# Patient Record
Sex: MALE | Race: WHITE | NOT HISPANIC OR LATINO | Employment: FULL TIME | URBAN - NONMETROPOLITAN AREA
[De-identification: names, ages, dates, MRNs, and addresses within clinical notes are randomized per-mention and may not be internally consistent; named-entity substitution may affect disease eponyms.]

---

## 2018-02-12 ENCOUNTER — OFFICE VISIT (OUTPATIENT)
Dept: FAMILY MEDICINE | Facility: OTHER | Age: 53
End: 2018-02-12
Attending: FAMILY MEDICINE
Payer: COMMERCIAL

## 2018-02-12 VITALS
OXYGEN SATURATION: 97 % | HEART RATE: 76 BPM | SYSTOLIC BLOOD PRESSURE: 116 MMHG | RESPIRATION RATE: 16 BRPM | TEMPERATURE: 98 F | DIASTOLIC BLOOD PRESSURE: 70 MMHG | HEIGHT: 74 IN | BODY MASS INDEX: 32.08 KG/M2 | WEIGHT: 250 LBS

## 2018-02-12 DIAGNOSIS — Z12.5 SCREENING FOR PROSTATE CANCER: ICD-10-CM

## 2018-02-12 DIAGNOSIS — E78.5 HYPERLIPIDEMIA, UNSPECIFIED HYPERLIPIDEMIA TYPE: ICD-10-CM

## 2018-02-12 DIAGNOSIS — K04.7 DENTAL INFECTION: ICD-10-CM

## 2018-02-12 DIAGNOSIS — K08.9 POOR DENTITION: ICD-10-CM

## 2018-02-12 DIAGNOSIS — Z13.220 LIPID SCREENING: ICD-10-CM

## 2018-02-12 DIAGNOSIS — F17.200 TOBACCO USE DISORDER: ICD-10-CM

## 2018-02-12 DIAGNOSIS — L72.9 SCALP CYST: ICD-10-CM

## 2018-02-12 DIAGNOSIS — Z76.89 ENCOUNTER TO ESTABLISH CARE WITH NEW DOCTOR: Primary | ICD-10-CM

## 2018-02-12 DIAGNOSIS — R22.1 MASS OF RIGHT SIDE OF NECK: ICD-10-CM

## 2018-02-12 DIAGNOSIS — E11.65 POORLY CONTROLLED TYPE 2 DIABETES MELLITUS (H): ICD-10-CM

## 2018-02-12 LAB
ALBUMIN SERPL-MCNC: 3.9 G/DL (ref 3.4–5)
ALP SERPL-CCNC: 65 U/L (ref 40–150)
ALT SERPL W P-5'-P-CCNC: 47 U/L (ref 0–70)
ANION GAP SERPL CALCULATED.3IONS-SCNC: 5 MMOL/L (ref 3–14)
AST SERPL W P-5'-P-CCNC: 27 U/L (ref 0–45)
BASOPHILS # BLD AUTO: 0.1 10E9/L (ref 0–0.2)
BASOPHILS NFR BLD AUTO: 1.2 %
BILIRUB SERPL-MCNC: 0.4 MG/DL (ref 0.2–1.3)
BUN SERPL-MCNC: 16 MG/DL (ref 7–30)
CALCIUM SERPL-MCNC: 8.3 MG/DL (ref 8.5–10.1)
CHLORIDE SERPL-SCNC: 106 MMOL/L (ref 94–109)
CHOLEST SERPL-MCNC: 208 MG/DL
CO2 SERPL-SCNC: 28 MMOL/L (ref 20–32)
CREAT SERPL-MCNC: 1.01 MG/DL (ref 0.66–1.25)
CREAT UR-MCNC: 305 MG/DL
DIFFERENTIAL METHOD BLD: NORMAL
EOSINOPHIL # BLD AUTO: 0.3 10E9/L (ref 0–0.7)
EOSINOPHIL NFR BLD AUTO: 4.2 %
ERYTHROCYTE [DISTWIDTH] IN BLOOD BY AUTOMATED COUNT: 12.8 % (ref 10–15)
EST. AVERAGE GLUCOSE BLD GHB EST-MCNC: 151 MG/DL
GFR SERPL CREATININE-BSD FRML MDRD: 78 ML/MIN/1.7M2
GLUCOSE SERPL-MCNC: 116 MG/DL (ref 70–99)
HBA1C MFR BLD: 6.9 % (ref 4.3–6)
HCT VFR BLD AUTO: 42 % (ref 40–53)
HDLC SERPL-MCNC: 35 MG/DL
HGB BLD-MCNC: 14.4 G/DL (ref 13.3–17.7)
IMM GRANULOCYTES # BLD: 0 10E9/L (ref 0–0.4)
IMM GRANULOCYTES NFR BLD: 0.4 %
LDLC SERPL CALC-MCNC: 110 MG/DL
LYMPHOCYTES # BLD AUTO: 2.1 10E9/L (ref 0.8–5.3)
LYMPHOCYTES NFR BLD AUTO: 30.6 %
MCH RBC QN AUTO: 29 PG (ref 26.5–33)
MCHC RBC AUTO-ENTMCNC: 34.3 G/DL (ref 31.5–36.5)
MCV RBC AUTO: 85 FL (ref 78–100)
MICROALBUMIN UR-MCNC: 48 MG/L
MICROALBUMIN/CREAT UR: 15.74 MG/G CR (ref 0–17)
MONOCYTES # BLD AUTO: 0.7 10E9/L (ref 0–1.3)
MONOCYTES NFR BLD AUTO: 9.8 %
NEUTROPHILS # BLD AUTO: 3.7 10E9/L (ref 1.6–8.3)
NEUTROPHILS NFR BLD AUTO: 53.8 %
NONHDLC SERPL-MCNC: 173 MG/DL
NRBC # BLD AUTO: 0 10*3/UL
NRBC BLD AUTO-RTO: 0 /100
PLATELET # BLD AUTO: 244 10E9/L (ref 150–450)
POTASSIUM SERPL-SCNC: 4 MMOL/L (ref 3.4–5.3)
PROT SERPL-MCNC: 8.2 G/DL (ref 6.8–8.8)
PSA SERPL-ACNC: 0.55 UG/L (ref 0–4)
RBC # BLD AUTO: 4.96 10E12/L (ref 4.4–5.9)
SODIUM SERPL-SCNC: 139 MMOL/L (ref 133–144)
TRIGL SERPL-MCNC: 313 MG/DL
TSH SERPL DL<=0.005 MIU/L-ACNC: 2.7 MU/L (ref 0.4–4)
WBC # BLD AUTO: 6.9 10E9/L (ref 4–11)

## 2018-02-12 PROCEDURE — 80061 LIPID PANEL: CPT | Performed by: FAMILY MEDICINE

## 2018-02-12 PROCEDURE — 82043 UR ALBUMIN QUANTITATIVE: CPT | Performed by: FAMILY MEDICINE

## 2018-02-12 PROCEDURE — 99204 OFFICE O/P NEW MOD 45 MIN: CPT | Performed by: FAMILY MEDICINE

## 2018-02-12 PROCEDURE — 36415 COLL VENOUS BLD VENIPUNCTURE: CPT | Performed by: FAMILY MEDICINE

## 2018-02-12 PROCEDURE — 83036 HEMOGLOBIN GLYCOSYLATED A1C: CPT | Performed by: FAMILY MEDICINE

## 2018-02-12 PROCEDURE — G0103 PSA SCREENING: HCPCS | Performed by: FAMILY MEDICINE

## 2018-02-12 PROCEDURE — 80050 GENERAL HEALTH PANEL: CPT | Performed by: FAMILY MEDICINE

## 2018-02-12 RX ORDER — CLINDAMYCIN HCL 300 MG
300 CAPSULE ORAL 3 TIMES DAILY
Qty: 21 CAPSULE | Refills: 0 | Status: SHIPPED | OUTPATIENT
Start: 2018-02-12 | End: 2018-02-19

## 2018-02-12 RX ORDER — ATORVASTATIN CALCIUM 20 MG/1
20 TABLET, FILM COATED ORAL DAILY
Qty: 90 TABLET | Refills: 3 | Status: SHIPPED | OUTPATIENT
Start: 2018-02-12 | End: 2018-12-07

## 2018-02-12 ASSESSMENT — PAIN SCALES - GENERAL: PAINLEVEL: NO PAIN (0)

## 2018-02-12 NOTE — PROGRESS NOTES
SUBJECTIVE:  Atul is a 52 year old male who comes in today for establish care visit.  He has not had medical care for over 10 years.      Diabetes - history of diabetes, diagnosed many years ago, around age 42.  Dad has diabetes.  Patient was having dry mouth, other symptoms, and went to be screened.  Glucose was 600.  Was treated with Metformin.  Made lifestyle changes, lost weight, and was able to stop the Metformin.  Now, quit smoking in September and has gained 30 pounds since.  Blood sugars are coming up again, 220 in the morning, 300 at times.  Has noted some tingling in the toes.      Scalp cyst - present for years, enlarging, more noticeable with hair loss.  Would like it excised.    Dental infection - has appointment with dentist next month.  Notes lack of care over years, dental pain, and now swelling in neck/jaw region.  No fevers.      Current Outpatient Prescriptions   Medication     clindamycin (CLEOCIN) 300 MG capsule     No current facility-administered medications for this visit.         Allergies   Allergen Reactions     Animal Dander      Nasal congestion     Dust Mites      Nasal congestion       Past Medical History:   Diagnosis Date     Congenital hiatus hernia      Diabetes (H)      History reviewed. No pertinent surgical history.  Social History     Social History     Marital status:      Spouse name: N/A     Number of children: N/A     Years of education: N/A     Occupational History     Not on file.     Social History Main Topics     Smoking status: Former Smoker     Quit date: 9/12/2017     Smokeless tobacco: Never Used     Alcohol use Not on file     Drug use: Not on file     Sexual activity: Not on file     Other Topics Concern     Not on file     Social History Narrative     Family History   Problem Relation Age of Onset     DIABETES Father      CEREBROVASCULAR DISEASE Father        ROS:  General: positive for weight gain, negative for, fever, chills  Skin: negative for,  "rash  Eyes: negative for, visual blurring, double vision  ENT: positive for as above, swollen glands, dental pain, infection, negative for, sore throat  Resp: No shortness of breath and No cough  CV: negative for, palpitations, chest pain, lower extremity edema and syncope or near-syncope  GI: poor appetite, nausea, vomiting, abdominal pain, melena, hematochezia, constipation and diarrhea  : negative for, dysuria and hematuria  Neurologic: positive for numbness or tingling of feet, negative for, local weakness and numbness or tingling of hands  Psychiatric: negative for, anxiety and depression  Endocrine: positive for as above and diabetes  Patient declined JOSELINE/PHQ.    OBJECTIVE:  Vitals:    02/12/18 0936   BP: 116/70   BP Location: Left arm   Patient Position: Sitting   Cuff Size: Adult Large   Pulse: 76   Resp: 16   Temp: 98  F (36.7  C)   TempSrc: Tympanic   SpO2: 97%   Weight: 250 lb (113.4 kg)   Height: 6' 2\" (1.88 m)     GENERAL APPEARANCE: alert, no distress, cooperative and over weight  NECK: right anterior cervical vs submandibular mass, soft, fairly well circumscribed; non-tender  RESP: lungs clear to auscultation - no rales, rhonchi or wheezes  CV: regular rates and rhythm, normal S1 S2, no S3 or S4 and no murmur, click or rub -  ABDOMEN:  soft, nontender, no HSM or masses and bowel sounds normal  MS: extremities normal- no gross deformities noted and peripheral pulses diminished; normal monofilament exam  SKIN: no suspicious lesions or rashes  NEURO: Normal strength and tone, sensory exam grossly normal, mentation intact and speech normal  PSYCH: mentation appears normal. and affect normal/bright    ASSESSMENT/ORDERS:    ICD-10-CM    1. Encounter to establish care with new doctor Z76.89    2. Tobacco use disorder F17.200    3. Poorly controlled type 2 diabetes mellitus (H) E11.65 DIABETES EDUCATION REFERRAL ([place])     OPHTHALMOLOGY ADULT REFERRAL     Lipid Profile (Chol, Trig, HDL, LDL calc)     " Hemoglobin A1c     CBC with platelets and differential     Comprehensive metabolic panel     TSH with free T4 reflex     Albumin Random Urine Quantitative with Creat Ratio   4. Lipid screening Z13.220    5. Screening for prostate cancer Z12.5 PSA, screen   6. Poor dentition K08.9    7. Dental infection K04.7 clindamycin (CLEOCIN) 300 MG capsule     CBC with platelets and differential   8. Scalp cyst L72.9 GENERAL SURG ADULT REFERRAL   9. Mass of right side of neck R22.1 CBC with platelets and differential     PLAN:  New patient today.  Lots of things to get updated on regarding health care maintenance.  Patient prefers to address diabetes today and follow up.  See below.    Patient Instructions   Will call with lab results.    Can then restart diabetic medications.    Referral to DR[facility] and for annual eye exam.  Foot exam done today.  Declined vaccines - pneumonia and flu.  Tetanus up to date.  Complete antibiotic course for dental infection.  Keep follow up dental exam next month.  If right neck mass does not resolve with treatment, needs follow up - possible CT of neck and/or ENT consultation.    Colon screen declined - stool guaiac or colonoscopy.  Referral to surgeon for scalp cyst excision.  Close follow up.          Zoraida Bahena

## 2018-02-12 NOTE — NURSING NOTE
"Chief Complaint   Patient presents with     Diabetes     Took metformin in the past and ended up not needing it but thinks he may need it now.  Patient reports blood sugars are a littler higher these days.     Dental Problem     States he doesn't really have any pain but a great deal of swelling in his mouth-R side.  Requesting an antibiotic.       Initial /70 (BP Location: Left arm, Patient Position: Sitting, Cuff Size: Adult Large)  Pulse 76  Temp 98  F (36.7  C) (Tympanic)  Resp 16  Ht 6' 2\" (1.88 m)  Wt 250 lb (113.4 kg)  SpO2 97%  BMI 32.1 kg/m2 Estimated body mass index is 32.1 kg/(m^2) as calculated from the following:    Height as of this encounter: 6' 2\" (1.88 m).    Weight as of this encounter: 250 lb (113.4 kg).  Medication Reconciliation: complete     Cynthia Maxwell      "

## 2018-02-12 NOTE — MR AVS SNAPSHOT
After Visit Summary   2/12/2018    Atul Lam    MRN: [id number]           Patient Information     Date Of Birth          1965        Visit Information        Provider Department      2/12/2018 9:30 AM Zoraida Pham MD [facility]        Today's Diagnoses     Encounter to establish care with new doctor    -  1    Tobacco use disorder        Poorly controlled type 2 diabetes mellitus (H)        Lipid screening        Screening for prostate cancer        Poor dentition        Dental infection        Scalp cyst        Mass of right side of neck          Care Instructions    Will call with lab results.    Can then restart diabetic medications.    Referral to DRC and for annual eye exam.  Foot exam done today.  Declined vaccines - pneumonia and flu.  Tetanus up to date.  Complete antibiotic course for dental infection.  Keep follow up dental exam next month.  If right neck mass does not resolve with treatment, needs follow up - possible CT of neck and/or ENT consultation.    Colon screen declined - stool guaiac or colonoscopy.  Referral to surgeon for scalp cyst excision.  Close follow up.            Follow-ups after your visit        Additional Services     DIABETES EDUCATION REFERRAL ([place])       DIABETES SELF-MANAGEMENT TRAINING (DSMT)  Type of training and number of hours requested:  Initial Group DMST; 10    (Medicare will cover:10 hours initial DSMT in 12-month period, plus 2 hours follow-up DSMT annually)    Please add if the patient has special educational need: None  (Patients with special needs requiring individual DSMT)    Please include the following DMST content: All ten content areas, as appropriate    Patient has the following:none    Please begin Medical Nutritional Therapy.  Initial Medical Nutritional Therapy (MNT)  (Medcare allows 3 hours initial MNT in the first calendar year, plus two hours follow up MNT annually.  Additional MNT hours are available for  change in medical condition treatment and/or diagnosis)    Additional Services Provided:  >>A1c will be completed upon referral and completion of program unless completed in clinic.  >>Influenza vaccination assessment (form #N228) as applicable.  >>Order for diabetes supplies will be faxed to patient's pharmacy.  >>If on insulin: Insulin dose adjustment per staged Diabetes Mgmt. Protocols    DIABETES RESOURCE CENTER  [facility]  Telephone:  [phone]   Fax:  [phone]            GENERAL SURG ADULT REFERRAL       Your provider has referred you to: N: [facility] [phone]   [email or web address]    Please be aware that coverage of these services is subject to the terms and limitations of your health insurance plan.  Call member services at your health plan with any benefit or coverage questions.      Please bring the following with you to your appointment:    (1) Any X-Rays, CTs or MRIs which have been performed.  Contact the facility where they were done to arrange for  prior to your scheduled appointment.   (2) List of current medications   (3) This referral request   (4) Any documents/labs given to you for this referral            OPHTHALMOLOGY ADULT REFERRAL       Your provider has referred you to: [facility]    Please be aware that coverage of these services is subject to the terms and limitations of your health insurance plan.  Call member services at your health plan with any benefit or coverage questions.      Please bring the following with you to your appointment:    (1) Any X-Rays, CTs or MRIs which have been performed.  Contact the facility where they were done to arrange for  prior to your scheduled appointment.    (2) List of current medications  (3) This referral request   (4) Any documents/labs given to you for this referral                  Who to contact     If you have questions  "or need follow up information about today's clinic visit or your schedule please contact [facility] directly at [phone].  Normal or non-critical lab and imaging results will be communicated to you by Ingeniatricshart, letter or phone within 4 business days after the clinic has received the results. If you do not hear from us within 7 days, please contact the clinic through Ingeniatricshart or phone. If you have a critical or abnormal lab result, we will notify you by phone as soon as possible.  Submit refill requests through Onepager or call your pharmacy and they will forward the refill request to us. Please allow 3 business days for your refill to be completed.          Additional Information About Your Visit        IngeniatricsharPortal Profes Information     Onepager lets you send messages to your doctor, view your test results, renew your prescriptions, schedule appointments and more. To sign up, go to [email or web address] . Click on \"Log in\" on the left side of the screen, which will take you to the Welcome page. Then click on \"Sign up Now\" on the right side of the page.     You will be asked to enter the access code listed below, as well as some personal information. Please follow the directions to create your username and password.     Your access code is: [email or web address]  Expires: 2018 10:24 AM     Your access code will  in 90 days. If you need help or a new code, please call your [facility] clinic or [phone].        Care EveryWhere ID     This is your Care EveryWhere ID. This could be used by other organizations to access your [facility] medical records  [id number]        Your Vitals Were     Pulse Temperature Respirations Height Pulse Oximetry BMI (Body Mass Index)    76 98  F (36.7  C) (Tympanic) 16 6' 2\" (1.88 m) 97% 32.1 kg/m2       Blood Pressure from Last 3 Encounters:   18 116/70    Weight from Last 3 Encounters:   18 250 lb (113.4 kg)              We Performed the Following     Albumin Random " Urine Quantitative with Creat Ratio     CBC with platelets and differential     Comprehensive metabolic panel     DIABETES EDUCATION REFERRAL ([place])     GENERAL SURG ADULT REFERRAL     Hemoglobin A1c     Lipid Profile (Chol, Trig, HDL, LDL calc)     OPHTHALMOLOGY ADULT REFERRAL     PSA, screen     TSH with free T4 reflex          Today's Medication Changes          These changes are accurate as of 2/12/18 10:24 AM.  If you have any questions, ask your nurse or doctor.               Start taking these medicines.        Dose/Directions    clindamycin 300 MG capsule   Commonly known as:  CLEOCIN   Used for:  Dental infection   Started by:  Zoraida Pham MD        Dose:  300 mg   Take 1 capsule (300 mg) by mouth 3 times daily for 7 days   Quantity:  21 capsule   Refills:  0            Where to get your medicines      These medications were sent to [id number] - [place], [place] - [address]   [id number] , [place] [address]     Phone:  [phone]     clindamycin 300 MG capsule                Primary Care Provider Office Phone # Fax #    [facility] [phone] [address] [facility] [address]        Equal Access to Services     [facility] AH: Hadii aad ku hadasho Soomaali, waaxda luqadaha, qaybta kaalmada adeegyada, waxay idiin hayaan karely richardson . So [facility] [phone].    ATENCIÓN: Si habla español, tiene a saunders disposición servicios gratuitos de asistencia lingüística. Llame al [phone].    We comply with applicable federal civil rights laws and [place] laws. We do not discriminate on the basis of race, color, national origin, age, disability, sex, sexual orientation, or gender identity.            Thank you!     Thank you for choosing [facility]  for your care. Our goal is always to provide you with excellent care. Hearing back from our patients is one way we can continue to improve our services. Please take a few minutes to complete the  written survey that you may receive in the mail after your visit with us. Thank you!             Your Updated Medication List - Protect others around you: Learn how to safely use, store and throw away your medicines at www.disposemymeds.org.          This list is accurate as of 2/12/18 10:24 AM.  Always use your most recent med list.                   Brand Name Dispense Instructions for use Diagnosis    clindamycin 300 MG capsule    CLEOCIN    21 capsule    Take 1 capsule (300 mg) by mouth 3 times daily for 7 days    Dental infection

## 2018-02-12 NOTE — PATIENT INSTRUCTIONS
Will call with lab results.    Can then restart diabetic medications.    Referral to DRC and for annual eye exam.  Foot exam done today.  Declined vaccines - pneumonia and flu.  Tetanus up to date.  Complete antibiotic course for dental infection.  Keep follow up dental exam next month.  If right neck mass does not resolve with treatment, needs follow up - possible CT of neck and/or ENT consultation.    Colon screen declined - stool guaiac or colonoscopy.  Referral to surgeon for scalp cyst excision.  Close follow up.

## 2018-02-13 ENCOUNTER — HOSPITAL ENCOUNTER (OUTPATIENT)
Dept: EDUCATION SERVICES | Facility: HOSPITAL | Age: 53
Discharge: HOME | End: 2018-02-13
Attending: FAMILY MEDICINE | Admitting: FAMILY MEDICINE
Payer: COMMERCIAL

## 2018-02-13 ENCOUNTER — TELEPHONE (OUTPATIENT)
Dept: EDUCATION SERVICES | Facility: HOSPITAL | Age: 53
End: 2018-02-13

## 2018-02-13 VITALS
WEIGHT: 250.9 LBS | DIASTOLIC BLOOD PRESSURE: 82 MMHG | OXYGEN SATURATION: 98 % | TEMPERATURE: 96.3 F | HEART RATE: 83 BPM | BODY MASS INDEX: 32.21 KG/M2 | SYSTOLIC BLOOD PRESSURE: 129 MMHG

## 2018-02-13 DIAGNOSIS — E11.9 TYPE 2 DIABETES MELLITUS WITHOUT COMPLICATION, WITHOUT LONG-TERM CURRENT USE OF INSULIN (H): Primary | ICD-10-CM

## 2018-02-13 PROCEDURE — 97802 MEDICAL NUTRITION INDIV IN: CPT | Performed by: DIETITIAN, REGISTERED

## 2018-02-13 RX ORDER — METFORMIN HCL 500 MG
500 TABLET, EXTENDED RELEASE 24 HR ORAL 2 TIMES DAILY WITH MEALS
Qty: 60 TABLET | Refills: 3 | Status: SHIPPED | OUTPATIENT
Start: 2018-02-13 | End: 2018-04-04

## 2018-02-13 ASSESSMENT — ANXIETY QUESTIONNAIRES
2. NOT BEING ABLE TO STOP OR CONTROL WORRYING: NOT AT ALL
3. WORRYING TOO MUCH ABOUT DIFFERENT THINGS: NOT AT ALL
1. FEELING NERVOUS, ANXIOUS, OR ON EDGE: NOT AT ALL
IF YOU CHECKED OFF ANY PROBLEMS ON THIS QUESTIONNAIRE, HOW DIFFICULT HAVE THESE PROBLEMS MADE IT FOR YOU TO DO YOUR WORK, TAKE CARE OF THINGS AT HOME, OR GET ALONG WITH OTHER PEOPLE: NOT DIFFICULT AT ALL
7. FEELING AFRAID AS IF SOMETHING AWFUL MIGHT HAPPEN: NOT AT ALL
5. BEING SO RESTLESS THAT IT IS HARD TO SIT STILL: NOT AT ALL
6. BECOMING EASILY ANNOYED OR IRRITABLE: NOT AT ALL
GAD7 TOTAL SCORE: 0

## 2018-02-13 ASSESSMENT — PAIN SCALES - GENERAL: PAINLEVEL: NO PAIN (0)

## 2018-02-13 ASSESSMENT — PATIENT HEALTH QUESTIONNAIRE - PHQ9: 5. POOR APPETITE OR OVEREATING: NOT AT ALL

## 2018-02-13 NOTE — PROGRESS NOTES
Patient here for annual review.  Pt was dx in approximately .  He has had no insurance recently so has been using a meter with  test strips and has been taking some old Metformin he had left over randomly.  His wife is here with him today and she appears supportive.      BG readings as follows: Pt has not been testing consistently and strips are .      Lab Results   Component Value Date    A1C 6.9 2018     Blood pressure 129/82, pulse 83, temperature 96.3  F (35.7  C), temperature source Tympanic, weight 113.8 kg (250 lb 14.4 oz), SpO2 98 %.  Pt states weight is up 30# since he quit smoking in 2017.      Current diabetes medications: none - taking some old Metformin he had inconsistently.      Readiness to learn: willing.     Barriers to learning: none.      Pt works 12-14 hours/day 5 days per week as a .  No routine exercise.  Verbal diet recall obtained.    Breakfast-3 peanut butter toast with coffee/vanilla creamer or 3 waffles with sugar free syrup  Pt packs 2 sandwiches and 2 pieces of fruit along with flavored water which he eats throughout the day.    Pt gets home at midnight and this is when he eats many snacks - ice cream, chocolate.     Provided pt with written educational materials regarding carbohydrate counting.  Reviewed carbohydrate limits, portion control, label reading.  Encouraged pt to try to consume foods with minimal carbohydrates when he gets home late at night and goes to bed shortly after eating.      Patient actively participated and demonstrated adequate understanding of topics discussed.     Topics covered: reviewed blood glucose/A1c targets, testing times, problem solving techniques, discussed risks and reduction of complications related to diabetes and co-morbidities, reviewed diabetes check-list, foot care, sick day management, stress & depression risks and management, managing a chronic disease, quality check of meter, review medications,  meal planning, benefits of exercise and importance of diabetes self-management.     Eye exam is due.  Pt encouraged to schedule.  Foot exam 2/12/2018.  Pt stopped smoking in September 2017.      Supplies for pt's current meter are not covered.  Provided pt with a new [employer] Contour Next EZ meter and instructed him on use.  Pt was able to perform test with minimal cueing.  Glucose level was 155 - 1.25 hours post prandial.      Plan: Follow carbohydrate counting meal plan provided.  Try to get some routine exercise.  Test glucose 2x/day - fasting and 2 hours after a meal.  Request sent to provider to begin Metformin  mg and titrate up as needed.      Follow up: 1 month.      Time spent with patient: 45 minutes.      HUMA Marino, CDE

## 2018-02-13 NOTE — DISCHARGE INSTRUCTIONS
-Follow carbohydrate counting meal plan.  Try to limit carbohydrates before bed.    -Be as active as you are able.  Exercise goal is 30 minutes most days of the week.   -Test your glucose 2x/day - fasting and 2 hours after a meal.   -Target levels are fasting , 2 hours after a meal less than 180.   -I will call you regarding Metformin.   -Weight today was 250.4#.  Optimal weight loss is 1# per week.   -Follow up in 1 month.  Bring your meter.   -Call with any questions.  HUMA Marino, [phone].

## 2018-02-13 NOTE — TELEPHONE ENCOUNTER
Pt was here today for diabetes visit.  He has been taking some old Metformin he had randomly.  Okay to begin Metformin  mg and titrate dose as needed?  Thanks!

## 2018-02-13 NOTE — TELEPHONE ENCOUNTER
Placed order for Metformin ER to patient's pharmacy.  Pt will begin 500 mg with dinner x 1 week and then increase to 500 mg bid on week 2.   I will call him after week two and we will titrate dose further if needed.

## 2018-02-13 NOTE — NURSING NOTE
"Chief Complaint   Patient presents with     Diabetes     annual review        Initial /82 (BP Location: Left arm, Patient Position: Chair, Cuff Size: Adult Large)  Pulse 83  Temp 96.3  F (35.7  C) (Tympanic)  Wt 113.8 kg (250 lb 14.4 oz)  SpO2 98%  BMI 32.21 kg/m2 Estimated body mass index is 32.21 kg/(m^2) as calculated from the following:    Height as of 2/12/18: 1.88 m (6' 2\").    Weight as of this encounter: 113.8 kg (250 lb 14.4 oz).  Medication Reconciliation: complete   Irasema Moody CMA(AAMA)     "

## 2018-02-13 NOTE — IP AVS SNAPSHOT
MRN:[id number]                      After Visit Summary   2/13/2018    Atul Lam    MRN: [id number]           Thank you!     Thank you for choosing [facility] for your care. Our goal is always to provide you with excellent care. Hearing back from our patients is one way we can continue to improve our services. Please take a few minutes to complete the written survey that you may receive in the mail after you visit with us. Thank you!        Patient Information     Date Of Birth          1965        About your hospital stay     You were admitted on:  February 13, 2018 You last received care in the:  [facility]    You were discharged on:  February 13, 2018       Who to Call     For medical emergencies, please call 911.  For non-urgent questions about your medical care, please call your primary care provider or clinic, [phone]          Attending Provider     Provider Specialty    Zoraida Pham MD Family Practice       Primary Care Provider Office Phone # Fax #    Sam Michaels [phone]      Further instructions from your care team       -Follow carbohydrate counting meal plan.  Try to limit carbohydrates before bed.    -Be as active as you are able.  Exercise goal is 30 minutes most days of the week.   -Test your glucose 2x/day - fasting and 2 hours after a meal.   -Target levels are fasting , 2 hours after a meal less than 180.   -I will call you regarding Metformin.   -Weight today was 250.4#.  Optimal weight loss is 1# per week.   -Follow up in 1 month.  Bring your meter.   -Call with any questions.  HUMA Marino, E [phone].     Pending Results     No orders found from 2/11/2018 to 2/14/2018.            Admission Information     Date & Time Provider Department Dept. Phone    2/13/2018 Zoraida Pham MD [facility] [phone]      Your Vitals Were     Blood Pressure Pulse Temperature    129/82 (BP Location:  "Left arm, Patient Position: Chair, Cuff Size: Adult Large) 83 96.3  F (35.7  C) (Tympanic)    Weight Pulse Oximetry BMI (Body Mass Index)    113.8 kg (250 lb 14.4 oz) 98% 32.21 kg/m2      [employer] Information     [employer] lets you send messages to your doctor, view your test results, renew your prescriptions, schedule appointments and more. To sign up, go to [email or web address] . Click on \"Log in\" on the left side of the screen, which will take you to the Welcome page. Then click on \"Sign up Now\" on the right side of the page.     You will be asked to enter the access code listed below, as well as some personal information. Please follow the directions to create your username and password.     Your access code is: [email or web address]  Expires: 2018 10:24 AM     Your access code will  in 90 days. If you need help or a new code, please call your [facility] clinic or [phone].        Care EveryWhere ID     This is your Care EveryWhere ID. This could be used by other organizations to access your [facility] medical records  [id number]        Equal Access to Services     JOSE EVANS AH: Vi Olivares, drew mas, qajosafat kaalmada ira, jennifer st. So [facility] [phone].    ATENCIÓN: Si habla español, tiene a saunders disposición servicios gratuitos de asistencia lingüística. Llame al [phone].    We comply with applicable federal civil rights laws and [place] laws. We do not discriminate on the basis of race, color, national origin, age, disability, sex, sexual orientation, or gender identity.               Review of your medicines      UNREVIEWED medicines. Ask your doctor about these medicines        Dose / Directions    atorvastatin 20 MG tablet   Commonly known as:  LIPITOR   Used for:  Hyperlipidemia, unspecified hyperlipidemia type, Poorly controlled type 2 diabetes mellitus (H)        Dose:  20 mg   Take 1 tablet (20 mg) by mouth daily   Quantity:  90 " tablet   Refills:  3       clindamycin 300 MG capsule   Commonly known as:  CLEOCIN   Used for:  Dental infection        Dose:  300 mg   Take 1 capsule (300 mg) by mouth 3 times daily for 7 days   Quantity:  21 capsule   Refills:  0                Protect others around you: Learn how to safely use, store and throw away your medicines at www.disposemymeds.org.             Medication List: This is a list of all your medications and when to take them. Check marks below indicate your daily home schedule. Keep this list as a reference.      Medications           Morning Afternoon Evening Bedtime As Needed    atorvastatin 20 MG tablet   Commonly known as:  LIPITOR   Take 1 tablet (20 mg) by mouth daily                                clindamycin 300 MG capsule   Commonly known as:  CLEOCIN   Take 1 capsule (300 mg) by mouth 3 times daily for 7 days

## 2018-02-13 NOTE — IP AVS SNAPSHOT
[facility]    [address] [place] [address]    Phone:  [phone]    Fax:  [phone]                                       After Visit Summary   2/13/2018    Atul Lam    MRN: [id number]           After Visit Summary Signature Page     I have received my discharge instructions, and my questions have been answered. I have discussed any challenges I see with this plan with the nurse or doctor.    ..........................................................................................................................................  Patient/Patient Representative Signature      ..........................................................................................................................................  Patient Representative Print Name and Relationship to Patient    ..................................................               ................................................  Date                                            Time    ..........................................................................................................................................  Reviewed by Signature/Title    ...................................................              ..............................................  Date                                                            Time

## 2018-02-14 ASSESSMENT — ANXIETY QUESTIONNAIRES: GAD7 TOTAL SCORE: 0

## 2018-02-14 ASSESSMENT — PATIENT HEALTH QUESTIONNAIRE - PHQ9: SUM OF ALL RESPONSES TO PHQ QUESTIONS 1-9: 0

## 2018-03-07 ENCOUNTER — OFFICE VISIT (OUTPATIENT)
Dept: SURGERY | Facility: OTHER | Age: 53
End: 2018-03-07
Attending: FAMILY MEDICINE
Payer: COMMERCIAL

## 2018-03-07 VITALS
TEMPERATURE: 97 F | WEIGHT: 250 LBS | RESPIRATION RATE: 18 BRPM | HEIGHT: 74 IN | HEART RATE: 72 BPM | BODY MASS INDEX: 32.08 KG/M2 | DIASTOLIC BLOOD PRESSURE: 64 MMHG | SYSTOLIC BLOOD PRESSURE: 118 MMHG

## 2018-03-07 DIAGNOSIS — R22.1 LOCALIZED SWELLING, MASS AND LUMP, NECK: Primary | ICD-10-CM

## 2018-03-07 DIAGNOSIS — L72.9 SCALP CYST: ICD-10-CM

## 2018-03-07 PROCEDURE — 88305 TISSUE EXAM BY PATHOLOGIST: CPT | Mod: TC | Performed by: SURGERY

## 2018-03-07 PROCEDURE — 11422 EXC H-F-NK-SP B9+MARG 1.1-2: CPT | Performed by: SURGERY

## 2018-03-07 ASSESSMENT — PAIN SCALES - GENERAL: PAINLEVEL: NO PAIN (0)

## 2018-03-07 NOTE — NURSING NOTE
"Chief Complaint   Patient presents with     Consult     Cyst removal from top of scalp.       Initial /64 (BP Location: Right arm, Patient Position: Sitting, Cuff Size: Adult Large)  Pulse 72  Temp 97  F (36.1  C) (Tympanic)  Resp 18  Ht 6' 2\" (1.88 m)  Wt 250 lb (113.4 kg)  BMI 32.1 kg/m2 Estimated body mass index is 32.1 kg/(m^2) as calculated from the following:    Height as of this encounter: 6' 2\" (1.88 m).    Weight as of this encounter: 250 lb (113.4 kg).  Medication Reconciliation: complete   Jessenia Landaverde LPN      "

## 2018-03-07 NOTE — PROGRESS NOTES
"[facility] Surgery Consultation    CC:  Scalp lesion, and right neck swelling     HPI:  This 52 year old year old male is seen at the request of Dr. Pham for evaluation of scalp lesion, he comes in with years of a scalp lesion that will swell occasionally become inflamed and drain. It has become more pronounced since he has lost his hair. He would like it removed.     As a side he has noted right submandibular swelling for the last year. He recently completed a course of antibiotics that he believes helped with the swelling. He denies that it is painful and will get bigger and smaller at times but has never gone away. He used to smoke but quit 6 months ago. Still chews tobacco, has poor dentition planning on having a few teeth removed next month.     Past Medical History:   Diagnosis Date     Congenital hiatus hernia      Diabetes (H)        No past surgical history on file.    Allergies   Allergen Reactions     Animal Dander      Nasal congestion     Dust Mites      Nasal congestion       Current Outpatient Prescriptions   Medication     blood glucose monitoring (BELL CONTOUR NEXT) test strip     blood glucose monitoring (BELL MICROLET) lancets     metFORMIN (GLUCOPHAGE-XR) 500 MG 24 hr tablet     atorvastatin (LIPITOR) 20 MG tablet     No current facility-administered medications for this visit.        HABITS:    Social History   Substance Use Topics     Smoking status: Former Smoker     Quit date: 9/12/2017     Smokeless tobacco: Never Used     Alcohol use Yes       Family History   Problem Relation Age of Onset     DIABETES Father      CEREBROVASCULAR DISEASE Father        REVIEW OF SYSTEMS:  Ten point review of systems negative except those mentioned in the HPI.     OBJECTIVE:    /64 (BP Location: Right arm, Patient Position: Sitting, Cuff Size: Adult Large)  Pulse 72  Temp 97  F (36.1  C) (Tympanic)  Resp 18  Ht 6' 2\" (1.88 m)  Wt 250 lb (113.4 kg)  BMI 32.1 kg/m2    GENERAL: Generally " appears well, in no distress with appropriate affect.  HEENT:   Sclerae anicteric - no visible lesions on tongue base or buccal mucosa, no mass of parotid, right submandibular swelling with a non-tender mass felt, tonsils absent no cervical anterior or posterior adenopathy.   There is a 2 cm scalp lesion mobile non ulcerated.      Respiratory:  No acute distress, no splinting   Cardiovascular:  Regular Rate and Rhythm  :  deferred  Extremities:  Extremities normal. No deformities, edema, or skin discoloration.  Skin:  no suspicious lesions or rashes  Neurological: grossly intact  Psych:  Alert, oriented, affect appropriate with normal decision making ability.    IMPRESSION:    Scalp cyst history and exam consistent with sebaceous cyst.     Right submandibular swelling- somewhat concerning time course for an infectious process, Non tender- will get US of neck and likely biopsy of lesion, differential included chronic inflammation from tooth infection, submandibular tumor, or clogged salivary duct. No masses identified as possible primary if malignancy on oral exam.     Offered to schedule colonoscopy he declined.     PLAN:    Excise head lesion   US with likely biopsy of right submandibular mass.     Jordan Page MD,     3/7/2018  9:47 AM    [facility] Surgery  Minor Procedure Note    Preoperative diagnosis:  Scalp cyst    Postoperative diagnosis:  Same     Procedure:  Excision of scalp cyst    Anesthesia:  Local    History: See above     Findings:  1.5 cm scalp cyst       Tissue to pathology:    See above     Details:  Pre procedure samina was placed and the requisite time out pause observed during which the patient confirmed their identity, date of birth, side and site of the requested excision.  The region was prepped and draped sterilely; local anesthesia was obtained with infiltration of 5 cc of 2% lidocaine.  A small elliptical  incision was made over the presenting portion of the mass and  carried through full thickness skin.  Using combination of blunt and sharp dissection the mass was delivered through the incision.  The wound was closed with interrupted 3-0 nylon sutures. The patient was observed in the treatment room for 15 minutes following the procedure without sequelae.  The procedure was well tolerated and the patient was discharged with wound care instructions and followup appointment.       Jordan Page

## 2018-03-07 NOTE — PATIENT INSTRUCTIONS
Thank you for allowing Dr. Page and our surgical team to participate in your care.  If you have a scheduling or an appointment question please contact Li our Health Unit Coordinator at her direct line [phone].   ALL nursing questions or concerns can be directed to your surgical nurse at: [phone] for Ying.      POST PROCEDURE INSTRUCTIONS    Leave your dressing on for 48 hours, remove dressing    Do not soak and scrub the area. Let the soapy water run over the area.     Keep incision clean and dry   Do NOT soak in water such as a tub bath or swimming   Do NOT put make-up, deodorant, powders, hairspray, lotions, etc on the incision    Cover with a clean dressing daily or when wet/soiled    If you have steri-strips, these will fall off on their own in 7 days. If they are still adhered after 7 days, you may remove them by pulling gently.     Do NOT use aspirin/NSAIDS (Motrin, Ibuprofen, Aleve, etc..) for 7 days    You can use acetaminophen(Tylenol) or the prescription you received for pain.       If you have any bleeding, cover the wound with clean gauze and hold pressure for 10 Minutes. If the bleeding does not stop or is heavy and profuse, call the clinic or go to the Urgent Care/Emergency Department.      SIGNS OF INFECTION ARE:    Redness, swelling, red streaks, pus, drainage, warmth, fever, increased pain, foul smell.     Contact your health care provider if you notice any of the warning signs.     FOLLOW - UP    Follow-up in clinic with Dr. Page on 03/16/2018 for suture removal.     Pathology results will also be discussed at that time.

## 2018-03-07 NOTE — MR AVS SNAPSHOT
After Visit Summary   3/7/2018    Atul Lam    MRN: [id number]           Patient Information     Date Of Birth          1965        Visit Information        Provider Department      3/7/2018 9:00 AM Jordan Page MD [facility]        Today's Diagnoses     Scalp cyst          Care Instructions    Thank you for allowing Dr. Page and our surgical team to participate in your care.  If you have a scheduling or an appointment question please contact Li [facility] Health Unit Coordinator at her direct line [phone].   ALL nursing questions or concerns can be directed to your surgical nurse at: [phone] for Ying.      POST PROCEDURE INSTRUCTIONS    Leave your dressing on for 48 hours, remove dressing    Do not soak and scrub the area. Let the soapy water run over the area.     Keep incision clean and dry   Do NOT soak in water such as a tub bath or swimming   Do NOT put make-up, deodorant, powders, hairspray, lotions, etc on the incision    Cover with a clean dressing daily or when wet/soiled    If you have steri-strips, these will fall off on their own in 7 days. If they are still adhered after 7 days, you may remove them by pulling gently.     Do NOT use aspirin/NSAIDS (Motrin, Ibuprofen, Aleve, etc..) for 7 days    You can use acetaminophen(Tylenol) or the prescription you received for pain.       If you have any bleeding, cover the wound with clean gauze and hold pressure for 10 Minutes. If the bleeding does not stop or is heavy and profuse, call the clinic or go to the Urgent Care/Emergency Department.      SIGNS OF INFECTION ARE:    Redness, swelling, red streaks, pus, drainage, warmth, fever, increased pain, foul smell.     Contact your health care provider if you notice any of the warning signs.     FOLLOW - UP    Follow-up in clinic with Dr. Page on 03/16/2018 for suture removal.     Pathology results will also be discussed at that time.             Follow-ups after  "your visit        Your next 10 appointments already scheduled     Mar 16, 2018  2:00 PM CDT   (Arrive by 1:45 PM)   Nurse Only with HC SURG NURSE   [facility] ([facility] )    [address] [address] [phone]            Mar 16, 2018  2:30 PM CDT   (Arrive by 2:15 PM)   Return Visit with Chica Guzman RD   [facility] ([facility] )    [address] [place] [id number] [phone]              Who to contact     If you have questions or need follow up information about today's clinic visit or your schedule please contact [facility] directly at [phone].  Normal or non-critical lab and imaging results will be communicated to you by [employer], letter or phone within 4 business days after the clinic has received the results. If you do not hear from us within 7 days, please contact the clinic through MyChart or phone. If you have a critical or abnormal lab result, we will notify you by phone as soon as possible.  Submit refill requests through [facility] or call your pharmacy and they will forward the refill request to us. Please allow 3 business days for your refill to be completed.          Additional Information About Your Visit        [employer] Information     [facility] lets you send messages to your doctor, view your test results, renew your prescriptions, schedule appointments and more. To sign up, go to [email or web address] . Click on \"Log in\" on the left side of the screen, which will take you to the Welcome page. Then click on \"Sign up Now\" on the right side of the page.     You will be asked to enter the access code listed below, as well as some personal information. Please follow the directions to create your username and password.     Your access code is: [email or web address]  Expires: 2018 10:24 AM     Your access code will  in 90 days. If you need help or a new code, please call your [facility] or " "[phone].        Care EveryWhere ID     This is your Care EveryWhere ID. This could be used by other organizations to access your [facility] medical records  [id number]        Your Vitals Were     Pulse Temperature Respirations Height BMI (Body Mass Index)       72 97  F (36.1  C) (Tympanic) 18 6' 2\" (1.88 m) 32.1 kg/m2        Blood Pressure from Last 3 Encounters:   03/07/18 118/64   02/13/18 129/82   02/12/18 116/70    Weight from Last 3 Encounters:   03/07/18 250 lb (113.4 kg)   02/13/18 250 lb 14.4 oz (113.8 kg)   02/12/18 250 lb (113.4 kg)              We Performed the Following     Surgical pathology exam        Primary Care Provider Office Phone # Fax #    Zoraida Pham [phone] [address]  [facility] [address]        Equal Access to Services     [facility]: Hadii aad ku hadasho Soomaali, waaxda luqadaha, qaybta kaalmada adeegyada, waxay idiin hayambikan karely richardson . So [facility] [phone].    ATENCIÓN: Si habla español, tiene a saunders disposición servicios gratuitos de asistencia lingüística. Llame al [phone].    We comply with applicable federal civil rights laws and [place] laws. We do not discriminate on the basis of race, color, national origin, age, disability, sex, sexual orientation, or gender identity.            Thank you!     Thank you for choosing [facility]  for your care. Our goal is always to provide you with excellent care. Hearing back from our patients is one way we can continue to improve our services. Please take a few minutes to complete the written survey that you may receive in the mail after your visit with us. Thank you!             Your Updated Medication List - Protect others around you: Learn how to safely use, store and throw away your medicines at www.disposemymeds.org.          This list is accurate as of 3/7/18  9:26 AM.  Always use your most recent med list.                   Brand Name Dispense Instructions for " use Diagnosis    atorvastatin 20 MG tablet    LIPITOR    90 tablet    Take 1 tablet (20 mg) by mouth daily    Hyperlipidemia, unspecified hyperlipidemia type, Poorly controlled type 2 diabetes mellitus (H)       blood glucose monitoring lancets     100 each    Use to test blood sugar 2 times daily.    Type 2 diabetes mellitus without complication, without long-term current use of insulin (H)       blood glucose monitoring test strip    BELL CONTOUR NEXT    100 each    Use to test blood sugar 2 times daily.    Type 2 diabetes mellitus without complication, without long-term current use of insulin (H)       metFORMIN 500 MG 24 hr tablet    GLUCOPHAGE-XR    60 tablet    Take 1 tablet (500 mg) by mouth 2 times daily (with meals)    Type 2 diabetes mellitus without complication, without long-term current use of insulin (H)

## 2018-03-08 ENCOUNTER — HOSPITAL ENCOUNTER (OUTPATIENT)
Dept: ULTRASOUND IMAGING | Facility: HOSPITAL | Age: 53
Discharge: HOME | End: 2018-03-08
Attending: SURGERY | Admitting: SURGERY
Payer: COMMERCIAL

## 2018-03-08 DIAGNOSIS — R59.0 CERVICAL LYMPHADENOPATHY: Primary | ICD-10-CM

## 2018-03-08 DIAGNOSIS — R22.1 LOCALIZED SWELLING, MASS AND LUMP, NECK: ICD-10-CM

## 2018-03-08 LAB — COPATH REPORT: NORMAL

## 2018-03-08 PROCEDURE — 76536 US EXAM OF HEAD AND NECK: CPT | Mod: TC

## 2018-03-09 ENCOUNTER — TELEPHONE (OUTPATIENT)
Dept: SURGERY | Facility: OTHER | Age: 53
End: 2018-03-09

## 2018-03-09 ENCOUNTER — TELEPHONE (OUTPATIENT)
Dept: EDUCATION SERVICES | Facility: HOSPITAL | Age: 53
End: 2018-03-09

## 2018-03-09 RX ORDER — LIDOCAINE HYDROCHLORIDE 10 MG/ML
10 INJECTION, SOLUTION EPIDURAL; INFILTRATION; INTRACAUDAL; PERINEURAL ONCE
Status: CANCELLED | OUTPATIENT
Start: 2018-03-15

## 2018-03-09 RX ORDER — DIAZEPAM 5 MG
10 TABLET ORAL ONCE
Status: CANCELLED | OUTPATIENT
Start: 2018-03-15

## 2018-03-09 RX ORDER — LIDOCAINE 40 MG/G
CREAM TOPICAL
Status: CANCELLED | OUTPATIENT
Start: 2018-03-15

## 2018-03-09 NOTE — TELEPHONE ENCOUNTER
Called pt and he reports he is tolerating Metformin  mg bid without any side effects.  Pt instructed to increase dose to 1000 mg bid.  Follow up appointment is scheduled.

## 2018-03-14 ENCOUNTER — TELEPHONE (OUTPATIENT)
Dept: INTERVENTIONAL RADIOLOGY/VASCULAR | Facility: HOSPITAL | Age: 53
End: 2018-03-14

## 2018-03-14 NOTE — TELEPHONE ENCOUNTER
Pre procedure call placed to patient regarding hector's procedure.  Questions answered.  Arrival time told to patient.

## 2018-03-15 ENCOUNTER — ALLIED HEALTH/NURSE VISIT (OUTPATIENT)
Dept: SURGERY | Facility: OTHER | Age: 53
End: 2018-03-15
Attending: SURGERY
Payer: COMMERCIAL

## 2018-03-15 ENCOUNTER — HOSPITAL ENCOUNTER (OUTPATIENT)
Dept: ULTRASOUND IMAGING | Facility: HOSPITAL | Age: 53
Discharge: HOME | End: 2018-03-15
Attending: SURGERY | Admitting: SURGERY
Payer: COMMERCIAL

## 2018-03-15 VITALS
OXYGEN SATURATION: 94 % | TEMPERATURE: 98 F | RESPIRATION RATE: 18 BRPM | DIASTOLIC BLOOD PRESSURE: 104 MMHG | SYSTOLIC BLOOD PRESSURE: 144 MMHG

## 2018-03-15 DIAGNOSIS — R59.0 CERVICAL LYMPHADENOPATHY: ICD-10-CM

## 2018-03-15 DIAGNOSIS — Z48.02 VISIT FOR SUTURE REMOVAL: Primary | ICD-10-CM

## 2018-03-15 PROCEDURE — 88173 CYTOPATH EVAL FNA REPORT: CPT | Mod: TC | Performed by: SURGERY

## 2018-03-15 PROCEDURE — 88305 TISSUE EXAM BY PATHOLOGIST: CPT | Mod: TC | Performed by: SURGERY

## 2018-03-15 PROCEDURE — 25000125 ZZHC RX 250: Performed by: RADIOLOGY

## 2018-03-15 PROCEDURE — 88172 CYTP DX EVAL FNA 1ST EA SITE: CPT | Mod: TC | Performed by: SURGERY

## 2018-03-15 PROCEDURE — 00000155 ZZHCL STATISTIC H-CELL BLOCK W/STAIN: Performed by: SURGERY

## 2018-03-15 PROCEDURE — 25000132 ZZH RX MED GY IP 250 OP 250 PS 637: Performed by: RADIOLOGY

## 2018-03-15 PROCEDURE — 21550 BIOPSY OF NECK/CHEST: CPT | Mod: TC

## 2018-03-15 RX ORDER — DIAZEPAM 5 MG
TABLET ORAL
Status: DISCONTINUED
Start: 2018-03-15 | End: 2018-03-16 | Stop reason: HOSPADM

## 2018-03-15 RX ORDER — DIAZEPAM 10 MG
10 TABLET ORAL ONCE
Status: COMPLETED | OUTPATIENT
Start: 2018-03-15 | End: 2018-03-15

## 2018-03-15 RX ORDER — LIDOCAINE HYDROCHLORIDE 10 MG/ML
10 INJECTION, SOLUTION EPIDURAL; INFILTRATION; INTRACAUDAL; PERINEURAL ONCE
Status: COMPLETED | OUTPATIENT
Start: 2018-03-15 | End: 2018-03-15

## 2018-03-15 RX ORDER — LIDOCAINE 40 MG/G
CREAM TOPICAL
Status: DISCONTINUED | OUTPATIENT
Start: 2018-03-15 | End: 2018-03-16 | Stop reason: HOSPADM

## 2018-03-15 RX ADMIN — LIDOCAINE HYDROCHLORIDE 10 ML: 10 INJECTION, SOLUTION EPIDURAL; INFILTRATION; INTRACAUDAL; PERINEURAL at 14:15

## 2018-03-15 RX ADMIN — DIAZEPAM 10 MG: 5 TABLET ORAL at 13:30

## 2018-03-15 NOTE — IP AVS SNAPSHOT
[facility]    [address] [place] [address]    Phone:  [phone]                                       After Visit Summary   3/15/2018    Atul Lam    MRN: [id number]           After Visit Summary Signature Page     I have received my discharge instructions, and my questions have been answered. I have discussed any challenges I see with this plan with the nurse or doctor.    ..........................................................................................................................................  Patient/Patient Representative Signature      ..........................................................................................................................................  Patient Representative Print Name and Relationship to Patient    ..................................................               ................................................  Date                                            Time    ..........................................................................................................................................  Reviewed by Signature/Title    ...................................................              ..............................................  Date                                                            Time

## 2018-03-15 NOTE — MR AVS SNAPSHOT
"              After Visit Summary   3/15/2018    Atul Lam    MRN: [id number]           Patient Information     Date Of Birth          1965        Visit Information        Provider Department      3/15/2018 2:00 PM HC SURG NURSE [facility]        Today's Diagnoses     Visit for suture removal    -  1       Follow-ups after your visit        Your next 10 appointments already scheduled     Mar 23, 2018 11:30 AM CDT   (Arrive by 11:15 AM)   Return Visit with Chica Guzman RD   [facility] ([facility] )    [address] [place] [id number] [phone]              Future tests that were ordered for you today     Open Standing Orders        Priority Remaining Interval Expires Ordered    Notify Provider Routine [id number] PRN  3/15/2018    Notify Radiologists  Routine [id number] PRN  3/15/2018    Wound care: Dressing change Routine 1/1 DAILY  3/15/2018            Who to contact     If you have questions or need follow up information about today's clinic visit or your schedule please contact [facility] directly at [phone].  Normal or non-critical lab and imaging results will be communicated to you by [employer], letter or phone within 4 business days after the clinic has received the results. If you do not hear from us within 7 days, please contact the clinic through [employer] or phone. If you have a critical or abnormal lab result, we will notify you by phone as soon as possible.  Submit refill requests through [employer] or call your pharmacy and they will forward the refill request to us. Please allow 3 business days for your refill to be completed.          Additional Information About Your Visit        MyChart Information     [employer] lets you send messages to your doctor, view your test results, renew your prescriptions, schedule appointments and more. To sign up, go to [email or web address] . Click on \"Log in\" on the left side of the screen, " "which will take you to the Welcome page. Then click on \"Sign up Now\" on the right side of the page.     You will be asked to enter the access code listed below, as well as some personal information. Please follow the directions to create your username and password.     Your access code is: [email or web address]  Expires: 2018 11:24 AM     Your access code will  in 90 days. If you need help or a new code, please call your [facility] clinic or [phone].        Care EveryWhere ID     This is your Care EveryWhere ID. This could be used by other organizations to access your [facility] medical records  [id number]         Blood Pressure from Last 3 Encounters:   03/15/18 (!) 144/104   18 118/64   18 129/82    Weight from Last 3 Encounters:   18 250 lb (113.4 kg)   18 250 lb 14.4 oz (113.8 kg)   18 250 lb (113.4 kg)              Today, you had the following     No orders found for display       Primary Care Provider Office Phone # Fax #    Zoraida Pham [phone]       [address] [facility] [address]  [facility] [address]        Equal Access to Services     MEKA EVANS AH: Hadii helen ku hadasho Soomaali, waaxda luqadaha, qaybta kaalmada adeegyada, waxay gopi hayelliott richardson . So [facility] [phone].    ATENCIÓN: Si habla español, tiene a saunders disposición servicios gratuitos de asistencia lingüística. Llame al [phone].    We comply with applicable federal civil rights laws and [place] laws. We do not discriminate on the basis of race, color, national origin, age, disability, sex, sexual orientation, or gender identity.            Thank you!     Thank you for choosing [facility]  for your care. Our goal is always to provide you with excellent care. Hearing back from our patients is one way we can continue to improve our services. Please take a few minutes to complete the written survey that you may receive in the mail after your visit with us. Thank you!   "           Your Updated Medication List - Protect others around you: Learn how to safely use, store and throw away your medicines at www.disposemymeds.org.          This list is accurate as of 3/15/18  3:09 PM.  Always use your most recent med list.                   Brand Name Dispense Instructions for use Diagnosis    atorvastatin 20 MG tablet    LIPITOR    90 tablet    Take 1 tablet (20 mg) by mouth daily    Hyperlipidemia, unspecified hyperlipidemia type, Poorly controlled type 2 diabetes mellitus (H)       blood glucose monitoring lancets     100 each    Use to test blood sugar 2 times daily.    Type 2 diabetes mellitus without complication, without long-term current use of insulin (H)       blood glucose monitoring test strip    BELL CONTOUR NEXT    100 each    Use to test blood sugar 2 times daily.    Type 2 diabetes mellitus without complication, without long-term current use of insulin (H)       metFORMIN 500 MG 24 hr tablet    GLUCOPHAGE-XR    60 tablet    Take 1 tablet (500 mg) by mouth 2 times daily (with meals)    Type 2 diabetes mellitus without complication, without long-term current use of insulin (H)

## 2018-03-15 NOTE — PROGRESS NOTES
Patient to ultrasound room with significant other.  Denies pain, denies any recent falls or fear of falling.  Changed into gown.  Signed consent after having procedure explained to him.  VSS as charted.  Medicated with 10mg of valium as ordered.

## 2018-03-15 NOTE — DISCHARGE INSTRUCTIONS
Discharge Instructions for Fine-Needle  Biopsy  You had a procedure called fine-needle biopsy. This biopsy was done to assess a nodule or cyst.  During the biopsy, a very thin needle is inserted through the skin into the gland. The needle is used to remove a small amount of tissue from the gland. (This may be done more than one time to be sure to get cells from all parts of the nodule.) Or the needle is used to drain fluid from a cyst. The tissue or fluid is then studied in a lab.   Home care  Tips to take care of yourself at home:     You will have a small adhesive bandage on your biopsy site. Leave the bandage in place for 4 to 6 hours. After this time, you don't need to keep it covered.     If you feel discomfort after the biopsy, take over-the-counter pain medicine. Do not take aspirin.    Ask your healthcare provider when you can return to work and normal activities. This will likely be the same day as your procedure.  Getting your results  Your biopsy results may take a few days. When the results are ready, your healthcare provider will discuss them with you and tell you what, if anything, needs to be done next.   Follow-up  Make a follow-up appointment as directed by your healthcare provider.  When to call your healthcare provider  Call your healthcare provider right away if you have any of the following:    Bleeding that won t stop    Shortness of breath or trouble breathing    Fever of 100.4 F (38 C) or higher    Increasing pain, redness, tenderness, or drainage at the biopsy site    Swelling of the biopsy site  Be sure you understand what problems you should watch for and know how to reach the healthcare provider after hours and on weekends.    Date Last Reviewed: 2/9/2016 2000-2017 [employer]. [address] [place] [address]. All rights reserved. This information is not intended as a substitute for professional medical care. Always follow your healthcare professional's  instructions.

## 2018-03-15 NOTE — NURSING NOTE
Nurse only for suture removal. 3 black sutures removed, patient tolerated well. No S/S of infection. Tamar Valdivia LPN

## 2018-03-15 NOTE — IP AVS SNAPSHOT
MRN:[id number]                      After Visit Summary   3/15/2018    Atul Lam    MRN: [id number]           Visit Information        Provider Department      3/15/2018  2:00 PM HIIRRAD; HIXRRN; HIUS1 HI ULTRASOUND           Review of your medicines      UNREVIEWED medicines. Ask your doctor about these medicines        Dose / Directions    atorvastatin 20 MG tablet   Commonly known as:  LIPITOR   Used for:  Hyperlipidemia, unspecified hyperlipidemia type, Poorly controlled type 2 diabetes mellitus (H)        Dose:  20 mg   Take 1 tablet (20 mg) by mouth daily   Quantity:  90 tablet   Refills:  3       metFORMIN 500 MG 24 hr tablet   Commonly known as:  GLUCOPHAGE-XR   Used for:  Type 2 diabetes mellitus without complication, without long-term current use of insulin (H)        Dose:  500 mg   Take 1 tablet (500 mg) by mouth 2 times daily (with meals)   Quantity:  60 tablet   Refills:  3         CONTINUE these medicines which have NOT CHANGED        Dose / Directions    blood glucose monitoring lancets   Used for:  Type 2 diabetes mellitus without complication, without long-term current use of insulin (H)        Use to test blood sugar 2 times daily.   Quantity:  100 each   Refills:  11       blood glucose monitoring test strip   Commonly known as:  BELL CONTOUR NEXT   Used for:  Type 2 diabetes mellitus without complication, without long-term current use of insulin (H)        Use to test blood sugar 2 times daily.   Quantity:  100 each   Refills:  11                Protect others around you: Learn how to safely use, store and throw away your medicines at www.disposemymeds.org.         Follow-ups after your visit        Your next 10 appointments already scheduled     Mar 15, 2018  2:00 PM CDT   (Arrive by 1:45 PM)   US BIOPSY HEAD NECK THORAX SOFT TISSUE with HIUS1, HIXRRN, HIIRRAD   [facility] ULTRASOUND ([facility] )    [address] [place] [address]   [phone]            Bring a list of your medicines to the exam. Include vitamins, minerals and over-the-counter drugs.  Tell your doctor in advance:   If you are or may be pregnant.   If you are taking Coumadin (or any other blood thinners) 5 days prior to the exam for any special instructions.  IF YOUR DOCTOR HAS TOLD YOU THAT YOU WILL BE RECEIVING SEDATION (medicine to help you relax): (Typically sedation is only for liver exams at [facility] and Renal Biopsy exams in Pediatrics)   See your family doctor for an exam within 30 days of treatment.   Plan for an adult to drive you home and stay with you for at least 24 hours.   No eating or drinking for 4 hours before your test. You may take medicine with small sips of water.   If you have diabetes:If you take insulin, call your diabetes care team for any special instructions for this exam.  Please call the Imaging Department at your exam site with any questions.             Mar 16, 2018  2:00 PM CDT   (Arrive by 1:45 PM)   Nurse Only with HC SURG NURSE   [facility] ([facility] )    [address] [place] [address]   [phone]            Mar 23, 2018 11:30 AM CDT   (Arrive by 11:15 AM)   Return Visit with Chica Guzman RD   [facility] ([facility] )    [address] [place] [id number] [phone]               Care Instructions        Further instructions from your care team         Discharge Instructions for Fine-Needle  Biopsy  You had a procedure called fine-needle biopsy. This biopsy was done to assess a nodule or cyst.  During the biopsy, a very thin needle is inserted through the skin into the gland. The needle is used to remove a small amount of tissue from the gland. (This may be done more than one time to be sure to get cells from all parts of the nodule.) Or the needle is used to drain fluid from a cyst. The tissue or fluid is then studied in a lab.   Home care  Tips to take care of  "yourself at home:     You will have a small adhesive bandage on your biopsy site. Leave the bandage in place for 4 to 6 hours. After this time, you don't need to keep it covered.     If you feel discomfort after the biopsy, take over-the-counter pain medicine. Do not take aspirin.    Ask your healthcare provider when you can return to work and normal activities. This will likely be the same day as your procedure.  Getting your results  Your biopsy results may take a few days. When the results are ready, your healthcare provider will discuss them with you and tell you what, if anything, needs to be done next.   Follow-up  Make a follow-up appointment as directed by your healthcare provider.  When to call your healthcare provider  Call your healthcare provider right away if you have any of the following:    Bleeding that won t stop    Shortness of breath or trouble breathing    Fever of 100.4 F (38 C) or higher    Increasing pain, redness, tenderness, or drainage at the biopsy site    Swelling of the biopsy site  Be sure you understand what problems you should watch for and know how to reach the healthcare provider after hours and on weekends.    Date Last Reviewed: 2/9/2016 2000-2017 [employer]. [address] [place] [address]. All rights reserved. This information is not intended as a substitute for professional medical care. Always follow your healthcare professional's instructions.               Additional Information About Your Visit        MyChart Information     [employer] lets you send messages to your doctor, view your test results, renew your prescriptions, schedule appointments and more. To sign up, go to [email or web address] . Click on \"Log in\" on the left side of the screen, which will take you to the Welcome page. Then click on \"Sign up Now\" on the right side of the page.     You will be asked to enter the access code listed below, as well as some personal information. Please " follow the directions to create your username and password.     Your access code is: [email or web address]  Expires: 2018 11:24 AM     Your access code will  in 90 days. If you need help or a new code, please call your [facility] clinic or [phone].        Care EveryWhere ID     This is your Care EveryWhere ID. This could be used by other organizations to access your [facility] medical records  [id number]        Your Vitals Were     Blood Pressure Temperature Respirations Pulse Oximetry          134/93 (BP Location: Left arm, Cuff Size: Adult Regular) 98  F (36.7  C) (Tympanic) 18 94%         Primary Care Provider Office Phone # Fax #    Zoraida Pham [phone]      Equal Access to Services     [facility] : Hadii helen marquez hadasho Soomaali, waaxda luqadaha, qaybta kaalmada adeegyada, jennifer richardson . So [facility] [phone].    ATENCIÓN: Si habla español, tiene a saunders disposición servicios gratuitos de asistencia lingüística. Llame al [phone].    We comply with applicable federal civil rights laws and [place] laws. We do not discriminate on the basis of race, color, national origin, age, disability, sex, sexual orientation, or gender identity.            Thank you!     Thank you for choosing [facility] for your care. Our goal is always to provide you with excellent care. Hearing back from our patients is one way we can continue to improve our services. Please take a few minutes to complete the written survey that you may receive in the mail after you visit with us. Thank you!             Medication List: This is a list of all your medications and when to take them. Check marks below indicate your daily home schedule. Keep this list as a reference.      Medications           Morning Afternoon Evening Bedtime As Needed    atorvastatin 20 MG tablet   Commonly known as:  LIPITOR   Take 1 tablet (20 mg) by mouth daily                                blood glucose  monitoring lancets   Use to test blood sugar 2 times daily.                                blood glucose monitoring test strip   Commonly known as:  [employer] CONTOUR NEXT   Use to test blood sugar 2 times daily.                                metFORMIN 500 MG 24 hr tablet   Commonly known as:  GLUCOPHAGE-XR   Take 1 tablet (500 mg) by mouth 2 times daily (with meals)

## 2018-03-15 NOTE — PROGRESS NOTES
There were  no complications and patient has no symptoms..      Tolerated procedure well.    Patientt to US.  Consent complete.  Supine on Ultrasound table.      Procedure: Fine Needle Biopsy, soft tissue    Radiologist:Dr Thompson    Time Out: Prior to the start of the procedure and with procedural staff participation, I verbally confirmed the patient s identity using two indicators, relevant allergies, that the procedure was appropriate and matched the consent or emergent situation, and that the correct equipment/implants were available. Immediately prior to starting the procedure I conducted the Time Out with the procedural staff and re-confirmed the patient s name, procedure, and site/side. (The Joint Commission universal protocol was followed.)  Yes    Position:  supine    Pain:  0    Sedation:None. Local Anesthestic used  No sedation    Estimated Blood Loss: Minimal     Condition: Stable    Comments: See dictated procedure note for full details     Park Dumont RN

## 2018-03-15 NOTE — PROGRESS NOTES
Soft tissue Biopsy Discharge Instructions      Diet and medicines:       You may go back to your regular diet and medicines.     You may take pain relievers such as Advil (ibuprofen) or Tylenol                          (acetaminophen).                           Activity       You may go back to your normal routine.     No heavy exercise for 24 hours.    Site care     The needle site may have mild bruising, soreness and swelling.                This will go away in a few days.     For swelling and bruising, place an ice pack on the site. Never use ice directly                on your skin. Use the pack for 20 minutes. Remove it for at least 30 minutes                before re-using.    Call your doctor if you have:       Severe pain at the needle site.     A fever over 101  F (38.3  C), taken under the tongue.     Increased redness or swelling.     Fluid oozing or draining from the site.    Go to the emergency room or call 911 if:       You have bleeding that cannot be stopped with direct pressure.     You have trouble breathing.     Your neck swells.    If you have questions, call your hospital:   [facility]  [phone]

## 2018-03-16 ENCOUNTER — TELEPHONE (OUTPATIENT)
Dept: SURGERY | Facility: OTHER | Age: 53
End: 2018-03-16

## 2018-03-16 ENCOUNTER — TELEPHONE (OUTPATIENT)
Dept: INTERVENTIONAL RADIOLOGY/VASCULAR | Facility: HOSPITAL | Age: 53
End: 2018-03-16

## 2018-03-16 DIAGNOSIS — D11.9 WARTHIN'S TUMOR: Primary | ICD-10-CM

## 2018-03-16 LAB — COPATH REPORT: NORMAL

## 2018-03-16 NOTE — TELEPHONE ENCOUNTER
Post procedural phone call placed to patient.  No s/s of infection (unusual drainage, fever, excessive swelling).  Denies that he has needed anything for pain.  Encouraged to call us if any questions or concerns arise.

## 2018-03-16 NOTE — TELEPHONE ENCOUNTER
Called to discuss results of his pathology, I discussed that this is typically benign in the sense it does not spread to other areas but can be locally destructive and should be removed. I discussed that this is a more specialized procedure. Discussed with ENT here ho recommended the [facility], referral made. If patient has not heard back form them by early next week he is to call out clinic back.     Jordan Page

## 2018-03-21 DIAGNOSIS — R22.1 MASS OF NECK: Primary | ICD-10-CM

## 2018-03-22 LAB — COPATH REPORT: NORMAL

## 2018-03-30 ENCOUNTER — RADIANT APPOINTMENT (OUTPATIENT)
Dept: CT IMAGING | Facility: CLINIC | Age: 53
End: 2018-03-30
Attending: OTOLARYNGOLOGY
Payer: COMMERCIAL

## 2018-03-30 ENCOUNTER — OFFICE VISIT (OUTPATIENT)
Dept: OTOLARYNGOLOGY | Facility: CLINIC | Age: 53
End: 2018-03-30
Payer: COMMERCIAL

## 2018-03-30 VITALS — BODY MASS INDEX: 32.56 KG/M2 | HEIGHT: 74 IN | WEIGHT: 253.7 LBS

## 2018-03-30 DIAGNOSIS — D11.9 WARTHIN TUMOR: ICD-10-CM

## 2018-03-30 DIAGNOSIS — R22.1 MASS OF NECK: Primary | ICD-10-CM

## 2018-03-30 DIAGNOSIS — R22.1 MASS OF NECK: ICD-10-CM

## 2018-03-30 PROBLEM — E88.810 DYSMETABOLIC SYNDROME X: Status: ACTIVE | Noted: 2018-03-30

## 2018-03-30 PROBLEM — E78.00 HYPERCHOLESTEREMIA: Status: ACTIVE | Noted: 2018-03-30

## 2018-03-30 RX ORDER — IOPAMIDOL 755 MG/ML
100 INJECTION, SOLUTION INTRAVASCULAR ONCE
Status: COMPLETED | OUTPATIENT
Start: 2018-03-30 | End: 2018-03-30

## 2018-03-30 RX ORDER — IOPAMIDOL 755 MG/ML
500 INJECTION, SOLUTION INTRAVASCULAR ONCE
Status: DISCONTINUED | OUTPATIENT
Start: 2018-03-30 | End: 2018-03-30 | Stop reason: CLARIF

## 2018-03-30 RX ADMIN — IOPAMIDOL 100 ML: 755 INJECTION, SOLUTION INTRAVASCULAR at 11:00

## 2018-03-30 ASSESSMENT — PAIN SCALES - GENERAL: PAINLEVEL: NO PAIN (0)

## 2018-03-30 NOTE — DISCHARGE INSTRUCTIONS

## 2018-03-30 NOTE — LETTER
3/30/2018       RE: Atul Lam  [address]  [place] [address]     Dear Colleague,    Thank you for referring your patient, Atul Lam, to the [facility] EAR NOSE AND THROAT at [facility]. Please see a copy of my visit note below.    Dear Dr. Page:    I had the pleasure of meeting Atul Lam in consultation today at the [facility] at your request.     History of Present Illness:   Atul Lam is a 52 year old man who was referred for evaluation of a right parotid mass. He says that he noticed a mass about a year ago. It has slowly increased in size. 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 He denies any pain prior to the biopsy but since then has intermittent aching in the area. He denies any ear pain. He has no facial weakness or numbness. He has no dysphagia or odynophagia and no weight changes.     PMH: diabetes - says sugars run around 120  PSH: tonsillectomy as adult - patient had postop bleed that he says was from carotid artery but on discussion with him on the story likely was just a postop tonsil bleed not a true carotid injury since it was managed with cautery  FH: negative  Social history: ; quit smoking 1 year ago previously 1.5 ppd x 30+ years, chews tobacco 1 tin every 3 days; occasional alcohol (few times per month); no drug use; works as a     MEDICATIONS:     Current Outpatient Prescriptions   Medication Sig Dispense Refill     blood glucose monitoring (BELL CONTOUR NEXT) test strip Use to test blood sugar 2 times daily. 100 each 11     blood glucose monitoring (BELL MICROLET) lancets Use to test blood sugar 2 times daily. 100 each 11     metFORMIN (GLUCOPHAGE-XR) 500 MG 24 hr tablet Take 1 tablet (500 mg) by mouth 2 times daily (with meals) 60 tablet 3     atorvastatin (LIPITOR) 20 MG tablet Take 1 tablet (20 mg) by mouth daily 90 tablet 3       ALLERGIES:   "  Allergies   Allergen Reactions     Animal Dander      Nasal congestion     Aspirin Nausea and Vomiting     Dust Mites      Nasal congestion     Morphine Nausea and Vomiting       HABITS/SOCIAL HISTORY:     Quit smoking 1 year ago previously 1.5 ppd x 30+ years, chews tobacco 1 tin every 3 days; occasional alcohol (few times per month); no drug use  Works as a       Social History     Social History     Marital status:      Spouse name: N/A     Number of children: N/A     Years of education: N/A     Occupational History     Not on file.     Social History Main Topics     Smoking status: Former Smoker     Years: 30.00     Types: Dip, chew, snus or snuff     Quit date: 9/12/2017     Smokeless tobacco: Current User     Alcohol use Yes     Drug use: No     Sexual activity: Yes     Partners: Female     Birth control/ protection: Male Surgical     Other Topics Concern     Not on file     Social History Narrative       PAST MEDICAL HISTORY:   Past Medical History:   Diagnosis Date     Congenital hiatus hernia      Diabetes (H)         PAST SURGICAL HISTORY: History reviewed. No pertinent surgical history.    FAMILY HISTORY:    Family History   Problem Relation Age of Onset     DIABETES Father      CEREBROVASCULAR DISEASE Father        REVIEW OF SYSTEMS:  12 point ROS was negative other than the symptoms noted above in the HPI.  Patient Supplied Answers to Review of Systems  No flowsheet data found.      PHYSICAL EXAMINATION:   Ht 1.88 m (6' 2\")  Wt 115.1 kg (253 lb 11.2 oz)  BMI 32.57 kg/m2   Appearance:   normal; NAD, age-appropriate appearance, well-developed, normal habitus   Communication:   normal; communicates verbally, normal voice quality   Head/Face:   inspection -  Visible right tail of parotid fullness   Salivary glands -  Approximately 3 cm mass of right tail of parotid, skin overlying mobile   Facial strength -  Normal and symmetric bilateral; H/B I/VI   Skin:  normal, no rash "   Ocular Motility:  normal occular movements   Ears:  auricle (AD) -  normal  EAC (AD) -  normal  TM (AD) -  Normal, no effusion  auricle (AS) -  normal  EAC (AS) -  normal  TM (AS) -  Normal, no effusion  Normal clinical speech reception   Nose:  Ext. inspection -  Normal   Oral Cavity:  lips -  Normal mucosa, oral competence, and stoma size   Poor dentition, healthy gingival mucosa   Hard palate, buccal, floor of mouth mucosa normal   Tongue - normal movement, no lesions   Oropharynx:  mucosa -  Normal, no lesions  soft palate -  Normal, no lesions, no asymmetry, normal elevation  tonsils -  absent   Neck: No visible mass or asymmetry   Normal palpation, no tenderness, no tracheal deviation  Normal range of motion   Lymphatic:  no abnormal nodes   Cardiovascular: warm, pink, well-perfused extremities without swelling, tenderness, or edema   Respiratory: Normal respiratory effort, no stridor   Neuro/Psych.:  mood/affect -  normal  mental status -  normal  cranial nerves -  normal          RESULTS REVIEWED:   I independently reviewed the CT scan images (formal read pending) - there is an approximately 3 x 2.5 cm mass of the right tail of parotid that abuts the retromandibular vein and appears to partially extend into the deep lobe; there is a questionable mass vs lymph node more superiorly in the right parotid as well as one in the left parotid      IMPRESSION AND PLAN:   Atul Lam is a 52 year old man with a right sided parotid mass. 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 We have requested the slides for review here. I explained to the patient that a Warthin's tumor is benign. Options for management include observation vs surgery. One of the risks of observation would be interval growth which can result in making dissection off the facial nerve more challenging. The alterative would be surgery which we discussed in detail. The tumor is within the parotid gland and we discussed the relevant anatomy of  the facial nerve. The facial nerve is at risk with surgical resection. Based on my review of the imaging the tumor may be sitting underneath the facial nerve, specifically the marginal mandibular branch. We discussed that even if the nerve is preserved it can have weakness associated with it which can last for a year even if its temporary. This can result in difficulty with movements in the face temporarily or permanently. We discussed with lower division weakness this can result in oral incompetence. We discussed other risks including bleeding, infection, scarring. 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 The patient would like to proceed with surgery. We will find him a surgical date. He will undergo preoperative clearance by his local PCP.        CC:  Jordan Page MD  [address] [address]      Again, thank you for allowing me to participate in the care of your patient.      Sincerely,    Alexandra Holbrook MD

## 2018-03-30 NOTE — MR AVS SNAPSHOT
After Visit Summary   3/30/2018    Atul Lam    MRN: [id number]           Patient Information     Date Of Birth          1965        Visit Information        Provider Department      3/30/2018 1:20 PM Alexandra Holbrook MD [facility]        Today's Diagnoses     Mass of neck    -  1    Warthin tumor          Care Instructions    1. Patient is scheduled for surgery on 4/10/18  2. Patient to schedule a pre-op physical with their primary MD within 30 days of the procedure.   3. Patient to avoid blood thinning medications 1 week prior to surgery (Ibuprofen, Aleve, Aspirin, etc.)   4. Patient to review contents within the surgical packet & use the antiseptic scrub as directed.   5. Patient to call the ENT clinic with further questions or concerns: [phone]                Follow-ups after your visit        Your next 10 appointments already scheduled     Apr 06, 2018  1:30 PM CDT   (Arrive by 1:15 PM)   Return Visit with Chica Guzman RD   [facility] ([facility] )    [address] [place] [id number] [phone]            Apr 06, 2018  4:00 PM CDT   (Arrive by 3:45 PM)   PAC PHONE RN ASSESSMENT with  Pac Rn   [facility] ([facility])    [address] [place] [address] [phone]           Note: this is not an onsite visit; there is no need to come to the facility.            Apr 10, 2018   Procedure with Alexandra Holbrook MD   [facility] [place], Same Day Surgery (--)    [address] [facility] [id number] [phone]            Apr 20, 2018  1:00 PM CDT   (Arrive by 12:45 PM)   Return Visit with Alexandra Holbrook MD   [facility] Ear Nose [facility] Throat ([facility])    [address] [address] [phone]              Who to contact     Please call your clinic at [phone] to:    Ask questions  "about your health    Make or cancel appointments    Discuss your medicines    Learn about your test results    Speak to your doctor            Additional Information About Your Visit        MyChart Information     SeeClickFix is an electronic gateway that provides easy, online access to your medical records. With SeeClickFix, you can request a clinic appointment, read your test results, renew a prescription or communicate with your care team.     To sign up for SeeClickFix visit the website at [email or web address]   You will be asked to enter the access code listed below, as well as some personal information. Please follow the directions to create your username and password.     Your access code is: [email or web address]  Expires: 2018 11:24 AM     Your access code will  in 90 days. If you need help or a new code, please contact your [facility] or call [phone] for assistance.        Care EveryWhere ID     This is your Care EveryWhere ID. This could be used by other organizations to access your [facility] medical records  [id number]        Your Vitals Were     Height BMI (Body Mass Index)                1.88 m (6' 2\") 32.57 kg/m2           Blood Pressure from Last 3 Encounters:   03/15/18 (!) 144/104   18 118/64   18 129/82    Weight from Last 3 Encounters:   18 115.1 kg (253 lb 11.2 oz)   18 113.4 kg (250 lb)   18 113.8 kg (250 lb 14.4 oz)              We Performed the Following     IMAGESTREAM RECORDING ORDER     [facility]ka-Operative Worksheet (Head & Neck)        Primary Care Provider Office Phone # Fax #    Zoraida Pham [phone]       [address] [place] [address]        Equal Access to Services     JOSE EVANS : Vi Olivares, wabelindada tg, qaybta kaalmada ira, jennifer st. So [facility] [phone].    ATENCIÓN: Si habla español, tiene a saunders disposición servicios gratuitos " de asistencia lingüística. Alex ambrose [phone].    We comply with applicable federal civil rights laws and [place] laws. We do not discriminate on the basis of race, color, national origin, age, disability, sex, sexual orientation, or gender identity.            Thank you!     Thank you for choosing [facility] EAR NOSE AND THROAT  for your care. Our goal is always to provide you with excellent care. Hearing back from our patients is one way we can continue to improve our services. Please take a few minutes to complete the written survey that you may receive in the mail after your visit with us. Thank you!             Your Updated Medication List - Protect others around you: Learn how to safely use, store and throw away your medicines at www.disposemymeds.org.          This list is accurate as of 3/30/18  3:07 PM.  Always use your most recent med list.                   Brand Name Dispense Instructions for use Diagnosis    atorvastatin 20 MG tablet    LIPITOR    90 tablet    Take 1 tablet (20 mg) by mouth daily    Hyperlipidemia, unspecified hyperlipidemia type, Poorly controlled type 2 diabetes mellitus (H)       blood glucose monitoring lancets     100 each    Use to test blood sugar 2 times daily.    Type 2 diabetes mellitus without complication, without long-term current use of insulin (H)       blood glucose monitoring test strip    BELL CONTOUR NEXT    100 each    Use to test blood sugar 2 times daily.    Type 2 diabetes mellitus without complication, without long-term current use of insulin (H)       metFORMIN 500 MG 24 hr tablet    GLUCOPHAGE-XR    60 tablet    Take 1 tablet (500 mg) by mouth 2 times daily (with meals)    Type 2 diabetes mellitus without complication, without long-term current use of insulin (H)

## 2018-03-30 NOTE — PROGRESS NOTES
Dear Dr. Page:    I had the pleasure of meeting Atul Lam in consultation today at the [facility] at your request.     History of Present Illness:   Atul Lam is a 52 year old man who was referred for evaluation of a right parotid mass. He says that he noticed a mass about a year ago. It has slowly increased in size. 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 He denies any pain prior to the biopsy but since then has intermittent aching in the area. He denies any ear pain. He has no facial weakness or numbness. He has no dysphagia or odynophagia and no weight changes.     PMH: diabetes - says sugars run around 120  PSH: tonsillectomy as adult - patient had postop bleed that he says was from carotid artery but on discussion with him on the story likely was just a postop tonsil bleed not a true carotid injury since it was managed with cautery  FH: negative  Social history: ; quit smoking 1 year ago previously 1.5 ppd x 30+ years, chews tobacco 1 tin every 3 days; occasional alcohol (few times per month); no drug use; works as a     MEDICATIONS:     Current Outpatient Prescriptions   Medication Sig Dispense Refill     blood glucose monitoring (BELL CONTOUR NEXT) test strip Use to test blood sugar 2 times daily. 100 each 11     blood glucose monitoring (BELL MICROLET) lancets Use to test blood sugar 2 times daily. 100 each 11     metFORMIN (GLUCOPHAGE-XR) 500 MG 24 hr tablet Take 1 tablet (500 mg) by mouth 2 times daily (with meals) 60 tablet 3     atorvastatin (LIPITOR) 20 MG tablet Take 1 tablet (20 mg) by mouth daily 90 tablet 3       ALLERGIES:    Allergies   Allergen Reactions     Animal Dander      Nasal congestion     Aspirin Nausea and Vomiting     Dust Mites      Nasal congestion     Morphine Nausea and Vomiting       HABITS/SOCIAL HISTORY:     Quit smoking 1 year ago previously 1.5 ppd x 30+ years, chews  "tobacco 1 tin every 3 days; occasional alcohol (few times per month); no drug use  Works as a       Social History     Social History     Marital status:      Spouse name: N/A     Number of children: N/A     Years of education: N/A     Occupational History     Not on file.     Social History Main Topics     Smoking status: Former Smoker     Years: 30.00     Types: Dip, chew, snus or snuff     Quit date: 9/12/2017     Smokeless tobacco: Current User     Alcohol use Yes     Drug use: No     Sexual activity: Yes     Partners: Female     Birth control/ protection: Male Surgical     Other Topics Concern     Not on file     Social History Narrative       PAST MEDICAL HISTORY:   Past Medical History:   Diagnosis Date     Congenital hiatus hernia      Diabetes (H)         PAST SURGICAL HISTORY: History reviewed. No pertinent surgical history.    FAMILY HISTORY:    Family History   Problem Relation Age of Onset     DIABETES Father      CEREBROVASCULAR DISEASE Father        REVIEW OF SYSTEMS:  12 point ROS was negative other than the symptoms noted above in the HPI.  Patient Supplied Answers to Review of Systems  No flowsheet data found.      PHYSICAL EXAMINATION:   Ht 1.88 m (6' 2\")  Wt 115.1 kg (253 lb 11.2 oz)  BMI 32.57 kg/m2   Appearance:   normal; NAD, age-appropriate appearance, well-developed, normal habitus   Communication:   normal; communicates verbally, normal voice quality   Head/Face:   inspection -  Visible right tail of parotid fullness   Salivary glands -  Approximately 3 cm mass of right tail of parotid, skin overlying mobile   Facial strength -  Normal and symmetric bilateral; H/B I/VI   Skin:  normal, no rash   Ocular Motility:  normal occular movements   Ears:  auricle (AD) -  normal  EAC (AD) -  normal  TM (AD) -  Normal, no effusion  auricle (AS) -  normal  EAC (AS) -  normal  TM (AS) -  Normal, no effusion  Normal clinical speech reception   Nose:  Ext. inspection -  Normal   Oral " Cavity:  lips -  Normal mucosa, oral competence, and stoma size   Poor dentition, healthy gingival mucosa   Hard palate, buccal, floor of mouth mucosa normal   Tongue - normal movement, no lesions   Oropharynx:  mucosa -  Normal, no lesions  soft palate -  Normal, no lesions, no asymmetry, normal elevation  tonsils -  absent   Neck: No visible mass or asymmetry   Normal palpation, no tenderness, no tracheal deviation  Normal range of motion   Lymphatic:  no abnormal nodes   Cardiovascular: warm, pink, well-perfused extremities without swelling, tenderness, or edema   Respiratory: Normal respiratory effort, no stridor   Neuro/Psych.:  mood/affect -  normal  mental status -  normal  cranial nerves -  normal          RESULTS REVIEWED:   I independently reviewed the CT scan images (formal read pending) - there is an approximately 3 x 2.5 cm mass of the right tail of parotid that abuts the retromandibular vein and appears to partially extend into the deep lobe; there is a questionable mass vs lymph node more superiorly in the right parotid as well as one in the left parotid      IMPRESSION AND PLAN:   Atul Lam is a 52 year old man with a right sided parotid mass. 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 We have requested the slides for review here. I explained to the patient that a Warthin's tumor is benign. Options for management include observation vs surgery. One of the risks of observation would be interval growth which can result in making dissection off the facial nerve more challenging. The alterative would be surgery which we discussed in detail. The tumor is within the parotid gland and we discussed the relevant anatomy of the facial nerve. The facial nerve is at risk with surgical resection. Based on my review of the imaging the tumor may be sitting underneath the facial nerve, specifically the marginal mandibular branch. We discussed that even if the nerve is preserved it can have weakness  associated with it which can last for a year even if its temporary. This can result in difficulty with movements in the face temporarily or permanently. We discussed with lower division weakness this can result in oral incompetence. We discussed other risks including bleeding, infection, scarring. 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 The patient would like to proceed with surgery. We will find him a surgical date. He will undergo preoperative clearance by his local PCP.    Thank you very much for the opportunity to participate in the care of your patient.      Alexandra Holbrook M.D.  Otolaryngology- Head & Neck Surgery        CC:  Jordan Page MD  [address] [place] [address]

## 2018-03-30 NOTE — NURSING NOTE
"Chief Complaint   Patient presents with     Consult     Consultation for Wathins tumor     Height 1.88 m (6' 2\"), weight 115.1 kg (253 lb 11.2 oz).    Momo Hopkins    "

## 2018-04-02 ENCOUNTER — CARE COORDINATION (OUTPATIENT)
Dept: OTOLARYNGOLOGY | Facility: CLINIC | Age: 53
End: 2018-04-02

## 2018-04-02 LAB
CREAT BLD-MCNC: 1 MG/DL (ref 0.66–1.25)
GFR SERPL CREATININE-BSD FRML MDRD: 79 ML/MIN/1.7M2

## 2018-04-02 NOTE — PROGRESS NOTES
Patient was called with CT scan results:    Impression:  1. Biopsy-proven right parotid tail Warthin's tumor.  2. Smaller solid mass in the superior aspect of the superficial right  parotid gland, most likely representing a synchronous Warthin's tumor,  though technically indeterminate.  3. Presumed reactive relatively symmetric borderline enlarged cervical  lymph nodes and hypertrophy of the Waldeyer's ring.  4. Pansinusitis.    Patient will proceed with surgery scheduled for 4/10 with Dr. Holbrook. Patient was encouraged to call with further questions or concerns.     Nuzhat Jorgensen, RN, BSN

## 2018-04-04 ENCOUNTER — OFFICE VISIT (OUTPATIENT)
Dept: FAMILY MEDICINE | Facility: OTHER | Age: 53
End: 2018-04-04
Attending: FAMILY MEDICINE
Payer: COMMERCIAL

## 2018-04-04 VITALS
DIASTOLIC BLOOD PRESSURE: 72 MMHG | RESPIRATION RATE: 16 BRPM | BODY MASS INDEX: 33.75 KG/M2 | SYSTOLIC BLOOD PRESSURE: 122 MMHG | HEART RATE: 75 BPM | OXYGEN SATURATION: 98 % | TEMPERATURE: 97.6 F | WEIGHT: 263 LBS | HEIGHT: 74 IN

## 2018-04-04 DIAGNOSIS — D11.9 WARTHIN TUMOR: ICD-10-CM

## 2018-04-04 DIAGNOSIS — E11.9 TYPE 2 DIABETES MELLITUS WITHOUT COMPLICATION, WITHOUT LONG-TERM CURRENT USE OF INSULIN (H): ICD-10-CM

## 2018-04-04 DIAGNOSIS — Z01.818 PREOP GENERAL PHYSICAL EXAM: Primary | ICD-10-CM

## 2018-04-04 LAB
ANION GAP SERPL CALCULATED.3IONS-SCNC: 7 MMOL/L (ref 3–14)
BASOPHILS # BLD AUTO: 0.1 10E9/L (ref 0–0.2)
BASOPHILS NFR BLD AUTO: 1.1 %
BUN SERPL-MCNC: 22 MG/DL (ref 7–30)
CALCIUM SERPL-MCNC: 9.1 MG/DL (ref 8.5–10.1)
CHLORIDE SERPL-SCNC: 106 MMOL/L (ref 94–109)
CO2 SERPL-SCNC: 26 MMOL/L (ref 20–32)
COPATH REPORT: NORMAL
CREAT SERPL-MCNC: 0.91 MG/DL (ref 0.66–1.25)
DIFFERENTIAL METHOD BLD: NORMAL
EOSINOPHIL # BLD AUTO: 0.3 10E9/L (ref 0–0.7)
EOSINOPHIL NFR BLD AUTO: 4.5 %
ERYTHROCYTE [DISTWIDTH] IN BLOOD BY AUTOMATED COUNT: 13 % (ref 10–15)
GFR SERPL CREATININE-BSD FRML MDRD: 88 ML/MIN/1.7M2
GLUCOSE SERPL-MCNC: 283 MG/DL (ref 70–99)
HCT VFR BLD AUTO: 42.6 % (ref 40–53)
HGB BLD-MCNC: 14.9 G/DL (ref 13.3–17.7)
IMM GRANULOCYTES # BLD: 0 10E9/L (ref 0–0.4)
IMM GRANULOCYTES NFR BLD: 0.6 %
LYMPHOCYTES # BLD AUTO: 1.8 10E9/L (ref 0.8–5.3)
LYMPHOCYTES NFR BLD AUTO: 27.8 %
MCH RBC QN AUTO: 29.5 PG (ref 26.5–33)
MCHC RBC AUTO-ENTMCNC: 35 G/DL (ref 31.5–36.5)
MCV RBC AUTO: 84 FL (ref 78–100)
MONOCYTES # BLD AUTO: 0.5 10E9/L (ref 0–1.3)
MONOCYTES NFR BLD AUTO: 7.2 %
NEUTROPHILS # BLD AUTO: 3.8 10E9/L (ref 1.6–8.3)
NEUTROPHILS NFR BLD AUTO: 58.8 %
NRBC # BLD AUTO: 0 10*3/UL
NRBC BLD AUTO-RTO: 0 /100
PLATELET # BLD AUTO: 222 10E9/L (ref 150–450)
POTASSIUM SERPL-SCNC: 4.3 MMOL/L (ref 3.4–5.3)
RBC # BLD AUTO: 5.05 10E12/L (ref 4.4–5.9)
SODIUM SERPL-SCNC: 139 MMOL/L (ref 133–144)
WBC # BLD AUTO: 6.4 10E9/L (ref 4–11)

## 2018-04-04 PROCEDURE — 36415 COLL VENOUS BLD VENIPUNCTURE: CPT | Performed by: FAMILY MEDICINE

## 2018-04-04 PROCEDURE — 85025 COMPLETE CBC W/AUTO DIFF WBC: CPT | Performed by: FAMILY MEDICINE

## 2018-04-04 PROCEDURE — 00000346 ZZHCL STATISTIC REVIEW OUTSIDE SLIDES TC 88321: Performed by: OTOLARYNGOLOGY

## 2018-04-04 PROCEDURE — 80048 BASIC METABOLIC PNL TOTAL CA: CPT | Performed by: FAMILY MEDICINE

## 2018-04-04 PROCEDURE — 93000 ELECTROCARDIOGRAM COMPLETE: CPT | Performed by: INTERNAL MEDICINE

## 2018-04-04 PROCEDURE — 99214 OFFICE O/P EST MOD 30 MIN: CPT | Performed by: FAMILY MEDICINE

## 2018-04-04 RX ORDER — METFORMIN HCL 500 MG
1000 TABLET, EXTENDED RELEASE 24 HR ORAL 2 TIMES DAILY WITH MEALS
Qty: 360 TABLET | Refills: 1 | Status: SHIPPED | OUTPATIENT
Start: 2018-04-04 | End: 2018-04-06 | Stop reason: SINTOL

## 2018-04-04 ASSESSMENT — PAIN SCALES - GENERAL: PAINLEVEL: NO PAIN (0)

## 2018-04-04 NOTE — PROGRESS NOTES
[facility]  [address]  [place] [address]  [phone]  Dept: [phone]    PRE-OP EVALUATION:  Today's date: 2018    Atul Lam (: 1965) presents for pre-operative evaluation assessment as requested by Dr. Escobar.  He requires evaluation and anesthesia risk assessment prior to undergoing surgery/procedure for excision of parotid Warthin tumor, right side.    Proposed Surgery/ Procedure: parotid Warthin tumor excision  Date of Surgery/ Procedure: 4/10/18  Time of Surgery/ Procedure: D  Hospital/Surgical Facility: The main  in [place]    Primary Physician: Zoraida Pham  Type of Anesthesia Anticipated: to be determined    Patient has a Health Care Directive or Living Will:  NO    1. NO - Do you have a history of heart attack, stroke, stent, bypass or surgery on an artery in the head, neck, heart or legs?  2. NO - Do you ever have any pain or discomfort in your chest?  3. NO - Do you have a history of  Heart Failure?  4. NO - Are you troubled by shortness of breath when: walking on the level, up a slight hill or at night?  5. NO - Do you currently have a cold, bronchitis or other respiratory infection?  6. NO - Do you have a cough, shortness of breath or wheezing?  7. NO - Do you sometimes get pains in the calves of your legs when you walk?  8. NO - Do you or anyone in your family have previous history of blood clots?  9. NO - Do you or does anyone in your family have a serious bleeding problem such as prolonged bleeding following surgeries or cuts?  10. NO - Have you ever had problems with anemia or been told to take iron pills?  11. NO - Have you had any abnormal blood loss such as black, tarry or bloody stools, or abnormal vaginal bleeding?  12. NO - Have you ever had a blood transfusion?  13. NO - Have you or any of your relatives ever had problems with anesthesia?  14. YES - DO YOU HAVE SLEEP APNEA, EXCESSIVE SNORING OR DAYTIME DROWSINESS? Snores; prior negative  sleep study  14. NO - Do you have sleep apnea, excessive snoring or daytime drowsiness?  15. NO - Do you have any prosthetic heart valves?  16. NO - Do you have prosthetic joints?  17. NO - Is there any chance that you may be pregnant?      HPI:     HPI related to upcoming procedure: Patient with right sided parotid tumor, Warthin's tumor.      DIABETES - Patient has a longstanding history of DiabetesType Type II . Patient is being treated with diet, oral agents and exercise and denies significant side effects. Control has been fair.  Follows at [facility].    Lab Results   Component Value Date    A1C 6.9 02/12/2018                                                                                                           MEDICAL HISTORY:     Patient Active Problem List    Diagnosis Date Noted     Hypercholesteremia 03/30/2018     Priority: Medium     Dysmetabolic syndrome X 03/30/2018     Priority: Medium     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     Priority: Medium     Tobacco use disorder 02/12/2018     Priority: Medium      Past Medical History:   Diagnosis Date     Congenital hiatus hernia      Diabetes (H)      History reviewed. No pertinent surgical history.  Current Outpatient Prescriptions   Medication Sig Dispense Refill     metFORMIN (GLUCOPHAGE-XR) 500 MG 24 hr tablet Take 2 tablets (1,000 mg) by mouth 2 times daily (with meals) 360 tablet 1     blood glucose monitoring (BELL CONTOUR NEXT) test strip Use to test blood sugar 2 times daily. 100 each 11     blood glucose monitoring (BELL MICROLET) lancets Use to test blood sugar 2 times daily. 100 each 11     atorvastatin (LIPITOR) 20 MG tablet Take 1 tablet (20 mg) by mouth daily 90 tablet 3     [DISCONTINUED] metFORMIN (GLUCOPHAGE-XR) 500 MG 24 hr tablet Take 1 tablet (500 mg) by mouth 2 times daily (with meals) 60 tablet 3     OTC products: None, except as noted above    Allergies   Allergen Reactions     Animal Dander      Nasal congestion     Aspirin Nausea and Vomiting  "    Dust Mites      Nasal congestion     Morphine Nausea and Vomiting      Latex Allergy: NO    Social History   Substance Use Topics     Smoking status: Former Smoker     Years: 30.00     Types: Dip, chew, snus or snuff     Quit date: 9/12/2017     Smokeless tobacco: Current User     Alcohol use Yes     History   Drug Use No       REVIEW OF SYSTEMS:   CONSTITUTIONAL: NEGATIVE for fever, chills, change in weight  INTEGUMENTARY/SKIN: NEGATIVE for worrisome rashes, moles or lesions  EYES: NEGATIVE for vision changes or irritation  ENT/MOUTH: as above - neck/parotid mass  RESP: NEGATIVE for significant cough or SOB  BREAST: NEGATIVE for masses, tenderness or discharge  CV: NEGATIVE for chest pain, palpitations or peripheral edema  GI: NEGATIVE for nausea, abdominal pain, heartburn, or change in bowel habits  : NEGATIVE for frequency, dysuria, or hematuria  MUSCULOSKELETAL: NEGATIVE for significant arthralgias or myalgia  NEURO: NEGATIVE for weakness, dizziness or paresthesias  ENDOCRINE: NEGATIVE for temperature intolerance, skin/hair changes  HEME: NEGATIVE for bleeding problems  PSYCHIATRIC: NEGATIVE for changes in mood or affect    EXAM:   /72 (BP Location: Right arm, Patient Position: Sitting, Cuff Size: Adult Large)  Pulse 75  Temp 97.6  F (36.4  C) (Tympanic)  Resp 16  Ht 6' 2\" (1.88 m)  Wt 263 lb (119.3 kg)  SpO2 98%  BMI 33.77 kg/m2    GENERAL APPEARANCE: healthy, alert and no distress     EYES: EOMI,  PERRL     HENT: ear canals and TM's normal and nose and mouth without ulcers or lesions     HENT: right sided parotid mass     NECK: no adenopathy, no asymmetry, masses, or scars and thyroid normal to palpation     RESP: lungs clear to auscultation - no rales, rhonchi or wheezes     CV: regular rates and rhythm, normal S1 S2, no S3 or S4 and no murmur, click or rub     ABDOMEN:  soft, nontender, no HSM or masses and bowel sounds normal     MS: extremities normal- no gross deformities noted, no " evidence of inflammation in joints, FROM in all extremities.     SKIN: no suspicious lesions or rashes     NEURO: Normal strength and tone, sensory exam grossly normal, mentation intact and speech normal     PSYCH: mentation appears normal. and affect normal/bright     LYMPHATICS: No cervical adenopathy    DIAGNOSTICS:   EKG: appears normal, NSR, normal axis, normal intervals, no acute ST/T changes c/w ischemia, no LVH by voltage criteria, unchanged from previous tracings  Results for orders placed or performed in visit on 04/04/18 (from the past 24 hour(s))   Basic metabolic panel   Result Value Ref Range    Sodium 139 133 - 144 mmol/L    Potassium 4.3 3.4 - 5.3 mmol/L    Chloride 106 94 - 109 mmol/L    Carbon Dioxide 26 20 - 32 mmol/L    Anion Gap 7 3 - 14 mmol/L    Glucose 283 (H) 70 - 99 mg/dL    Urea Nitrogen 22 7 - 30 mg/dL    Creatinine 0.91 0.66 - 1.25 mg/dL    GFR Estimate 88 >60 mL/min/1.7m2    GFR Estimate If Black >90 >60 mL/min/1.7m2    Calcium 9.1 8.5 - 10.1 mg/dL   CBC with platelets and differential   Result Value Ref Range    WBC 6.4 4.0 - 11.0 10e9/L    RBC Count 5.05 4.4 - 5.9 10e12/L    Hemoglobin 14.9 13.3 - 17.7 g/dL    Hematocrit 42.6 40.0 - 53.0 %    MCV 84 78 - 100 fl    MCH 29.5 26.5 - 33.0 pg    MCHC 35.0 31.5 - 36.5 g/dL    RDW 13.0 10.0 - 15.0 %    Platelet Count 222 150 - 450 10e9/L    Diff Method Automated Method     % Neutrophils 58.8 %    % Lymphocytes 27.8 %    % Monocytes 7.2 %    % Eosinophils 4.5 %    % Basophils 1.1 %    % Immature Granulocytes 0.6 %    Nucleated RBCs 0 0 /100    Absolute Neutrophil 3.8 1.6 - 8.3 10e9/L    Absolute Lymphocytes 1.8 0.8 - 5.3 10e9/L    Absolute Monocytes 0.5 0.0 - 1.3 10e9/L    Absolute Eosinophils 0.3 0.0 - 0.7 10e9/L    Absolute Basophils 0.1 0.0 - 0.2 10e9/L    Abs Immature Granulocytes 0.0 0 - 0.4 10e9/L    Absolute Nucleated RBC 0.0          Recent Labs   Lab Test  02/12/18   1030   HGB  14.4   PLT  244   NA  139   POTASSIUM  4.0   CR  1.01    A1C  6.9*        IMPRESSION:   Reason for surgery/procedure: right parotid Warthin's tumor excision  Diagnosis/reason for consult: cardiopulmonary clearance    The proposed surgical procedure is considered INTERMEDIATE risk.    REVISED CARDIAC RISK INDEX  The patient has the following serious cardiovascular risks for perioperative complications such as (MI, PE, VFib and 3  AV Block):  No serious cardiac risks  INTERPRETATION: 0 risks: Class I (very low risk - 0.4% complication rate)    The patient has the following additional risks for perioperative complications:  No identified additional risks      ICD-10-CM    1. Preop general physical exam Z01.818 Basic metabolic panel     CBC with platelets and differential     EKG 12-lead complete w/read - (Clinic Performed)   2. Type 2 diabetes mellitus without complication, without long-term current use of insulin (H) E11.9 metFORMIN (GLUCOPHAGE-XR) 500 MG 24 hr tablet   3. Warthin tumor D11.9 GENERAL SURG ADULT REFERRAL       RECOMMENDATIONS:       --Patient is to take all scheduled medications on the day of surgery EXCEPT for modifications listed below.    Diabetes Medication Use  Metformin - hold day of surgery  -----Hold usual oral and non-insulin diabetic meds (e.g. Metformin, Actos, Glipizide) while NPO.       Anticoagulant or Antiplatelet Medication Use  ASPIRIN: Does not take - allergic        APPROVAL GIVEN to proceed with proposed procedure, without further diagnostic evaluation       Signed Electronically by: Zoraida Bahena MD    Copy of this evaluation report is provided to requesting physician.    [facility] Preop Guidelines

## 2018-04-04 NOTE — PATIENT INSTRUCTIONS
EKG today.  Will call with notable lab results.  Hold Metformin day of surgery.  No Aspirin or Ibuprofen products week prior to surgery.    Before Your Surgery      Call your surgeon if there is any change in your health. This includes signs of a cold or flu (such as a sore throat, runny nose, cough, rash or fever).    Do not smoke, drink alcohol or take over the counter medicine (unless your surgeon or primary care doctor tells you to) for the 24 hours before and after surgery.    If you take prescribed drugs: Follow your doctor s orders about which medicines to take and which to stop until after surgery.    Eating and drinking prior to surgery: follow the instructions from your surgeon    Take a shower or bath the night before surgery. Use the soap your surgeon gave you to gently clean your skin. If you do not have soap from your surgeon, use your regular soap. Do not shave or scrub the surgery site.  Wear clean pajamas and have clean sheets on your bed.

## 2018-04-04 NOTE — MR AVS SNAPSHOT
After Visit Summary   4/4/2018    Atul Lam    MRN: [id number]           Patient Information     Date Of Birth          1965        Visit Information        Provider Department      4/4/2018 9:45 AM Zoraida Pham MD [facility]        Today's Diagnoses     Preop general physical exam    -  1    Type 2 diabetes mellitus without complication, without long-term current use of insulin (H)          Care Instructions    EKG today.  Will call with notable lab results.  Hold Metformin day of surgery.  No Aspirin or Ibuprofen products week prior to surgery.    Before Your Surgery      Call your surgeon if there is any change in your health. This includes signs of a cold or flu (such as a sore throat, runny nose, cough, rash or fever).    Do not smoke, drink alcohol or take over the counter medicine (unless your surgeon or primary care doctor tells you to) for the 24 hours before and after surgery.    If you take prescribed drugs: Follow your doctor s orders about which medicines to take and which to stop until after surgery.    Eating and drinking prior to surgery: follow the instructions from your surgeon    Take a shower or bath the night before surgery. Use the soap your surgeon gave you to gently clean your skin. If you do not have soap from your surgeon, use your regular soap. Do not shave or scrub the surgery site.  Wear clean pajamas and have clean sheets on your bed.           Follow-ups after your visit        Your next 10 appointments already scheduled     Apr 06, 2018  1:30 PM CDT   (Arrive by 1:15 PM)   Return Visit with Chica Guzman RD   [facility] ([facility] )    [address] [place] [id number] [phone]            Apr 06, 2018  4:00 PM CDT   (Arrive by 3:45 PM)   PAC PHONE RN ASSESSMENT with Christiano Pac Rn   [facility] ([facility])    [address] [place] [address]  "Floor  [facility] [id number] [phone]           Note: this is not an onsite visit; there is no need to come to the facility.            Apr 10, 2018   Procedure with Alexandra Holbrook MD   [facility] [place] Same Day [facility] (--)    [address]  [place] [address]   [phone]            2018  1:00 PM CDT   (Arrive by 12:45 PM)   Return Visit with Alexandra Holbrook MD   [facility] ([facility])    [address]  4th Floor  [facility] [id number] [phone]              Who to contact     If you have questions or need follow up information about today's clinic visit or your schedule please [facility] at [phone].  Normal or non-critical lab and imaging results will be communicated to you by MyChart, letter or phone within 4 business days after the clinic has received the results. If you do not hear from us within 7 days, please contact the clinic through MyChart or phone. If you have a critical or abnormal lab result, we will notify you by phone as soon as possible.  Submit refill requests through [employer] or call your pharmacy and they will forward the refill request to us. Please allow 3 business days for your refill to be completed.          Additional Information About Your Visit        MyChart Information     [employer] lets you send messages to your doctor, view your test results, renew your prescriptions, schedule appointments and more. To sign up, go to [email or web address] . Click on \"Log in\" on the left side of the screen, which will take you to the Welcome page. Then click on \"Sign up Now\" on the right side of the page.     You will be asked to enter the access code listed below, as well as some personal information. Please follow the directions to create your username and password.     Your access code is: [email or web address]  Expires: 2018 11:24 AM     Your access code will  in 90 days. If " "you need help or a new code, please call your [place] clinic or [phone].        Care EveryWhere ID     This is your Care EveryWhere ID. This could be used by other organizations to access your [place] medical records  [id number]        Your Vitals Were     Pulse Temperature Respirations Height Pulse Oximetry BMI (Body Mass Index)    75 97.6  F (36.4  C) (Tympanic) 16 6' 2\" (1.88 m) 98% 33.77 kg/m2       Blood Pressure from Last 3 Encounters:   04/04/18 122/72   03/15/18 (!) 144/104   03/07/18 118/64    Weight from Last 3 Encounters:   04/04/18 263 lb (119.3 kg)   03/30/18 253 lb 11.2 oz (115.1 kg)   03/07/18 250 lb (113.4 kg)              We Performed the Following     Basic metabolic panel     CBC with platelets and differential     EKG 12-lead complete w/read - (Clinic Performed)          Today's Medication Changes          These changes are accurate as of 4/4/18 11:04 AM.  If you have any questions, ask your nurse or doctor.               These medicines have changed or have updated prescriptions.        Dose/Directions    metFORMIN 500 MG 24 hr tablet   Commonly known as:  GLUCOPHAGE-XR   This may have changed:  how much to take   Used for:  Type 2 diabetes mellitus without complication, without long-term current use of insulin (H)   Changed by:  Zoraida Pham MD        Dose:  1000 mg   Take 2 tablets (1,000 mg) by mouth 2 times daily (with meals)   Quantity:  360 tablet   Refills:  1            Where to get your medicines      These medications were sent to [id number] - [address] - [address]  [address], [place] [address]     Phone:  [phone]     metFORMIN 500 MG 24 hr tablet                Primary Care Provider Office Phone # Fax #    Zoraida Pham [phone] [address] [facility] [address]        Equal Access to Services     [facility] NATHAN AH: Hadii helen marquez hadasho Soomaali, waaxda luqadaha, qaybta kaalmada adeegyada, jennifer cali " blu richardson ah. So [facility] [phone].    ATENCIÓN: Si fitzla jennifer, tiene a saunders disposición servicios gratuitos de asistencia lingüística. Alex al [phone].    We comply with applicable federal civil rights laws and [place] laws. We do not discriminate on the basis of race, color, national origin, age, disability, sex, sexual orientation, or gender identity.            Thank you!     Thank you for choosing [facility]  for your care. Our goal is always to provide you with excellent care. Hearing back from our patients is one way we can continue to improve our services. Please take a few minutes to complete the written survey that you may receive in the mail after your visit with us. Thank you!             Your Updated Medication List - Protect others around you: Learn how to safely use, store and throw away your medicines at www.disposemymeds.org.          This list is accurate as of 4/4/18 11:04 AM.  Always use your most recent med list.                   Brand Name Dispense Instructions for use Diagnosis    atorvastatin 20 MG tablet    LIPITOR    90 tablet    Take 1 tablet (20 mg) by mouth daily    Hyperlipidemia, unspecified hyperlipidemia type, Poorly controlled type 2 diabetes mellitus (H)       blood glucose monitoring lancets     100 each    Use to test blood sugar 2 times daily.    Type 2 diabetes mellitus without complication, without long-term current use of insulin (H)       blood glucose monitoring test strip    BELL CONTOUR NEXT    100 each    Use to test blood sugar 2 times daily.    Type 2 diabetes mellitus without complication, without long-term current use of insulin (H)       metFORMIN 500 MG 24 hr tablet    GLUCOPHAGE-XR    360 tablet    Take 2 tablets (1,000 mg) by mouth 2 times daily (with meals)    Type 2 diabetes mellitus without complication, without long-term current use of insulin (H)

## 2018-04-05 ENCOUNTER — TELEPHONE (OUTPATIENT)
Dept: FAMILY MEDICINE | Facility: OTHER | Age: 53
End: 2018-04-05

## 2018-04-06 ENCOUNTER — TELEPHONE (OUTPATIENT)
Dept: EDUCATION SERVICES | Facility: HOSPITAL | Age: 53
End: 2018-04-06

## 2018-04-06 ENCOUNTER — HOSPITAL ENCOUNTER (OUTPATIENT)
Dept: EDUCATION SERVICES | Facility: HOSPITAL | Age: 53
Discharge: HOME | End: 2018-04-06
Attending: NURSE PRACTITIONER | Admitting: FAMILY MEDICINE
Payer: COMMERCIAL

## 2018-04-06 VITALS
HEART RATE: 81 BPM | HEIGHT: 74 IN | WEIGHT: 257.5 LBS | DIASTOLIC BLOOD PRESSURE: 79 MMHG | SYSTOLIC BLOOD PRESSURE: 121 MMHG | BODY MASS INDEX: 33.05 KG/M2 | OXYGEN SATURATION: 97 %

## 2018-04-06 DIAGNOSIS — E11.9 TYPE 2 DIABETES MELLITUS WITHOUT COMPLICATION, WITHOUT LONG-TERM CURRENT USE OF INSULIN (H): Primary | ICD-10-CM

## 2018-04-06 PROCEDURE — 97803 MED NUTRITION INDIV SUBSEQ: CPT | Performed by: DIETITIAN, REGISTERED

## 2018-04-06 RX ORDER — GLIMEPIRIDE 1 MG/1
1 TABLET ORAL
Qty: 30 TABLET | Refills: 3 | Status: SHIPPED | OUTPATIENT
Start: 2018-04-06 | End: 2018-05-01

## 2018-04-06 ASSESSMENT — PAIN SCALES - GENERAL: PAINLEVEL: NO PAIN (0)

## 2018-04-06 NOTE — PROGRESS NOTES
"Pt is here today for diabetes follow up - glucose review.      /79  Pulse 81  Ht 1.88 m (6' 2\")  Wt 116.8 kg (257 lb 8 oz)  SpO2 97%  BMI 33.06 kg/m2     Current diabetes medications:  Metformin ER 1000 mg bid.  Pt has c/o gi symptoms.      Current glucose levels:  [id number]  Later in the day - 148, 133    Pt's wife is here with him today.  She seems supportive.  Pt's wife packs his lunch for work.  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  Packs two sandwiches, small bag of chips, bag of fruit, bag of vegetables.  He does drink large amounts of coffee with flavored creamer.  No exercise.  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      PLAN:  Continue current glucose testing times.  Try to find alternative to flavored creamer - discussed options.  Provider okayed d/c of Metformin ER and begin 1 mg Glimepiride daily.  Dose will be titrated as needed.      Follow up:  Via phone on April 16th to review glucose numbers and titrate Glimepiride if needed.      Total time spent with pt was 45 minutes.    "

## 2018-04-06 NOTE — TELEPHONE ENCOUNTER
Pt was here today for diabetes follow up.  He states Metformin ER is causing him gi issues.  He is a .  Okay to discontinue Metformin ER and being 1 mg Glimepiride daily and titrate dose as needed.  Thanks!

## 2018-04-06 NOTE — NURSING NOTE
"Chief Complaint   Patient presents with     Diabetes     BG review       Initial /79  Pulse 81  Ht 1.88 m (6' 2\")  Wt 116.8 kg (257 lb 8 oz)  SpO2 97%  BMI 33.06 kg/m2 Estimated body mass index is 33.06 kg/(m^2) as calculated from the following:    Height as of this encounter: 1.88 m (6' 2\").    Weight as of this encounter: 116.8 kg (257 lb 8 oz).  Medication Reconciliation: complete   Stephanie Velazco      "

## 2018-04-06 NOTE — IP AVS SNAPSHOT
MRN:[id number]                      After Visit Summary   4/6/2018    Atul Lam    MRN: [id number]           Thank you!     Thank you for choosing [facility] for your care. Our goal is always to provide you with excellent care. Hearing back from our patients is one way we can continue to improve our services. Please take a few minutes to complete the written survey that you may receive in the mail after you visit with us. Thank you!        Patient Information     Date Of Birth          1965        About your hospital stay     You were admitted on:  April 6, 2018 You last received care in the:  [facility]    You were discharged on:  April 6, 2018       Who to Call     For medical emergencies, please call 911.  For non-urgent questions about your medical care, please call your primary care provider or clinic, [phone]          Attending Provider     Provider Specialty    Carolyn De Leon NP [facility]       Primary Care Provider Office Phone # Fax #    Zoraida Pham [phone]      Your next 10 appointments already scheduled     Apr 06, 2018  4:00 PM CDT   (Arrive by 3:45 PM)   PAC PHONE RN ASSESSMENT with  Pac Rn   [facility] ([facility])    [address]  4th Floor  [place] [address] [phone]           Note: this is not an onsite visit; there is no need to come to the facility.            Apr 10, 2018   Procedure with Alexandra Holbrook MD   [facility], Same Day Surgery (--)    [address] [address] [phone]            Apr 13, 2018 11:30 AM CDT   (Arrive by 11:15 AM)   Return Visit with Jordan Page MD   [facility] [place] ([facility] [place] )    [address] [place] [address]   [phone]            Apr 20, 2018  1:00 PM CDT   (Arrive by 12:45 PM)   Return Visit with Alexandra Holbrook MD   [facility]ose [facility]  "Throat ([facility] [place])    [address]  4th Floor  [facility] [address] [phone]              Further instructions from your care team       -Try to find substitution for creamer.   -Test your glucose 2x/day - fasting when you wake up and when you get home.   -I will call you regarding beginning Glimepiride 1 mg daily -vs Metformin.    -You will stop the Metformin once you begin the Glimepiride.   -I will call for your glucose numbers on .    -Call with any questions - HUMA Marino, [phone].    Pending Results     No orders found from 2018 to 2018.            Admission Information     Date & Time Provider Department Dept. Phone    2018 Carolyn De Leon, [facility] [phone]      Your Vitals Were     Blood Pressure Pulse Height Weight Pulse Oximetry BMI (Body Mass Index)    121/79 81 1.88 m (6' 2\") 116.8 kg (257 lb 8 oz) 97% 33.06 kg/m2      Circular Information     Circular lets you send messages to your doctor, view your test results, renew your prescriptions, schedule appointments and more. To sign up, go to [email or web address] . Click on \"Log in\" on the left side of the screen, which will take you to the Welcome page. Then click on \"Sign up Now\" on the right side of the page.     You will be asked to enter the access code listed below, as well as some personal information. Please follow the directions to create your username and password.     Your access code is: [email or web address]  Expires: 2018 11:24 AM     Your access code will  in 90 days. If you need help or a new code, please call your [facility] clinic or [phone].        Care EveryWhere ID     This is your Care EveryWhere ID. This could be used by other organizations to access your [facility] medical records  [id number]        Equal Access to Services     JOSE EVANS AH: drew Rangel, belle roth, " jennifer balbuenaeugenio cali khjumanash la'aan ah. So [facility] [phone].    ATENCIÓN: Si fitzla jennifer, tiene a saunders disposición servicios gratuitos de asistencia lingüística. Alex al [phone].    We comply with applicable federal civil rights laws and [place] laws. We do not discriminate on the basis of race, color, national origin, age, disability, sex, sexual orientation, or gender identity.               Review of your medicines      UNREVIEWED medicines. Ask your doctor about these medicines        Dose / Directions    atorvastatin 20 MG tablet   Commonly known as:  LIPITOR   Used for:  Hyperlipidemia, unspecified hyperlipidemia type, Poorly controlled type 2 diabetes mellitus (H)        Dose:  20 mg   Take 1 tablet (20 mg) by mouth daily   Quantity:  90 tablet   Refills:  3       metFORMIN 500 MG 24 hr tablet   Commonly known as:  GLUCOPHAGE-XR   Used for:  Type 2 diabetes mellitus without complication, without long-term current use of insulin (H)        Dose:  1000 mg   Take 2 tablets (1,000 mg) by mouth 2 times daily (with meals)   Quantity:  360 tablet   Refills:  1         CONTINUE these medicines which have NOT CHANGED        Dose / Directions    blood glucose monitoring lancets   Used for:  Type 2 diabetes mellitus without complication, without long-term current use of insulin (H)        Use to test blood sugar 2 times daily.   Quantity:  100 each   Refills:  11       blood glucose monitoring test strip   Commonly known as:  BELL CONTOUR NEXT   Used for:  Type 2 diabetes mellitus without complication, without long-term current use of insulin (H)        Use to test blood sugar 2 times daily.   Quantity:  100 each   Refills:  11                Protect others around you: Learn how to safely use, store and throw away your medicines at www.disposemymeds.org.             Medication List: This is a list of all your medications and when to take them. Check marks below indicate your daily home schedule. Keep this  list as a reference.      Medications           Morning Afternoon Evening Bedtime As Needed    atorvastatin 20 MG tablet   Commonly known as:  LIPITOR   Take 1 tablet (20 mg) by mouth daily                                blood glucose monitoring lancets   Use to test blood sugar 2 times daily.                                blood glucose monitoring test strip   Commonly known as:  SWIIM System CONTOUR NEXT   Use to test blood sugar 2 times daily.                                metFORMIN 500 MG 24 hr tablet   Commonly known as:  GLUCOPHAGE-XR   Take 2 tablets (1,000 mg) by mouth 2 times daily (with meals)

## 2018-04-06 NOTE — IP AVS SNAPSHOT
[facility]    [address] [place] [address]    Phone:  [phone]    Fax:  [phone]                                       After Visit Summary   4/6/2018    Atul Lam    MRN: [id number]           After Visit Summary Signature Page     I have received my discharge instructions, and my questions have been answered. I have discussed any challenges I see with this plan with the nurse or doctor.    ..........................................................................................................................................  Patient/Patient Representative Signature      ..........................................................................................................................................  Patient Representative Print Name and Relationship to Patient    ..................................................               ................................................  Date                                            Time    ..........................................................................................................................................  Reviewed by Signature/Title    ...................................................              ..............................................  Date                                                            Time

## 2018-04-06 NOTE — DISCHARGE INSTRUCTIONS
-Try to find substitution for creamer.   -Test your glucose 2x/day - fasting when you wake up and when you get home.   -I will call you regarding beginning Glimepiride 1 mg daily -vs Metformin.    -You will stop the Metformin once you begin the Glimepiride.   -I will call for your glucose numbers on Monday April 16th.    -Call with any questions - HUMA Marino, -[phone].

## 2018-04-09 ENCOUNTER — ANESTHESIA EVENT (OUTPATIENT)
Dept: SURGERY | Facility: CLINIC | Age: 53
End: 2018-04-09
Payer: COMMERCIAL

## 2018-04-10 ENCOUNTER — SURGERY (OUTPATIENT)
Age: 53
End: 2018-04-10

## 2018-04-10 ENCOUNTER — HOSPITAL ENCOUNTER (OUTPATIENT)
Facility: CLINIC | Age: 53
Discharge: HOME | End: 2018-04-10
Attending: OTOLARYNGOLOGY | Admitting: OTOLARYNGOLOGY
Payer: COMMERCIAL

## 2018-04-10 ENCOUNTER — ANESTHESIA (OUTPATIENT)
Dept: SURGERY | Facility: CLINIC | Age: 53
End: 2018-04-10
Payer: COMMERCIAL

## 2018-04-10 VITALS
OXYGEN SATURATION: 94 % | RESPIRATION RATE: 16 BRPM | BODY MASS INDEX: 32.62 KG/M2 | DIASTOLIC BLOOD PRESSURE: 86 MMHG | WEIGHT: 254.19 LBS | HEART RATE: 78 BPM | TEMPERATURE: 97.2 F | SYSTOLIC BLOOD PRESSURE: 139 MMHG | HEIGHT: 74 IN

## 2018-04-10 DIAGNOSIS — D11.9 WARTHIN TUMOR: Primary | ICD-10-CM

## 2018-04-10 LAB
GLUCOSE BLDC GLUCOMTR-MCNC: 180 MG/DL (ref 70–99)
GLUCOSE BLDC GLUCOMTR-MCNC: 217 MG/DL (ref 70–99)
GLUCOSE BLDC GLUCOMTR-MCNC: 245 MG/DL (ref 70–99)

## 2018-04-10 PROCEDURE — 25000128 H RX IP 250 OP 636: Performed by: OTOLARYNGOLOGY

## 2018-04-10 PROCEDURE — 71000013 ZZH RECOVERY PHASE 1 LEVEL 1 EA ADDTL HR: Performed by: OTOLARYNGOLOGY

## 2018-04-10 PROCEDURE — 25000128 H RX IP 250 OP 636: Performed by: ANESTHESIOLOGY

## 2018-04-10 PROCEDURE — 36000062 ZZH SURGERY LEVEL 4 1ST 30 MIN - UMMC: Performed by: OTOLARYNGOLOGY

## 2018-04-10 PROCEDURE — 25000125 ZZHC RX 250: Performed by: OTOLARYNGOLOGY

## 2018-04-10 PROCEDURE — 37000009 ZZH ANESTHESIA TECHNICAL FEE, EACH ADDTL 15 MIN: Performed by: OTOLARYNGOLOGY

## 2018-04-10 PROCEDURE — 27210995 ZZH RX 272: Performed by: OTOLARYNGOLOGY

## 2018-04-10 PROCEDURE — 25000125 ZZHC RX 250: Performed by: NURSE ANESTHETIST, CERTIFIED REGISTERED

## 2018-04-10 PROCEDURE — 27210794 ZZH OR GENERAL SUPPLY STERILE: Performed by: OTOLARYNGOLOGY

## 2018-04-10 PROCEDURE — 25000566 ZZH SEVOFLURANE, EA 15 MIN: Performed by: OTOLARYNGOLOGY

## 2018-04-10 PROCEDURE — 36000064 ZZH SURGERY LEVEL 4 EA 15 ADDTL MIN - UMMC: Performed by: OTOLARYNGOLOGY

## 2018-04-10 PROCEDURE — 82962 GLUCOSE BLOOD TEST: CPT

## 2018-04-10 PROCEDURE — 25000128 H RX IP 250 OP 636: Performed by: NURSE ANESTHETIST, CERTIFIED REGISTERED

## 2018-04-10 PROCEDURE — 88307 TISSUE EXAM BY PATHOLOGIST: CPT | Performed by: OTOLARYNGOLOGY

## 2018-04-10 PROCEDURE — 37000008 ZZH ANESTHESIA TECHNICAL FEE, 1ST 30 MIN: Performed by: OTOLARYNGOLOGY

## 2018-04-10 PROCEDURE — 40000170 ZZH STATISTIC PRE-PROCEDURE ASSESSMENT II: Performed by: OTOLARYNGOLOGY

## 2018-04-10 PROCEDURE — 71000012 ZZH RECOVERY PHASE 1 LEVEL 1 FIRST HR: Performed by: OTOLARYNGOLOGY

## 2018-04-10 PROCEDURE — 25000132 ZZH RX MED GY IP 250 OP 250 PS 637: Performed by: ANESTHESIOLOGY

## 2018-04-10 PROCEDURE — 71000027 ZZH RECOVERY PHASE 2 EACH 15 MINS: Performed by: OTOLARYNGOLOGY

## 2018-04-10 RX ORDER — OXYCODONE HYDROCHLORIDE 5 MG/1
5-10 TABLET ORAL EVERY 6 HOURS PRN
Qty: 56 TABLET | Refills: 0 | Status: SHIPPED | OUTPATIENT
Start: 2018-04-10 | End: 2018-04-20

## 2018-04-10 RX ORDER — LIDOCAINE HYDROCHLORIDE 20 MG/ML
INJECTION, SOLUTION INFILTRATION; PERINEURAL PRN
Status: DISCONTINUED | OUTPATIENT
Start: 2018-04-10 | End: 2018-04-10

## 2018-04-10 RX ORDER — ONDANSETRON 2 MG/ML
INJECTION INTRAMUSCULAR; INTRAVENOUS PRN
Status: DISCONTINUED | OUTPATIENT
Start: 2018-04-10 | End: 2018-04-10

## 2018-04-10 RX ORDER — FENTANYL CITRATE 50 UG/ML
25-50 INJECTION, SOLUTION INTRAMUSCULAR; INTRAVENOUS EVERY 5 MIN PRN
Status: DISCONTINUED | OUTPATIENT
Start: 2018-04-10 | End: 2018-04-10 | Stop reason: HOSPADM

## 2018-04-10 RX ORDER — NALOXONE HYDROCHLORIDE 0.4 MG/ML
.1-.4 INJECTION, SOLUTION INTRAMUSCULAR; INTRAVENOUS; SUBCUTANEOUS
Status: DISCONTINUED | OUTPATIENT
Start: 2018-04-10 | End: 2018-04-10 | Stop reason: HOSPADM

## 2018-04-10 RX ORDER — PROPOFOL 10 MG/ML
INJECTION, EMULSION INTRAVENOUS PRN
Status: DISCONTINUED | OUTPATIENT
Start: 2018-04-10 | End: 2018-04-10

## 2018-04-10 RX ORDER — SODIUM CHLORIDE, SODIUM LACTATE, POTASSIUM CHLORIDE, CALCIUM CHLORIDE 600; 310; 30; 20 MG/100ML; MG/100ML; MG/100ML; MG/100ML
INJECTION, SOLUTION INTRAVENOUS CONTINUOUS
Status: DISCONTINUED | OUTPATIENT
Start: 2018-04-10 | End: 2018-04-10 | Stop reason: HOSPADM

## 2018-04-10 RX ORDER — ONDANSETRON 4 MG/1
4 TABLET, ORALLY DISINTEGRATING ORAL EVERY 30 MIN PRN
Status: DISCONTINUED | OUTPATIENT
Start: 2018-04-10 | End: 2018-04-10 | Stop reason: HOSPADM

## 2018-04-10 RX ORDER — DEXAMETHASONE SODIUM PHOSPHATE 10 MG/ML
10 INJECTION, SOLUTION INTRAMUSCULAR; INTRAVENOUS ONCE
Status: COMPLETED | OUTPATIENT
Start: 2018-04-10 | End: 2018-04-10

## 2018-04-10 RX ORDER — ONDANSETRON 2 MG/ML
4 INJECTION INTRAMUSCULAR; INTRAVENOUS EVERY 30 MIN PRN
Status: DISCONTINUED | OUTPATIENT
Start: 2018-04-10 | End: 2018-04-10 | Stop reason: HOSPADM

## 2018-04-10 RX ORDER — ACETAMINOPHEN 325 MG/1
975 TABLET ORAL ONCE
Status: COMPLETED | OUTPATIENT
Start: 2018-04-10 | End: 2018-04-10

## 2018-04-10 RX ORDER — SODIUM CHLORIDE, SODIUM LACTATE, POTASSIUM CHLORIDE, CALCIUM CHLORIDE 600; 310; 30; 20 MG/100ML; MG/100ML; MG/100ML; MG/100ML
INJECTION, SOLUTION INTRAVENOUS CONTINUOUS PRN
Status: DISCONTINUED | OUTPATIENT
Start: 2018-04-10 | End: 2018-04-10

## 2018-04-10 RX ORDER — AMOXICILLIN 250 MG
1-2 CAPSULE ORAL 2 TIMES DAILY
Qty: 30 TABLET | Refills: 0 | Status: SHIPPED | OUTPATIENT
Start: 2018-04-10 | End: 2018-04-20

## 2018-04-10 RX ORDER — GABAPENTIN 300 MG/1
300 CAPSULE ORAL ONCE
Status: COMPLETED | OUTPATIENT
Start: 2018-04-10 | End: 2018-04-10

## 2018-04-10 RX ORDER — GLIMEPIRIDE 1 MG/1
1 TABLET ORAL ONCE
Status: COMPLETED | OUTPATIENT
Start: 2018-04-10 | End: 2018-04-10

## 2018-04-10 RX ORDER — AMPICILLIN AND SULBACTAM 2; 1 G/1; G/1
3 INJECTION, POWDER, FOR SOLUTION INTRAMUSCULAR; INTRAVENOUS
Status: COMPLETED | OUTPATIENT
Start: 2018-04-10 | End: 2018-04-10

## 2018-04-10 RX ORDER — FENTANYL CITRATE 50 UG/ML
INJECTION, SOLUTION INTRAMUSCULAR; INTRAVENOUS PRN
Status: DISCONTINUED | OUTPATIENT
Start: 2018-04-10 | End: 2018-04-10

## 2018-04-10 RX ADMIN — REMIFENTANIL HYDROCHLORIDE: 1 INJECTION, POWDER, LYOPHILIZED, FOR SOLUTION INTRAVENOUS at 12:22

## 2018-04-10 RX ADMIN — AMPICILLIN SODIUM AND SULBACTAM SODIUM 1.5 G: 2; 1 INJECTION, POWDER, FOR SOLUTION INTRAMUSCULAR; INTRAVENOUS at 10:35

## 2018-04-10 RX ADMIN — FENTANYL CITRATE 50 MCG: 50 INJECTION, SOLUTION INTRAMUSCULAR; INTRAVENOUS at 13:28

## 2018-04-10 RX ADMIN — ACETAMINOPHEN 975 MG: 325 TABLET, FILM COATED ORAL at 06:15

## 2018-04-10 RX ADMIN — HYDROMORPHONE HYDROCHLORIDE 0.5 MG: 1 INJECTION, SOLUTION INTRAMUSCULAR; INTRAVENOUS; SUBCUTANEOUS at 13:44

## 2018-04-10 RX ADMIN — LIDOCAINE HYDROCHLORIDE 100 MG: 20 INJECTION, SOLUTION INFILTRATION; PERINEURAL at 08:07

## 2018-04-10 RX ADMIN — FENTANYL CITRATE 100 MCG: 50 INJECTION, SOLUTION INTRAMUSCULAR; INTRAVENOUS at 07:57

## 2018-04-10 RX ADMIN — GLIMEPIRIDE 1 MG: 1 TABLET ORAL at 16:18

## 2018-04-10 RX ADMIN — PROPOFOL 100 MG: 10 INJECTION, EMULSION INTRAVENOUS at 08:38

## 2018-04-10 RX ADMIN — REMIFENTANIL HYDROCHLORIDE: 1 INJECTION, POWDER, LYOPHILIZED, FOR SOLUTION INTRAVENOUS at 10:32

## 2018-04-10 RX ADMIN — HUMAN INSULIN 3 UNITS: 100 INJECTION, SOLUTION SUBCUTANEOUS at 14:28

## 2018-04-10 RX ADMIN — PROPOFOL 200 MG: 10 INJECTION, EMULSION INTRAVENOUS at 08:07

## 2018-04-10 RX ADMIN — EPINEPHRINE: 1 INJECTION PARENTERAL at 08:43

## 2018-04-10 RX ADMIN — SODIUM CHLORIDE, POTASSIUM CHLORIDE, SODIUM LACTATE AND CALCIUM CHLORIDE: 600; 310; 30; 20 INJECTION, SOLUTION INTRAVENOUS at 07:30

## 2018-04-10 RX ADMIN — AMPICILLIN SODIUM AND SULBACTAM SODIUM 3 G: 2; 1 INJECTION, POWDER, FOR SOLUTION INTRAMUSCULAR; INTRAVENOUS at 08:35

## 2018-04-10 RX ADMIN — FENTANYL CITRATE 50 MCG: 50 INJECTION, SOLUTION INTRAMUSCULAR; INTRAVENOUS at 13:37

## 2018-04-10 RX ADMIN — DEXAMETHASONE SODIUM PHOSPHATE 10 MG: 10 INJECTION INTRAMUSCULAR; INTRAVENOUS at 08:15

## 2018-04-10 RX ADMIN — Medication 140 MG: at 08:07

## 2018-04-10 RX ADMIN — REMIFENTANIL HYDROCHLORIDE 0.07 MCG/KG/MIN: 1 INJECTION, POWDER, LYOPHILIZED, FOR SOLUTION INTRAVENOUS at 08:11

## 2018-04-10 RX ADMIN — AMPICILLIN SODIUM AND SULBACTAM SODIUM 1.5 G: 2; 1 INJECTION, POWDER, FOR SOLUTION INTRAMUSCULAR; INTRAVENOUS at 12:31

## 2018-04-10 RX ADMIN — Medication 1 PAD.: at 12:19

## 2018-04-10 RX ADMIN — FENTANYL CITRATE 100 MCG: 50 INJECTION, SOLUTION INTRAMUSCULAR; INTRAVENOUS at 08:38

## 2018-04-10 RX ADMIN — GABAPENTIN 300 MG: 300 CAPSULE ORAL at 06:16

## 2018-04-10 RX ADMIN — ONDANSETRON 4 MG: 2 INJECTION INTRAMUSCULAR; INTRAVENOUS at 12:46

## 2018-04-10 RX ADMIN — MIDAZOLAM 2 MG: 1 INJECTION INTRAMUSCULAR; INTRAVENOUS at 07:54

## 2018-04-10 ASSESSMENT — COPD QUESTIONNAIRES: COPD: 0

## 2018-04-10 ASSESSMENT — LIFESTYLE VARIABLES: TOBACCO_USE: 1

## 2018-04-10 NOTE — IP AVS SNAPSHOT
MRN:[id number]                      After Visit Summary   4/10/2018    Atul Lam    MRN: [id number]           Thank you!     Thank you for choosing [facility] for your care. Our goal is always to provide you with excellent care. Hearing back from our patients is one way we can continue to improve our services. Please take a few minutes to complete the written survey that you may receive in the mail after you visit with us. Thank you!        Patient Information     Date Of Birth          1965        About your hospital stay     You were admitted on:  April 10, 2018 You last received care in the:  Post Anesthesia Care Unit [facility]    You were discharged on:  April 10, 2018       Who to Call     For medical emergencies, please call 911.  For non-urgent questions about your medical care, please call your primary care provider or clinic, [phone]  For questions related to your surgery, please call your surgery clinic        Attending Provider     Provider Specialty    Alexandra Holbrook MD Otolaryngology       Primary Care Provider Office Phone # Fax #    Zoraida Pham [phone]      After Care Instructions     Diet Instructions       Resume pre procedure diet            Discharge Instructions - Lifting restrictions       Lifting Restrictions 10 pounds until seen at Post-op follow up appointment            No driving or operating machinery       While taking narcotic pain medications            Wound care       - Leave jaw bra in place for 48 hours.  - Empty and record drain output 2 times/day for your neck drain.  Once output is less than 30cc in each drain, call the ENT clinic to schedule an appointment to have it removed.                  Your next 10 appointments already scheduled     Apr 13, 2018 11:30 AM CDT   (Arrive by 11:15 AM)   Return Visit with Jordan Page MD   [facility] ([facility] )    [address]  [place] [address]   [phone]            2018  1:00 PM CDT   (Arrive by 12:45 PM)   Return Visit with Alexandra Holbrook MD   [facility] ([facility])    [address] [id number] [phone]              Further instructions from your care team       [facility] [place]  Same-Day Surgery   Adult Discharge Orders & Instructions     For 24 hours after surgery    1. Get plenty of rest.  A responsible adult must stay with you for at least 24 hours after you leave the hospital.   2. Do not drive or use heavy equipment.  If you have weakness or tingling, don't drive or use heavy equipment until this feeling goes away.  3. Do not drink alcohol.  4. Avoid strenuous or risky activities.  Ask for help when climbing stairs.   5. You may feel lightheaded.  IF so, sit for a few minutes before standing.  Have someone help you get up.   6. If you have nausea (feel sick to your stomach): Drink only clear liquids such as apple juice, ginger ale, broth or 7-Up.  Rest may also help.  Be sure to drink enough fluids.  Move to a regular diet as you feel able.  7. You may have a slight fever. Call the doctor if your fever is over 100 F (37.7 C) (taken under the tongue) or lasts longer than 24 hours.  8. You may have a dry mouth, a sore throat, muscle aches or trouble sleeping.  These should go away after 24 hours.  9. Do not make important or legal decisions.   Call your doctor for any of the followin.  Signs of infection (fever, growing tenderness at the surgery site, a large amount of drainage or bleeding, severe pain, foul-smelling drainage, redness, swelling).    2. It has been over 8 to 10 hours since surgery and you are still not able to urinate (pass water).    3.  Headache for over 24 hours.      To contact a doctor, call Dr. Alexandra Holbrook at [phone] (Otolarygology clinic) or:    x   [phone] and  "ask for the resident on call for   Otolaryngology (answered 24 hours a day)  x   Emergency Department:    [facility]: [phone]       (TTY for hearing impaired: [phone])           Tips for taking pain medications  To get the best pain relief possible , remember these points:      Take pain medications as directed, before pain becomes severe      Pain medication can upset your stomach: taking it with food may help      Constipation is a common side effect of pain medication. Drink plenty of  Fluids      Eat foods high in fiber. Take a stool softener  if recommended by your doctor or  Pharmacist.        Do not drink alcohol, drive or operate machinery while taking pain medications.      Ask about other ways to control pain, such as with heat, ice or relaxation.          Pending Results     Date and Time Order Name Status Description    4/10/2018 1153 Surgical pathology exam In process             Admission Information     Date & Time Provider Department Dept. Phone    4/10/2018 Alexandra Holbrook MD Post Anesthesia Care Unit [facility] [phone]      Your Vitals Were     Blood Pressure Pulse Temperature Respirations Height Weight    134/85 78 98.1  F (36.7  C) (Oral) 14 1.88 m (6' 2\") 115.3 kg (254 lb 3.1 oz)    Pulse Oximetry BMI (Body Mass Index)                96% 32.64 kg/m2          [employer] Information     [employer] lets you send messages to your doctor, view your test results, renew your prescriptions, schedule appointments and more. To sign up, go to [email or web address] . Click on \"Log in\" on the left side of the screen, which will take you to the Welcome page. Then click on \"Sign up Now\" on the right side of the page.     You will be asked to enter the access code listed below, as well as some personal information. Please follow the directions to create your username and password.     Your access code is: [email or web address]  Expires: 2018 11:24 AM     Your access code will  in 90 " days. If you need help or a new code, please call your [facility] clinic or [phone].        Care EveryWhere ID     This is your Care EveryWhere ID. This could be used by other organizations to access your [facility] medical records  [id number]        Equal Access to Services     KULWANTMEKA NATHAN : Hadii aad ku hadkrystalsimeon Somarline, waaxda luqadaha, qaybta kaalmada anaterijamal, jennifer christopherjumanashaista st. [facility] [phone].    ATENCIÓN: Si habla español, tiene a saunders disposición servicios gratuitos de asistencia lingüística. Llame al [phone].    We comply with applicable federal civil rights laws and [place] laws. We do not discriminate on the basis of race, color, national origin, age, disability, sex, sexual orientation, or gender identity.               Review of your medicines      START taking        Dose / Directions    oxyCODONE IR 5 MG tablet   Commonly known as:  ROXICODONE   Used for:  Warthin tumor        Dose:  5-10 mg   Take 1-2 tablets (5-10 mg) by mouth every 6 hours as needed for pain or other (Moderate to Severe)   Quantity:  56 tablet   Refills:  0       senna-docusate 8.6-50 MG per tablet   Commonly known as:  SENOKOT-S;PERICOLACE   Used for:  Warthin tumor        Dose:  1-2 tablet   Take 1-2 tablets by mouth 2 times daily Take while on oral narcotics to prevent or treat constipation.   Quantity:  30 tablet   Refills:  0         CONTINUE these medicines which have NOT CHANGED        Dose / Directions    atorvastatin 20 MG tablet   Commonly known as:  LIPITOR   Used for:  Hyperlipidemia, unspecified hyperlipidemia type, Poorly controlled type 2 diabetes mellitus (H)        Dose:  20 mg   Take 1 tablet (20 mg) by mouth daily   Quantity:  90 tablet   Refills:  3       blood glucose monitoring lancets   Used for:  Type 2 diabetes mellitus without complication, without long-term current use of insulin (H)        Use to test blood sugar 2 times daily.   Quantity:  100 each   Refills:  11        blood glucose monitoring test strip   Commonly known as:  BELL CONTOUR NEXT   Used for:  Type 2 diabetes mellitus without complication, without long-term current use of insulin (H)        Use to test blood sugar 2 times daily.   Quantity:  100 each   Refills:  11       glimepiride 1 MG tablet   Commonly known as:  AMARYL   Used for:  Type 2 diabetes mellitus without complication, without long-term current use of insulin (H)        Dose:  1 mg   Take 1 tablet (1 mg) by mouth every morning (before breakfast)   Quantity:  30 tablet   Refills:  3            Where to get your medicines      These medications were sent to [facility] [place], [place] - [address]  [address] [address]     Phone:  [phone]     senna-docusate 8.6-50 MG per tablet         Some of these will need a paper prescription and others can be bought over the counter. Ask your nurse if you have questions.     Bring a paper prescription for each of these medications     oxyCODONE IR 5 MG tablet                Protect others around you: Learn how to safely use, store and throw away your medicines at www.disposemymeds.org.        Information about OPIOIDS     PRESCRIPTION OPIOIDS: WHAT YOU NEED TO KNOW    Prescription opioids can be used to help relieve moderate to severe pain and are often prescribed following a surgery or injury, or for certain health conditions. These medications can be an important part of treatment but also come with serious risks. It is important to work with your health care provider to make sure you are getting the safest, most effective care.    WHAT ARE THE RISKS AND SIDE EFFECTS OF OPIOID USE?  Prescription opioids carry serious risks of addiction and overdose, especially with prolonged use. An opioid overdose, often marked by slowed breathing can cause sudden death. The use of prescription opioids can have a number of side effects as well, even when taken as  directed:      Tolerance - meaning you might need to take more of a medication for the same pain relief    Physical dependence - meaning you have symptoms of withdrawal when a medication is stopped    Increased sensitivity to pain    Constipation    Nausea, vomiting, and dry mouth    Sleepiness and dizziness    Confusion    Depression    Low levels of testosterone that can result in lower sex drive, energy, and strength    Itching and sweating    RISKS ARE GREATER WITH:    History of drug misuse, substance use disorder, or overdose    Mental health conditions (such as depression or anxiety)    Sleep apnea    Older age (65 years or older)    Pregnancy    Avoid alcohol while taking prescription opioids.   Also, unless specifically advised by your health care provider, medications to avoid include:    Benzodiazepines (such as Xanax or Valium)    Muscle relaxants (such as Soma or Flexeril)    Hypnotics (such as Ambien or Lunesta)    Other prescription opioids    KNOW YOUR OPTIONS:  Talk to your health care provider about ways to manage your pain that do not involve prescription opioids. Some of these options may actually work better and have fewer risks and side effects:    Pain relievers such as acetaminophen, ibuprofen, and naproxen    Some medications that are also used for depression or seizures    Physical therapy and exercise    Cognitive behavioral therapy, a psychological, goal-directed approach, in which patients learn how to modify physical, behavioral, and emotional triggers of pain and stress    IF YOU ARE PRESCRIBED OPIOIDS FOR PAIN:    Never take opioids in greater amounts or more often than prescribed    Follow up with your primary health care provider and work together to create a plan on how to manage your pain.    Talk about ways to help manage your pain that do not involve prescription opioids    Talk about all concerns and side effects    Help prevent misuse and abuse    Never sell or share  prescription opioids    Never use another person's prescription opioids    Store prescription opioids in a secure place and out of reach of others (this may include visitors, children, friends, and family)    Visit www.cdc.gov/drugoverdose to learn about risks of opioid abuse and overdose    If you believe you may be struggling with addiction, tell your health care provider and ask for guidance or call [facility]'s National Helpline at 5-012-826-HELP    LEARN MORE / www.cdc.gov/drugoverdose/prescribing/guideline.html    Safely dispose of unused prescription opioids: Find your local drug take-back programs and more information about the importance of safe disposal at www.doseofreality.[place].gov             Medication List: This is a list of all your medications and when to take them. Check marks below indicate your daily home schedule. Keep this list as a reference.      Medications           Morning Afternoon Evening Bedtime As Needed    atorvastatin 20 MG tablet   Commonly known as:  LIPITOR   Take 1 tablet (20 mg) by mouth daily                                blood glucose monitoring lancets   Use to test blood sugar 2 times daily.                                blood glucose monitoring test strip   Commonly known as:  [employer] CONTOUR NEXT   Use to test blood sugar 2 times daily.                                glimepiride 1 MG tablet   Commonly known as:  AMARYL   Take 1 tablet (1 mg) by mouth every morning (before breakfast)                                oxyCODONE IR 5 MG tablet   Commonly known as:  ROXICODONE   Take 1-2 tablets (5-10 mg) by mouth every 6 hours as needed for pain or other (Moderate to Severe)                                senna-docusate 8.6-50 MG per tablet   Commonly known as:  SENOKOT-S;PERICOLACE   Take 1-2 tablets by mouth 2 times daily Take while on oral narcotics to prevent or treat constipation.                                          More Information        Caring for a Closed Suction Drainage  "Tube  A drainage tube removes fluid from around an incision. This helps prevent infection and promotes healing. The collection bulb at the end of the tube is squeezed and plugged to create suction. The bulb should be emptied and reset when half full to maintain adequate suction. You need to empty the bulb and clean the skin around the drain as often as your healthcare provider tells you to. Follow the steps below.     What you ll need  Have the following items ready:    Disposable gloves    Measuring cup    Record sheet    Gauze or paper towel    Sterile cotton swabs or 4\" x 4\" gauze pads    Sterile saline or soap and water       Step 1. Empty the bulb    Wash your hands and put on a new pair of disposable gloves.    Point the top of the bulb away from you and remove the stopper.    Turn the bulb upside down over a measuring cup. Squeeze the fluid into the cup. Make sure the bulb is totally empty.    Put the cup to one side. You can record the volume of liquid in the cup after you clean and reconnect the bulb in step 2.    Step 2. Clean and reconnect the bulb    Clean the top of the bulb with clean gauze or a paper towel, if needed.    Squeeze the bulb tight, and put the stopper back on the top.    Record the amount of fluid in the cup. Then, empty the cup as directed.    Step 3. Clean the site    Remove your disposable gloves and wash your hands before cleaning the site.    Put on a new pair of disposable gloves.    Wet a sterile cotton swab or 4\" x 4\" gauze pad with sterile saline or soap and water.    Gently clean the skin around the drain. Always wipe away from the incision.    Apply an antibacterial ointment if directed.   When to call your healthcare provider  Call your healthcare provider if you notice any of these changes:    The amount of fluid increases or decreases suddenly    Large amount of blood or a clot in drainage    Color, odor, or thickness of the fluid changes    Tube falls out or the incision " "opens    Skin around the drain is red, swollen, painful, or seeping pus    You have a fever of 100.4 F (38 C) or higher, or as directed by your healthcare provider     If the tube isn't draining  Here are tips to drain the tube:    Uncurl any kinks in the tube.    With one hand, firmly hold the base of the tube between your thumb and index finger. Do not touch the incision.    Put the thumb and index finger of your other hand on the tube, next to the first hand. Pinch your fingers together. Then pull them along the tube toward the bag. This will help push any clogged fluid through the tube. This is called \"stripping the tube.\" You may find it helpful to hold an alcohol swab between your fingers and the tube to lubricate the tubing.    If the tube still does not drain, call your healthcare provider.   Date Last Reviewed: 12/1/2016 2000-2017 [employer]. [address] [place] [address]. All rights reserved. This information is not intended as a substitute for professional medical care. Always follow your healthcare professional's instructions.             "

## 2018-04-10 NOTE — OP NOTE
Date of Procedure: 4/10/2018    Attending Physician: Alexandra Holbrook MD    Resident Physicians: Cherelle Lala MD    Procedure Performed:  Right superficial parotidectomy with facial nerve dissection  Continuous facial nerve monitoring    Preoperative Diagnosis: Warthin tumor    Postoperative Diagnosis: same    Anesthesia: General    Blood loss: 25 cc    Specimens:   ID Type Source Tests Collected by Time Destination   A : RIGHT SUPERFICIAL PAROTIDECTOMY  -  STITCH SUPERIOR Tissue Parotid Gland SURGICAL PATHOLOGY EXAM Alexandra Holbrook MD 4/10/2018 11:52 AM        Implants:  GREG drain x 1    Complications: None    Findings:  Mass of right tail of parotid measuring about 3 cm  Smaller mass of superior parotid  Facial nerve intact and stimulated at 0.8 mA at end of case with movement in all branches    Indications:  Atul Lam is a 52 year old man with a history 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 Preoperative imaging also showed a possible smaller second Warthin tumor in the superior parotid. He is indicated for a superficial parotidectomy with facial nerve monitoring.     Description of Procedure:  After informed consent was obtained, the patient was brought back to the main operating room and placed in a supine position. General anesthesia was induced. The patient was orotracheally intubated. A dowell was placed. The bed was turned 180 degrees from anesthesia. The [facility] facial nerve monitors were placed and tested. The planned incision was marked in modified Romulo fashion in a preauricular crease and extending down into the neck. This was injected with 10 cc of 1:100,000 epinephrine. The patient was prepped and draped in sterile fashion. A time out was performed. A 15 blade was used to make an incision in the skin. The incision was extended inferiorly down through the platysma. The external jugular vein and greater auricular nerve were identified and preserved.  Laterally the incision was brought down to the SCM and superiorly to the parotid fascia. A skin flap was raised superiorly in a subplatysmal plane and extended superiorly along the parotid fascia out toward the masseter. The greater auricular nerve was traced out and the anterior branch was divided as it passed over the tumor while the posterior branch was preserved. The anterior border of the SCM was defined and the posterior belly of the digastric was identified. The soft tissue lateral to the digastric was released. A pre-tragal plane of dissection was defined. The Wesley dissector was used to identify the main trunk of the facial nerve and verified with the Prass probe. The main trunk of the facial nerve was dissected anteriorly toward the pes, dividing the overlying parotid tissue with the bipolar cautery and a 12 blade. The larger mass was in the tail of the parotid, overlying the lower division of the facial nerve. We started with dissecting out the superior division of the facial nerve. The superior division was traced out to the frontal and zygomatic branches, dividing the overlying parotid gland. The nerve was thin and small in caliber. Once we reached the anterior border of the parotid on these two branches the parotid fascia was divided along its anterior border. The inferior division and it branches were then traced. Of note, the inferior division branches were even smaller than the superior division branches and were not all at the same level as one another. Great care was taken to use the Prass probe to stimulate any tissue before dividing it with the bipolar and 12 blade while tracing out the facial nerve given its atypical branching pattern. The buccal and marginal mandibular nerve branches were dissected out toward the masseter and the anterior border of the parotid was released. The parotid and the tail of parotid mass were then released off the cervical branch and the external jugular vein. Once  the parotid and the mass was completely released it was handed off to nursing for permanent pathology. Of note, the patient had an enlarged external jugular vein node vs tumor lobule that was taken in continuity with the specimen. Given the smaller second tumor that had been identified on preoperative imaging, the remainder of the superior aspect of the parotid overlying the frontal branch was removed. The frontal branch was traced out and the parotid overlying it was reflected laterally back toward the tragus. The bipolar cautery and 12 blade were used to release this approximately 2.5 cm area of parotid tissue. On manual palpation of this parotid specimen, a small mass could be palpated within the specimen. This was also handed off to nursing for permanent pathology. The wound was irrigated with a liter of saline. Hemostasis was achieved with the bipolar cautery. Multiple sustained valsalva maneuvers were performed to ensure hemostasis. The Prass probe was used to test the main trunk of the facial nerve at 0.8 mA with movement in all branches. There was a midface branch that appeared to be praxed when stimulating it distally but was intact and had undergone more extensive dissection to remove the parotid. A piece of surgicel was placed in the defect. A 10 mm flat GREG drain was placed. The incision was closed with a buried interrupted 3-0 vicryl. The skin was closed with a running 5-0 prolene. Bacitracin was applied to the incision. A pressure dressing was placed. The patient was turned back to anesthesia for extubation and transported to the recovery room in stable condition with no immediate complications.    I was present for and participated in the entire procedure.       Alexandra Holbrook MD    Department of Otolaryngology

## 2018-04-10 NOTE — ANESTHESIA PREPROCEDURE EVALUATION
Atul Lam is a 52 year old male with a PMH of  Neck Mass  who is scheduled for Procedure(s):  Right Superficial Parotidectomy - Wound Class: I-Clean    NPO Status: Adequate.  > 6 hours solids, > 2 hours clear liquids.       Past Surgical History:   Procedure Laterality Date     ENT SURGERY      T and A       Anesthesia Evaluation     . Pt has had prior anesthetic. Type: General    No history of anesthetic complications          ROS/MED HX    ENT/Pulmonary:     (+)tobacco use (currently chewing, past smoker), , . .   (-) asthma and COPD   Neurologic:      (-) CVA, TIA and Neuropathy   Cardiovascular:        (-) hypertension, CAD, irregular heartbeat/palpitations and stent   METS/Exercise Tolerance:     Hematologic:        (-) anemia   Musculoskeletal:         GI/Hepatic:        (-) GERD and liver disease   Renal/Genitourinary:      (-) renal disease   Endo:     (+) type II DM Not using insulin .   (-) Type I DM and thyroid disease   Psychiatric:         Infectious Disease:  - neg infectious disease ROS       Malignancy:         Other:                     Physical Exam  Normal systems: cardiovascular, pulmonary and dental    Airway   Mallampati: III  TM distance: >3 FB  Neck ROM: full    Dental     Cardiovascular   Rhythm and rate: regular and normal      Pulmonary    breath sounds clear to auscultation                    Anesthesia Plan      History & Physical Review  History and physical reviewed and following examination; no interval change.    ASA Status:  2 .        Plan for General and ETT with Intravenous induction. Maintenance will be Balanced.    PONV prophylaxis:  Ondansetron (or other 5HT-3) and Dexamethasone or Solumedrol  Additional equipment: Videolaryngoscope and 2nd IV      Postoperative Care  Postoperative pain management:  Oral pain medications and Multi-modal analgesia.      Consents  Anesthetic plan, risks, benefits and alternatives discussed with:  Patient..          Michael Lai,  MD  8:37 AM April 10, 2018                     .

## 2018-04-10 NOTE — OR NURSING
Pt's wife was instructed in GREG drain care, then she stripped the GREG drain line gently, and emptied the bulb, and measured it with verbal cueing.She performed an efficient return demonstration, and verbalized understanding.

## 2018-04-10 NOTE — OR NURSING
Dr landrum came to see the pt and ordered pt to take his Amaryl. He did not take it yesterday or today, and his blood sugars are high at present. Plus he just drank apple juice. Amaryl 1 mg given before discharge. Pt is to check his blood sugar tonight and call his MD if his blood sugar is 400 or more. He will check his blood sugar in the morning, and resume his amaryl tomorrow.

## 2018-04-10 NOTE — DISCHARGE INSTRUCTIONS
General acute hospital  Same-Day Surgery   Adult Discharge Orders & Instructions     For 24 hours after surgery    1. Get plenty of rest.  A responsible adult must stay with you for at least 24 hours after you leave the hospital.   2. Do not drive or use heavy equipment.  If you have weakness or tingling, don't drive or use heavy equipment until this feeling goes away.  3. Do not drink alcohol.  4. Avoid strenuous or risky activities.  Ask for help when climbing stairs.   5. You may feel lightheaded.  IF so, sit for a few minutes before standing.  Have someone help you get up.   6. If you have nausea (feel sick to your stomach): Drink only clear liquids such as apple juice, ginger ale, broth or 7-Up.  Rest may also help.  Be sure to drink enough fluids.  Move to a regular diet as you feel able.  7. You may have a slight fever. Call the doctor if your fever is over 100 F (37.7 C) (taken under the tongue) or lasts longer than 24 hours.  8. You may have a dry mouth, a sore throat, muscle aches or trouble sleeping.  These should go away after 24 hours.  9. Do not make important or legal decisions.   Call your doctor for any of the followin.  Signs of infection (fever, growing tenderness at the surgery site, a large amount of drainage or bleeding, severe pain, foul-smelling drainage, redness, swelling).    2. It has been over 8 to 10 hours since surgery and you are still not able to urinate (pass water).    3.  Headache for over 24 hours.      To contact a doctor, call Dr. Alexandra Holbrook at [phone] (Otolarygology clinic) or:    x   [phone] and ask for the resident on call for   Otolaryngology (answered 24 hours a day)  x   Emergency Department:    [facility]: [phone]       (TTY for hearing impaired: [phone])           Tips for taking pain medications  To get the best pain relief possible , remember these points:      Take pain medications as directed, before pain  becomes severe      Pain medication can upset your stomach: taking it with food may help      Constipation is a common side effect of pain medication. Drink plenty of  Fluids      Eat foods high in fiber. Take a stool softener  if recommended by your doctor or  Pharmacist.        Do not drink alcohol, drive or operate machinery while taking pain medications.      Ask about other ways to control pain, such as with heat, ice or relaxation.

## 2018-04-10 NOTE — ANESTHESIA CARE TRANSFER NOTE
Patient: Atul Lam    Procedure(s):  Right Superficial Parotidectomy - Wound Class: I-Clean    Diagnosis: Neck Mass   Diagnosis Additional Information: No value filed.    Anesthesia Type:   No value filed.     Note:  Airway :Face Mask  Patient transferred to:PACU  Handoff Report: Identifed the Patient, Identified the Reponsible Provider, Reviewed the pertinent medical history, Discussed the surgical course, Reviewed Intra-OP anesthesia mangement and issues during anesthesia, Set expectations for post-procedure period and Allowed opportunity for questions and acknowledgement of understanding      Vitals: (Last set prior to Anesthesia Care Transfer)    CRNA VITALS  4/10/2018 1238 - 4/10/2018 1315      4/10/2018             Resp Rate (set): 10                Electronically Signed By: MARYLIN Magallanes CRNA  April 10, 2018  1:15 PM

## 2018-04-10 NOTE — BRIEF OP NOTE
[facility] [place]    Brief Operative Note    Pre-operative diagnosis: Neck Mass   Post-operative diagnosis * No post-op diagnosis entered *  Procedure: Procedure(s):  Right Superficial Parotidectomy - Wound Class: I-Clean  Surgeon: Surgeon(s) and Role:     * Alexandra Holbrook MD - Primary     * Cherelle Lala MD - Resident - Assisting  Anesthesia: General   Estimated blood loss: Less than 10 ml  Drains: 10 fully perforated Shakeel-Mcnamara in the right neck  Specimens:   ID Type Source Tests Collected by Time Destination   A : RIGHT SUPERFICIAL PAROTIDECTOMY  -  STITCH SUPERIOR Tissue Parotid Gland SURGICAL PATHOLOGY EXAM Alexandra Holbrook MD 4/10/2018 11:52 AM      Findings:   See op note  Complications: None.  Implants: None.    Cherelle Lala MD PGY-3  Otolaryngology-Head & Neck Surgery

## 2018-04-10 NOTE — IP AVS SNAPSHOT
Post Anesthesia Care Unit [address] [address]    Phone:  [phone]                                       After Visit Summary   4/10/2018    Atul Lam    MRN: [id number]           After Visit Summary Signature Page     I have received my discharge instructions, and my questions have been answered. I have discussed any challenges I see with this plan with the nurse or doctor.    ..........................................................................................................................................  Patient/Patient Representative Signature      ..........................................................................................................................................  Patient Representative Print Name and Relationship to Patient    ..................................................               ................................................  Date                                            Time    ..........................................................................................................................................  Reviewed by Signature/Title    ...................................................              ..............................................  Date                                                            Time

## 2018-04-10 NOTE — ANESTHESIA POSTPROCEDURE EVALUATION
Patient: Atul Lam    Procedure(s):  Right Superficial Parotidectomy - Wound Class: I-Clean    Diagnosis:Neck Mass   Diagnosis Additional Information: No value filed.    Anesthesia Type:  No value filed.    Note:  Anesthesia Post Evaluation    Patient location during evaluation: PACU and Phase 2  Patient participation: Able to fully participate in evaluation  Level of consciousness: awake and alert  Pain management: adequate  Airway patency: patent  Cardiovascular status: acceptable  Respiratory status: acceptable  Hydration status: acceptable  PONV: none     Anesthetic complications: None    Comments: Dressed and ready to go, feeling well. BS moderately elevated, has not taken glimepride in past few days, patient will take home dose this afternoon and then late morning tomorrow. Has glucometer at home.        Last vitals:  Vitals:    04/10/18 1430 04/10/18 1445 04/10/18 1504   BP: 130/89 134/85 (!) 142/91   Pulse:      Resp: 11 14 14   Temp:  36.7  C (98.1  F) 36.9  C (98.5  F)   SpO2: 97% 96% 98%         Electronically Signed By: Michael Lai MD  April 10, 2018  3:56 PM

## 2018-04-11 ENCOUNTER — CARE COORDINATION (OUTPATIENT)
Dept: OTOLARYNGOLOGY | Facility: CLINIC | Age: 53
End: 2018-04-11

## 2018-04-11 NOTE — PROGRESS NOTES
ENT Discharge Follow-Up      Responsible Attending Physician:   Date of Discharge:    Discharge to:  Home    Current Status:  Pt is a 53 y/o male s/p Right superficial parotidectomy with facial nerve dissection on 4/11/18.  Patient reports operative  pain is decreasing.  Ambulating without assistance.  Pain well controlled with current meds, ample supply.  Reports incision CDI without signs of infection.  Denies redness, swelling, increased tenderness, or elevated temp.  Denies current bowel or bladder issues.  GREG drain output has been 40ml in the last 24 hours. Patient is scheduled with local ENT doctor on Friday for removal, he will rearrange this if output is still greater than 30ml in 24 hours.     Discharge instructions and medication use were reviewed.  RN triage/on call number given:  [phone] or after hours and w/e - [phone].  Patient verbalized understanding and agreement with current plan.    Follow up appointments/imaging/tests needed: 4/20 at 1:00pm.     Nuzhat Jorgensen, RN, BSN

## 2018-04-13 ENCOUNTER — OFFICE VISIT (OUTPATIENT)
Dept: SURGERY | Facility: OTHER | Age: 53
End: 2018-04-13
Attending: FAMILY MEDICINE
Payer: COMMERCIAL

## 2018-04-13 VITALS
BODY MASS INDEX: 32.6 KG/M2 | SYSTOLIC BLOOD PRESSURE: 132 MMHG | TEMPERATURE: 97 F | OXYGEN SATURATION: 98 % | DIASTOLIC BLOOD PRESSURE: 68 MMHG | HEIGHT: 74 IN | HEART RATE: 75 BPM | WEIGHT: 254 LBS

## 2018-04-13 DIAGNOSIS — D11.9 WARTHIN TUMOR: ICD-10-CM

## 2018-04-13 PROCEDURE — 99213 OFFICE O/P EST LOW 20 MIN: CPT | Performed by: SURGERY

## 2018-04-13 ASSESSMENT — PAIN SCALES - GENERAL: PAINLEVEL: MILD PAIN (2)

## 2018-04-13 NOTE — PROGRESS NOTES
"[facility] Progress Note    HPI: 52 y.o. Male referred to my office for a sebaceous cyst of the scalp found to have a right sided neck mass. 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 Referred to the N for resection. Returns to clinic per ENT order to have drain removed.        S: Doing well no issues     O:   Vitals:  /68 (BP Location: Left arm, Patient Position: Sitting, Cuff Size: Adult Regular)  Pulse 75  Temp 97  F (36.1  C) (Tympanic)  Ht 6' 2\" (1.88 m)  Wt 254 lb (115.2 kg)  SpO2 98%  BMI 32.61 kg/m2      Physical Exam:  G: alert oriented, no acute distress   ENT: sclera non-icteric, right sided suture line without erythema, drain serous.   Pulm: no respiratory distress   CVS: RRR  ABD: soft non-tender non-distended   Ext: WWP     Assessment/Plan:  52 y.o. M comes to clinic post op from superficial parotidectomy, here for drain removal. Reviewed outputs at they are 30cc or less. Drain output is serous. Removed in clinic without issue.     Jordan Page     "

## 2018-04-13 NOTE — MR AVS SNAPSHOT
"              After Visit Summary   4/13/2018    Atul Lam    MRN: [id number]           Patient Information     Date Of Birth          1965        Visit Information        Provider Department      4/13/2018 11:30 AM Jordan Page MD [facility]        Today's Diagnoses     Warthin tumor          Care Instructions    Thank you for allowing Dr. Page and our surgical team to participate in your care. Please call with any scheduling questions or concerns to Li at [phone]   Ying [phone]            Follow-ups after your visit        Your next 10 appointments already scheduled     Apr 20, 2018  1:00 PM CDT   (Arrive by 12:45 PM)   Return Visit with Alexandra Holbrook MD   [facility] ([facility])    [address] [place] [address] [phone]              Who to contact     If you have questions or need follow up information about today's clinic visit or your schedule please contact [facility] directly at [phone].  Normal or non-critical lab and imaging results will be communicated to you by MyChart, letter or phone within 4 business days after the clinic has received the results. If you do not hear from us within 7 days, please contact the clinic through MyChart or phone. If you have a critical or abnormal lab result, we will notify you by phone as soon as possible.  Submit refill requests through [email or web address] or call your pharmacy and they will forward the refill request to us. Please allow 3 business days for your refill to be completed.          Additional Information About Your Visit        MyChart Information     [email or web address] lets you send messages to your doctor, view your test results, renew your prescriptions, schedule appointments and more. To sign up, go to [email or web address] . Click on \"Log in\" on the left side of the screen, which will take you to the Welcome page. Then click on " "\"Sign up Now\" on the right side of the page.     You will be asked to enter the access code listed below, as well as some personal information. Please follow the directions to create your username and password.     Your access code is: [email or web address]  Expires: 2018 11:24 AM     Your access code will  in 90 days. If you need help or a new code, please call your [facility] clinic or [phone].        Care EveryWhere ID     This is your Care EveryWhere ID. This could be used by other organizations to access your [facility] medical records  [id number]        Your Vitals Were     Pulse Temperature Height Pulse Oximetry BMI (Body Mass Index)       75 97  F (36.1  C) (Tympanic) 6' 2\" (1.88 m) 98% 32.61 kg/m2        Blood Pressure from Last 3 Encounters:   18 132/68   04/10/18 139/86   18 121/79    Weight from Last 3 Encounters:   18 254 lb (115.2 kg)   04/10/18 254 lb 3.1 oz (115.3 kg)   18 257 lb 8 oz (116.8 kg)              Today, you had the following     No orders found for display       Primary Care Provider Office Phone # Fax #    Zoraiad Pham [phone] [address]  [facility] [address]        Equal Access to Services     [facility]: Hadii aad ku hadasho Soomaali, waaxda luqadaha, qaybta kaalmada adeegyada, jennifer richardson . So [facility] [phone].    ATENCIÓN: Si habla español, tiene a saunders disposición servicios gratuitos de asistencia lingüística. Llame al [phone].    We comply with applicable federal civil rights laws and [place] laws. We do not discriminate on the basis of race, color, national origin, age, disability, sex, sexual orientation, or gender identity.            Thank you!     Thank you for choosing [facility]  for your care. Our goal is always to provide you with excellent care. Hearing back from our patients is one way we can continue to improve our services. Please take a few minutes to " complete the written survey that you may receive in the mail after your visit with us. Thank you!             Your Updated Medication List - Protect others around you: Learn how to safely use, store and throw away your medicines at www.disposemymeds.org.          This list is accurate as of 4/13/18 11:31 AM.  Always use your most recent med list.                   Brand Name Dispense Instructions for use Diagnosis    atorvastatin 20 MG tablet    LIPITOR    90 tablet    Take 1 tablet (20 mg) by mouth daily    Hyperlipidemia, unspecified hyperlipidemia type, Poorly controlled type 2 diabetes mellitus (H)       blood glucose monitoring lancets     100 each    Use to test blood sugar 2 times daily.    Type 2 diabetes mellitus without complication, without long-term current use of insulin (H)       blood glucose monitoring test strip    BELL CONTOUR NEXT    100 each    Use to test blood sugar 2 times daily.    Type 2 diabetes mellitus without complication, without long-term current use of insulin (H)       glimepiride 1 MG tablet    AMARYL    30 tablet    Take 1 tablet (1 mg) by mouth every morning (before breakfast)    Type 2 diabetes mellitus without complication, without long-term current use of insulin (H)       oxyCODONE IR 5 MG tablet    ROXICODONE    56 tablet    Take 1-2 tablets (5-10 mg) by mouth every 6 hours as needed for pain or other (Moderate to Severe)    Warthin tumor       senna-docusate 8.6-50 MG per tablet    SENOKOT-S;PERICOLACE    30 tablet    Take 1-2 tablets by mouth 2 times daily Take while on oral narcotics to prevent or treat constipation.    Warthin tumor

## 2018-04-13 NOTE — NURSING NOTE
"Chief Complaint   Patient presents with     Surgical Followup     Drain removal.       Initial /68 (BP Location: Left arm, Patient Position: Sitting, Cuff Size: Adult Regular)  Pulse 75  Temp 97  F (36.1  C) (Tympanic)  Ht 6' 2\" (1.88 m)  Wt 254 lb (115.2 kg)  SpO2 98%  BMI 32.61 kg/m2 Estimated body mass index is 32.61 kg/(m^2) as calculated from the following:    Height as of this encounter: 6' 2\" (1.88 m).    Weight as of this encounter: 254 lb (115.2 kg).  Medication Reconciliation: complete   Jessenia Landaverde LPN      "

## 2018-04-13 NOTE — PATIENT INSTRUCTIONS
Thank you for allowing Dr. Page and our surgical team to participate in your care. Please call with any scheduling questions or concerns to Li at [phone]   Ying [phone]

## 2018-04-16 ENCOUNTER — TELEPHONE (OUTPATIENT)
Dept: EDUCATION SERVICES | Facility: HOSPITAL | Age: 53
End: 2018-04-16

## 2018-04-16 NOTE — TELEPHONE ENCOUNTER
Called pt regarding glucose levels.  Current diabetes medications:  Glimepiride 1 mg daily.  Current glucose levels:  Fasting-180, 246 (no meds secondary to surgery), 146, 211, 157  2 hours post supper-245, 169, 162  Pt will increase Glimepiride to 2 mg daily and we will speak again in one week.

## 2018-04-17 LAB — COPATH REPORT: NORMAL

## 2018-04-20 ENCOUNTER — OFFICE VISIT (OUTPATIENT)
Dept: OTOLARYNGOLOGY | Facility: CLINIC | Age: 53
End: 2018-04-20
Payer: COMMERCIAL

## 2018-04-20 VITALS
SYSTOLIC BLOOD PRESSURE: 126 MMHG | OXYGEN SATURATION: 96 % | WEIGHT: 256 LBS | HEIGHT: 74 IN | DIASTOLIC BLOOD PRESSURE: 82 MMHG | BODY MASS INDEX: 32.85 KG/M2 | HEART RATE: 83 BPM

## 2018-04-20 DIAGNOSIS — D11.9 WARTHIN TUMOR: Primary | ICD-10-CM

## 2018-04-20 ASSESSMENT — PAIN SCALES - GENERAL: PAINLEVEL: NO PAIN (0)

## 2018-04-20 NOTE — PROGRESS NOTES
Dear Dr. Page:    I had the pleasure of seeing Atul Lam in follow-up today at the [facility].     History of Present Illness:   Atul Lam is a 52 year old man with a right sided gradually increasing right parotid mass. 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 His preoperative CT scan showed a 4 cm cystic parotid tumor in addition to a possible second parotid tumor superiorly. He was taken to the OR on 4/10/2018 for superficial parotidectomy. The facial nerve was preserved during the case. 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 He comes in today for f/u. His drain was removed locally. He denies any facial weakness. He has numbness of his incision and ear.         MEDICATIONS:     Current Outpatient Prescriptions   Medication Sig Dispense Refill     atorvastatin (LIPITOR) 20 MG tablet Take 1 tablet (20 mg) by mouth daily 90 tablet 3     blood glucose monitoring (BELL CONTOUR NEXT) test strip Use to test blood sugar 2 times daily. 100 each 11     blood glucose monitoring (BELL MICROLET) lancets Use to test blood sugar 2 times daily. 100 each 11     glimepiride (AMARYL) 1 MG tablet Take 1 tablet (1 mg) by mouth every morning (before breakfast) 30 tablet 3       ALLERGIES:    Allergies   Allergen Reactions     Animal Dander      Nasal congestion     Aspirin Nausea and Vomiting     Dust Mites      Nasal congestion     Morphine Nausea and Vomiting       HABITS/SOCIAL HISTORY:     Quit smoking 1 year ago previously 1.5 ppd x 30+ years, chews tobacco 1 tin every 3 days; occasional alcohol (few times per month); no drug use  Works as a       Social History     Social History     Marital status:      Spouse name: N/A     Number of children: N/A     Years of education: N/A     Occupational History     Not on file.     Social History Main Topics     Smoking status: Former Smoker     Years: 30.00     Types: Dip, chew, snus or snuff     Quit  "date: 9/12/2017     Smokeless tobacco: Current User     Types: Chew     Alcohol use Yes      Comment: 1 per mo     Drug use: No     Sexual activity: Yes     Partners: Female     Birth control/ protection: Male Surgical     Other Topics Concern     Not on file     Social History Narrative       PAST MEDICAL HISTORY:   Past Medical History:   Diagnosis Date     Congenital hiatus hernia      Diabetes (H)      Gastroesophageal reflux disease      Hiatal hernia      Neck mass      Warthin tumor         PAST SURGICAL HISTORY:   Past Surgical History:   Procedure Laterality Date     ENT SURGERY      T and A     PAROTIDECTOMY Right 4/10/2018    Procedure: PAROTIDECTOMY;  Right Superficial Parotidectomy;  Surgeon: Alexandra Holbrook MD;  Location:  OR       FAMILY HISTORY:    Family History   Problem Relation Age of Onset     DIABETES Father      CEREBROVASCULAR DISEASE Father        REVIEW OF SYSTEMS:  12 point ROS was negative other than the symptoms noted above in the HPI.  Patient Supplied Answers to Review of Systems  [facility] ROS 4/20/2018   Ears, Nose, Throat Nasal congestion or drainage         PHYSICAL EXAMINATION:   /82  Pulse 83  Ht 1.88 m (6' 2\")  Wt 116.1 kg (256 lb)  SpO2 96%  BMI 32.87 kg/m2   Patient in NAD  Breathing comfortably on room air  Facial nerve moving symmetrically bilaterally, normal function  Right parotidectomy modified Romulo incision healing appropriately, no fluid collection, incision intact without dehiscence    RESULTS REVIEWED:     SPECIMEN(S):   Right superficial parotidectomy     FINAL DIAGNOSIS:   PAROTID GLAND, RIGHT, SUPERFICIAL PAROTIDECTOMY:   - Warthin tumor, multinodular, 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   - Margins are negative for tumor.   - Two benign lymph nodes.   - Uninvolved salivary gland with no histopathologic abnormality.   - Negative for malignancy.       IMPRESSION AND PLAN:   Atul Lam is a 52 year old man with a right sided Warthin tumor s/p " parotidectomy. His pathology was reviewed today along with its benign nature. His sutures were removed today in clinic. I will see him back in 3 months.     Thank you very much for the opportunity to participate in the care of your patient.      Alexandra Holbrook M.D.  Otolaryngology- Head & Neck Surgery        CC:  Jordan Page MD  [address] [place] [address]

## 2018-04-20 NOTE — LETTER
4/20/2018       RE: Atul Lam  [address]  [place] [address]     Dear Colleague,    Thank you for referring your patient, Atul Lam, to the [facility] EAR NOSE AND THROAT at [facility]. Please see a copy of my visit note below.    Dear Dr. Page:    I had the pleasure of seeing Atul Lam in follow-up today at the [facility].     History of Present Illness:   Atul Lam is a 52 year old man with a right sided gradually increasing right parotid mass. 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 His preoperative CT scan showed a 4 cm cystic parotid tumor in addition to a possible second parotid tumor superiorly. He was taken to the OR on 4/10/2018 for superficial parotidectomy. The facial nerve was preserved during the case. 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 He comes in today for f/u. His drain was removed locally. He denies any facial weakness. He has numbness of his incision and ear.         MEDICATIONS:     Current Outpatient Prescriptions   Medication Sig Dispense Refill     atorvastatin (LIPITOR) 20 MG tablet Take 1 tablet (20 mg) by mouth daily 90 tablet 3     blood glucose monitoring (BELL CONTOUR NEXT) test strip Use to test blood sugar 2 times daily. 100 each 11     blood glucose monitoring (BELL MICROLET) lancets Use to test blood sugar 2 times daily. 100 each 11     glimepiride (AMARYL) 1 MG tablet Take 1 tablet (1 mg) by mouth every morning (before breakfast) 30 tablet 3       ALLERGIES:    Allergies   Allergen Reactions     Animal Dander      Nasal congestion     Aspirin Nausea and Vomiting     Dust Mites      Nasal congestion     Morphine Nausea and Vomiting       HABITS/SOCIAL HISTORY:     Quit smoking 1 year ago previously 1.5 ppd x 30+ years, chews tobacco 1 tin every 3 days; occasional alcohol (few times per month); no drug use  Works as a       Social History     Social  "History     Marital status:      Spouse name: N/A     Number of children: N/A     Years of education: N/A     Occupational History     Not on file.     Social History Main Topics     Smoking status: Former Smoker     Years: 30.00     Types: Dip, chew, snus or snuff     Quit date: 9/12/2017     Smokeless tobacco: Current User     Types: Chew     Alcohol use Yes      Comment: 1 per mo     Drug use: No     Sexual activity: Yes     Partners: Female     Birth control/ protection: Male Surgical     Other Topics Concern     Not on file     Social History Narrative       PAST MEDICAL HISTORY:   Past Medical History:   Diagnosis Date     Congenital hiatus hernia      Diabetes (H)      Gastroesophageal reflux disease      Hiatal hernia      Neck mass      Warthin tumor         PAST SURGICAL HISTORY:   Past Surgical History:   Procedure Laterality Date     ENT SURGERY      T and A     PAROTIDECTOMY Right 4/10/2018    Procedure: PAROTIDECTOMY;  Right Superficial Parotidectomy;  Surgeon: Alexandra Holbrook MD;  Location:  OR       FAMILY HISTORY:    Family History   Problem Relation Age of Onset     DIABETES Father      CEREBROVASCULAR DISEASE Father        REVIEW OF SYSTEMS:  12 point ROS was negative other than the symptoms noted above in the HPI.  Patient Supplied Answers to Review of Systems  [facility] ENT ROS 4/20/2018   Ears, Nose, Throat Nasal congestion or drainage         PHYSICAL EXAMINATION:   /82  Pulse 83  Ht 1.88 m (6' 2\")  Wt 116.1 kg (256 lb)  SpO2 96%  BMI 32.87 kg/m2   Patient in NAD  Breathing comfortably on room air  Facial nerve moving symmetrically bilaterally, normal function  Right parotidectomy modified Romulo incision healing appropriately, no fluid collection, incision intact without dehiscence    RESULTS REVIEWED:     SPECIMEN(S):   Right superficial parotidectomy     FINAL DIAGNOSIS:   PAROTID GLAND, RIGHT, SUPERFICIAL PAROTIDECTOMY:   - Warthin tumor, multinodular, 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   - Margins are negative for tumor.   - Two benign lymph nodes.   - Uninvolved salivary gland with no histopathologic abnormality.   - Negative for malignancy.       IMPRESSION AND PLAN:   Atul Lam is a 52 year old man with a right sided Warthin tumor s/p parotidectomy. His pathology was reviewed today along with its benign nature. His sutures were removed today in clinic. I will see him back in 3 months.     CC:  Jordan Page MD  [address] [place] [address]      Again, thank you for allowing me to participate in the care of your patient.      Sincerely,    Alexandra Holbrook MD

## 2018-04-20 NOTE — PATIENT INSTRUCTIONS
1. Please follow-up in clinic in 3 months with Dr. Holbrook  2. Please call the ENT clinic with any questions,concerns, new or worsening symptoms.    -Clinic number is [phone]   - Nuzhat's direct line (Dr. Holbrook's nurse) [phone]

## 2018-04-20 NOTE — MR AVS SNAPSHOT
"              After Visit Summary   2018    Atul Lam    MRN: [id number]           Patient Information     Date Of Birth          1965        Visit Information        Provider Department      2018 1:00 PM Alexandra Holbrook MD [facility] Ear Nose and Throat        Today's Diagnoses     Warthin tumor    -  1      Care Instructions    1. Please follow-up in clinic in 3 months with Dr. Holbrook  2. Please call the ENT clinic with any questions,concerns, new or worsening symptoms.    -Clinic number is [phone]   - Nuzhat's direct line (Dr. Holbrook's nurse) [phone]              Follow-ups after your visit        Who to contact     Please call your clinic at [phone] to:    Ask questions about your health    Make or cancel appointments    Discuss your medicines    Learn about your test results    Speak to your doctor            Additional Information About Your Visit        MyChart Information     "[employer] is an electronic gateway that provides easy, online access to your medical records. With "[employer] you can request a clinic appointment, read your test results, renew a prescription or communicate with your care team.     To sign up for "[employer] visit the website at [email or web address]   You will be asked to enter the access code listed below, as well as some personal information. Please follow the directions to create your username and password.     Your access code is: [email or web address]  Expires: 2018 11:24 AM     Your access code will  in 90 days. If you need help or a new code, please contact your [facility] or call [phone] for assistance.        Care EveryWhere ID     This is your Care EveryWhere ID. This could be used by other organizations to access your [facility] medical records  [id number]        Your Vitals Were     Pulse Height Pulse Oximetry BMI (Body Mass Index)          83 1.88 m (6' 2\") 96% 32.87 kg/m2         Blood Pressure from " Last 3 Encounters:   04/20/18 126/82   04/13/18 132/68   04/10/18 139/86    Weight from Last 3 Encounters:   04/20/18 116.1 kg (256 lb)   04/13/18 115.2 kg (254 lb)   04/10/18 115.3 kg (254 lb 3.1 oz)              Today, you had the following     No orders found for display       Primary Care Provider Office Phone # Fax #    Zoraida Pham [phone]       [address] [address]        Equal Access to Services     [facility]: Hadii aad ku hadasho Soomaali, waaxda luqadaha, qaybta kaalmada adechuckyyajamal, jennifer richardson . So [facility] [phone].    ATENCIÓN: Si habla español, tiene a saunders disposición servicios gratuitos de asistencia lingüística. [facility] [phone].    We comply with applicable federal civil rights laws and [place] laws. We do not discriminate on the basis of race, color, national origin, age, disability, sex, sexual orientation, or gender identity.            Thank you!     Thank you for choosing [facility] EAR NOSE AND THROAT  for your care. Our goal is always to provide you with excellent care. Hearing back from our patients is one way we can continue to improve our services. Please take a few minutes to complete the written survey that you may receive in the mail after your visit with us. Thank you!             Your Updated Medication List - Protect others around you: Learn how to safely use, store and throw away your medicines at www.disposemymeds.org.          This list is accurate as of 4/20/18  7:46 PM.  Always use your most recent med list.                   Brand Name Dispense Instructions for use Diagnosis    atorvastatin 20 MG tablet    LIPITOR    90 tablet    Take 1 tablet (20 mg) by mouth daily    Hyperlipidemia, unspecified hyperlipidemia type, Poorly controlled type 2 diabetes mellitus (H)       blood glucose monitoring lancets     100 each    Use to test blood sugar 2 times daily.    Type 2 diabetes mellitus without  complication, without long-term current use of insulin (H)       blood glucose monitoring test strip    BELL CONTOUR NEXT    100 each    Use to test blood sugar 2 times daily.    Type 2 diabetes mellitus without complication, without long-term current use of insulin (H)       glimepiride 1 MG tablet    AMARYL    30 tablet    Take 1 tablet (1 mg) by mouth every morning (before breakfast)    Type 2 diabetes mellitus without complication, without long-term current use of insulin (H)

## 2018-04-20 NOTE — NURSING NOTE
"Chief Complaint   Patient presents with     Consult     post op parotidectomy      Blood pressure 126/82, pulse 83, height 1.88 m (6' 2\"), weight 116.1 kg (256 lb), SpO2 96 %.    Oni Moore LPN    "

## 2018-04-23 ENCOUNTER — TELEPHONE (OUTPATIENT)
Dept: EDUCATION SERVICES | Facility: HOSPITAL | Age: 53
End: 2018-04-23

## 2018-04-23 NOTE — TELEPHONE ENCOUNTER
Called pt today regarding glucose levels.  Current diabetes medications:  Glimepiride 2 mg daily.  Current glucose levels:  Fasting-210, 269, 168, 143, 187, 219  Post supper-103, 103, 108, 209, 130  Pt forgot pill one day.  His wife states he would like to try taking 1 mg in am and 1 mg pm.  Told her he could try this and we will speak again in one week.

## 2018-04-30 ENCOUNTER — TELEPHONE (OUTPATIENT)
Dept: EDUCATION SERVICES | Facility: HOSPITAL | Age: 53
End: 2018-04-30

## 2018-04-30 NOTE — TELEPHONE ENCOUNTER
Called pt's wife regarding his glucose levels.  He is a  and is working.  Current diabetes medication is Glimepiride 2 mg.  Unsure if he is taking 2 mg in am or 1 mg bid.  Current glucose levels:  Fasting-157, 198, 207  Post supper-110, 101, 170  Pt is missing many tests.  Encouraged more testing.  Instructed wife to have pt increase Glimepiride to 1 mg in am and 2 mg in evening.  Will speak with them again next week.

## 2018-05-01 ENCOUNTER — TELEPHONE (OUTPATIENT)
Dept: EDUCATION SERVICES | Facility: HOSPITAL | Age: 53
End: 2018-05-01

## 2018-05-01 DIAGNOSIS — E11.9 TYPE 2 DIABETES MELLITUS WITHOUT COMPLICATION, WITHOUT LONG-TERM CURRENT USE OF INSULIN (H): ICD-10-CM

## 2018-05-01 RX ORDER — GLIMEPIRIDE 1 MG/1
TABLET ORAL
Qty: 90 TABLET | Refills: 3 | Status: SHIPPED | OUTPATIENT
Start: 2018-05-01 | End: 2018-05-07

## 2018-05-01 NOTE — TELEPHONE ENCOUNTER
Returned pt's wife's call.  Spoke with pt.  Plan is that he will take 2 mg Glimepiride in the am and 1 mg in the evening.  Will speak with them again next week regarding glucose levels.

## 2018-05-01 NOTE — TELEPHONE ENCOUNTER
Patient wife Leah calling and she can't remember how Atul should be taking his medication for morning and evenings. Please advise.

## 2018-05-07 ENCOUNTER — TELEPHONE (OUTPATIENT)
Dept: EDUCATION SERVICES | Facility: HOSPITAL | Age: 53
End: 2018-05-07

## 2018-05-07 DIAGNOSIS — E11.9 TYPE 2 DIABETES MELLITUS WITHOUT COMPLICATION, WITHOUT LONG-TERM CURRENT USE OF INSULIN (H): ICD-10-CM

## 2018-05-07 RX ORDER — GLIMEPIRIDE 1 MG/1
TABLET ORAL
Qty: 120 TABLET | Refills: 3 | Status: SHIPPED | OUTPATIENT
Start: 2018-05-07 | End: 2018-05-22

## 2018-05-07 NOTE — TELEPHONE ENCOUNTER
Called Leah mayer's pt's glucose levels.  Current diabetes medications:  Glimepiride 2 mg bid.  Current glucose levels:   Fasting-150, 158, 157, 168, 161  Evening-134, 155, 131, 181  No change in medication dose today.  Will speak with pt and/or wife in one week.

## 2018-05-15 ENCOUNTER — TELEPHONE (OUTPATIENT)
Dept: EDUCATION SERVICES | Facility: HOSPITAL | Age: 53
End: 2018-05-15

## 2018-05-15 NOTE — TELEPHONE ENCOUNTER
Called pt's wife Leah today regarding his glucose levels.  Current diabetes medications:  Glimepiride 2 mg bid.  Pt has been taking this dose since May 7th.  Current glucose levels:  Fasting-148, 153, 161, 187, 170, 249, 215  Evening-118, 98, 131, 110  Pt forgot pills x1.  No change today.  Will speak with them again in one week.

## 2018-05-22 ENCOUNTER — TELEPHONE (OUTPATIENT)
Dept: EDUCATION SERVICES | Facility: HOSPITAL | Age: 53
End: 2018-05-22

## 2018-05-22 DIAGNOSIS — E11.9 TYPE 2 DIABETES MELLITUS WITHOUT COMPLICATION, WITHOUT LONG-TERM CURRENT USE OF INSULIN (H): ICD-10-CM

## 2018-05-22 RX ORDER — GLIMEPIRIDE 1 MG/1
TABLET ORAL
Qty: 150 TABLET | Refills: 3 | Status: SHIPPED | OUTPATIENT
Start: 2018-05-22 | End: 2018-10-17

## 2018-05-22 NOTE — TELEPHONE ENCOUNTER
Called Leah today regarding Atul's glucose levels.  Current diabetes medications:  Glimepiride 2 mg bid.  Current glucose levels:  Fasting-184, 174, 120, 171, 180, 155, 258  Evening-120, 149, 111, 125, 98, 151  Instructed Leah to have Atul try taking 2 mg am and 3 mg evening to see if we can get fasting levels to trend down.  Will speak with her again in one week.

## 2018-05-29 ENCOUNTER — TELEPHONE (OUTPATIENT)
Dept: EDUCATION SERVICES | Facility: HOSPITAL | Age: 53
End: 2018-05-29

## 2018-05-29 NOTE — TELEPHONE ENCOUNTER
Called Leah today regarding pt's glucose levels.  Current diabetes medications:  Glimepiride 2 mg am/3 mg evening.  Current glucose levels:  Fasting-202, 147, 158, 160, 153, 137  Evening-137, 113, 118, 129, 141, 98  Levels trending down. No changes today.  Will speak with Leah again next week for pt's glucose numbers.

## 2018-06-05 ENCOUNTER — TELEPHONE (OUTPATIENT)
Dept: EDUCATION SERVICES | Facility: HOSPITAL | Age: 53
End: 2018-06-05

## 2018-06-05 NOTE — TELEPHONE ENCOUNTER
Called Leah today regarding pt's glucose levels.  Current diabetes medications:  Glimepiride 2 gm am/3 mg evening.  Current glucose levels:  Fasting-138, 147, 156, 161, 138, 186, 201  Evening-111, 155, 181, 174, 95  Pt's work schedule will change again for a few weeks.  Leah notes this may change the times he takes his Glimepiride.  A1c is due.  She will check to see if pt will be able to come in for an appointment.  May need to consider alternate medication with erratic schedule.  Will speak with her again next week.

## 2018-06-13 ENCOUNTER — TELEPHONE (OUTPATIENT)
Dept: EDUCATION SERVICES | Facility: HOSPITAL | Age: 53
End: 2018-06-13

## 2018-06-13 NOTE — TELEPHONE ENCOUNTER
Called Leah today regarding pt's glucose levels.  Current diabetes medications:  Glimepiride 2 mg am/3 mg evening.  Current glucose levels:  Fasting-201, 176, 130, 134, 138, 154, 143  Evening-148, 105 117, 119, 124, 122, 124  Pt is able to come in for an appointment on June 22nd for A1c check and glucose review.  Will schedule.  No changes in med doses today.

## 2018-06-21 ENCOUNTER — TELEPHONE (OUTPATIENT)
Dept: EDUCATION SERVICES | Facility: HOSPITAL | Age: 53
End: 2018-06-21

## 2018-06-21 NOTE — TELEPHONE ENCOUNTER
Wife calling left a voicemail message stating they are cancelling the appointment with Chica on Friday 6/22/18 and she will talk with Chica on Tuesday when Chica call her.

## 2018-06-26 ENCOUNTER — TELEPHONE (OUTPATIENT)
Dept: EDUCATION SERVICES | Facility: HOSPITAL | Age: 53
End: 2018-06-26

## 2018-07-02 NOTE — TELEPHONE ENCOUNTER
Called Leah today regarding pt's glucose levels.  Pt was also present today.  Current diabetes medications:  Glimepiride 2 gm am/3 mg evening.  Current glucose levels:  Fasting-136, 154, 132, 142, 138, 147, 141  Evening-104, 98, 112, 109, 114, 106, 146  Pt denies any hypoglycemia.  No medication changes indicated.  Appointment scheduled for July 27th for A1c check.

## 2018-08-10 ENCOUNTER — HOSPITAL ENCOUNTER (OUTPATIENT)
Dept: EDUCATION SERVICES | Facility: HOSPITAL | Age: 53
Discharge: HOME | End: 2018-08-10
Attending: FAMILY MEDICINE | Admitting: FAMILY MEDICINE
Payer: COMMERCIAL

## 2018-08-10 VITALS
BODY MASS INDEX: 33.95 KG/M2 | SYSTOLIC BLOOD PRESSURE: 123 MMHG | DIASTOLIC BLOOD PRESSURE: 83 MMHG | OXYGEN SATURATION: 97 % | HEART RATE: 75 BPM | HEIGHT: 74 IN | WEIGHT: 264.5 LBS | RESPIRATION RATE: 16 BRPM

## 2018-08-10 DIAGNOSIS — E11.9 TYPE 2 DIABETES MELLITUS WITHOUT COMPLICATION, WITHOUT LONG-TERM CURRENT USE OF INSULIN (H): Primary | ICD-10-CM

## 2018-08-10 LAB — HBA1C MFR BLD: 6.8 % (ref 0–5.7)

## 2018-08-10 PROCEDURE — G0108 DIAB MANAGE TRN  PER INDIV: HCPCS | Performed by: DIETITIAN, REGISTERED

## 2018-08-10 PROCEDURE — 36416 COLLJ CAPILLARY BLOOD SPEC: CPT | Performed by: DIETITIAN, REGISTERED

## 2018-08-10 PROCEDURE — 83036 HEMOGLOBIN GLYCOSYLATED A1C: CPT | Performed by: DIETITIAN, REGISTERED

## 2018-08-10 ASSESSMENT — PAIN SCALES - GENERAL: PAINLEVEL: NO PAIN (0)

## 2018-08-10 NOTE — IP AVS SNAPSHOT
"                  MRN:[id number]                      After Visit Summary   8/10/2018    Atul Lam    MRN: [id number]           Thank you!     Thank you for choosing [facility] for your care. Our goal is always to provide you with excellent care. Hearing back from our patients is one way we can continue to improve our services. Please take a few minutes to complete the written survey that you may receive in the mail after you visit with us. Thank you!        Patient Information     Date Of Birth          1965        About your hospital stay     You were admitted on:  August 10, 2018 You last received care in the:  [facility]    You were discharged on:  August 10, 2018       Who to Call     For medical emergencies, please call 911.  For non-urgent questions about your medical care, please call your primary care provider or clinic, [phone]          Attending Provider     Provider Specialty    Zoraida Pham MD Family Practice       Primary Care Provider Office Phone # Fax #    Zoraida Pham [phone]      Further instructions from your care team       -Keep limiting foods high in carbohydrates in your diet.   -Be as active as you are able.  Exercise lowers your glucose levels.   -Test 2x/day - fasting and before you eat in the evening.   -Target levels are .   -Weight today was 264.4#.  Try to avoid further gain.   -A1c was 6.8% today - great job!  Goal is to keep A1c below 7.0%.   -Follow up in 6 months.   -Call with any concerns - HUMA Marino, [phone].     Pending Results     No orders found from 8/8/2018 to 8/11/2018.            Admission Information     Date & Time Provider Department Dept. Phone    8/10/2018 Zoraida Pham MD [facility] [phone]      Your Vitals Were     Blood Pressure Pulse Respirations Height Weight Pulse Oximetry    123/83 75 16 1.88 m (6' 2\") 120 kg (264 lb 8 oz) 97%    BMI (Body Mass Index)    " "               33.96 kg/m2           [employer] Information     [employer] lets you send messages to your doctor, view your test results, renew your prescriptions, schedule appointments and more. To sign up, go to [email or web address] . Click on \"Log in\" on the left side of the screen, which will take you to the Welcome page. Then click on \"Sign up Now\" on the right side of the page.     You will be asked to enter the access code listed below, as well as some personal information. Please follow the directions to create your username and password.     Your access code is: [email or web address]  Expires: 2018 12:39 PM     Your access code will  in 90 days. If you need help or a new code, please call your [place] clinic or [phone].        Care EveryWhere ID     This is your Care EveryWhere ID. This could be used by other organizations to access your [place] medical records  [id number]        Equal Access to Services     [facility]: Hadii helen marquez hadasho Sosadafali, waaxda luqadaha, qaybta kaalmada adeegyada, jennifer richardson . So [facility] [phone].    ATENCIÓN: Si habla español, tiene a saunders disposición servicios gratuitos de asistencia lingüística. Llame al [phone].    We comply with applicable federal civil rights laws and [place] laws. We do not discriminate on the basis of race, color, national origin, age, disability, sex, sexual orientation, or gender identity.               Review of your medicines      UNREVIEWED medicines. Ask your doctor about these medicines        Dose / Directions    atorvastatin 20 MG tablet   Commonly known as:  LIPITOR   Used for:  Hyperlipidemia, unspecified hyperlipidemia type, Poorly controlled type 2 diabetes mellitus (H)        Dose:  20 mg   Take 1 tablet (20 mg) by mouth daily   Quantity:  90 tablet   Refills:  3       glimepiride 1 MG tablet   Commonly known as:  AMARYL   Used for:  Type 2 diabetes mellitus without complication, without " long-term current use of insulin (H)        Take 2 mg with breakfast and 3 mg in the evening.   Quantity:  150 tablet   Refills:  3         CONTINUE these medicines which have NOT CHANGED        Dose / Directions    blood glucose monitoring lancets   Used for:  Type 2 diabetes mellitus without complication, without long-term current use of insulin (H)        Use to test blood sugar 2 times daily.   Quantity:  100 each   Refills:  11       blood glucose monitoring test strip   Commonly known as:  BELL CONTOUR NEXT   Used for:  Type 2 diabetes mellitus without complication, without long-term current use of insulin (H)        Use to test blood sugar 2 times daily.   Quantity:  100 each   Refills:  11                Protect others around you: Learn how to safely use, store and throw away your medicines at www.disposemymeds.org.             Medication List: This is a list of all your medications and when to take them. Check marks below indicate your daily home schedule. Keep this list as a reference.      Medications           Morning Afternoon Evening Bedtime As Needed    atorvastatin 20 MG tablet   Commonly known as:  LIPITOR   Take 1 tablet (20 mg) by mouth daily                                blood glucose monitoring lancets   Use to test blood sugar 2 times daily.                                blood glucose monitoring test strip   Commonly known as:  BELL CONTOUR NEXT   Use to test blood sugar 2 times daily.                                glimepiride 1 MG tablet   Commonly known as:  AMARYL   Take 2 mg with breakfast and 3 mg in the evening.

## 2018-08-10 NOTE — DISCHARGE INSTRUCTIONS
-Keep limiting foods high in carbohydrates in your diet.   -Be as active as you are able.  Exercise lowers your glucose levels.   -Test 2x/day - fasting and before you eat in the evening.   -Target levels are .   -Weight today was 264.4#.  Try to avoid further gain.   -A1c was 6.8% today - great job!  Goal is to keep A1c below 7.0%.   -Follow up in 6 months.   -Call with any concerns - HUMA Marino, -[phone].

## 2018-08-10 NOTE — IP AVS SNAPSHOT
[facility]    [address] [place] [address]    Phone:  [phone]    Fax:  [phone]                                       After Visit Summary   8/10/2018    Atul Lam    MRN: [id number]           After Visit Summary Signature Page     I have received my discharge instructions, and my questions have been answered. I have discussed any challenges I see with this plan with the nurse or doctor.    ..........................................................................................................................................  Patient/Patient Representative Signature      ..........................................................................................................................................  Patient Representative Print Name and Relationship to Patient    ..................................................               ................................................  Date                                            Time    ..........................................................................................................................................  Reviewed by Signature/Title    ...................................................              ..............................................  Date                                                            Time

## 2018-08-10 NOTE — PROGRESS NOTES
"Pt is here today for diabetes follow up - glucose review.      /83  Pulse 75  Resp 16  Ht 1.88 m (6' 2\")  Wt 120 kg (264 lb 8 oz)  SpO2 97%  BMI 33.96 kg/m2  Weight is up 7# since last visit.     Current diabetes medications:  Glimepiride 2 mg am/3 mg evening.  Pt takes am dose at 10:30 am and evening dose at midnight-3 am r/t work schedule.  He is a .     Current glucose levels:   [id number]  Evening before eating when he gets home late at night (midnight-3am)-103-136 with one at 170.     Lab Results   Component Value Date    A1C 6.8 08/10/2018    A1C 6.9 02/12/2018     A1c is in target.  Discussed goal of keeping A1c below 7.0%.  Reviewed diet recall and pt eats breakfast before he leaves home and wife packs lunch and snacks that are appropriate in carbohydrates.  He eats late at night when he gets home and does seem to be making efforts to make better choices - vegetable spiral noodles vs pasta, less bread, sugar free cream in coffee.  No routine exercise.      PLAN:  Continue current meal planning efforts.  Be as active as able.  Test glucose 2x/day - fasting and before eating evening meal. Schedule eye exam.      Follow up:  6 months     Total time spent with pt was 30 minutes.      "

## 2018-08-10 NOTE — NURSING NOTE
"Chief Complaint   Patient presents with     Diabetes     glucose review, book       Initial /83  Pulse 75  Resp 16  Ht 1.88 m (6' 2\")  Wt 120 kg (264 lb 8 oz)  SpO2 97%  BMI 33.96 kg/m2 Estimated body mass index is 33.96 kg/(m^2) as calculated from the following:    Height as of this encounter: 1.88 m (6' 2\").    Weight as of this encounter: 120 kg (264 lb 8 oz).  Medication Reconciliation: complete    Serene Garduno LPN    "

## 2018-12-05 PROBLEM — E11.9 TYPE 2 DIABETES MELLITUS WITHOUT COMPLICATION, WITHOUT LONG-TERM CURRENT USE OF INSULIN (H): Status: ACTIVE | Noted: 2018-12-05

## 2018-12-05 NOTE — PROGRESS NOTES
"  SUBJECTIVE:   Atul Lam is a 53 year old male who presents to clinic today for the following health issues:    Diabetes Follow-up    Patient is checking blood sugars: once daily.  Results are as follows:         am - 120         bedtime - 100    Diabetic concerns: None     Symptoms of hypoglycemia (low blood sugar): none     Paresthesias (numbness or burning in feet) or sores: No     Date of last diabetic eye exam: Unsure of last exam.    BP Readings from Last 2 Encounters:   12/07/18 118/82   08/10/18 123/83     Hemoglobin A1C (%)   Date Value   08/10/2018 6.8   02/12/2018 6.9 (H)     LDL Cholesterol Calculated (mg/dL)   Date Value   02/12/2018 110 (H)       Diabetes Management Resources  Hyperlipidemia Follow-Up      Rate your low fat/cholesterol diet?: good    Taking statin?  Yes, no muscle aches from statin    Other lipid medications/supplements?:  none      Amount of exercise or physical activity: None    Problems taking medications regularly: No    Medication side effects: none    Diet: regular (no restrictions), diabetic         Erectile dysfunction.  Requesting viagra trial. No cardiac history.  No chest pain or dyspnea.  Poor quality erection \"half mast\".  Normal urination, ejaculation, and libido.    Problem list and histories reviewed & adjusted, as indicated.  Additional history: as documented    Current Outpatient Prescriptions   Medication     atorvastatin (LIPITOR) 20 MG tablet     blood glucose monitoring (BELL CONTOUR NEXT) test strip     blood glucose monitoring (BELL MICROLET) lancets     glimepiride (AMARYL) 1 MG tablet     sildenafil (REVATIO) 20 MG tablet     [DISCONTINUED] atorvastatin (LIPITOR) 20 MG tablet     [DISCONTINUED] glimepiride (AMARYL) 1 MG tablet     No current facility-administered medications for this visit.        Patient Active Problem List   Diagnosis     Hyperlipidemia, unspecified hyperlipidemia type     Dysmetabolic syndrome X     Warthin tumor     Type 2 " diabetes mellitus without complication, without long-term current use of insulin (H)     Past Surgical History:   Procedure Laterality Date     ENT SURGERY      T and A     PAROTIDECTOMY Right 4/10/2018    Procedure: PAROTIDECTOMY;  Right Superficial Parotidectomy;  Surgeon: Alexandra Holbrook MD;  Location:  OR       Social History   Substance Use Topics     Smoking status: Former Smoker     Years: 30.00     Types: Dip, chew, snus or snuff     Quit date: 9/12/2017     Smokeless tobacco: Current User     Types: Chew     Alcohol use Yes      Comment: 1 per mo     Family History   Problem Relation Age of Onset     Diabetes Father      Cerebrovascular Disease Father            Reviewed and updated as needed this visit by clinical staff  Tobacco  Allergies  Meds  Med Hx  Surg Hx  Fam Hx  Soc Hx      Reviewed and updated as needed this visit by Provider         ROS:  Constitutional, HEENT, cardiovascular, pulmonary, gi and gu systems are negative, except as otherwise noted.    OBJECTIVE:     /82 (BP Location: Right arm, Patient Position: Chair, Cuff Size: Adult Large)  Pulse 78  Temp 96.8  F (36  C) (Tympanic)  Wt 260 lb 12.8 oz (118.3 kg)  SpO2 97%  BMI 33.48 kg/m2  Body mass index is 33.48 kg/(m^2).  GENERAL: alert, no distress and overweight  EYES: Eyes grossly normal to inspection, PERRL and conjunctivae and sclerae normal  NECK: no adenopathy, no asymmetry, masses, or scars and thyroid normal to palpation  RESP: lungs clear to auscultation - no rales, rhonchi or wheezes  CV: regular rate and rhythm, normal S1 S2, no S3 or S4, no murmur, click or rub, no peripheral edema and peripheral pulses strong  ABDOMEN: soft, nontender, no hepatosplenomegaly, no masses and bowel sounds normal  MS: no gross musculoskeletal defects noted, no edema  SKIN: no suspicious lesions or rashes  PSYCH: mentation appears normal, affect normal/bright  Diabetic foot exam: normal DP and PT pulses, no trophic changes or  ulcerative lesions and normal sensory exam    Diagnostic Test Results:  Pending.    ASSESSMENT/PLAN:     (E11.9) Type 2 diabetes mellitus without complication, without long-term current use of insulin (H)  (primary encounter diagnosis)  Comment: fair control; update labs; foot exam done; referral for eye exam; follow up with DRC; declines immunizations  Plan: Hemoglobin A1c, Lipid Profile (Chol, Trig, HDL,        LDL calc), OPHTHALMOLOGY ADULT REFERRAL,         Comprehensive metabolic panel, glimepiride         (AMARYL) 1 MG tablet            (E78.5) Hyperlipidemia, unspecified hyperlipidemia type  Comment: started statin - repeat labs today  Plan: Lipid Profile (Chol, Trig, HDL, LDL calc),         Comprehensive metabolic panel, atorvastatin         (LIPITOR) 20 MG tablet            (E66.9) Obesity (BMI 30.0-34.9)  Comment: diet, exercise    (E11.65) Poorly controlled type 2 diabetes mellitus (H)  Comment: improving  Plan: atorvastatin (LIPITOR) 20 MG tablet            (N52.9) Erectile dysfunction, unspecified erectile dysfunction type  Comment: former smoker; diabetic  Plan: sildenafil (REVATIO) 20 MG tablet          Patient Instructions   Will call with lab results.  Medications refilled.  Immunizations declined.  Referral for eye exam.  Foot exam done today.  Trial of Viagra.            Zoraida Bahena MD  [facility]

## 2018-12-07 ENCOUNTER — OFFICE VISIT (OUTPATIENT)
Dept: FAMILY MEDICINE | Facility: OTHER | Age: 53
End: 2018-12-07
Attending: FAMILY MEDICINE
Payer: COMMERCIAL

## 2018-12-07 VITALS
TEMPERATURE: 96.8 F | HEART RATE: 78 BPM | SYSTOLIC BLOOD PRESSURE: 118 MMHG | OXYGEN SATURATION: 97 % | DIASTOLIC BLOOD PRESSURE: 82 MMHG | BODY MASS INDEX: 33.48 KG/M2 | WEIGHT: 260.8 LBS

## 2018-12-07 DIAGNOSIS — N52.9 ERECTILE DYSFUNCTION, UNSPECIFIED ERECTILE DYSFUNCTION TYPE: ICD-10-CM

## 2018-12-07 DIAGNOSIS — E78.5 HYPERLIPIDEMIA, UNSPECIFIED HYPERLIPIDEMIA TYPE: ICD-10-CM

## 2018-12-07 DIAGNOSIS — E11.9 TYPE 2 DIABETES MELLITUS WITHOUT COMPLICATION, WITHOUT LONG-TERM CURRENT USE OF INSULIN (H): Primary | ICD-10-CM

## 2018-12-07 DIAGNOSIS — E11.65 POORLY CONTROLLED TYPE 2 DIABETES MELLITUS (H): ICD-10-CM

## 2018-12-07 DIAGNOSIS — E66.811 OBESITY (BMI 30.0-34.9): ICD-10-CM

## 2018-12-07 PROBLEM — F17.200 TOBACCO USE DISORDER: Status: RESOLVED | Noted: 2018-02-12 | Resolved: 2018-12-07

## 2018-12-07 LAB
ALBUMIN SERPL-MCNC: 3.9 G/DL (ref 3.4–5)
ALP SERPL-CCNC: 100 U/L (ref 40–150)
ALT SERPL W P-5'-P-CCNC: 58 U/L (ref 0–70)
ANION GAP SERPL CALCULATED.3IONS-SCNC: 6 MMOL/L (ref 3–14)
AST SERPL W P-5'-P-CCNC: 23 U/L (ref 0–45)
BILIRUB SERPL-MCNC: 0.6 MG/DL (ref 0.2–1.3)
BUN SERPL-MCNC: 13 MG/DL (ref 7–30)
CALCIUM SERPL-MCNC: 9.1 MG/DL (ref 8.5–10.1)
CHLORIDE SERPL-SCNC: 104 MMOL/L (ref 94–109)
CHOLEST SERPL-MCNC: 144 MG/DL
CO2 SERPL-SCNC: 28 MMOL/L (ref 20–32)
CREAT SERPL-MCNC: 0.94 MG/DL (ref 0.66–1.25)
EST. AVERAGE GLUCOSE BLD GHB EST-MCNC: 220 MG/DL
GFR SERPL CREATININE-BSD FRML MDRD: 84 ML/MIN/1.7M2
GLUCOSE SERPL-MCNC: 163 MG/DL (ref 70–99)
HBA1C MFR BLD: 9.3 % (ref 0–5.6)
HDLC SERPL-MCNC: 34 MG/DL
LDLC SERPL CALC-MCNC: 67 MG/DL
NONHDLC SERPL-MCNC: 110 MG/DL
POTASSIUM SERPL-SCNC: 4.1 MMOL/L (ref 3.4–5.3)
PROT SERPL-MCNC: 8.6 G/DL (ref 6.8–8.8)
SODIUM SERPL-SCNC: 138 MMOL/L (ref 133–144)
TRIGL SERPL-MCNC: 217 MG/DL

## 2018-12-07 PROCEDURE — 83036 HEMOGLOBIN GLYCOSYLATED A1C: CPT | Performed by: FAMILY MEDICINE

## 2018-12-07 PROCEDURE — 80061 LIPID PANEL: CPT | Performed by: FAMILY MEDICINE

## 2018-12-07 PROCEDURE — 99214 OFFICE O/P EST MOD 30 MIN: CPT | Performed by: FAMILY MEDICINE

## 2018-12-07 PROCEDURE — 36415 COLL VENOUS BLD VENIPUNCTURE: CPT | Performed by: FAMILY MEDICINE

## 2018-12-07 PROCEDURE — 80053 COMPREHEN METABOLIC PANEL: CPT | Performed by: FAMILY MEDICINE

## 2018-12-07 RX ORDER — METFORMIN HCL 500 MG
500 TABLET, EXTENDED RELEASE 24 HR ORAL 2 TIMES DAILY WITH MEALS
Qty: 60 TABLET | Refills: 1 | Status: SHIPPED | OUTPATIENT
Start: 2018-12-07 | End: 2019-02-08

## 2018-12-07 RX ORDER — GLIMEPIRIDE 1 MG/1
TABLET ORAL
Qty: 450 TABLET | Refills: 1 | Status: SHIPPED | OUTPATIENT
Start: 2018-12-07 | End: 2019-03-29

## 2018-12-07 RX ORDER — ATORVASTATIN CALCIUM 20 MG/1
20 TABLET, FILM COATED ORAL DAILY
Qty: 90 TABLET | Refills: 3 | Status: SHIPPED | OUTPATIENT
Start: 2018-12-07 | End: 2019-03-29 | Stop reason: SINTOL

## 2018-12-07 RX ORDER — SILDENAFIL CITRATE 20 MG/1
100 TABLET ORAL DAILY PRN
Qty: 30 TABLET | Refills: 1 | Status: SHIPPED | OUTPATIENT
Start: 2018-12-07 | End: 2019-08-08

## 2018-12-07 ASSESSMENT — PAIN SCALES - GENERAL: PAINLEVEL: NO PAIN (0)

## 2018-12-07 NOTE — MR AVS SNAPSHOT
After Visit Summary   12/7/2018    Atul Lam    MRN: [id number]           Patient Information     Date Of Birth          1965        Visit Information        Provider Department      12/7/2018 10:15 AM Zoraida Pham MD [facility]        Today's Diagnoses     Type 2 diabetes mellitus without complication, without long-term current use of insulin (H)    -  1    Hyperlipidemia, unspecified hyperlipidemia type        Obesity (BMI 30.0-34.9)        Poorly controlled type 2 diabetes mellitus (H)        Erectile dysfunction, unspecified erectile dysfunction type          Care Instructions    Will call with lab results.  Medications refilled.  Immunizations declined.  Referral for eye exam.  Foot exam done today.  Trial of Viagra.          Follow-ups after your visit        Additional Services     OPHTHALMOLOGY ADULT REFERRAL       Your provider has referred you to: [facility]    Please be aware that coverage of these services is subject to the terms and limitations of your health insurance plan.  Call member services at your health plan with any benefit or coverage questions.      Please bring the following with you to your appointment:    (1) Any X-Rays, CTs or MRIs which have been performed.  Contact the facility where they were done to arrange for  prior to your scheduled appointment.    (2) List of current medications  (3) This referral request   (4) Any documents/labs given to you for this referral                  Your next 10 appointments already scheduled     Feb 08, 2019 10:30 AM CST   (Arrive by 10:15 AM)   Diabetes Education with Chica Guzman RD   [facility] ([facility] )    [address] [place] [id number] [phone]              Who to contact     If you have questions or need follow up information about today's clinic visit or your schedule please contact [facility] directly at  [phone].  Normal or non-critical lab and imaging results will be communicated to you by MyChart, letter or phone within 4 business days after the clinic has received the results. If you do not hear from us within 7 days, please contact the clinic through MyChart or phone. If you have a critical or abnormal lab result, we will notify you by phone as soon as possible.  Submit refill requests through [employer] or call your pharmacy and they will forward the refill request to us. Please allow 3 business days for your refill to be completed.          Additional Information About Your Visit        Care EveryWhere ID     This is your Care EveryWhere ID. This could be used by other organizations to access your [facility] medical records  [id number]        Your Vitals Were     Pulse Temperature Pulse Oximetry BMI (Body Mass Index)          78 96.8  F (36  C) (Tympanic) 97% 33.48 kg/m2         Blood Pressure from Last 3 Encounters:   12/07/18 118/82   08/10/18 123/83   04/20/18 126/82    Weight from Last 3 Encounters:   12/07/18 260 lb 12.8 oz (118.3 kg)   08/10/18 264 lb 8 oz (120 kg)   04/20/18 256 lb (116.1 kg)              We Performed the Following     Comprehensive metabolic panel     Hemoglobin A1c     Lipid Profile (Chol, Trig, HDL, LDL calc)     OPHTHALMOLOGY ADULT REFERRAL          Today's Medication Changes          These changes are accurate as of 12/7/18 10:40 AM.  If you have any questions, ask your nurse or doctor.               Start taking these medicines.        Dose/Directions    sildenafil 20 MG tablet   Commonly known as:  REVATIO   Used for:  Erectile dysfunction, unspecified erectile dysfunction type   Started by:  Zoraida Pham MD        Dose:  100 mg   Take 5 tablets (100 mg) by mouth daily as needed (erectile dysfunction)   Quantity:  30 tablet   Refills:  1         These medicines have changed or have updated prescriptions.        Dose/Directions    glimepiride 1 MG tablet   Commonly known  as:  SOLEDAD   This may have changed:  See the new instructions.   Used for:  Type 2 diabetes mellitus without complication, without long-term current use of insulin (H)   Changed by:  Zoraida Pham MD        Take 2 tablets by mouth once daily in the morning with breakfast and 3 tablets in the evening   Quantity:  450 tablet   Refills:  1            Where to get your medicines      These medications were sent to [id number] - [place], [place] - [address]   [id number] [facility] [place] [address]     Phone:  [phone]     atorvastatin 20 MG tablet    glimepiride 1 MG tablet    sildenafil 20 MG tablet                Primary Care Provider Office Phone # Fax #    Zoraida Pham [phone] [address]  [place] [address]        Equal Access to Services     [facility] : Hadii helen marquez hadasho Soomaali, waaxda luqadaha, qaybta kaalmada adeegyada, waxay idiin hayaan karely khgerald richardson . So [facility] [phone].    ATENCIÓN: Si habla español, tiene a saunders disposición servicios gratuitos de asistencia lingüística. Llame al [phone].    We comply with applicable federal civil rights laws and [place] laws. We do not discriminate on the basis of race, color, national origin, age, disability, sex, sexual orientation, or gender identity.            Thank you!     Thank you for choosing [facility]  for your care. Our goal is always to provide you with excellent care. Hearing back from our patients is one way we can continue to improve our services. Please take a few minutes to complete the written survey that you may receive in the mail after your visit with us. Thank you!             Your Updated Medication List - Protect others around you: Learn how to safely use, store and throw away your medicines at www.disposemymeds.org.          This list is accurate as of 12/7/18 10:40 AM.  Always use your most recent med list.                   Brand Name Dispense  Instructions for use Diagnosis    atorvastatin 20 MG tablet    LIPITOR    90 tablet    Take 1 tablet (20 mg) by mouth daily    Hyperlipidemia, unspecified hyperlipidemia type, Poorly controlled type 2 diabetes mellitus (H)       blood glucose monitoring lancets     100 each    Use to test blood sugar 2 times daily.    Type 2 diabetes mellitus without complication, without long-term current use of insulin (H)       blood glucose monitoring test strip    BELL CONTOUR NEXT    100 each    Use to test blood sugar 2 times daily.    Type 2 diabetes mellitus without complication, without long-term current use of insulin (H)       glimepiride 1 MG tablet    AMARYL    450 tablet    Take 2 tablets by mouth once daily in the morning with breakfast and 3 tablets in the evening    Type 2 diabetes mellitus without complication, without long-term current use of insulin (H)       sildenafil 20 MG tablet    REVATIO    30 tablet    Take 5 tablets (100 mg) by mouth daily as needed (erectile dysfunction)    Erectile dysfunction, unspecified erectile dysfunction type

## 2018-12-07 NOTE — NURSING NOTE
"Chief Complaint   Patient presents with     Lipids     Diabetes       Initial /82 (BP Location: Right arm, Patient Position: Chair, Cuff Size: Adult Large)  Pulse 78  Temp 96.8  F (36  C) (Tympanic)  Wt 260 lb 12.8 oz (118.3 kg)  SpO2 97%  BMI 33.48 kg/m2 Estimated body mass index is 33.48 kg/(m^2) as calculated from the following:    Height as of 8/10/18: 6' 2\" (1.88 m).    Weight as of this encounter: 260 lb 12.8 oz (118.3 kg).  Medication Reconciliation: complete    Brenda Hearn LPN    "

## 2018-12-07 NOTE — PATIENT INSTRUCTIONS
Will call with lab results.  Medications refilled.  Immunizations declined.  Referral for eye exam.  Foot exam done today.  Trial of Viagra.

## 2018-12-18 ENCOUNTER — TRANSFERRED RECORDS (OUTPATIENT)
Dept: HEALTH INFORMATION MANAGEMENT | Facility: CLINIC | Age: 53
End: 2018-12-18

## 2018-12-20 ENCOUNTER — TELEPHONE (OUTPATIENT)
Dept: FAMILY MEDICINE | Facility: OTHER | Age: 53
End: 2018-12-20

## 2019-01-25 ENCOUNTER — TELEPHONE (OUTPATIENT)
Dept: EDUCATION SERVICES | Facility: HOSPITAL | Age: 54
End: 2019-01-25

## 2019-01-25 DIAGNOSIS — E11.9 TYPE 2 DIABETES MELLITUS WITHOUT COMPLICATION, WITHOUT LONG-TERM CURRENT USE OF INSULIN (H): Primary | ICD-10-CM

## 2019-02-08 ENCOUNTER — HOSPITAL ENCOUNTER (OUTPATIENT)
Dept: EDUCATION SERVICES | Facility: HOSPITAL | Age: 54
Discharge: HOME | End: 2019-02-08
Attending: FAMILY MEDICINE | Admitting: FAMILY MEDICINE
Payer: COMMERCIAL

## 2019-02-08 VITALS
HEART RATE: 82 BPM | HEIGHT: 74 IN | TEMPERATURE: 97.9 F | SYSTOLIC BLOOD PRESSURE: 126 MMHG | BODY MASS INDEX: 33.11 KG/M2 | OXYGEN SATURATION: 98 % | WEIGHT: 258 LBS | DIASTOLIC BLOOD PRESSURE: 79 MMHG

## 2019-02-08 DIAGNOSIS — E11.9 TYPE 2 DIABETES MELLITUS WITHOUT COMPLICATION, WITHOUT LONG-TERM CURRENT USE OF INSULIN (H): ICD-10-CM

## 2019-02-08 PROCEDURE — G0108 DIAB MANAGE TRN  PER INDIV: HCPCS | Performed by: DIETITIAN, REGISTERED

## 2019-02-08 RX ORDER — METFORMIN HCL 500 MG
500 TABLET, EXTENDED RELEASE 24 HR ORAL 2 TIMES DAILY WITH MEALS
Qty: 60 TABLET | Refills: 1 | Status: SHIPPED | OUTPATIENT
Start: 2019-02-08 | End: 2019-03-29

## 2019-02-08 ASSESSMENT — MIFFLIN-ST. JEOR: SCORE: 2095.03

## 2019-02-08 ASSESSMENT — PAIN SCALES - GENERAL: PAINLEVEL: NO PAIN (0)

## 2019-02-08 NOTE — LETTER
"    2/8/2019        RE: Atul Lam  [address]  [place] [address]        Diabetes Self-Management Education & Support    Diabetes Education Self Management & Training    SUBJECTIVE/OBJECTIVE:  Presents for: Follow-up  Accompanied by: Self  Diabetes education in the past 24mo: Yes  Focus of Visit: Reducing Risks  Diabetes type: Type 2  Date of diagnosis: 2008  Disease course: Worsening  How confident are you filling out medical forms by yourself:: Quite a bit  Diabetes management related comments/concerns: Elevated A1c.   Transportation concerns: No  Other concerns:: None  Cultural Influences/Ethnic Background:  American    Diabetes Symptoms & Complications  Blurred vision: No  Fatigue: Yes(always tired r/t work )  Neuropathy: No  Foot ulcerations: (Recently froze feet.  Healing. )  Visual change: No  Weakness: No  Weight loss: Yes(Down 6.5# since August 2018. )  Slow healing wounds: No  Recent Infection(s): No  Symptom course: Stable  Weight trend: Decreasing steadily  Autonomic neuropathy: No  CVA: No  Heart disease: No  Nephropathy: No  Peripheral neuropathy: No  Peripheral Vascular Disease: No  Retinopathy: No    Patient Problem List and Family Medical History reviewed for relevant medical history, current medical status, and diabetes risk factors.    Vitals:  /79 (BP Location: Right arm, Patient Position: Sitting, Cuff Size: Adult Large)   Pulse 82   Temp 97.9  F (36.6  C) (Tympanic)   Ht 1.88 m (6' 2\")   Wt 117 kg (258 lb)   SpO2 98%   BMI 33.13 kg/m     Estimated body mass index is 33.13 kg/m  as calculated from the following:    Height as of this encounter: 1.88 m (6' 2\").    Weight as of this encounter: 117 kg (258 lb).   Weight is down 6.5# since last visit in August 2018.   Last 3 BP:   BP Readings from Last 3 Encounters:   02/08/19 126/79   12/07/18 118/82   08/10/18 123/83       History   Smoking Status     Former Smoker     Years: 30.00     Types: Dip, chew, snus or snuff     Quit " date: 2017   Smokeless Tobacco     Current User     Types: Chew       Labs:  Lab Results   Component Value Date    A1C 9.3 2018     Lab Results   Component Value Date     2018     Lab Results   Component Value Date    LDL 67 2018     HDL Cholesterol   Date Value Ref Range Status   2018 34 (L) >39 mg/dL Final   ]  GFR Estimate   Date Value Ref Range Status   2018 84 >60 mL/min/1.7m2 Final     Comment:     Non  GFR Calc     GFR Estimate If Black   Date Value Ref Range Status   2018 >90 >60 mL/min/1.7m2 Final     Comment:      GFR Calc     Lab Results   Component Value Date    CR 0.94 2018     No results found for: MICROALBUMIN    Healthy Eating  Healthy Eating Assessed Today: Yes  Cultural/Jew diet restrictions?: No  Patient on a regular basis: (Reduced carbohydrates in diet since appointment with provider in December. )  Meals include: Breakfast  Breakfast: 11-12 pm 2 eggs, fried ham, milk   Lunch: Packs lunch - meat, yogurt, cheese sticks, fruit - eats throughout day at work - drinks coffee with sugar free creamer  Dinner: Eats when he gets home at 12-2 am - steamer bag of vegis with pasta, meat, milk   Other: Rare dining out.   Beverages: Coffee, Milk, Diet soda(flavored water)  Has patient met with a dietitian in the past?: Yes    Being Active  Being Active Assessed Today: Yes  Exercise:: Yes(started riding exercise bike 2 weeks ago)  Days per week of moderate to strenuous exercise (like a brisk walk): 5  On average, minutes per day of exercise at this level: 30  How intense was your typical exercise? : Moderate (like brisk walking)  Exercise Minutes per Week: 150  Barrier to exercise: None    Monitoring  Monitoring Assessed Today: Yes  Did patient bring glucose meter to appointment? : No  Blood Glucose Meter: Accu-check  Home Glucose (Sugar) Monitorin-2 times per day  Blood glucose trend: Decreasing steadily  Pt states  fasting levels around 120 and levels when he gets home from work late in the evening are closer to 100.      Taking Medications  Diabetes Medication(s)     Biguanides Sig    metFORMIN (GLUCOPHAGE-XR) 500 MG 24 hr tablet Take 1 tablet (500 mg) by mouth 2 times daily (with meals)    Sulfonylureas Sig    glimepiride (AMARYL) 1 MG tablet Take 2 tablets by mouth once daily in the morning with breakfast and 3 tablets in the evening        Taking Medication Assessed Today: Yes  Current Treatments: Oral Agent (monotherapy)(Metformin 500 mg bid.  Pt stopped Glimepiride)  Problems taking diabetes medications regularly?: Yes  Diabetes medication side effects?: No  Treatment Compliance: All of the time    Problem Solving  Problem Solving Assessed Today: Yes  Hypoglycemia Frequency: Never    Reducing Risks  Diabetes Risks: Age over 45 years, Family History  CAD Risks: Family history, Diabetes Mellitus, Male sex, Obesity  Has dilated eye exam at least once a year?: Yes  Sees dentist every 6 months?: No  Sees podiatrist (foot doctor)?: No    Healthy Coping  Healthy Coping Assessed Today: Yes  Emotional response to diabetes: Acceptance, Ready to learn  Stage of change: ACTION (Actively working towards change)  Difficulty affording diabetes management supplies?: No  Support resources: None  Patient Activation Measure Survey Score:  GRUPO Score (Last Two) 12/7/2018   GRUPO Raw Score 29   Activation Score 52.9   GRUPO Level 2     ASSESSMENT:  Pt's A1c in December trended way up from previous A1c.  He has made changes to his diet to reduce carbohydrate intake since then and purchased an exercise bike and is riding it for 30 minutes 5x/week.  He does this at 12-2 am when he gets home from work - praise given for his efforts.  He is now tolerating Metformin as he did not previously.  Pt stopped his Glimepiride when he started the Metformin.  Glucose levels per his report are improved but no meter to document.  Will not restart Glimepiride  until he brings in meter.     Goals Addressed as of 2/8/2019        Patient Stated      Reducing Risks (pt-stated)     Added 2/8/19 by Chica Guzman RD     My Goal: I will lower my A1c to target of less than 7.0%.     What I need to meet my goal: limit carbohydrates in diet, continue to use exercise bike, take medications as prescribed.     I plan to meet my goal by this date: next A1c check.           Patient's most recent   Lab Results   Component Value Date    A1C 9.3 12/07/2018    is not meeting goal of <7.0    INTERVENTION:   Diabetes knowledge and skills assessment:     Patient is knowledgeable in diabetes management concepts related to: Healthy Eating, Being Active, Monitoring and Taking Medication    Patient needs further education on the following diabetes management concepts: Problem Solving, Reducing Risks and Healthy Coping    Based on learning assessment above, most appropriate setting for further diabetes education would be: Individual setting.    Education provided today on:  AADE Self-Care Behaviors:  Healthy Eating: consistency in amount, composition, and timing of food intake and portion control  Being Active: relationship to blood glucose  Monitoring: individual blood glucose targets and frequency of monitoring  Taking Medication: Possible need to increase dose of Metformin and/or add back Glimepiride pending A1c and review of glucose levels.   Reducing Risks: major complications of diabetes, A1C - goals, relating to blood glucose levels, how often to check and need to keep an eye on toes r/t recent episode of freezing them.     Opportunities for ongoing education and support in diabetes-self management were discussed.    Pt verbalized understanding of concepts discussed and recommendations provided today.       Education Materials Provided:  None    PLAN:  Continue to limit foods high in carbohydrates in diet.   Exercise goal is 30 minutes most days of the week.   Test 2x/day - fasting and when  you get home from work.  Try to test a two hours after a meal on a day off.   See Patient Instructions for co-developed, patient-stated behavior change goals.  AVS printed and provided to patient today.   Follow up in one month for A1c check and meter download.      Time Spent: 45 minutes  Encounter Type: Individual    Any diabetes medication dose changes were made via the CDE Protocol and Collaborative Practice Agreement with the patient's referring provider. A copy of this encounter was shared with the provider.        Sincerely,        Chica Guzman RD

## 2019-02-08 NOTE — PATIENT INSTRUCTIONS
-Keep trying to limit the foods high in carbohydrates in your diet.    -Exercise goal is 30 minutes most days of the week.   -Test you glucose 2x/day - fasting and when you get home from work.   -Target levels are .  If you ever are able to test 2 hours after eating a meal target level is less than 180.   -Last A1c was 9.3%.  Goal is less than 7.0%.    -Weight today was 258# - down 6.5# since last visit.    -Follow up in one month for A1c check.  You do not have to be fasting.   -Bring your meter.   -Call with questions - HUMA Marino, [phone].

## 2019-02-08 NOTE — PROGRESS NOTES
"Diabetes Self-Management Education & Support    Diabetes Education Self Management & Training    SUBJECTIVE/OBJECTIVE:  Presents for: Follow-up  Accompanied by: Self  Diabetes education in the past 24mo: Yes  Focus of Visit: Reducing Risks  Diabetes type: Type 2  Date of diagnosis: 2008  Disease course: Worsening  How confident are you filling out medical forms by yourself:: Quite a bit  Diabetes management related comments/concerns: Elevated A1c.   Transportation concerns: No  Other concerns:: None  Cultural Influences/Ethnic Background:  American    Diabetes Symptoms & Complications  Blurred vision: No  Fatigue: Yes(always tired r/t work )  Neuropathy: No  Foot ulcerations: (Recently froze feet.  Healing. )  Visual change: No  Weakness: No  Weight loss: Yes(Down 6.5# since August 2018. )  Slow healing wounds: No  Recent Infection(s): No  Symptom course: Stable  Weight trend: Decreasing steadily  Autonomic neuropathy: No  CVA: No  Heart disease: No  Nephropathy: No  Peripheral neuropathy: No  Peripheral Vascular Disease: No  Retinopathy: No    Patient Problem List and Family Medical History reviewed for relevant medical history, current medical status, and diabetes risk factors.    Vitals:  /79 (BP Location: Right arm, Patient Position: Sitting, Cuff Size: Adult Large)   Pulse 82   Temp 97.9  F (36.6  C) (Tympanic)   Ht 1.88 m (6' 2\")   Wt 117 kg (258 lb)   SpO2 98%   BMI 33.13 kg/m    Estimated body mass index is 33.13 kg/m  as calculated from the following:    Height as of this encounter: 1.88 m (6' 2\").    Weight as of this encounter: 117 kg (258 lb).   Weight is down 6.5# since last visit in August 2018.   Last 3 BP:   BP Readings from Last 3 Encounters:   02/08/19 126/79   12/07/18 118/82   08/10/18 123/83       History   Smoking Status     Former Smoker     Years: 30.00     Types: Dip, chew, snus or snuff     Quit date: 9/12/2017   Smokeless Tobacco     Current User     Types: Chew "       Labs:  Lab Results   Component Value Date    A1C 9.3 2018     Lab Results   Component Value Date     2018     Lab Results   Component Value Date    LDL 67 2018     HDL Cholesterol   Date Value Ref Range Status   2018 34 (L) >39 mg/dL Final   ]  GFR Estimate   Date Value Ref Range Status   2018 84 >60 mL/min/1.7m2 Final     Comment:     Non  GFR Calc     GFR Estimate If Black   Date Value Ref Range Status   2018 >90 >60 mL/min/1.7m2 Final     Comment:      GFR Calc     Lab Results   Component Value Date    CR 0.94 2018     No results found for: MICROALBUMIN    Healthy Eating  Healthy Eating Assessed Today: Yes  Cultural/Faith diet restrictions?: No  Patient on a regular basis: (Reduced carbohydrates in diet since appointment with provider in December. )  Meals include: Breakfast  Breakfast: 11-12 pm 2 eggs, fried ham, milk   Lunch: Packs lunch - meat, yogurt, cheese sticks, fruit - eats throughout day at work - drinks coffee with sugar free creamer  Dinner: Eats when he gets home at 12-2 am - steamer bag of vegis with pasta, meat, milk   Other: Rare dining out.   Beverages: Coffee, Milk, Diet soda(flavored water)  Has patient met with a dietitian in the past?: Yes    Being Active  Being Active Assessed Today: Yes  Exercise:: Yes(started riding exercise bike 2 weeks ago)  Days per week of moderate to strenuous exercise (like a brisk walk): 5  On average, minutes per day of exercise at this level: 30  How intense was your typical exercise? : Moderate (like brisk walking)  Exercise Minutes per Week: 150  Barrier to exercise: None    Monitoring  Monitoring Assessed Today: Yes  Did patient bring glucose meter to appointment? : No  Blood Glucose Meter: Accu-check  Home Glucose (Sugar) Monitorin-2 times per day  Blood glucose trend: Decreasing steadily  Pt states fasting levels around 120 and levels when he gets home from work  late in the evening are closer to 100.      Taking Medications  Diabetes Medication(s)     Biguanides Sig    metFORMIN (GLUCOPHAGE-XR) 500 MG 24 hr tablet Take 1 tablet (500 mg) by mouth 2 times daily (with meals)    Sulfonylureas Sig    glimepiride (AMARYL) 1 MG tablet Take 2 tablets by mouth once daily in the morning with breakfast and 3 tablets in the evening        Taking Medication Assessed Today: Yes  Current Treatments: Oral Agent (monotherapy)(Metformin 500 mg bid.  Pt stopped Glimepiride)  Problems taking diabetes medications regularly?: Yes  Diabetes medication side effects?: No  Treatment Compliance: All of the time    Problem Solving  Problem Solving Assessed Today: Yes  Hypoglycemia Frequency: Never    Reducing Risks  Diabetes Risks: Age over 45 years, Family History  CAD Risks: Family history, Diabetes Mellitus, Male sex, Obesity  Has dilated eye exam at least once a year?: Yes  Sees dentist every 6 months?: No  Sees podiatrist (foot doctor)?: No    Healthy Coping  Healthy Coping Assessed Today: Yes  Emotional response to diabetes: Acceptance, Ready to learn  Stage of change: ACTION (Actively working towards change)  Difficulty affording diabetes management supplies?: No  Support resources: None  Patient Activation Measure Survey Score:  GRUPO Score (Last Two) 12/7/2018   GRUPO Raw Score 29   Activation Score 52.9   GRUPO Level 2     ASSESSMENT:  Pt's A1c in December trended way up from previous A1c.  He has made changes to his diet to reduce carbohydrate intake since then and purchased an exercise bike and is riding it for 30 minutes 5x/week.  He does this at 12-2 am when he gets home from work - praise given for his efforts.  He is now tolerating Metformin as he did not previously.  Pt stopped his Glimepiride when he started the Metformin.  Glucose levels per his report are improved but no meter to document.  Will not restart Glimepiride until he brings in meter.     Goals Addressed as of 2/8/2019         Patient Stated      Reducing Risks (pt-stated)     Added 2/8/19 by Chica Guzman RD     My Goal: I will lower my A1c to target of less than 7.0%.     What I need to meet my goal: limit carbohydrates in diet, continue to use exercise bike, take medications as prescribed.     I plan to meet my goal by this date: next A1c check.           Patient's most recent   Lab Results   Component Value Date    A1C 9.3 12/07/2018    is not meeting goal of <7.0    INTERVENTION:   Diabetes knowledge and skills assessment:     Patient is knowledgeable in diabetes management concepts related to: Healthy Eating, Being Active, Monitoring and Taking Medication    Patient needs further education on the following diabetes management concepts: Problem Solving, Reducing Risks and Healthy Coping    Based on learning assessment above, most appropriate setting for further diabetes education would be: Individual setting.    Education provided today on:  AADE Self-Care Behaviors:  Healthy Eating: consistency in amount, composition, and timing of food intake and portion control  Being Active: relationship to blood glucose  Monitoring: individual blood glucose targets and frequency of monitoring  Taking Medication: Possible need to increase dose of Metformin and/or add back Glimepiride pending A1c and review of glucose levels.   Reducing Risks: major complications of diabetes, A1C - goals, relating to blood glucose levels, how often to check and need to keep an eye on toes r/t recent episode of freezing them.     Opportunities for ongoing education and support in diabetes-self management were discussed.    Pt verbalized understanding of concepts discussed and recommendations provided today.       Education Materials Provided:  None    PLAN:  Continue to limit foods high in carbohydrates in diet.   Exercise goal is 30 minutes most days of the week.   Test 2x/day - fasting and when you get home from work.  Try to test a two hours after a meal on a  day off.   See Patient Instructions for co-developed, patient-stated behavior change goals.  AVS printed and provided to patient today.   Follow up in one month for A1c check and meter download.      Time Spent: 45 minutes  Encounter Type: Individual    Any diabetes medication dose changes were made via the CDE Protocol and Collaborative Practice Agreement with the patient's referring provider. A copy of this encounter was shared with the provider.

## 2019-03-28 NOTE — PROGRESS NOTES
SUBJECTIVE:   Atul Lam is a 53 year old male who presents to clinic today for the following health issues:    Diabetes Follow-up      Patient is checking blood sugars: rarely.  Results range from 100 to 120; checked 3 times in past couple weeks; fasting 100-120    Diabetic concerns: None     Symptoms of hypoglycemia (low blood sugar): none     Paresthesias (numbness or burning in feet) or sores: No     Date of last diabetic eye exam: few months ago     on Metformin only - some diarrhea at times    Cut out bread at lunches; made reduction in carbs; no pasta    BP Readings from Last 2 Encounters:   03/29/19 124/86   02/08/19 126/79     Hemoglobin A1C (%)   Date Value   12/07/2018 9.3 (H)   08/10/2018 6.8     LDL Cholesterol Calculated (mg/dL)   Date Value   12/07/2018 67   02/12/2018 110 (H)       Diabetes Management Resources  Hyperlipidemia Follow-Up      Rate your low fat/cholesterol diet?: not monitoring fat    Taking statin?  Yes, muscle aches after starting statin; shoulders most notable; stopped 3 weeks ago and resolved    Other lipid medications/supplements?:  none    Amount of exercise or physical activity: 6-7 days/week for an average of 45-60 minutes    Problems taking medications regularly: No    Medication side effects: muscle aches    Diet: regular (no restrictions)    Declines colonoscopy.  Open to cologuard.      Problem list and histories reviewed & adjusted, as indicated.  Additional history: as documented    Current Outpatient Medications   Medication     blood glucose monitoring (BELL CONTOUR NEXT) test strip     blood glucose monitoring (BELL MICROLET) lancets     lisinopril (PRINIVIL/ZESTRIL) 5 MG tablet     metFORMIN (GLUCOPHAGE-XR) 500 MG 24 hr tablet     rosuvastatin (CRESTOR) 10 MG tablet     sildenafil (REVATIO) 20 MG tablet     No current facility-administered medications for this visit.        Patient Active Problem List   Diagnosis     Hyperlipidemia, unspecified  "hyperlipidemia type     Dysmetabolic syndrome X     Warthin tumor     Type 2 diabetes mellitus without complication, without long-term current use of insulin (H)     Past Surgical History:   Procedure Laterality Date     ENT SURGERY      T and A     PAROTIDECTOMY Right 4/10/2018    Procedure: PAROTIDECTOMY;  Right Superficial Parotidectomy;  Surgeon: Alexandra Holbrook MD;  Location:  OR       Social History     Tobacco Use     Smoking status: Former Smoker     Years: 30.00     Types: Dip, chew, snus or snuff     Last attempt to quit: 2017     Years since quittin.5     Smokeless tobacco: Current User     Types: Chew   Substance Use Topics     Alcohol use: Yes     Comment: 1 per mo     Family History   Problem Relation Age of Onset     Diabetes Father      Cerebrovascular Disease Father            Reviewed and updated as needed this visit by clinical staff  Tobacco  Allergies  Meds  Med Hx  Surg Hx  Fam Hx  Soc Hx      Reviewed and updated as needed this visit by Provider         ROS:  Constitutional, HEENT, cardiovascular, pulmonary, gi and gu systems are negative, except as otherwise noted.    OBJECTIVE:     /86 (BP Location: Left arm, Patient Position: Sitting, Cuff Size: Adult Regular)   Pulse 73   Temp 97  F (36.1  C) (Tympanic)   Ht 1.88 m (6' 2\")   Wt 113.5 kg (250 lb 3.2 oz)   SpO2 98%   BMI 32.12 kg/m    Body mass index is 32.12 kg/m .  GENERAL: alert, no distress and over weight  EYES: Eyes grossly normal to inspection, PERRL and conjunctivae and sclerae normal  NECK: no adenopathy, no asymmetry, masses, or scars and thyroid normal to palpation  RESP: lungs clear to auscultation - no rales, rhonchi or wheezes  CV: regular rate and rhythm, normal S1 S2, no S3 or S4, no murmur, click or rub, no peripheral edema and peripheral pulses strong  ABDOMEN: soft, nontender, no hepatosplenomegaly, no masses and bowel sounds normal  MS: no gross musculoskeletal defects noted, no " edema  SKIN: no suspicious lesions or rashes  NEURO: Normal strength and tone, mentation intact and speech normal  PSYCH: mentation appears normal, affect normal/bright  Diabetic foot exam: normal DP and PT pulses, no trophic changes or ulcerative lesions, normal sensory exam and normal monofilament exam    Diagnostic Test Results:  pending    ASSESSMENT/PLAN:     (E11.9) Type 2 diabetes mellitus without complication, without long-term current use of insulin (H)  (primary encounter diagnosis)  Comment: uncontrolled based on last MRI  Plan: Albumin Random Urine Quantitative with Creat         Ratio, DIABETES EDUCATION REFERRAL ([place]),         Hemoglobin A1c, Basic metabolic panel,         lisinopril (PRINIVIL/ZESTRIL) 5 MG tablet,         rosuvastatin (CRESTOR) 10 MG tablet            (E78.5) Hyperlipidemia, unspecified hyperlipidemia type  Comment: intolerant to Lipitor  Plan: rosuvastatin (CRESTOR) 10 MG tablet        Start Crestor    (Z12.11) Special screening for malignant neoplasms, colon  Comment: declines colonoscopy  Plan: cologuard form completed    (F17.200) Tobacco use disorder  Comment: chewing  Plan: cessation advised    (Z71.6) Encounter for tobacco use cessation counseling    Stop Lipitor.  Start Crestor.  Can start with 1/2 tab then full if tolerating.  Add Lisinopril for diabetes, kidneys.  Recheck a1c - adjust medication if needed.  Foot exam today normal.  Eye exam up to date.  Cologuard colon screen.  Tobacco cessation advised.    Zoraida Bahena MD  [facility] [place]

## 2019-03-29 ENCOUNTER — OFFICE VISIT (OUTPATIENT)
Dept: FAMILY MEDICINE | Facility: OTHER | Age: 54
End: 2019-03-29
Attending: FAMILY MEDICINE
Payer: COMMERCIAL

## 2019-03-29 VITALS
HEIGHT: 74 IN | OXYGEN SATURATION: 98 % | BODY MASS INDEX: 32.11 KG/M2 | SYSTOLIC BLOOD PRESSURE: 124 MMHG | TEMPERATURE: 97 F | HEART RATE: 73 BPM | WEIGHT: 250.2 LBS | DIASTOLIC BLOOD PRESSURE: 86 MMHG

## 2019-03-29 DIAGNOSIS — Z71.6 ENCOUNTER FOR TOBACCO USE CESSATION COUNSELING: ICD-10-CM

## 2019-03-29 DIAGNOSIS — F17.200 TOBACCO USE DISORDER: ICD-10-CM

## 2019-03-29 DIAGNOSIS — E78.5 HYPERLIPIDEMIA, UNSPECIFIED HYPERLIPIDEMIA TYPE: ICD-10-CM

## 2019-03-29 DIAGNOSIS — Z12.11 SPECIAL SCREENING FOR MALIGNANT NEOPLASMS, COLON: ICD-10-CM

## 2019-03-29 DIAGNOSIS — E11.9 TYPE 2 DIABETES MELLITUS WITHOUT COMPLICATION, WITHOUT LONG-TERM CURRENT USE OF INSULIN (H): Primary | ICD-10-CM

## 2019-03-29 LAB
ANION GAP SERPL CALCULATED.3IONS-SCNC: 3 MMOL/L (ref 3–14)
BUN SERPL-MCNC: 16 MG/DL (ref 7–30)
CALCIUM SERPL-MCNC: 9.1 MG/DL (ref 8.5–10.1)
CHLORIDE SERPL-SCNC: 104 MMOL/L (ref 94–109)
CO2 SERPL-SCNC: 31 MMOL/L (ref 20–32)
CREAT SERPL-MCNC: 1.07 MG/DL (ref 0.66–1.25)
CREAT UR-MCNC: 258 MG/DL
GFR SERPL CREATININE-BSD FRML MDRD: 80 ML/MIN/{1.73_M2}
GLUCOSE SERPL-MCNC: 129 MG/DL (ref 70–99)
HBA1C MFR BLD: 7.2 % (ref 0–5.6)
MICROALBUMIN UR-MCNC: 29 MG/L
MICROALBUMIN/CREAT UR: 11.28 MG/G CR (ref 0–17)
POTASSIUM SERPL-SCNC: 3.8 MMOL/L (ref 3.4–5.3)
SODIUM SERPL-SCNC: 138 MMOL/L (ref 133–144)

## 2019-03-29 PROCEDURE — 82043 UR ALBUMIN QUANTITATIVE: CPT | Performed by: FAMILY MEDICINE

## 2019-03-29 PROCEDURE — 83036 HEMOGLOBIN GLYCOSYLATED A1C: CPT | Performed by: FAMILY MEDICINE

## 2019-03-29 PROCEDURE — 36415 COLL VENOUS BLD VENIPUNCTURE: CPT | Performed by: FAMILY MEDICINE

## 2019-03-29 PROCEDURE — 80048 BASIC METABOLIC PNL TOTAL CA: CPT | Performed by: FAMILY MEDICINE

## 2019-03-29 PROCEDURE — 99214 OFFICE O/P EST MOD 30 MIN: CPT | Performed by: FAMILY MEDICINE

## 2019-03-29 RX ORDER — LISINOPRIL 5 MG/1
5 TABLET ORAL DAILY
Qty: 30 TABLET | Refills: 11 | Status: SHIPPED | OUTPATIENT
Start: 2019-03-29 | End: 2019-08-08 | Stop reason: SINTOL

## 2019-03-29 RX ORDER — ROSUVASTATIN CALCIUM 10 MG/1
10 TABLET, COATED ORAL DAILY
Qty: 30 TABLET | Refills: 11 | Status: SHIPPED | OUTPATIENT
Start: 2019-03-29 | End: 2019-08-08 | Stop reason: SINTOL

## 2019-03-29 RX ORDER — METFORMIN HYDROCHLORIDE 750 MG/1
750 TABLET, EXTENDED RELEASE ORAL 2 TIMES DAILY WITH MEALS
Qty: 60 TABLET | Refills: 3 | Status: SHIPPED | OUTPATIENT
Start: 2019-03-29 | End: 2019-07-26

## 2019-03-29 ASSESSMENT — MIFFLIN-ST. JEOR: SCORE: 2059.65

## 2019-03-29 ASSESSMENT — PAIN SCALES - GENERAL: PAINLEVEL: NO PAIN (0)

## 2019-03-29 NOTE — NURSING NOTE
"Chief Complaint   Patient presents with     Diabetes     Hyperlipidemia       Initial /86 (BP Location: Left arm, Patient Position: Sitting, Cuff Size: Adult Regular)   Pulse 73   Temp 97  F (36.1  C) (Tympanic)   Ht 1.88 m (6' 2\")   Wt 113.5 kg (250 lb 3.2 oz)   SpO2 98%   BMI 32.12 kg/m   Estimated body mass index is 32.12 kg/m  as calculated from the following:    Height as of this encounter: 1.88 m (6' 2\").    Weight as of this encounter: 113.5 kg (250 lb 3.2 oz).  Medication Reconciliation: complete    Ashley A. Lechevalier, LPN  "

## 2019-03-29 NOTE — PATIENT INSTRUCTIONS
Stop Lipitor.  Start Crestor.  Can start with 1/2 tab then full if tolerating.  Add Lisinopril for diabetes, kidneys.  Recheck a1c - adjust medication if needed.  Foot exam today normal.  Eye exam up to date.  Cologuard colon screen.  Tobacco cessation advised.

## 2019-07-26 DIAGNOSIS — E78.5 HYPERLIPIDEMIA, UNSPECIFIED HYPERLIPIDEMIA TYPE: ICD-10-CM

## 2019-07-26 DIAGNOSIS — E11.9 TYPE 2 DIABETES MELLITUS WITHOUT COMPLICATION, WITHOUT LONG-TERM CURRENT USE OF INSULIN (H): ICD-10-CM

## 2019-07-26 RX ORDER — METFORMIN HYDROCHLORIDE 750 MG/1
750 TABLET, EXTENDED RELEASE ORAL 2 TIMES DAILY WITH MEALS
Qty: 60 TABLET | Refills: 0 | Status: SHIPPED | OUTPATIENT
Start: 2019-07-26 | End: 2019-08-08

## 2019-08-01 NOTE — PROGRESS NOTES
Subjective     Atul Lam is a 53 year old male who presents to clinic today for the following health issues:    HPI   Diabetes Follow-up      How often are you checking your blood sugar? 3-4x per week; 100-110 fastings    What time of day are you checking your blood sugars (select all that apply)? AM / PM    Have you had any blood sugars above 200?  No    Have you had any blood sugars below 70?  No    What symptoms do you notice when your blood sugar is low?  None    What concerns do you have today about your diabetes? None     Do you have any of these symptoms? (Select all that apply)  No numbness or tingling in feet.  No redness, sores or blisters on feet.  No complaints of excessive thirst.  No reports of blurry vision.  No significant changes to weight.     Have you had a diabetic eye exam in the last 12 months? YES    Metformin increased from 500 BID to 850 BID last visit    Crestor causes rhinorrhea - tried saline, but left side plugged; prior Lipitor as well    Lisinopril was giving hives - quit a few days ago and hives improved    Mores white sinus congestion; onset week-2; no fevers; sinus headaches    BP Readings from Last 2 Encounters:   08/08/19 130/78   03/29/19 124/86     Hemoglobin A1C (%)   Date Value   03/29/2019 7.2 (H)   12/07/2018 9.3 (H)     LDL Cholesterol Calculated (mg/dL)   Date Value   12/07/2018 67   02/12/2018 110 (H)       Diabetes Management Resources  Depression Followup    How are you doing with your depression since your last visit? No change    Are you having other symptoms that might be associated with depression? No    Have you had a significant life event?  No     Are you feeling anxious or having panic attacks?   No    Do you have any concerns with your use of alcohol or other drugs? No       Social History     Tobacco Use     Smoking status: Former Smoker     Years: 30.00     Types: Dip, chew, snus or snuff     Last attempt to quit: 9/12/2017     Years since quitting:  1.9     Smokeless tobacco: Current User     Types: Chew   Substance Use Topics     Alcohol use: Yes     Comment: 1 per mo     Drug use: No     PHQ 2018   PHQ-9 Total Score 0   Q9: Thoughts of better off dead/self-harm past 2 weeks Not at all     JOSELINE-7 SCORE 2018   Total Score 0           Suicide Assessment Five-step Evaluation and Treatment (SAFE-T)    Amount of exercise or physical activity: 6-7 days/week (work)    Problems taking medications regularly: No    Medication side effects: none    Diet: regular (no restrictions)      RESPIRATORY SYMPTOMS      Duration: Ongoing for weeks     Description  nasal congestion, rhinorrhea, cough, headache, hoarse voice and myalgias    Severity: mild    Accompanying signs and symptoms: None    History (predisposing factors):  none    Precipitating or alleviating factors: None    Therapies tried and outcome:  none      Current Outpatient Medications   Medication     amoxicillin-clavulanate (AUGMENTIN) 875-125 MG tablet     blood glucose monitoring (BELL CONTOUR NEXT) test strip     blood glucose monitoring (BELL MICROLET) lancets     losartan (COZAAR) 25 MG tablet     metFORMIN (GLUCOPHAGE-XR) 750 MG 24 hr tablet     simvastatin (ZOCOR) 40 MG tablet     No current facility-administered medications for this visit.        Patient Active Problem List   Diagnosis     Hyperlipidemia, unspecified hyperlipidemia type     Dysmetabolic syndrome X     Warthin tumor     Type 2 diabetes mellitus without complication, without long-term current use of insulin (H)     Past Surgical History:   Procedure Laterality Date     ENT SURGERY      T and A     PAROTIDECTOMY Right 4/10/2018    Procedure: PAROTIDECTOMY;  Right Superficial Parotidectomy;  Surgeon: Alexandra Holbrook MD;  Location:  OR       Social History     Tobacco Use     Smoking status: Former Smoker     Years: 30.00     Types: Dip, chew, snus or snuff     Last attempt to quit: 2017     Years since quittin.9      Smokeless tobacco: Current User     Types: Chew   Substance Use Topics     Alcohol use: Yes     Comment: 1 per mo     Family History   Problem Relation Age of Onset     Diabetes Father      Cerebrovascular Disease Father              Reviewed and updated as needed this visit by Provider  Tobacco  Allergies  Meds  Med Hx  Surg Hx  Fam Hx  Soc Hx        Review of Systems   ROS COMP: Constitutional, HEENT, cardiovascular, pulmonary, gi and gu systems are negative, except as otherwise noted.      Objective    /78 (BP Location: Right arm, Patient Position: Chair, Cuff Size: Adult Large)   Pulse 102   Temp 96.3  F (35.7  C) (Tympanic)   Wt 111.6 kg (246 lb)   SpO2 98%   BMI 31.58 kg/m    Body mass index is 31.58 kg/m .  Physical Exam   GENERAL: healthy, alert and no distress  EYES: Eyes grossly normal to inspection, PERRL and conjunctivae and sclerae normal  HENT: normal cephalic/atraumatic, ear canals and TM's normal, nasal mucosa edematous , rhinorrhea white and thick, oropharynx clear and oral mucous membranes moist  NECK: no adenopathy, no asymmetry, masses, or scars and thyroid normal to palpation  RESP: lungs clear to auscultation - no rales, rhonchi or wheezes  CV: regular rate and rhythm, normal S1 S2, no S3 or S4, no murmur, click or rub, no peripheral edema and peripheral pulses strong  MS: no gross musculoskeletal defects noted, no edema  PSYCH: mentation appears normal, affect normal/bright    Diagnostic Test Results:  Labs reviewed in Epic  Repeat a1c pending        Assessment & Plan       ICD-10-CM    1. Type 2 diabetes mellitus without complication, without long-term current use of insulin (H) E11.9 Hemoglobin A1c     losartan (COZAAR) 25 MG tablet     simvastatin (ZOCOR) 40 MG tablet     metFORMIN (GLUCOPHAGE-XR) 750 MG 24 hr tablet   2. Hyperlipidemia, unspecified hyperlipidemia type E78.5 simvastatin (ZOCOR) 40 MG tablet   3. Acute non-recurrent maxillary sinusitis J01.00  "amoxicillin-clavulanate (AUGMENTIN) 875-125 MG tablet   4. Tobacco use disorder F17.200    5. Encounter for tobacco use cessation counseling Z71.6         BMI:   Estimated body mass index is 31.58 kg/m  as calculated from the following:    Height as of 3/29/19: 1.88 m (6' 2\").    Weight as of this encounter: 111.6 kg (246 lb).   Weight management plan: Discussed healthy diet and exercise guidelines      Unsure if his reactions are truly to the medications or other illness, factors, etc.    Patient Instructions   Will call with lab result.    Continue Metformin 850 mg twice daily.    Stop Lisinopril.  Start Cozaar/Losartan 1/2 tab daily.  Stop Cresor.  Start Simvastatin/Zocor 40 mg daily.    Wait 1 week between starting new medications to know if reaction occurs- which med it was from.    Complete antibiotic course Augmentin.  Continue sinus saline rinses.  Push fluids.  Tobacco cessation advised.      No follow-ups on file.    Zoraida Bahena MD  [facility] [place]      "

## 2019-08-08 ENCOUNTER — OFFICE VISIT (OUTPATIENT)
Dept: FAMILY MEDICINE | Facility: OTHER | Age: 54
End: 2019-08-08
Attending: FAMILY MEDICINE
Payer: COMMERCIAL

## 2019-08-08 VITALS
TEMPERATURE: 96.3 F | DIASTOLIC BLOOD PRESSURE: 78 MMHG | HEART RATE: 102 BPM | SYSTOLIC BLOOD PRESSURE: 130 MMHG | OXYGEN SATURATION: 98 % | WEIGHT: 246 LBS | BODY MASS INDEX: 31.58 KG/M2

## 2019-08-08 DIAGNOSIS — Z71.6 ENCOUNTER FOR TOBACCO USE CESSATION COUNSELING: ICD-10-CM

## 2019-08-08 DIAGNOSIS — E11.9 TYPE 2 DIABETES MELLITUS WITHOUT COMPLICATION, WITHOUT LONG-TERM CURRENT USE OF INSULIN (H): Primary | ICD-10-CM

## 2019-08-08 DIAGNOSIS — J01.00 ACUTE NON-RECURRENT MAXILLARY SINUSITIS: ICD-10-CM

## 2019-08-08 DIAGNOSIS — E78.5 HYPERLIPIDEMIA, UNSPECIFIED HYPERLIPIDEMIA TYPE: ICD-10-CM

## 2019-08-08 DIAGNOSIS — F17.200 TOBACCO USE DISORDER: ICD-10-CM

## 2019-08-08 LAB
EST. AVERAGE GLUCOSE BLD GHB EST-MCNC: 146 MG/DL
HBA1C MFR BLD: 6.7 % (ref 0–5.6)

## 2019-08-08 PROCEDURE — 36415 COLL VENOUS BLD VENIPUNCTURE: CPT | Performed by: FAMILY MEDICINE

## 2019-08-08 PROCEDURE — 83036 HEMOGLOBIN GLYCOSYLATED A1C: CPT | Performed by: FAMILY MEDICINE

## 2019-08-08 PROCEDURE — 99214 OFFICE O/P EST MOD 30 MIN: CPT | Performed by: FAMILY MEDICINE

## 2019-08-08 RX ORDER — SIMVASTATIN 40 MG
40 TABLET ORAL AT BEDTIME
Qty: 90 TABLET | Refills: 1 | Status: SHIPPED | OUTPATIENT
Start: 2019-08-08 | End: 2020-03-23

## 2019-08-08 RX ORDER — LOSARTAN POTASSIUM 25 MG/1
12.5 TABLET ORAL DAILY
Qty: 45 TABLET | Refills: 1 | Status: SHIPPED | OUTPATIENT
Start: 2019-08-08 | End: 2019-10-30 | Stop reason: SINTOL

## 2019-08-08 RX ORDER — METFORMIN HYDROCHLORIDE 750 MG/1
750 TABLET, EXTENDED RELEASE ORAL 2 TIMES DAILY WITH MEALS
Qty: 60 TABLET | Refills: 3 | Status: SHIPPED | OUTPATIENT
Start: 2019-08-08 | End: 2020-01-06

## 2019-08-08 ASSESSMENT — PAIN SCALES - GENERAL: PAINLEVEL: NO PAIN (0)

## 2019-08-08 NOTE — PATIENT INSTRUCTIONS
Will call with lab result.    Continue Metformin 850 mg twice daily.    Stop Lisinopril.  Start Cozaar/Losartan 1/2 tab daily.  Stop Cresor.  Start Simvastatin/Zocor 40 mg daily.    Wait 1 week between starting new medications to know if reaction occurs- which med it was from.    Complete antibiotic course Augmentin.  Continue sinus saline rinses.  Push fluids.  Tobacco cessation advised.

## 2019-10-28 NOTE — PROGRESS NOTES
"Subjective     Atul Lam is a 53 year old male who presents to clinic today for the following health issues:    HPI   RESPIRATORY SYMPTOMS      Duration: ongoing 8 months     Description  nasal congestion, sore throat, cough, wheezing, headache, fatigue/malaise, hoarse voice and nausea    Severity: severe    Accompanying signs and symptoms: trouble sleeping, trouble breathing occasionally, light headed      History (predisposing factors):  tobacco abuse    Precipitating or alleviating factors: None    Therapies tried and outcome:  antihistamine nasal spray/wash - cant use anymore due to one nasal opening always being plugged, last antibiotic helped slightly     Augmentin 8/8/19 - helped some - but not all the way - returned right away    Former smoker    Sinus drainage at night - causing cough - no sleep    Tried nasal steroid spray - didn't help - had done for a week or so    No antihistamine    2 years of \"allergies\" - but not year round; worst is pet dander (poodle at home) and dust     No prior allergy testing    No prior sinus CT    White thick discharge when blows nose - \"like Dipak's glue\"    No fever    Of note - changed from Lisinopril (hives) - to Losartan - did same hives.        Current Outpatient Medications   Medication     amoxicillin-clavulanate (AUGMENTIN) 875-125 MG tablet     blood glucose monitoring (BELL CONTOUR NEXT) test strip     blood glucose monitoring (BELL MICROLET) lancets     fluticasone (FLONASE) 50 MCG/ACT nasal spray     loratadine (CLARITIN) 10 MG tablet     metFORMIN (GLUCOPHAGE-XR) 750 MG 24 hr tablet     predniSONE (DELTASONE) 20 MG tablet     simvastatin (ZOCOR) 40 MG tablet     No current facility-administered medications for this visit.        Patient Active Problem List   Diagnosis     Hyperlipidemia, unspecified hyperlipidemia type     Dysmetabolic syndrome X     Warthin tumor     Type 2 diabetes mellitus without complication, without long-term current use of " insulin (H)     Past Surgical History:   Procedure Laterality Date     ENT SURGERY      T and A     PAROTIDECTOMY Right 4/10/2018    Procedure: PAROTIDECTOMY;  Right Superficial Parotidectomy;  Surgeon: Alexandra Holbrook MD;  Location:  OR       Social History     Tobacco Use     Smoking status: Former Smoker     Years: 30.00     Types: Dip, chew, snus or snuff     Last attempt to quit: 2017     Years since quittin.1     Smokeless tobacco: Current User     Types: Chew   Substance Use Topics     Alcohol use: Yes     Comment: 1 per mo     Family History   Problem Relation Age of Onset     Diabetes Father      Cerebrovascular Disease Father              Reviewed and updated as needed this visit by Provider         Review of Systems   ROS COMP: Constitutional, HEENT, cardiovascular, pulmonary, gi and gu systems are negative, except as otherwise noted.      Objective    /78 (BP Location: Left arm, Patient Position: Chair, Cuff Size: Adult Large)   Pulse 80   Temp 96.8  F (36  C) (Tympanic)   Wt 115.2 kg (254 lb)   SpO2 98%   BMI 32.61 kg/m    Body mass index is 32.61 kg/m .  Physical Exam   GENERAL: healthy, alert and no distress  EYES: Eyes grossly normal to inspection, PERRL and conjunctivae and sclerae normal  HENT: normal cephalic/atraumatic, ear canals and TM's normal, nasal mucosa edematous , rhinorrhea white, oral mucous membranes moist and post nasal drainage noted; voice is very nasal  NECK: no adenopathy, no asymmetry, masses, or scars and thyroid normal to palpation  RESP: lungs clear to auscultation - no rales, rhonchi or wheezes  CV: regular rate and rhythm, normal S1 S2, no S3 or S4, no murmur, click or rub, no peripheral edema and peripheral pulses strong  MS: no gross musculoskeletal defects noted, no edema  PSYCH: mentation appears normal, affect normal/bright    Diagnostic Test Results:  Labs reviewed in Epic        Assessment & Plan       ICD-10-CM    1. Chronic sinusitis,  unspecified location J32.9 amoxicillin-clavulanate (AUGMENTIN) 875-125 MG tablet     predniSONE (DELTASONE) 20 MG tablet     fluticasone (FLONASE) 50 MCG/ACT nasal spray     loratadine (CLARITIN) 10 MG tablet     CT Sinus w/o Contrast        Concern about potential underlying structural abnormality.      Patient Instructions   Complete 7 days of prednisone.  Complete 2 weeks of antibiotics.  Use nasal spray daily - takes time to work.  Use antihistamine nightly - Claritin.  CT scan of sinuses after 2 weeks of treatment.  Consider ENT consult next depending on results and symptoms.          No follow-ups on file.    Zoraida Bahena MD  [facility] [place]

## 2019-10-30 ENCOUNTER — OFFICE VISIT (OUTPATIENT)
Dept: FAMILY MEDICINE | Facility: OTHER | Age: 54
End: 2019-10-30
Attending: FAMILY MEDICINE
Payer: COMMERCIAL

## 2019-10-30 VITALS
OXYGEN SATURATION: 98 % | BODY MASS INDEX: 32.61 KG/M2 | DIASTOLIC BLOOD PRESSURE: 78 MMHG | TEMPERATURE: 96.8 F | SYSTOLIC BLOOD PRESSURE: 112 MMHG | HEART RATE: 80 BPM | WEIGHT: 254 LBS

## 2019-10-30 DIAGNOSIS — J32.9 CHRONIC SINUSITIS, UNSPECIFIED LOCATION: Primary | ICD-10-CM

## 2019-10-30 PROCEDURE — 99214 OFFICE O/P EST MOD 30 MIN: CPT | Performed by: FAMILY MEDICINE

## 2019-10-30 RX ORDER — LORATADINE 10 MG/1
10 TABLET ORAL DAILY
Qty: 90 TABLET | Refills: 3 | Status: SHIPPED | OUTPATIENT
Start: 2019-10-30 | End: 2020-11-20

## 2019-10-30 RX ORDER — FLUTICASONE PROPIONATE 50 MCG
1 SPRAY, SUSPENSION (ML) NASAL DAILY
Qty: 16 G | Refills: 3 | Status: SHIPPED | OUTPATIENT
Start: 2019-10-30 | End: 2019-12-30

## 2019-10-30 RX ORDER — PREDNISONE 20 MG/1
20 TABLET ORAL DAILY
Qty: 7 TABLET | Refills: 0 | Status: SHIPPED | OUTPATIENT
Start: 2019-10-30 | End: 2019-12-30

## 2019-10-30 ASSESSMENT — PAIN SCALES - GENERAL: PAINLEVEL: NO PAIN (0)

## 2019-10-30 NOTE — NURSING NOTE
"Chief Complaint   Patient presents with     Sinus Problem       Initial /78 (BP Location: Left arm, Patient Position: Chair, Cuff Size: Adult Large)   Pulse 80   Temp 96.8  F (36  C) (Tympanic)   Wt 115.2 kg (254 lb)   SpO2 98%   BMI 32.61 kg/m   Estimated body mass index is 32.61 kg/m  as calculated from the following:    Height as of 3/29/19: 1.88 m (6' 2\").    Weight as of this encounter: 115.2 kg (254 lb).  Medication Reconciliation: complete  Kelvin Aguilar LPN  "

## 2019-11-15 ENCOUNTER — TELEPHONE (OUTPATIENT)
Dept: FAMILY MEDICINE | Facility: OTHER | Age: 54
End: 2019-11-15

## 2019-11-15 ENCOUNTER — HOSPITAL ENCOUNTER (OUTPATIENT)
Dept: CT IMAGING | Facility: HOSPITAL | Age: 54
Discharge: HOME | End: 2019-11-15
Attending: FAMILY MEDICINE | Admitting: FAMILY MEDICINE
Payer: COMMERCIAL

## 2019-11-15 DIAGNOSIS — J32.9 CHRONIC SINUSITIS, UNSPECIFIED LOCATION: ICD-10-CM

## 2019-11-15 PROCEDURE — 70486 CT MAXILLOFACIAL W/O DYE: CPT | Mod: TC

## 2019-11-15 NOTE — TELEPHONE ENCOUNTER
ENT Referral per Dr. Bahena. Patient notified of results (see result note encounter). Ashley A. Lechevalier, LPN on 11/15/2019 at 3:04 PM     Detail Level: Zone MD does not see any visible rash or lesions on Pt that might be causing her itching. It is likely being caused by the metastasis of her breast cancer. OTC anti-itch medications may help relieve Pt’s symptoms.

## 2019-12-02 ENCOUNTER — TELEPHONE (OUTPATIENT)
Dept: FAMILY MEDICINE | Facility: OTHER | Age: 54
End: 2019-12-02

## 2019-12-02 DIAGNOSIS — J32.9 CHRONIC SINUSITIS, UNSPECIFIED LOCATION: Primary | ICD-10-CM

## 2019-12-02 NOTE — TELEPHONE ENCOUNTER
"Pt calling, was seen -19, for \"severe sinus infection\"    Was to be referred to ENT, doesn't look like a referral was entered, also no call back to pt.    Please call pt.    Referral entered.    Susi Wiggins LPN    " altered mental status/chest pain

## 2019-12-06 ENCOUNTER — OFFICE VISIT (OUTPATIENT)
Dept: OTOLARYNGOLOGY | Facility: OTHER | Age: 54
End: 2019-12-06
Attending: FAMILY MEDICINE
Payer: COMMERCIAL

## 2019-12-06 VITALS
BODY MASS INDEX: 32.6 KG/M2 | SYSTOLIC BLOOD PRESSURE: 118 MMHG | TEMPERATURE: 97.8 F | OXYGEN SATURATION: 97 % | DIASTOLIC BLOOD PRESSURE: 74 MMHG | HEIGHT: 74 IN | HEART RATE: 80 BPM | WEIGHT: 254 LBS

## 2019-12-06 DIAGNOSIS — J33.9 NASAL POLYPOSIS: Primary | ICD-10-CM

## 2019-12-06 DIAGNOSIS — J32.9 CHRONIC SINUSITIS, UNSPECIFIED LOCATION: ICD-10-CM

## 2019-12-06 DIAGNOSIS — J30.2 SEASONAL ALLERGIC RHINITIS, UNSPECIFIED TRIGGER: ICD-10-CM

## 2019-12-06 PROCEDURE — 31231 NASAL ENDOSCOPY DX: CPT | Performed by: NURSE PRACTITIONER

## 2019-12-06 PROCEDURE — 99213 OFFICE O/P EST LOW 20 MIN: CPT | Mod: 25 | Performed by: NURSE PRACTITIONER

## 2019-12-06 RX ORDER — BUDESONIDE 0.5 MG/2ML
INHALANT ORAL
Qty: 3 BOX | Refills: 11 | Status: SHIPPED | OUTPATIENT
Start: 2019-12-12 | End: 2020-01-06

## 2019-12-06 RX ORDER — PREDNISONE 20 MG/1
40 TABLET ORAL DAILY
Qty: 10 TABLET | Refills: 0 | Status: SHIPPED | OUTPATIENT
Start: 2019-12-06 | End: 2019-12-30

## 2019-12-06 RX ORDER — FLUTICASONE PROPIONATE 50 MCG
2 SPRAY, SUSPENSION (ML) NASAL DAILY
Qty: 16 G | Refills: 12 | Status: SHIPPED | OUTPATIENT
Start: 2019-12-06 | End: 2020-01-06

## 2019-12-06 ASSESSMENT — PAIN SCALES - GENERAL: PAINLEVEL: NO PAIN (0)

## 2019-12-06 ASSESSMENT — MIFFLIN-ST. JEOR: SCORE: 2071.89

## 2019-12-06 NOTE — NURSING NOTE
"Chief Complaint   Patient presents with     Consult     Chronic Sinusitis; Referred by Dr. Pham       Initial /74 (Cuff Size: Adult Regular)   Pulse 80   Temp 97.8  F (36.6  C) (Tympanic)   Ht 1.88 m (6' 2\")   Wt 115.2 kg (254 lb)   SpO2 97%   BMI 32.61 kg/m   Estimated body mass index is 32.61 kg/m  as calculated from the following:    Height as of this encounter: 1.88 m (6' 2\").    Weight as of this encounter: 115.2 kg (254 lb).  Medication Reconciliation: complete  Roxana Wilson LPN    "

## 2019-12-06 NOTE — PATIENT INSTRUCTIONS
Thank you for allowing Cynthia Laura NP and our ENT team to participate in your care.  If your medications are too expensive, please give the nurse a call.  We can possibly change this medication.  If you have a scheduling or an appointment question please contact our Health Unit Coordinator at their direct line [phone].   ALL nursing questions or concerns can be directed to your ENT nurse.    Use Budesonide Rinses Twice Daily  Continue Flonase 2 Sprays, Once Daily  Take Prednisone as prescribed  Complete Allergy Skin Testing  Follow up with Dr. Chacon    Budesonide nasal saline irrigation per instructions:  -Obtain Kip Med Sinus rinse over the counter.    -Use warm distilled water and 2 packets of the salt solution that comes with the bottle, dissolve in bottle up to the 240 mL samina.  -Add 1 vial of budesonide.  -Irrigate each side of your nose leaning over the sink, using 1/3 to 1/2 the volume of the bottle in each nostril every irrigation.  Irrigate 2 times daily.  -If additional rinses are needed/recommended, you may use the plan Kip Med Sinus irrigation without the use of added budesonide.     Indications for allergy testing include:   1) Confirm suspicion of allergic rhinitis due to inhalant allergies  2) Identify the offending allergen to determine specific mode of treatment  3) In the case of chronic rhinosinusitis: when symptoms are not controlled by avoidance and pharmacotherapy  4) In the Asthma patient when exacerbations may be due to perennial allergen exposure  5) Suspect food allergy  6) Otitis Media, chronic rhinitis, atopic dermatitis, Meniere disease, headache, pharyngitis or eye symptoms    Modified quantitative testing (MQT) will be performed.  Signed consent was obtained, and the risks of immunotherapy were discussed, including the potential for anaphylaxis.    If immunotherapy (IT) is recommended, there is continued risk of anaphylaxis.   Anaphylaxis can cause death. The  patient will need to be monitored for 30 minutes post injection.  They must present their epinephrine pen prior to injection.  Subcutaneous as well as sublingual immunotherapy (SLIT) were discussed as potential treatment options.  The patient was told SLIT is not approved by the FDA and is cash pay.  The general time frame of immunotherapy was discussed (generally 3-5 years, sometimes longer), and the basic immunology behind IT was discussed.

## 2019-12-06 NOTE — LETTER
12/6/2019         RE: Atul Lam  [address]  [place] [address]        Dear Colleague,    Thank you for referring your patient, Atul Lam, to the [facility] - [place]. Please see a copy of my visit note below.    Otolaryngology Note         Chief Complaint:     Patient presents with:  Consult: Chronic Sinusitis; Referred by Dr. Pham         History of Present Illness:     Atul Lam presents with chief complaint of chronic nasal congestion, frequent sinusitis.  Symptoms have been present for several years.    Symptoms have been worsening recently  Symptoms include sinus congestion, PND, cough  Patient is not having facial pain or pressure  Treatments have included abx, oral steroids  Is not able to breath thru nares, left worse than right  No history of sinus surgery.  He has a history of nasal fracture as child.  History of tonsillectomy as young adult.    No dysphonia, dysphagia.   History of with reflux, denies concerns at this time    History of COM, ear surgeries  +history of hives with allergy exposure    ho history of food intolerance  + family history of allergies/atopy, dad and daughter  No prior allergy testing.      Resides in a house with a basement. There is no water or mold.  There is no carpet in the bedroom  Heat source in home forced air  Pets: Poodle    PROCEDURE: CT SINUS W/O CONTRAST 11/15/2019 10:40 AM     HISTORY: Sinusitis, chronic, possible surgery; Chronic sinusitis,  unspecified location     COMPARISONS: None.     Meds/Dose Given:     TECHNIQUE: CT scan of the sinuses with sagittal coronal  reconstructions     FINDINGS: There is near complete opacification of the ethmoid sinuses.  Moderate mucosal thickening is seen in the frontal sinuses with a  completely opacified medial air cell arising from the right frontal  sinus. There is moderate mucosal thickening in the maxillary and  sphenoid sinuses. There is  nodularity within the nasal  cavity  consistent with multiple nasal polyps. The retropharyngeal soft  tissues are normal.                                                                        IMPRESSION: Pansinusitis with nasal polyposis.         Medications:     Current Outpatient Rx   Medication Sig Dispense Refill     blood glucose monitoring (BELL CONTOUR NEXT) test strip Use to test blood sugar 2 times daily. 100 each 11     blood glucose monitoring (BELL MICROLET) lancets Use to test blood sugar 2 times daily. 100 each 11     [START ON 12/12/2019] budesonide (PULMICORT) 0.5 MG/2ML neb solution Squirt entire vial into previously made preet med saline bottle, mix, and irrigate each nostril until entire bottle empty. BID 3 Box 11     fluticasone (FLONASE) 50 MCG/ACT nasal spray Spray 2 sprays into both nostrils daily 16 g 12     fluticasone (FLONASE) 50 MCG/ACT nasal spray Spray 1 spray into both nostrils daily 16 g 3     loratadine (CLARITIN) 10 MG tablet Take 1 tablet (10 mg) by mouth daily 90 tablet 3     metFORMIN (GLUCOPHAGE-XR) 750 MG 24 hr tablet Take 1 tablet (750 mg) by mouth 2 times daily (with meals) 60 tablet 3     predniSONE (DELTASONE) 20 MG tablet Take 2 tablets (40 mg) by mouth daily for 5 days 10 tablet 0     predniSONE (DELTASONE) 20 MG tablet Take 1 tablet (20 mg) by mouth daily 7 tablet 0     simvastatin (ZOCOR) 40 MG tablet Take 1 tablet (40 mg) by mouth At Bedtime 90 tablet 1            Allergies:     Allergies: Animal dander; Aspirin; Dust mites; Lisinopril; Losartan; and Morphine          Past Medical History:     Past Medical History:   Diagnosis Date     Congenital hiatus hernia      Diabetes (H)      Gastroesophageal reflux disease      Hiatal hernia      Neck mass      Warthin tumor             Past Surgical History:     Past Surgical History:   Procedure Laterality Date     ENT SURGERY      T and A     PAROTIDECTOMY Right 4/10/2018    Procedure: PAROTIDECTOMY;  Right Superficial Parotidectomy;  Surgeon: Yusef  "Alexandra Cabral MD;  Location: [facility] OR       ENT family history reviewed         Social History:     Social History     Tobacco Use     Smoking status: Former Smoker     Years: 30.00     Types: Dip, chew, snus or snuff     Last attempt to quit: 2017     Years since quittin.2     Smokeless tobacco: Current User     Types: Chew   Substance Use Topics     Alcohol use: Yes     Comment: 1 per mo     Drug use: No            Review of Systems:     ROS: See HPI         Physical Exam:     /74 (Cuff Size: Adult Regular)   Pulse 80   Temp 97.8  F (36.6  C) (Tympanic)   Ht 1.88 m (6' 2\")   Wt 115.2 kg (254 lb)   SpO2 97%   BMI 32.61 kg/m       General - The patient is well nourished and well developed, and appears to have good nutritional status.  Alert and oriented to person and place, answers questions and cooperates with examination appropriately.   Head and Face - Normocephalic and atraumatic, with no gross asymmetry noted.  The facial nerve is intact, with strong symmetric movements.  Voice and Breathing - The patient was breathing comfortably without the use of accessory muscles. There was no wheezing, stridor, or stertor.  He is mouth breathing.  His voice has a nasal tone.  Ears -The external auditory canals are patent, the tympanic membranes are intact without effusion, retraction or mass.  Bony landmarks are intact.  Eyes - Extraocular movements intact, and the pupils were reactive to light.  Sclera were not icteric or injected, conjunctiva were pink and moist.  Mouth - Examination of the oral cavity showed pink, healthy oral mucosa. No lesions or ulcerations noted.  The tongue was mobile and midline, and the dentition were in good condition.    Throat - The walls of the oropharynx were smooth, pink, moist, symmetric, and had no lesions or ulcerations.  The tonsillar pillars and soft palate were symmetric.  The uvula was midline on elevation.    Neck -  Full range of motion on passive movement.  " Palpation of the occipital, submental, submandibular, internal jugular chain, and supraclavicular nodes did not demonstrate any abnormal lymph nodes or masses.  Palpation of the thyroid was soft and smooth, with no nodules or goiter appreciated.  The trachea was mobile and midline.  Nose - External contour is symmetric, no gross deflection or scars.  Nasal mucosa is pale and boggy with gross polyps noted.      To further evaluate the nasal cavity, I performed rigid nasal endoscopy.  I first sprayed the nasal cavity bilaterally with a mix of lidocaine and neosynephrine.  I then began on the left side using a 2.7mm, 0 degree rigid scope.  Clear secretions noted, gross polyps noted the inferior and middle turbinates.  I was not able to pass the scope passed the MT due to hypertrophy and polyps.  The nasopharynx and eustachian tube opening were not visualized.    I then turned my attention to the right side.   Clear secretions present.  There is gross polyposis.  The right inferior and middle turbinates are pale and boggy with gross polyps noted. I was not able to advance the scope past the MT.  The nasopharynx and eustachian tube opening were not visualized         Assessment and Plan:       ICD-10-CM    1. Nasal polyposis J33.9 budesonide (PULMICORT) 0.5 MG/2ML neb solution     predniSONE (DELTASONE) 20 MG tablet     fluticasone (FLONASE) 50 MCG/ACT nasal spray   2. Chronic sinusitis, unspecified location J32.9 OTOLARYNGOLOGY REFERRAL     budesonide (PULMICORT) 0.5 MG/2ML neb solution     predniSONE (DELTASONE) 20 MG tablet     fluticasone (FLONASE) 50 MCG/ACT nasal spray   3. Seasonal allergic rhinitis, unspecified trigger J30.2 budesonide (PULMICORT) 0.5 MG/2ML neb solution     predniSONE (DELTASONE) 20 MG tablet     fluticasone (FLONASE) 50 MCG/ACT nasal spray       Indications for allergy testing include:    1) Confirm suspicion of allergic rhinitis due to inhalant allergies  2) Identify the offending allergen to  determine specific mode of treatment  3) In the case of chronic rhinosinusitis: when symptoms are not controlled by avoidance and pharmacotherapy  4) In the Asthma patient when exacerbations may be due to perennial allergen exposure  5) Suspect food allergy  6) Otitis Media, chronic rhinitis, atopic dermatitis, Meniere disease, headache, pharyngitis or eye symptoms      Modified quantitative testing (MQT) will be performed.  Signed consent was obtained, and the risks of immunotherapy were discussed, including the potential for anaphylaxis.     If immunotherapy (IT) is recommended, there is continued risk of anaphylaxis.   Anaphylaxis can cause death. The patient will need to be monitored for 30 minutes post injection.  They must present their epinephrine pen prior to injection.  Subcutaneous as well as sublingual immunotherapy (SLIT) were discussed as potential treatment options.  The patient was told SLIT is not approved by the FDA and is cash pay.  The general time frame of immunotherapy was discussed (generally 3-5 years, sometimes longer), and the basic immunology behind IT was discussed.    Start Prednisone 40 mg daily x 5 days, then start Budesonide rinses.  If you feel you have access prior you can start rinses sooner  You can continue Navage saline rinses 2-3 times per day in addition to above  Continue Flonase  Complete MQT  Follow up with Dr Chacon for surgical consultation      Cynthia SELF  [facility] ENT  11:16 PM  December 5, 2019        Again, thank you for allowing me to participate in the care of your patient.        Sincerely,        Cynthia Laura NP

## 2019-12-06 NOTE — PROGRESS NOTES
Otolaryngology Note         Chief Complaint:     Patient presents with:  Consult: Chronic Sinusitis; Referred by Dr. Pham         History of Present Illness:     Atul Lam presents with chief complaint of chronic nasal congestion, frequent sinusitis.  Symptoms have been present for several years.    Symptoms have been worsening recently  Symptoms include sinus congestion, PND, cough  Patient is not having facial pain or pressure  Treatments have included abx, oral steroids  Is not able to breath thru nares, left worse than right  No history of sinus surgery.  He has a history of nasal fracture as child.  History of tonsillectomy as young adult.    No dysphonia, dysphagia.   History of with reflux, denies concerns at this time    History of COM, ear surgeries  +history of hives with allergy exposure    ho history of food intolerance  + family history of allergies/atopy, dad and daughter  No prior allergy testing.      Resides in a house with a basement. There is no water or mold.  There is no carpet in the bedroom  Heat source in home forced air  Pets: Poodle    PROCEDURE: CT SINUS W/O CONTRAST 11/15/2019 10:40 AM     HISTORY: Sinusitis, chronic, possible surgery; Chronic sinusitis,  unspecified location     COMPARISONS: None.     Meds/Dose Given:     TECHNIQUE: CT scan of the sinuses with sagittal coronal  reconstructions     FINDINGS: There is near complete opacification of the ethmoid sinuses.  Moderate mucosal thickening is seen in the frontal sinuses with a  completely opacified medial air cell arising from the right frontal  sinus. There is moderate mucosal thickening in the maxillary and  sphenoid sinuses. There is  nodularity within the nasal cavity  consistent with multiple nasal polyps. The retropharyngeal soft  tissues are normal.                                                                        IMPRESSION: Pansinusitis with nasal polyposis.         Medications:     Current Outpatient Rx    Medication Sig Dispense Refill     blood glucose monitoring (BELL CONTOUR NEXT) test strip Use to test blood sugar 2 times daily. 100 each 11     blood glucose monitoring (BELL MICROLET) lancets Use to test blood sugar 2 times daily. 100 each 11     [START ON 12/12/2019] budesonide (PULMICORT) 0.5 MG/2ML neb solution Squirt entire vial into previously made preet med saline bottle, mix, and irrigate each nostril until entire bottle empty. BID 3 Box 11     fluticasone (FLONASE) 50 MCG/ACT nasal spray Spray 2 sprays into both nostrils daily 16 g 12     fluticasone (FLONASE) 50 MCG/ACT nasal spray Spray 1 spray into both nostrils daily 16 g 3     loratadine (CLARITIN) 10 MG tablet Take 1 tablet (10 mg) by mouth daily 90 tablet 3     metFORMIN (GLUCOPHAGE-XR) 750 MG 24 hr tablet Take 1 tablet (750 mg) by mouth 2 times daily (with meals) 60 tablet 3     predniSONE (DELTASONE) 20 MG tablet Take 2 tablets (40 mg) by mouth daily for 5 days 10 tablet 0     predniSONE (DELTASONE) 20 MG tablet Take 1 tablet (20 mg) by mouth daily 7 tablet 0     simvastatin (ZOCOR) 40 MG tablet Take 1 tablet (40 mg) by mouth At Bedtime 90 tablet 1            Allergies:     Allergies: Animal dander; Aspirin; Dust mites; Lisinopril; Losartan; and Morphine          Past Medical History:     Past Medical History:   Diagnosis Date     Congenital hiatus hernia      Diabetes (H)      Gastroesophageal reflux disease      Hiatal hernia      Neck mass      Warthin tumor             Past Surgical History:     Past Surgical History:   Procedure Laterality Date     ENT SURGERY      T and A     PAROTIDECTOMY Right 4/10/2018    Procedure: PAROTIDECTOMY;  Right Superficial Parotidectomy;  Surgeon: Alexandra Holbrook MD;  Location: [facility] OR       ENT family history reviewed         Social History:     Social History     Tobacco Use     Smoking status: Former Smoker     Years: 30.00     Types: Dip, chew, snus or snuff     Last attempt to quit: 9/12/2017      "Years since quittin.2     Smokeless tobacco: Current User     Types: Chew   Substance Use Topics     Alcohol use: Yes     Comment: 1 per mo     Drug use: No            Review of Systems:     ROS: See HPI         Physical Exam:     /74 (Cuff Size: Adult Regular)   Pulse 80   Temp 97.8  F (36.6  C) (Tympanic)   Ht 1.88 m (6' 2\")   Wt 115.2 kg (254 lb)   SpO2 97%   BMI 32.61 kg/m      General - The patient is well nourished and well developed, and appears to have good nutritional status.  Alert and oriented to person and place, answers questions and cooperates with examination appropriately.   Head and Face - Normocephalic and atraumatic, with no gross asymmetry noted.  The facial nerve is intact, with strong symmetric movements.  Voice and Breathing - The patient was breathing comfortably without the use of accessory muscles. There was no wheezing, stridor, or stertor.  He is mouth breathing.  His voice has a nasal tone.  Ears -The external auditory canals are patent, the tympanic membranes are intact without effusion, retraction or mass.  Bony landmarks are intact.  Eyes - Extraocular movements intact, and the pupils were reactive to light.  Sclera were not icteric or injected, conjunctiva were pink and moist.  Mouth - Examination of the oral cavity showed pink, healthy oral mucosa. No lesions or ulcerations noted.  The tongue was mobile and midline, and the dentition were in good condition.    Throat - The walls of the oropharynx were smooth, pink, moist, symmetric, and had no lesions or ulcerations.  The tonsillar pillars and soft palate were symmetric.  The uvula was midline on elevation.    Neck -  Full range of motion on passive movement.  Palpation of the occipital, submental, submandibular, internal jugular chain, and supraclavicular nodes did not demonstrate any abnormal lymph nodes or masses.  Palpation of the thyroid was soft and smooth, with no nodules or goiter appreciated.  The trachea " was mobile and midline.  Nose - External contour is symmetric, no gross deflection or scars.  Nasal mucosa is pale and boggy with gross polyps noted.      To further evaluate the nasal cavity, I performed rigid nasal endoscopy.  I first sprayed the nasal cavity bilaterally with a mix of lidocaine and neosynephrine.  I then began on the left side using a 2.7mm, 0 degree rigid scope.  Clear secretions noted, gross polyps noted the inferior and middle turbinates.  I was not able to pass the scope passed the MT due to hypertrophy and polyps.  The nasopharynx and eustachian tube opening were not visualized.    I then turned my attention to the right side.   Clear secretions present.  There is gross polyposis.  The right inferior and middle turbinates are pale and boggy with gross polyps noted. I was not able to advance the scope past the MT.  The nasopharynx and eustachian tube opening were not visualized         Assessment and Plan:       ICD-10-CM    1. Nasal polyposis J33.9 budesonide (PULMICORT) 0.5 MG/2ML neb solution     predniSONE (DELTASONE) 20 MG tablet     fluticasone (FLONASE) 50 MCG/ACT nasal spray   2. Chronic sinusitis, unspecified location J32.9 OTOLARYNGOLOGY REFERRAL     budesonide (PULMICORT) 0.5 MG/2ML neb solution     predniSONE (DELTASONE) 20 MG tablet     fluticasone (FLONASE) 50 MCG/ACT nasal spray   3. Seasonal allergic rhinitis, unspecified trigger J30.2 budesonide (PULMICORT) 0.5 MG/2ML neb solution     predniSONE (DELTASONE) 20 MG tablet     fluticasone (FLONASE) 50 MCG/ACT nasal spray       Indications for allergy testing include:    1) Confirm suspicion of allergic rhinitis due to inhalant allergies  2) Identify the offending allergen to determine specific mode of treatment  3) In the case of chronic rhinosinusitis: when symptoms are not controlled by avoidance and pharmacotherapy  4) In the Asthma patient when exacerbations may be due to perennial allergen exposure  5) Suspect food  allergy  6) Otitis Media, chronic rhinitis, atopic dermatitis, Meniere disease, headache, pharyngitis or eye symptoms      Modified quantitative testing (MQT) will be performed.  Signed consent was obtained, and the risks of immunotherapy were discussed, including the potential for anaphylaxis.     If immunotherapy (IT) is recommended, there is continued risk of anaphylaxis.   Anaphylaxis can cause death. The patient will need to be monitored for 30 minutes post injection.  They must present their epinephrine pen prior to injection.  Subcutaneous as well as sublingual immunotherapy (SLIT) were discussed as potential treatment options.  The patient was told SLIT is not approved by the FDA and is cash pay.  The general time frame of immunotherapy was discussed (generally 3-5 years, sometimes longer), and the basic immunology behind IT was discussed.    Start Prednisone 40 mg daily x 5 days, then start Budesonide rinses.  If you feel you have access prior you can start rinses sooner  You can continue Navage saline rinses 2-3 times per day in addition to above  Continue Flonase  Complete MQT  Follow up with Dr Chacon for surgical consultation      Cynthia SELF  [facility] ENT  11:16 PM  December 5, 2019

## 2019-12-09 ENCOUNTER — TELEPHONE (OUTPATIENT)
Dept: OTOLARYNGOLOGY | Facility: OTHER | Age: 54
End: 2019-12-09

## 2019-12-09 NOTE — TELEPHONE ENCOUNTER
I spoke with the patient today regarding allergy skin testing.    All general instructions are reviewed with the patient.      The patient is made aware of all medications that need to be held prior to testing, and will stop medications as directed per instructions.  The patient verbalized understanding and agree with plan.      Should the patient be given any additional medications prior to the day of testing, they are told to let the provider know that they are going to be allergy tested, so medications that need to be help prior to MQT are not prescribed.      An appointment will be arranged by the ENT [facility] for testing date and time.     This message is forwarded to the [facility] to arrange for an appointment. Written instructions are mailed to the patient as well,  by the ENT [facility].  Irma Lala RN

## 2019-12-29 NOTE — PROGRESS NOTES
"Otolaryngology Progress Note          Atul Lam is a 54 year old male    Presents for follow-up of chronic sinusitis  Initially saw Adriana in December for years of chronic congestion  Atul states that he noticed about a year ago difficulty breathing out of his nose and loss of sense of smell and taste  He does not recall preceding upper respiratory tract infection or any change in home or work environment    Postnasal drainage and cough he had received antibiotics oral steroids and is use nasal saline and nasal steroids no prior allergy testing CAT scan sinus dated November 2019 revealed diffuse opacifications and findings consistent with polyposis and is reviewed below    Asthma:  Denies hx of asthma or wheezing  Significant hx of  NSAID allergy or intolerance with nausea and emisis with ASA use  He states he feels severe anxiety when he takes aspirin    He chews tobacco, quit smoking x 2 years     He started budesonide irrigations and nasal steroids and since that time with flonase use he has occasional sense of smell  Budesonide irrigations were very expensive so he did not continue this  He has upcoming IDT pending  History of a  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  Alert and oriented to person and place, interactive.  Head and Face - Normocephalic and atraumatic, with no gross asymmetry noted of the contour of the facial features.  The facial nerve is intact, with strong symmetric movements.  Neck-no palpable lymphadenopathy or thyroid mass, no parotid mass, well healed parotidectomy incision R.  Trachea is midline.  Eyes - Extraocular movements intact.   Ears- External auditory canals are patent, " tympanic membranes are intact without effusion or worrisome retractions   Nose - Nasal mucosa is pink and moist with no abnormal mucus.  The septum was grossly midline and non-obstructive, turbinates of normal size and position.  No polyps, masses, or purulence noted on examination.  Mouth - Examination of the oral cavity shows pink, healthy, moist mucosa.  No lesions or ulceration noted.  The dentition are in fair repair.  The tongue is mobile and midline.  Throat - The walls of the oropharynx were smooth, pink, moist, symmetric, and had no lesions or ulcerations.  The tonsillar pillars and soft palate were symmetric.  The uvula was midline on elevation.    Lungs-clear    To evaluate the nose and sinuses, I performed rigid nasal endoscopy. The LPN had previously sprayed both nares with lidocaine and neosynephrine.    I began with the LEFT side using a 0 degree rigid nasal endoscope, and then similarly examined the RIGHT side    Findings:  Inferior turbinates:  Enlarged  The entire inferior and middle meatus is occluded with large dense polyps bilaterally.  Limited exam due to polyposis I am not able to view the nasopharynx  Used a 7 Adorno to remove some mucus  he procedure well      Imaging  CT sinus dated 11/15/2019 reveals a caudal septal deviation to the left diffuse polyposis occluding the nares bilaterally the ostiomeatal complexes are occluded and there is mucoperiosteal thickening throughout the maxillary sinuses diffuse polypoid obstruction of the anterior ethmoid sinus antrum posterior ethmoid sinuses and frontal sinuses and does appear he has a left antrochoanal polyp there is polypoid tissue extending to the nasopharynx this may be bilateral but looks more pronounced on the left the skull base is intact the optic nerve is not dehiscent he has a left supraorbital ethmoid cell Keros 2  [place] 22/24  SNOT:  40          Impression/Plan  Atul Lam is a 54 year old male    ICD-10-CM    1. Chronic  pansinusitis J32.4 budesonide (PULMICORT) 0.5 MG/2ML neb solution     predniSONE (DELTASONE) 20 MG tablet   2. Disorder of respiratory system exacerbated by aspirin J98.4 budesonide (PULMICORT) 0.5 MG/2ML neb solution    T39.015A    3. Nasal polyp J33.9 budesonide (PULMICORT) 0.5 MG/2ML neb solution   4. chewing tobacco abuse counseling Z71.6    5. Nasal turbinate hypertrophy J34.3        Aspirin exacerbated resipiratory disease  NO NSAIDS, no ibuprofen post op, no Toradol    The risks and complications of bilateral endoscopic sinus surgery with polypectomy, frozens, possible septoplasty, turbinate reduction were openly discussed.  The potential risks include bleeding, general anesthesia, infection, scar formation, need for additional surgery, septal perforation, and rarely injury to the eye with the possibility of blindness,  injury to the brain, meningitis or other intracranial complications.  Nonsurgical options were discussed including prolonged antibioitics and/or oral and topical steroids.   Sinus anatomy and sinus disease were reviewed.  CT sinus was reviewed with the patient.  All questions were answered.   No guarantees were given that his sense of smell and taste would return although I am hopeful it well because he does get breakthrough periods of some smell.    Plan to use propels and nasopore in kenalog    Start Prednisone Taper prior to surgery as prescribed  Continue Flonase 2 sprays, twice daily  Use Dexamethasone Rinses Twice Daily  Work on quitting chewing  Follow up in surgery    Risks of oral steroid use were discussed and include psychiatric/mood changes, insomnia, stomach ulcers and potential GI bleeding, blood sugar elevation/worsening diabetes, hip/bone necrosis or bone demineralization.      Total exam time was over 40 minutes with over 25 minutes of this time spent in direct patient education, counseling and coordination of care.  We discussed the importance of high flow nasal saline use to  prevent nasal secretion stasis, the correct use of nasal steroids, and nasal and sinus anatomy were reviewed.    Tobacco cessation was strongly encouraged.  The associated risk of head and neck cancer was discussed.  Every year of cessation offers health benefits. This was discussed with the patient today and they voiced understanding.  Quit tools and a nicotine patch were offered.            Cherelle Chacon D.O.  Otolaryngology/Head and Neck Surgery  Allergy

## 2019-12-30 ENCOUNTER — PREP FOR PROCEDURE (OUTPATIENT)
Dept: OTOLARYNGOLOGY | Facility: OTHER | Age: 54
End: 2019-12-30

## 2019-12-30 ENCOUNTER — OFFICE VISIT (OUTPATIENT)
Dept: OTOLARYNGOLOGY | Facility: OTHER | Age: 54
End: 2019-12-30
Attending: FAMILY MEDICINE
Payer: COMMERCIAL

## 2019-12-30 VITALS
BODY MASS INDEX: 32.6 KG/M2 | DIASTOLIC BLOOD PRESSURE: 76 MMHG | TEMPERATURE: 98 F | SYSTOLIC BLOOD PRESSURE: 122 MMHG | HEIGHT: 74 IN | OXYGEN SATURATION: 96 % | WEIGHT: 254 LBS | HEART RATE: 81 BPM

## 2019-12-30 DIAGNOSIS — J34.3 NASAL TURBINATE HYPERTROPHY: ICD-10-CM

## 2019-12-30 DIAGNOSIS — J33.9 NASAL POLYP: ICD-10-CM

## 2019-12-30 DIAGNOSIS — Z71.6 TOBACCO ABUSE COUNSELING: ICD-10-CM

## 2019-12-30 DIAGNOSIS — J32.4 CHRONIC PANSINUSITIS: Primary | ICD-10-CM

## 2019-12-30 DIAGNOSIS — Z88.6 DISORDER OF RESPIRATORY SYSTEM EXACERBATED BY ASPIRIN: ICD-10-CM

## 2019-12-30 DIAGNOSIS — J45.909 DISORDER OF RESPIRATORY SYSTEM EXACERBATED BY ASPIRIN: ICD-10-CM

## 2019-12-30 DIAGNOSIS — J33.9 DISORDER OF RESPIRATORY SYSTEM EXACERBATED BY ASPIRIN: ICD-10-CM

## 2019-12-30 PROCEDURE — 99215 OFFICE O/P EST HI 40 MIN: CPT | Mod: 25 | Performed by: OTOLARYNGOLOGY

## 2019-12-30 PROCEDURE — 31231 NASAL ENDOSCOPY DX: CPT | Performed by: OTOLARYNGOLOGY

## 2019-12-30 RX ORDER — PREDNISONE 20 MG/1
TABLET ORAL
Qty: 20 TABLET | Refills: 0 | Status: SHIPPED | OUTPATIENT
Start: 2019-12-30 | End: 2020-01-22

## 2019-12-30 RX ORDER — FLUTICASONE PROPIONATE 50 MCG
2 SPRAY, SUSPENSION (ML) NASAL 2 TIMES DAILY
Qty: 16 G | Refills: 12 | Status: SHIPPED | OUTPATIENT
Start: 2019-12-30 | End: 2020-02-17

## 2019-12-30 RX ORDER — BUDESONIDE 0.5 MG/2ML
INHALANT ORAL
Qty: 3 BOX | Refills: 11 | Status: SHIPPED | OUTPATIENT
Start: 2019-12-30 | End: 2023-12-01

## 2019-12-30 RX ORDER — BUDESONIDE 0.5 MG/2ML
INHALANT ORAL
Qty: 3 BOX | Refills: 11 | Status: SHIPPED | OUTPATIENT
Start: 2019-12-30 | End: 2020-01-06

## 2019-12-30 ASSESSMENT — MIFFLIN-ST. JEOR: SCORE: 2071.89

## 2019-12-30 ASSESSMENT — PAIN SCALES - GENERAL: PAINLEVEL: NO PAIN (0)

## 2019-12-30 NOTE — PATIENT INSTRUCTIONS
Thank you for allowing Dr. Chacon and our ENT team to participate in your care.  If your medications are too expensive, please give the nurse a call.  We can possibly change this medication.  If you have a scheduling or an appointment question please contact our Health Unit Coordinator at their direct line [phone].   ALL nursing questions or concerns can be directed to your ENT nurse at: [phone] Will Roxana    Start Prednisone Taper prior to surgery as prescribed  Continue Flonase 2 sprays, twice daily  Use Dexamethasone Rinses Twice Daily  Work on quitting chewing  Follow up in surgery      Instructions for Sinus Surgery    Recovery - Everyone recovers differently from a general anesthetic.  Symptoms such as fatigue, nausea, light-headedness, and sometimes a low grade fever (up to 100 degrees) are not unusual.  As your body removes the anesthetic drugs from circulation, these symptoms will resolve.  Your nose will be sore after surgery, and you may even have symptoms similar to a sinus infection with headache, congestion, and pressure.  These will resolve with healing.  For several days you may experience bloody drainage from the nose, please use the drip pad as necessary for this.  If there is persistent bleeding, please call the office during business hours or the on call ENT physician after hours.  There are no diet restrictions after sinus surgery, and you can resume your home medications.      Please do not blow your nose until 1 week after surgery.  At 1 week you may gently blow your nose, unless otherwise indicated by Dr. Chacon.     Limit your activity to no strenuous activities until I see you for the first follow-up visit in approximately 2 weeks.      Medications - You were sent home with narcotic pain medication.  If you can tolerate the discomfort during your recovery by using just plain Tylenol or ibuprofen (advil), please do so.  However, do not hesitate to use the stronger pain  medication if needed.  If you were sent home with an antibiotic, it is primarily used to help the healing process.  If it causes loose bowel movements or other signs of intolerance, it is appropriate to discontinue it.  By far the most important measure you can take to speed recovery, and maximize the chances of long term success of sinus surgery is using the sinus rinses at least three time per day for the first month after surgery.       Start Preet Med saline irrigation tomorrow and use at least 5 times daily.     I recommend 2 preet med bottles, one to add the budesonide to and irrigate with the budesonide rinse twice a day for 2 months.    In the other preet med bottle use only the saline solution, and irrigate with this at least 3 additional times daily.    Perform gentle irrigation for the first week.  Starting 1 week after surgery, you should increase the volume of preet med saline irrigation to each nostril, continuing to use the rinses in an alternating fashion at least 5 times daily.  You cannot use too much of the preet med saline, but please limit budesonide rinses to twice daily.    At 2 weeks after surgery, you may also restart nasal steroids (flonase, nasonex, etc).        Complications - Problems related to sinus surgery almost always are detected during the operation, and special instruction will be given in that situation.  However, unexpected things can happen, and are all related to the structures around the sinus cavities.  Symptoms that should alert you to a possible problem include: severe eye pain or eye swelling, persistent heavy bleeding from the nose, and high fevers with headache and neck pain.  Any of these symptoms should be called into my office or to the on call ENT if after hours.  The most common non-emergency complication of sinus surgery is the formation of scar tissue which can re-block the sinuses.  This is addressed below.    Follow-up -  As you have noted, there are quite a few  follow-up visits after sinus surgery.  This is done to aggressively manage the most common complication of this technique, which is scar tissue blocking the sinuses.  These visits will require the examination of your nose and possibly removal of crusts of dry mucous and blood, with possible removal of early scar tissue.  Please prepare for these visits by using your sinus rinses.    If there are any questions or issues with the above, or if there are other issues that concern you, always feel free to call the clinic and I am happy to speak with you as soon as I can.    Cherelle Chacon D.O.  Otolaryngology/Head and Neck Surgery  Allergy    [phone] office            HOW TO PREPARE-      You need to have a scheduled Pre-Op with your primary care physician within 30 days of your scheduled surgery.        You need a friend or family member available to drive you home AND stay with you for 24 hours after you leave the hospital. You will not be allowed to drive yourself. IF you need to take a taxi or the bus you MUST have a responsible person to ride with you. YOUR PROCEDURE WILL BE CANCELLED IF YOU DO NOT HAVE A RESPONSIBLE ADULT TO DRIVE YOU HOME.       You CANNOT have anything to eat or drink after midnight the night before your surgery, including water and coffee. Your stomach needs to be completely empty. Do NOT chew gum, suck on hard candy, or smoke. You can brush your teeth the morning of surgery.       The Surgery Education Nurses will call you  1-2 weeks prior to your surgery date at  [phone] or toll free [phone]. Please have your medication and allergy lists ready.      Stop your aspirin or other NSAIDs(Ibuprofen, Motrin, Aleve, Celebrex, Naproxen, etc...) 7 days before your surgery.      Hospital admitting will call you the day before your surgery with your exact arrival time.       Please call your primary care physician if you should become ill within 24 hours of scheduled surgery.  (ex.vomiting, diarrhea, fever)          You will need to wash the night before AND the morning of you procedure with a liquid antibacterial soap, like Dial.

## 2019-12-30 NOTE — NURSING NOTE
"Chief Complaint   Patient presents with     RECHECK     Follow Up Nasal Polyposis, Chronic Sinusitis, Seasonal Allergic Rhinitis       Initial /76 (Cuff Size: Adult Large)   Pulse 81   Temp 98  F (36.7  C) (Tympanic)   Ht 1.88 m (6' 2\")   Wt 115.2 kg (254 lb)   SpO2 96%   BMI 32.61 kg/m   Estimated body mass index is 32.61 kg/m  as calculated from the following:    Height as of this encounter: 1.88 m (6' 2\").    Weight as of this encounter: 115.2 kg (254 lb).  Medication Reconciliation: complete  Roxana Wilson LPN      Scope #: [id number]    "

## 2019-12-30 NOTE — LETTER
"    12/30/2019         RE: Atul Lam  [address]  [place] [address]        Dear Colleague,    Thank you for referring your patient, Atul aLm, to the [facility] [place]. Please see a copy of my visit note below.    Otolaryngology Progress Note          Atul Lam is a 54 year old male    Presents for follow-up of chronic sinusitis  Initially saw Adriana in December for years of chronic congestion  Atul states that he noticed about a year ago difficulty breathing out of his nose and loss of sense of smell and taste  He does not recall preceding upper respiratory tract infection or any change in home or work environment    Postnasal drainage and cough he had received antibiotics oral steroids and is use nasal saline and nasal steroids no prior allergy testing CAT scan sinus dated November 2019 revealed diffuse opacifications and findings consistent with polyposis and is reviewed below    Asthma:  Denies hx of asthma or wheezing  Significant hx of  NSAID allergy or intolerance with nausea and emisis with ASA use  He states he feels severe anxiety when he takes aspirin    He chews tobacco, quit smoking x 2 years     He started budesonide irrigations and nasal steroids and since that time with flonase use he has occasional sense of smell  Budesonide irrigations were very expensive so he did not continue this  He has upcoming IDT pending  History of a  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  Alert and oriented to person and place, interactive.  Head and Face - Normocephalic and atraumatic, with no gross asymmetry noted of the contour of the facial features.  The facial nerve is " intact, with strong symmetric movements.  Neck-no palpable lymphadenopathy or thyroid mass, no parotid mass, well healed parotidectomy incision R.  Trachea is midline.  Eyes - Extraocular movements intact.   Ears- External auditory canals are patent, tympanic membranes are intact without effusion or worrisome retractions   Nose - Nasal mucosa is pink and moist with no abnormal mucus.  The septum was grossly midline and non-obstructive, turbinates of normal size and position.  No polyps, masses, or purulence noted on examination.  Mouth - Examination of the oral cavity shows pink, healthy, moist mucosa.  No lesions or ulceration noted.  The dentition are in fair repair.  The tongue is mobile and midline.  Throat - The walls of the oropharynx were smooth, pink, moist, symmetric, and had no lesions or ulcerations.  The tonsillar pillars and soft palate were symmetric.  The uvula was midline on elevation.    Lungs-clear    To evaluate the nose and sinuses, I performed rigid nasal endoscopy. The LPN had previously sprayed both nares with lidocaine and neosynephrine.    I began with the LEFT side using a 0 degree rigid nasal endoscope, and then similarly examined the RIGHT side    Findings:  Inferior turbinates:  Enlarged  The entire inferior and middle meatus is occluded with large dense polyps bilaterally.  Limited exam due to polyposis I am not able to view the nasopharynx  Used a 7 Adorno to remove some mucus  he procedure well      Imaging  CT sinus dated 11/15/2019 reveals a caudal septal deviation to the left diffuse polyposis occluding the nares bilaterally the ostiomeatal complexes are occluded and there is mucoperiosteal thickening throughout the maxillary sinuses diffuse polypoid obstruction of the anterior ethmoid sinus antrum posterior ethmoid sinuses and frontal sinuses and does appear he has a left antrochoanal polyp there is polypoid tissue extending to the nasopharynx this may be bilateral but looks more  pronounced on the left the skull base is intact the optic nerve is not dehiscent he has a left supraorbital ethmoid cell Keros 2  Pomaria 22/24  SNOT:  40          Impression/Plan  Atul Lam is a 54 year old male    ICD-10-CM    1. Chronic pansinusitis J32.4 budesonide (PULMICORT) 0.5 MG/2ML neb solution     predniSONE (DELTASONE) 20 MG tablet   2. Disorder of respiratory system exacerbated by aspirin J98.4 budesonide (PULMICORT) 0.5 MG/2ML neb solution    T39.015A    3. Nasal polyp J33.9 budesonide (PULMICORT) 0.5 MG/2ML neb solution   4. chewing tobacco abuse counseling Z71.6    5. Nasal turbinate hypertrophy J34.3        Aspirin exacerbated resipiratory disease  NO NSAIDS, no ibuprofen post op, no Toradol    The risks and complications of bilateral endoscopic sinus surgery with polypectomy, frozens, possible septoplasty, turbinate reduction were openly discussed.  The potential risks include bleeding, general anesthesia, infection, scar formation, need for additional surgery, septal perforation, and rarely injury to the eye with the possibility of blindness,  injury to the brain, meningitis or other intracranial complications.  Nonsurgical options were discussed including prolonged antibioitics and/or oral and topical steroids.   Sinus anatomy and sinus disease were reviewed.  CT sinus was reviewed with the patient.  All questions were answered.   No guarantees were given that his sense of smell and taste would return although I am hopeful it well because he does get breakthrough periods of some smell.    Plan to use propels and nasopore in kenalog    Start Prednisone Taper prior to surgery as prescribed  Continue Flonase 2 sprays, twice daily  Use Dexamethasone Rinses Twice Daily  Work on quitting chewing  Follow up in surgery    Risks of oral steroid use were discussed and include psychiatric/mood changes, insomnia, stomach ulcers and potential GI bleeding, blood sugar elevation/worsening diabetes, hip/bone  necrosis or bone demineralization.      Total exam time was over 40 minutes with over 25 minutes of this time spent in direct patient education, counseling and coordination of care.  We discussed the importance of high flow nasal saline use to prevent nasal secretion stasis, the correct use of nasal steroids, and nasal and sinus anatomy were reviewed.    Tobacco cessation was strongly encouraged.  The associated risk of head and neck cancer was discussed.  Every year of cessation offers health benefits. This was discussed with the patient today and they voiced understanding.  Quit tools and a nicotine patch were offered.            Cherelle Chacon D.O.  Otolaryngology/Head and Neck Surgery  Allergy          Again, thank you for allowing me to participate in the care of your patient.        Sincerely,        Cherelle Chacon MD

## 2019-12-31 NOTE — PROGRESS NOTES
[facility] [place]  [address]  [place] [address]  [phone]  Dept: [phone]    PRE-OP EVALUATION:  Today's date: 2020    Atul Lam (: 1965) presents for pre-operative evaluation assessment as requested by Dr. Chacon.  He requires evaluation and anesthesia risk assessment prior to undergoing surgery/procedure for treatment of nasal polyp, nasal turbinate hypertrophy, and chronic pansinusitis     Proposed Surgery/ Procedure: bilateral endoscopic sinus surgery with polypectomy, frozens, possible septoplasty, turbinate reduction  Date of Surgery/ Procedure: 20  Time of Surgery/ Procedure: [facility]/Surgical Facility: [facility] [place]  Primary Physician: Zoraida Pham  Type of Anesthesia Anticipated: to be determined    Patient has a Health Care Directive or Living Will:  NO    1. NO - Do you have a history of heart attack, stroke, stent, bypass or surgery on an artery in the head, neck, heart or legs?  2. NO - Do you ever have any pain or discomfort in your chest?  3. NO - Do you have a history of  Heart Failure?  4. NO - Are you troubled by shortness of breath when: walking on the level, up a slight hill or at night?  5. NO - Do you currently have a cold, bronchitis or other respiratory infection?  6. NO - Do you have a cough, shortness of breath or wheezing?  7. NO - Do you sometimes get pains in the calves of your legs when you walk?  8. NO - Do you or anyone in your family have previous history of blood clots?  9. NO - Do you or does anyone in your family have a serious bleeding problem such as prolonged bleeding following surgeries or cuts?  10. NO - Have you ever had problems with anemia or been told to take iron pills?  11. NO - Have you had any abnormal blood loss such as black, tarry or bloody stools, or abnormal vaginal bleeding?  12. NO - Have you ever had a blood transfusion?  13. NO - Have you or any of your relatives ever had problems with  anesthesia?  14. YES - DO YOU HAVE SLEEP APNEA, EXCESSIVE SNORING OR DAYTIME DROWSINESS? Snoring r/t sinus issues  15. NO - Do you have any prosthetic heart valves?  16. NO - Do you have prosthetic joints?  17. NO - Is there any chance that you may be pregnant?      HPI:     HPI related to upcoming procedure: Patient with nasal polyp, nasal turbinate hypertrophy, and chronic pansinusitis.  Planning bilateral endoscopic sinus surgery with polypectomy, frozens, possible septoplasty, turbinate reduction      See problem list for active medical problems.  Problems all longstanding and stable, except as noted/documented.  See ROS for pertinent symptoms related to these conditions.      MEDICAL HISTORY:     Patient Active Problem List    Diagnosis Date Noted     chewing tobacco abuse counseling 12/30/2019     Priority: Medium     Disorder of respiratory system exacerbated by aspirin 12/30/2019     Priority: Medium     Chronic pansinusitis 12/30/2019     Priority: Medium     Nasal polyp 12/30/2019     Priority: Medium     Nasal turbinate hypertrophy 12/30/2019     Priority: Medium     Type 2 diabetes mellitus without complication, without long-term current use of insulin (H) 12/05/2018     Priority: Medium     Hyperlipidemia, unspecified hyperlipidemia type 03/30/2018     Priority: Medium     Dysmetabolic syndrome X 03/30/2018     Priority: Medium     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     Priority: Medium      Past Medical History:   Diagnosis Date     Congenital hiatus hernia      Diabetes (H)      Gastroesophageal reflux disease      Hiatal hernia      Neck mass      Warthin tumor      Past Surgical History:   Procedure Laterality Date     ENT SURGERY      T and A     PAROTIDECTOMY Right 4/10/2018    Procedure: PAROTIDECTOMY;  Right Superficial Parotidectomy;  Surgeon: Alexandra Holbrook MD;  Location: [facility] OR     Current Outpatient Medications   Medication Sig Dispense Refill     blood glucose monitoring (BELL CONTOUR NEXT) test  strip Use to test blood sugar 2 times daily. 100 each 11     blood glucose monitoring (BELL MICROLET) lancets Use to test blood sugar 2 times daily. 100 each 11     budesonide (PULMICORT) 0.5 MG/2ML neb solution Squirt entire vial into previously made preet med saline bottle, mix, and irrigate each nostril until entire bottle empty.  Do this twice daily. 3 Box 11     fluticasone (FLONASE) 50 MCG/ACT nasal spray Spray 2 sprays into both nostrils 2 times daily 16 g 12     loratadine (CLARITIN) 10 MG tablet Take 1 tablet (10 mg) by mouth daily 90 tablet 3     metFORMIN (GLUCOPHAGE-XR) 750 MG 24 hr tablet Take 1 tablet (750 mg) by mouth 2 times daily (with meals) 60 tablet 3     predniSONE (DELTASONE) 20 MG tablet Start 12 days before surgery:  Take 3 tabs by mouth daily x 3 days, then 2 tabs daily x 3 days, then 1 tab daily x 3 days, then 1/2 tab daily x 3 days. 20 tablet 0     simvastatin (ZOCOR) 40 MG tablet Take 1 tablet (40 mg) by mouth At Bedtime 90 tablet 1     OTC products: None, except as noted above    Allergies   Allergen Reactions     Animal Dander      Nasal congestion     Aspirin Nausea and Vomiting     Dust Mites      Nasal congestion     Lisinopril Hives     Losartan Hives     Morphine Nausea and Vomiting      Latex Allergy: NO    Social History     Tobacco Use     Smoking status: Former Smoker     Years: 30.00     Types: Dip, chew, snus or snuff     Last attempt to quit: 2017     Years since quittin.3     Smokeless tobacco: Current User     Types: Chew   Substance Use Topics     Alcohol use: Yes     Comment: 1 per mo     History   Drug Use No       REVIEW OF SYSTEMS:   Constitutional, neuro, ENT, endocrine, pulmonary, cardiac, gastrointestinal, genitourinary, musculoskeletal, integument and psychiatric systems are negative, except as otherwise noted.    EXAM:   /68 (BP Location: Right arm, Patient Position: Sitting, Cuff Size: Adult Large)   Pulse 89   Temp 98.3  F (36.8  C) (Tympanic)   " Ht 1.88 m (6' 2\")   Wt 114.3 kg (252 lb)   SpO2 97%   BMI 32.35 kg/m      GENERAL APPEARANCE: healthy, alert and no distress     EYES: EOMI,  PERRL     HENT: ear canals and TM's normal and nose and mouth without ulcers or lesions     NECK: no adenopathy, no asymmetry, masses, or scars and thyroid normal to palpation     RESP: lungs clear to auscultation - no rales, rhonchi or wheezes     CV: regular rates and rhythm, normal S1 S2, no S3 or S4 and no murmur, click or rub     ABDOMEN:  soft, nontender, no HSM or masses and bowel sounds normal     MS: extremities normal- no gross deformities noted, no evidence of inflammation in joints, FROM in all extremities.     SKIN: no suspicious lesions or rashes     NEURO: Normal strength and tone, sensory exam grossly normal, mentation intact and speech normal     PSYCH: mentation appears normal. and affect normal/bright     LYMPHATICS: No cervical adenopathy    DIAGNOSTICS:   EKG: appears normal, NSR, normal axis, normal intervals, no acute ST/T changes c/w ischemia, no LVH by voltage criteria, unchanged from previous tracings    Results for orders placed or performed in visit on 01/06/20 (from the past 24 hour(s))   Hemoglobin A1c   Result Value Ref Range    Hemoglobin A1C 7.7 (H) 0 - 5.6 %   CBC with platelets and differential   Result Value Ref Range    WBC 9.1 4.0 - 11.0 10e9/L    RBC Count 5.06 4.4 - 5.9 10e12/L    Hemoglobin 14.5 13.3 - 17.7 g/dL    Hematocrit 42.9 40.0 - 53.0 %    MCV 85 78 - 100 fl    MCH 28.7 26.5 - 33.0 pg    MCHC 33.8 31.5 - 36.5 g/dL    RDW 13.8 10.0 - 15.0 %    Platelet Count 284 150 - 450 10e9/L    Diff Method Automated Method     % Neutrophils 53.1 %    % Lymphocytes 33.6 %    % Monocytes 8.6 %    % Eosinophils 1.0 %    % Basophils 1.1 %    % Immature Granulocytes 2.6 %    Nucleated RBCs 0 0 /100    Absolute Neutrophil 4.8 1.6 - 8.3 10e9/L    Absolute Lymphocytes 3.0 0.8 - 5.3 10e9/L    Absolute Monocytes 0.8 0.0 - 1.3 10e9/L    Absolute " Eosinophils 0.1 0.0 - 0.7 10e9/L    Absolute Basophils 0.1 0.0 - 0.2 10e9/L    Abs Immature Granulocytes 0.2 0 - 0.4 10e9/L    Absolute Nucleated RBC 0.0    Comprehensive metabolic panel   Result Value Ref Range    Sodium 137 133 - 144 mmol/L    Potassium 4.1 3.4 - 5.3 mmol/L    Chloride 101 94 - 109 mmol/L    Carbon Dioxide 31 20 - 32 mmol/L    Anion Gap 5 3 - 14 mmol/L    Glucose 364 (H) 70 - 99 mg/dL    Urea Nitrogen 20 7 - 30 mg/dL    Creatinine 0.91 0.66 - 1.25 mg/dL    GFR Estimate >90 >60 mL/min/[1.73_m2]    GFR Estimate If Black >90 >60 mL/min/[1.73_m2]    Calcium 9.3 8.5 - 10.1 mg/dL    Bilirubin Total 0.2 0.2 - 1.3 mg/dL    Albumin 3.8 3.4 - 5.0 g/dL    Protein Total 7.6 6.8 - 8.8 g/dL    Alkaline Phosphatase 71 40 - 150 U/L    ALT 39 0 - 70 U/L    AST 9 0 - 45 U/L   TSH with free T4 reflex   Result Value Ref Range    TSH 1.19 0.40 - 4.00 mU/L         Recent Labs   Lab Test 08/08/19  1634 03/29/19  1053 12/07/18  1055  04/04/18  1034 02/12/18  1030   HGB  --   --   --   --  14.9 14.4   PLT  --   --   --   --  222 244   NA  --  138 138  --  139 139   POTASSIUM  --  3.8 4.1  --  4.3 4.0   CR  --  1.07 0.94  --  0.91 1.01   A1C 6.7* 7.2* 9.3*   < >  --  6.9*    < > = values in this interval not displayed.        IMPRESSION:   Reason for surgery/procedure: nasal polyp, nasal turbinate hypertrophy, and chronic pansinusitis;   bilateral endoscopic sinus surgery with polypectomy, frozens, possible septoplasty, turbinate reduction    Diagnosis/reason for consult: cardiopulmonary clearance    The proposed surgical procedure is considered INTERMEDIATE risk.    REVISED CARDIAC RISK INDEX  The patient has the following serious cardiovascular risks for perioperative complications such as (MI, PE, VFib and 3  AV Block):  No serious cardiac risks  INTERPRETATION: 0 risks: Class I (very low risk - 0.4% complication rate)    The patient has the following additional risks for perioperative complications:  No identified  additional risks      ICD-10-CM    1. Preop general physical exam Z01.818 Hemoglobin A1c     Lipid Profile (Chol, Trig, HDL, LDL calc)     CBC with platelets and differential     Comprehensive metabolic panel     TSH with free T4 reflex     EKG 12-lead complete w/read - (Clinic Performed)     Estimated Average Glucose   2. Nasal turbinate hypertrophy J34.3    3. Chronic pansinusitis J32.4    4. Nasal polyp J33.9    5. Type 2 diabetes mellitus without complication, without long-term current use of insulin (H) E11.9 Hemoglobin A1c     Lipid Profile (Chol, Trig, HDL, LDL calc)     CBC with platelets and differential     Comprehensive metabolic panel     TSH with free T4 reflex     EKG 12-lead complete w/read - (Clinic Performed)     metFORMIN (GLUCOPHAGE-XR) 750 MG 24 hr tablet   6. Hyperlipidemia, unspecified hyperlipidemia type E78.5 Lipid Profile (Chol, Trig, HDL, LDL calc)     Comprehensive metabolic panel   7. Tobacco use disorder F17.200    8. Encounter for tobacco use cessation counseling Z71.6        RECOMMENDATIONS:         --Patient is to take all scheduled medications on the day of surgery EXCEPT for modifications listed below.    Diabetes Medication Use  -----Hold usual oral and non-insulin diabetic meds (e.g. Metformin, Actos, Glipizide) while NPO.       Anticoagulant or Antiplatelet Medication Use  ASPIRIN: Discontinue ASA 7-10 days prior to procedure to reduce bleeding risk.  It should be resumed post-operatively.  NSAIDS: Ibuprofen (Motrin):         Stop one day prior to surgery        APPROVAL GIVEN to proceed with proposed procedure, without further diagnostic evaluation       Signed Electronically by: Zoraida Bahena MD    Copy of this evaluation report is provided to requesting physician.    [facility] Preop Guidelines    Revised Cardiac Risk Index

## 2019-12-31 NOTE — PATIENT INSTRUCTIONS
EKG today.  Will call with lab results.  Aspirin/Ibuprofen on hold week prior.  Do not take Metformin morning of surgery.    Refills done.  Immunizations declined.  Before Your Surgery      Call your surgeon if there is any change in your health. This includes signs of a cold or flu (such as a sore throat, runny nose, cough, rash or fever).    Do not smoke, drink alcohol or take over the counter medicine (unless your surgeon or primary care doctor tells you to) for the 24 hours before and after surgery.    If you take prescribed drugs: Follow your doctor s orders about which medicines to take and which to stop until after surgery.    Eating and drinking prior to surgery: follow the instructions from your surgeon    Take a shower or bath the night before surgery. Use the soap your surgeon gave you to gently clean your skin. If you do not have soap from your surgeon, use your regular soap. Do not shave or scrub the surgery site.  Wear clean pajamas and have clean sheets on your bed.

## 2020-01-01 ENCOUNTER — TRANSFERRED RECORDS (OUTPATIENT)
Dept: MULTI SPECIALTY CLINIC | Facility: CLINIC | Age: 55
End: 2020-01-01

## 2020-01-01 LAB — RETINOPATHY: NORMAL

## 2020-01-03 ENCOUNTER — ANESTHESIA EVENT (OUTPATIENT)
Dept: SURGERY | Facility: HOSPITAL | Age: 55
End: 2020-01-03
Payer: COMMERCIAL

## 2020-01-03 NOTE — ANESTHESIA PREPROCEDURE EVALUATION
Anesthesia Pre-Procedure Evaluation    Patient: Atul Lam   MRN: [id number] : 1965          Preoperative Diagnosis: Chronic pansinusitis [J32.4]  Nasal polyp [J33.9]  Nasal turbinate hypertrophy [J34.3]    Procedure(s):  Bilateral Endoscopic Sinus Surgery with Polypectomy, Frozens, Possible Septoplasty, Turbinate Reduction   POSSIBLE SEPTOPLASTY, TURBINATE REDUCTION    Past Medical History:   Diagnosis Date     Congenital hiatus hernia      Diabetes (H)      Gastroesophageal reflux disease      Hiatal hernia      Neck mass      Warthin tumor      Past Surgical History:   Procedure Laterality Date     ENT SURGERY      T and A     PAROTIDECTOMY Right 4/10/2018    Procedure: PAROTIDECTOMY;  Right Superficial Parotidectomy;  Surgeon: Alexandra Holbrook MD;  Location: [facility] OR       Anesthesia Evaluation     . Pt has had prior anesthetic.     No history of anesthetic complications          ROS/MED HX    ENT/Pulmonary: Comment: chewing tobacco abuse counseling  Chronic pansinusitis  Nasal polyp  Nasal turbinate hypertrophy  Warthin tumor    Disorder of respiratory system exacerbated by aspirin          Neurologic:  - neg neurologic ROS     Cardiovascular:     (+) Dyslipidemia, ----. : . . . :. .       METS/Exercise Tolerance:     Hematologic:  - neg hematologic  ROS       Musculoskeletal:  - neg musculoskeletal ROS       GI/Hepatic:     (+) GERD hiatal hernia,       Renal/Genitourinary:  - ROS Renal section negative       Endo:     (+) type II DM Last HgA1c: 6.7 date: 19 .      Psychiatric:  - neg psychiatric ROS       Infectious Disease:  - neg infectious disease ROS       Malignancy:      - no malignancy   Other:    - neg other ROS                      Physical Exam  Normal systems: cardiovascular, pulmonary and dental    Airway   Mallampati: IV  TM distance: >3 FB  Neck ROM: full    Dental     Cardiovascular   Rhythm and rate: regular and normal      Pulmonary    breath sounds clear to  "auscultation            Lab Results   Component Value Date    WBC 6.4 04/04/2018    HGB 14.9 04/04/2018    HCT 42.6 04/04/2018     04/04/2018     03/29/2019    POTASSIUM 3.8 03/29/2019    CHLORIDE 104 03/29/2019    CO2 31 03/29/2019    BUN 16 03/29/2019    CR 1.07 03/29/2019     (H) 03/29/2019    NEFTALI 9.1 03/29/2019    ALBUMIN 3.9 12/07/2018    PROTTOTAL 8.6 12/07/2018    ALT 58 12/07/2018    AST 23 12/07/2018    ALKPHOS 100 12/07/2018    BILITOTAL 0.6 12/07/2018    TSH 2.70 02/12/2018       Preop Vitals  BP Readings from Last 3 Encounters:   12/30/19 122/76   12/06/19 118/74   10/30/19 112/78    Pulse Readings from Last 3 Encounters:   12/30/19 81   12/06/19 80   10/30/19 80      Resp Readings from Last 3 Encounters:   08/10/18 16   04/10/18 16   04/04/18 16    SpO2 Readings from Last 3 Encounters:   12/30/19 96%   12/06/19 97%   10/30/19 98%      Temp Readings from Last 1 Encounters:   12/30/19 98  F (36.7  C) (Tympanic)    Ht Readings from Last 1 Encounters:   12/30/19 1.88 m (6' 2\")      Wt Readings from Last 1 Encounters:   12/30/19 115.2 kg (254 lb)    Estimated body mass index is 32.61 kg/m  as calculated from the following:    Height as of 12/30/19: 1.88 m (6' 2\").    Weight as of 12/30/19: 115.2 kg (254 lb).       Anesthesia Plan      History & Physical Review  History and physical reviewed and following examination; no interval change.    ASA Status:  3 .    NPO Status:  > 6 hours    Plan for General and ETT with Intravenous induction. Maintenance will be Inhalation.      H and P 1-6-20      Postoperative Care      Consents  Anesthetic plan, risks, benefits and alternatives discussed with:  Patient..                 MARYLIN West CRNA  "

## 2020-01-06 ENCOUNTER — OFFICE VISIT (OUTPATIENT)
Dept: FAMILY MEDICINE | Facility: OTHER | Age: 55
End: 2020-01-06
Attending: FAMILY MEDICINE
Payer: COMMERCIAL

## 2020-01-06 VITALS
HEIGHT: 74 IN | TEMPERATURE: 98.3 F | SYSTOLIC BLOOD PRESSURE: 124 MMHG | WEIGHT: 252 LBS | BODY MASS INDEX: 32.34 KG/M2 | OXYGEN SATURATION: 97 % | DIASTOLIC BLOOD PRESSURE: 68 MMHG | HEART RATE: 89 BPM

## 2020-01-06 DIAGNOSIS — J34.3 NASAL TURBINATE HYPERTROPHY: ICD-10-CM

## 2020-01-06 DIAGNOSIS — Z71.6 ENCOUNTER FOR TOBACCO USE CESSATION COUNSELING: ICD-10-CM

## 2020-01-06 DIAGNOSIS — Z01.818 PREOP GENERAL PHYSICAL EXAM: Primary | ICD-10-CM

## 2020-01-06 DIAGNOSIS — J32.4 CHRONIC PANSINUSITIS: ICD-10-CM

## 2020-01-06 DIAGNOSIS — F17.200 TOBACCO USE DISORDER: ICD-10-CM

## 2020-01-06 DIAGNOSIS — E78.5 HYPERLIPIDEMIA, UNSPECIFIED HYPERLIPIDEMIA TYPE: ICD-10-CM

## 2020-01-06 DIAGNOSIS — J33.9 NASAL POLYP: ICD-10-CM

## 2020-01-06 DIAGNOSIS — E11.9 TYPE 2 DIABETES MELLITUS WITHOUT COMPLICATION, WITHOUT LONG-TERM CURRENT USE OF INSULIN (H): ICD-10-CM

## 2020-01-06 LAB
ALBUMIN SERPL-MCNC: 3.8 G/DL (ref 3.4–5)
ALP SERPL-CCNC: 71 U/L (ref 40–150)
ALT SERPL W P-5'-P-CCNC: 39 U/L (ref 0–70)
ANION GAP SERPL CALCULATED.3IONS-SCNC: 5 MMOL/L (ref 3–14)
AST SERPL W P-5'-P-CCNC: 9 U/L (ref 0–45)
BASOPHILS # BLD AUTO: 0.1 10E9/L (ref 0–0.2)
BASOPHILS NFR BLD AUTO: 1.1 %
BILIRUB SERPL-MCNC: 0.2 MG/DL (ref 0.2–1.3)
BUN SERPL-MCNC: 20 MG/DL (ref 7–30)
CALCIUM SERPL-MCNC: 9.3 MG/DL (ref 8.5–10.1)
CHLORIDE SERPL-SCNC: 101 MMOL/L (ref 94–109)
CO2 SERPL-SCNC: 31 MMOL/L (ref 20–32)
CREAT SERPL-MCNC: 0.91 MG/DL (ref 0.66–1.25)
DIFFERENTIAL METHOD BLD: NORMAL
EOSINOPHIL # BLD AUTO: 0.1 10E9/L (ref 0–0.7)
EOSINOPHIL NFR BLD AUTO: 1 %
ERYTHROCYTE [DISTWIDTH] IN BLOOD BY AUTOMATED COUNT: 13.8 % (ref 10–15)
EST. AVERAGE GLUCOSE BLD GHB EST-MCNC: 174 MG/DL
GFR SERPL CREATININE-BSD FRML MDRD: >90 ML/MIN/{1.73_M2}
GLUCOSE SERPL-MCNC: 364 MG/DL (ref 70–99)
HBA1C MFR BLD: 7.7 % (ref 0–5.6)
HCT VFR BLD AUTO: 42.9 % (ref 40–53)
HGB BLD-MCNC: 14.5 G/DL (ref 13.3–17.7)
IMM GRANULOCYTES # BLD: 0.2 10E9/L (ref 0–0.4)
IMM GRANULOCYTES NFR BLD: 2.6 %
LYMPHOCYTES # BLD AUTO: 3 10E9/L (ref 0.8–5.3)
LYMPHOCYTES NFR BLD AUTO: 33.6 %
MCH RBC QN AUTO: 28.7 PG (ref 26.5–33)
MCHC RBC AUTO-ENTMCNC: 33.8 G/DL (ref 31.5–36.5)
MCV RBC AUTO: 85 FL (ref 78–100)
MONOCYTES # BLD AUTO: 0.8 10E9/L (ref 0–1.3)
MONOCYTES NFR BLD AUTO: 8.6 %
NEUTROPHILS # BLD AUTO: 4.8 10E9/L (ref 1.6–8.3)
NEUTROPHILS NFR BLD AUTO: 53.1 %
NRBC # BLD AUTO: 0 10*3/UL
NRBC BLD AUTO-RTO: 0 /100
PLATELET # BLD AUTO: 284 10E9/L (ref 150–450)
POTASSIUM SERPL-SCNC: 4.1 MMOL/L (ref 3.4–5.3)
PROT SERPL-MCNC: 7.6 G/DL (ref 6.8–8.8)
RBC # BLD AUTO: 5.06 10E12/L (ref 4.4–5.9)
SODIUM SERPL-SCNC: 137 MMOL/L (ref 133–144)
TSH SERPL DL<=0.005 MIU/L-ACNC: 1.19 MU/L (ref 0.4–4)
WBC # BLD AUTO: 9.1 10E9/L (ref 4–11)

## 2020-01-06 PROCEDURE — 99214 OFFICE O/P EST MOD 30 MIN: CPT | Performed by: FAMILY MEDICINE

## 2020-01-06 PROCEDURE — 80050 GENERAL HEALTH PANEL: CPT | Performed by: FAMILY MEDICINE

## 2020-01-06 PROCEDURE — 40000788 ZZHCL STATISTIC ESTIMATED AVERAGE GLUCOSE: Performed by: FAMILY MEDICINE

## 2020-01-06 PROCEDURE — 36415 COLL VENOUS BLD VENIPUNCTURE: CPT | Performed by: FAMILY MEDICINE

## 2020-01-06 PROCEDURE — 83036 HEMOGLOBIN GLYCOSYLATED A1C: CPT | Performed by: FAMILY MEDICINE

## 2020-01-06 PROCEDURE — 93000 ELECTROCARDIOGRAM COMPLETE: CPT | Performed by: INTERNAL MEDICINE

## 2020-01-06 RX ORDER — METFORMIN HYDROCHLORIDE 750 MG/1
750 TABLET, EXTENDED RELEASE ORAL 2 TIMES DAILY WITH MEALS
Qty: 60 TABLET | Refills: 3 | Status: SHIPPED | OUTPATIENT
Start: 2020-01-06 | End: 2020-01-09

## 2020-01-06 ASSESSMENT — PAIN SCALES - GENERAL: PAINLEVEL: NO PAIN (0)

## 2020-01-06 ASSESSMENT — MIFFLIN-ST. JEOR: SCORE: 2062.81

## 2020-01-06 NOTE — NURSING NOTE
"Chief Complaint   Patient presents with     Pre-Op Exam       Initial /68 (BP Location: Right arm, Patient Position: Sitting, Cuff Size: Adult Large)   Pulse 89   Temp 98.3  F (36.8  C) (Tympanic)   Ht 1.88 m (6' 2\")   Wt 114.3 kg (252 lb)   SpO2 97%   BMI 32.35 kg/m   Estimated body mass index is 32.35 kg/m  as calculated from the following:    Height as of this encounter: 1.88 m (6' 2\").    Weight as of this encounter: 114.3 kg (252 lb).  Medication Reconciliation: complete  Angelita Arevalo LPN  "

## 2020-01-08 ENCOUNTER — ANESTHESIA (OUTPATIENT)
Dept: SURGERY | Facility: HOSPITAL | Age: 55
End: 2020-01-08
Payer: COMMERCIAL

## 2020-01-08 ENCOUNTER — HOSPITAL ENCOUNTER (OUTPATIENT)
Facility: HOSPITAL | Age: 55
Discharge: HOME | End: 2020-01-08
Attending: OTOLARYNGOLOGY | Admitting: OTOLARYNGOLOGY
Payer: COMMERCIAL

## 2020-01-08 VITALS
HEART RATE: 77 BPM | RESPIRATION RATE: 5 BRPM | SYSTOLIC BLOOD PRESSURE: 130 MMHG | DIASTOLIC BLOOD PRESSURE: 92 MMHG | BODY MASS INDEX: 32.34 KG/M2 | HEIGHT: 74 IN | WEIGHT: 252 LBS | TEMPERATURE: 97.4 F | OXYGEN SATURATION: 92 %

## 2020-01-08 DIAGNOSIS — J32.4 CHRONIC PANSINUSITIS: ICD-10-CM

## 2020-01-08 DIAGNOSIS — J33.9 NASAL POLYP: ICD-10-CM

## 2020-01-08 DIAGNOSIS — Z98.890 S/P FESS (FUNCTIONAL ENDOSCOPIC SINUS SURGERY): Primary | ICD-10-CM

## 2020-01-08 DIAGNOSIS — J34.3 NASAL TURBINATE HYPERTROPHY: ICD-10-CM

## 2020-01-08 PROCEDURE — 25000125 ZZHC RX 250: Performed by: OTOLARYNGOLOGY

## 2020-01-08 PROCEDURE — 25000128 H RX IP 250 OP 636: Performed by: OTOLARYNGOLOGY

## 2020-01-08 PROCEDURE — 37000008 ZZH ANESTHESIA TECHNICAL FEE, 1ST 30 MIN: Performed by: OTOLARYNGOLOGY

## 2020-01-08 PROCEDURE — 61782 SCAN PROC CRANIAL EXTRA: CPT | Performed by: OTOLARYNGOLOGY

## 2020-01-08 PROCEDURE — 25800030 ZZH RX IP 258 OP 636: Performed by: NURSE ANESTHETIST, CERTIFIED REGISTERED

## 2020-01-08 PROCEDURE — 71000027 ZZH RECOVERY PHASE 2 EACH 15 MINS: Performed by: OTOLARYNGOLOGY

## 2020-01-08 PROCEDURE — 36000058 ZZH SURGERY LEVEL 3 EA 15 ADDTL MIN: Performed by: OTOLARYNGOLOGY

## 2020-01-08 PROCEDURE — 27210794 ZZH OR GENERAL SUPPLY STERILE: Performed by: OTOLARYNGOLOGY

## 2020-01-08 PROCEDURE — 88331 PATH CONSLTJ SURG 1 BLK 1SPC: CPT | Mod: TC | Performed by: OTOLARYNGOLOGY

## 2020-01-08 PROCEDURE — S1090 MOMETASONE SINUS IMPLANT: HCPCS | Performed by: OTOLARYNGOLOGY

## 2020-01-08 PROCEDURE — 40000306 ZZH STATISTIC PRE PROC ASSESS II: Performed by: OTOLARYNGOLOGY

## 2020-01-08 PROCEDURE — 99135 ANES COMP CTRLD HYPOTENSION: CPT | Performed by: NURSE ANESTHETIST, CERTIFIED REGISTERED

## 2020-01-08 PROCEDURE — 31267 ENDOSCOPY MAXILLARY SINUS: CPT | Mod: 50 | Performed by: OTOLARYNGOLOGY

## 2020-01-08 PROCEDURE — 25000128 H RX IP 250 OP 636: Performed by: NURSE ANESTHETIST, CERTIFIED REGISTERED

## 2020-01-08 PROCEDURE — 31257 NSL/SINS NDSC TOT W/SPHENDT: CPT | Mod: 50 | Performed by: OTOLARYNGOLOGY

## 2020-01-08 PROCEDURE — 36000056 ZZH SURGERY LEVEL 3 1ST 30 MIN: Performed by: OTOLARYNGOLOGY

## 2020-01-08 PROCEDURE — 71000014 ZZH RECOVERY PHASE 1 LEVEL 2 FIRST HR: Performed by: OTOLARYNGOLOGY

## 2020-01-08 PROCEDURE — 88304 TISSUE EXAM BY PATHOLOGIST: CPT | Mod: TC | Performed by: OTOLARYNGOLOGY

## 2020-01-08 PROCEDURE — 37000009 ZZH ANESTHESIA TECHNICAL FEE, EACH ADDTL 15 MIN: Performed by: OTOLARYNGOLOGY

## 2020-01-08 PROCEDURE — 25000125 ZZHC RX 250: Performed by: NURSE ANESTHETIST, CERTIFIED REGISTERED

## 2020-01-08 PROCEDURE — 31257 NSL/SINS NDSC TOT W/SPHENDT: CPT | Performed by: NURSE ANESTHETIST, CERTIFIED REGISTERED

## 2020-01-08 PROCEDURE — 31276 NSL/SINS NDSC FRNT TISS RMVL: CPT | Mod: 50 | Performed by: OTOLARYNGOLOGY

## 2020-01-08 PROCEDURE — 27110028 ZZH OR GENERAL SUPPLY NON-STERILE: Performed by: OTOLARYNGOLOGY

## 2020-01-08 PROCEDURE — 30930 THER FX NASAL INF TURBINATE: CPT | Mod: 51 | Performed by: OTOLARYNGOLOGY

## 2020-01-08 DEVICE — PROPEL-CONTOUR DISSOLVABLE: Type: IMPLANTABLE DEVICE | Site: SINUS | Status: FUNCTIONAL

## 2020-01-08 DEVICE — PROPEL-MINI DISSOLVABLE: Type: IMPLANTABLE DEVICE | Site: SINUS | Status: FUNCTIONAL

## 2020-01-08 RX ORDER — DEXAMETHASONE SODIUM PHOSPHATE 10 MG/ML
INJECTION, SOLUTION INTRAMUSCULAR; INTRAVENOUS PRN
Status: DISCONTINUED | OUTPATIENT
Start: 2020-01-08 | End: 2020-01-08

## 2020-01-08 RX ORDER — FENTANYL CITRATE 50 UG/ML
25-50 INJECTION, SOLUTION INTRAMUSCULAR; INTRAVENOUS
Status: DISCONTINUED | OUTPATIENT
Start: 2020-01-08 | End: 2020-01-08 | Stop reason: HOSPADM

## 2020-01-08 RX ORDER — HYDRALAZINE HYDROCHLORIDE 20 MG/ML
2.5-5 INJECTION INTRAMUSCULAR; INTRAVENOUS EVERY 10 MIN PRN
Status: DISCONTINUED | OUTPATIENT
Start: 2020-01-08 | End: 2020-01-08 | Stop reason: HOSPADM

## 2020-01-08 RX ORDER — ALBUTEROL SULFATE 0.83 MG/ML
2.5 SOLUTION RESPIRATORY (INHALATION) EVERY 4 HOURS PRN
Status: DISCONTINUED | OUTPATIENT
Start: 2020-01-08 | End: 2020-01-08 | Stop reason: HOSPADM

## 2020-01-08 RX ORDER — PROPOFOL 10 MG/ML
INJECTION, EMULSION INTRAVENOUS PRN
Status: DISCONTINUED | OUTPATIENT
Start: 2020-01-08 | End: 2020-01-08

## 2020-01-08 RX ORDER — HYDROMORPHONE HYDROCHLORIDE 1 MG/ML
.3-.5 INJECTION, SOLUTION INTRAMUSCULAR; INTRAVENOUS; SUBCUTANEOUS EVERY 10 MIN PRN
Status: DISCONTINUED | OUTPATIENT
Start: 2020-01-08 | End: 2020-01-08 | Stop reason: HOSPADM

## 2020-01-08 RX ORDER — HYDROCODONE BITARTRATE AND ACETAMINOPHEN 7.5; 325 MG/1; MG/1
1 TABLET ORAL EVERY 6 HOURS PRN
Qty: 15 TABLET | Refills: 0 | Status: SHIPPED | OUTPATIENT
Start: 2020-01-08 | End: 2020-01-22

## 2020-01-08 RX ORDER — NALOXONE HYDROCHLORIDE 0.4 MG/ML
.1-.4 INJECTION, SOLUTION INTRAMUSCULAR; INTRAVENOUS; SUBCUTANEOUS
Status: DISCONTINUED | OUTPATIENT
Start: 2020-01-08 | End: 2020-01-08 | Stop reason: HOSPADM

## 2020-01-08 RX ORDER — LIDOCAINE HYDROCHLORIDE 20 MG/ML
INJECTION, SOLUTION INFILTRATION; PERINEURAL PRN
Status: DISCONTINUED | OUTPATIENT
Start: 2020-01-08 | End: 2020-01-08

## 2020-01-08 RX ORDER — LIDOCAINE HYDROCHLORIDE AND EPINEPHRINE 10; 10 MG/ML; UG/ML
INJECTION, SOLUTION INFILTRATION; PERINEURAL PRN
Status: DISCONTINUED | OUTPATIENT
Start: 2020-01-08 | End: 2020-01-08 | Stop reason: HOSPADM

## 2020-01-08 RX ORDER — ONDANSETRON 2 MG/ML
INJECTION INTRAMUSCULAR; INTRAVENOUS PRN
Status: DISCONTINUED | OUTPATIENT
Start: 2020-01-08 | End: 2020-01-08

## 2020-01-08 RX ORDER — OXYMETAZOLINE HYDROCHLORIDE 0.05 G/100ML
3 SPRAY NASAL
Status: COMPLETED | OUTPATIENT
Start: 2020-01-08 | End: 2020-01-08

## 2020-01-08 RX ORDER — TRIAMCINOLONE ACETONIDE 40 MG/ML
INJECTION, SUSPENSION INTRA-ARTICULAR; INTRAMUSCULAR PRN
Status: DISCONTINUED | OUTPATIENT
Start: 2020-01-08 | End: 2020-01-08 | Stop reason: HOSPADM

## 2020-01-08 RX ORDER — COCAINE HYDROCHLORIDE 40 MG/ML
SOLUTION NASAL
Status: DISCONTINUED
Start: 2020-01-08 | End: 2020-01-08 | Stop reason: HOSPADM

## 2020-01-08 RX ORDER — PREDNISONE 20 MG/1
TABLET ORAL
Qty: 8 TABLET | Refills: 1 | Status: SHIPPED | OUTPATIENT
Start: 2020-01-09 | End: 2020-01-22

## 2020-01-08 RX ORDER — SODIUM CHLORIDE, SODIUM LACTATE, POTASSIUM CHLORIDE, CALCIUM CHLORIDE 600; 310; 30; 20 MG/100ML; MG/100ML; MG/100ML; MG/100ML
INJECTION, SOLUTION INTRAVENOUS CONTINUOUS
Status: DISCONTINUED | OUTPATIENT
Start: 2020-01-08 | End: 2020-01-08 | Stop reason: HOSPADM

## 2020-01-08 RX ORDER — FENTANYL CITRATE 50 UG/ML
INJECTION, SOLUTION INTRAMUSCULAR; INTRAVENOUS PRN
Status: DISCONTINUED | OUTPATIENT
Start: 2020-01-08 | End: 2020-01-08

## 2020-01-08 RX ORDER — ONDANSETRON 4 MG/1
4 TABLET, ORALLY DISINTEGRATING ORAL EVERY 30 MIN PRN
Status: DISCONTINUED | OUTPATIENT
Start: 2020-01-08 | End: 2020-01-08 | Stop reason: HOSPADM

## 2020-01-08 RX ORDER — ONDANSETRON 2 MG/ML
4 INJECTION INTRAMUSCULAR; INTRAVENOUS EVERY 30 MIN PRN
Status: DISCONTINUED | OUTPATIENT
Start: 2020-01-08 | End: 2020-01-08 | Stop reason: HOSPADM

## 2020-01-08 RX ORDER — MEPERIDINE HYDROCHLORIDE 50 MG/ML
12.5 INJECTION INTRAMUSCULAR; INTRAVENOUS; SUBCUTANEOUS
Status: DISCONTINUED | OUTPATIENT
Start: 2020-01-08 | End: 2020-01-08 | Stop reason: HOSPADM

## 2020-01-08 RX ADMIN — LIDOCAINE HYDROCHLORIDE 40 MG: 20 INJECTION, SOLUTION INFILTRATION; PERINEURAL at 09:44

## 2020-01-08 RX ADMIN — FENTANYL CITRATE 100 MCG: 50 INJECTION, SOLUTION INTRAMUSCULAR; INTRAVENOUS at 09:43

## 2020-01-08 RX ADMIN — ROCURONIUM BROMIDE 50 MG: 10 INJECTION INTRAVENOUS at 09:44

## 2020-01-08 RX ADMIN — FENTANYL CITRATE 100 MCG: 50 INJECTION, SOLUTION INTRAMUSCULAR; INTRAVENOUS at 10:36

## 2020-01-08 RX ADMIN — FENTANYL CITRATE 50 MCG: 50 INJECTION, SOLUTION INTRAMUSCULAR; INTRAVENOUS at 11:02

## 2020-01-08 RX ADMIN — SODIUM CHLORIDE, POTASSIUM CHLORIDE, SODIUM LACTATE AND CALCIUM CHLORIDE: 600; 310; 30; 20 INJECTION, SOLUTION INTRAVENOUS at 08:31

## 2020-01-08 RX ADMIN — OXYMETAZOLINE HCL 3 SPRAY: 0.05 SPRAY NASAL at 08:17

## 2020-01-08 RX ADMIN — DEXAMETHASONE SODIUM PHOSPHATE 12 MG: 10 INJECTION, SOLUTION INTRAMUSCULAR; INTRAVENOUS at 09:49

## 2020-01-08 RX ADMIN — SODIUM CHLORIDE, POTASSIUM CHLORIDE, SODIUM LACTATE AND CALCIUM CHLORIDE: 600; 310; 30; 20 INJECTION, SOLUTION INTRAVENOUS at 09:56

## 2020-01-08 RX ADMIN — FENTANYL CITRATE 100 MCG: 50 INJECTION, SOLUTION INTRAMUSCULAR; INTRAVENOUS at 09:40

## 2020-01-08 RX ADMIN — OXYMETAZOLINE HCL 3 SPRAY: 0.05 SPRAY NASAL at 08:26

## 2020-01-08 RX ADMIN — PROPOFOL 200 MG: 10 INJECTION, EMULSION INTRAVENOUS at 09:44

## 2020-01-08 RX ADMIN — ONDANSETRON 4 MG: 2 INJECTION INTRAMUSCULAR; INTRAVENOUS at 11:34

## 2020-01-08 RX ADMIN — OXYMETAZOLINE HCL 3 SPRAY: 0.05 SPRAY NASAL at 08:36

## 2020-01-08 ASSESSMENT — MIFFLIN-ST. JEOR: SCORE: 2062.81

## 2020-01-08 NOTE — OR NURSING
Pateint discharged to home.  Godwin score 19/20. Pain level 0/10.  Discharged from unit via w/c.  Discharge instructions given to pt and wife understanding plan of care.

## 2020-01-08 NOTE — ANESTHESIA CARE TRANSFER NOTE
Patient: Atul Lam    Procedure(s):  Bilateral Endoscopic Sinus Surgery with Polypectomy, Frozens  TURBINATE REDUCTION, PROPEL IMPLANT TIMES 4    Diagnosis: Chronic pansinusitis [J32.4]  Nasal polyp [J33.9]  Nasal turbinate hypertrophy [J34.3]  Diagnosis Additional Information: No value filed.    Anesthesia Type:   General, ETT     Note:    Patient transferred to:PACU  Handoff Report: Identifed the Patient, Identified the Reponsible Provider, Reviewed the pertinent medical history, Discussed the surgical course, Reviewed Intra-OP anesthesia mangement and issues during anesthesia, Set expectations for post-procedure period and Allowed opportunity for questions and acknowledgement of understanding      Vitals: (Last set prior to Anesthesia Care Transfer)    CRNA VITALS  1/8/2020 1140 - 1/8/2020 1210      1/8/2020             Resp Rate (observed):  (!) 1    Resp Rate (set):  8                Electronically Signed By: MARYLIN West CRNA  January 8, 2020  12:10 PM

## 2020-01-08 NOTE — OP NOTE
Otolaryngology Operative Note     Pre-op Diagnosis: Chronic recurrent sinusitis with nasal polyps, aspirin exacerbated airway disease, bilateral inferior turbinate hypertrophy  Post-op Diagnosis:  same  Procedures:    1.  Bilateral frontal sinusotomy  2.  Bilateral total ethmoidectomy  3.  Bilateral sphenoidotomy  4.  Bilateral maxillary antrostomy with tissue removal  5.  Bilateral submucosal reduction inferior turbinates  Above sinus procedures performed with polypectomy and with Medtronic navigation    Sinus procedures performed with Medtronic navigation  Surgeon:  Cherelle Chacon D.O.  Anesthesia:  General endotracheal  EBL:  25 ml  Findings: diffuse inflammatory polyposis bilaterally   maxillary sinus mucosa is flat and healthy post polyposis there is moderate polypoid degeneration of the mucosa throughout the ethmoid and frontal sinuses  Complications:  none  Condition:  stable     Description of the Procedure  After surgical consent was obtained the patient was brought back to the operating room and laid in a comfortable and supine position.  The patient was administered a general anesthetic by a member of anesthesia.  The table was turned 180 degrees.  The patient was draped in the normal clean fashion and a timeout was taken.  The bed was placed into reverse Trendelenburg positioning.  A timeout was taken.  Cocaine pledgets were placed into the nares for several minutes and removed.  The lateral nasal wall, middle turbinates and inferior turbinates were anesthetized with 1% lidocaine with 1-100,000 epinephrine.    Is a 30 degree endoscope to evaluate the nares on the left there is diffuse polyposis the polyps were similarly anesthetized bilaterally and removed with Blakesley forceps these were sent to pathology for frozen section and returned as inflammatory polyps negative for inverted papilloma.  I then used the microdebrider blade on the left side and under endoscopic visualization removed  inflammatory polyps until I could identify the middle turbinate and the uncinate process.  The uncinate process was reflected anterior with a maxillary sinus seeker and the uncinate was removed with backbiting Blakesley and the microdebrider blade.  The lamina was preserved throughout.  I then entered the maxillary sinus through the natural ostia with the relieve a guidewire and balloon and dilated the sinus to 12 mm atmospheric pressure.  The balloon was deflated and the sinuses irrigated with Ancef and saline and gently suction debrided.  There is a accessory ostia inferior to this.  Diffuse polyposis was noted within the maxillary sinuses removed with Blakesley and maxillary sinus forceps until the sinus was clear the sinus mucosa is flat and healthy throughout the maxillary sinus.    Next identified the ethmoid bulla which is polypoid and dissected along the lamina preserving the lamina Propecia throughout there is extensive bone remodeling throughout the ethmoid cavity.  Bony septations were removed with Blakesley forceps and polyps removed with the microdebrider blade.  I continued dissecting to the ground lamella which is entered inferior and medial.  The posterior ethmoid cells were entered identified the skull base and continued to remove bpny septations and polyps from a posterior to anterior fashion.  The skull base was preserved.  There is moderate polypoid degeneration of the ethmoid sinus mucosa.  Next I directed the scope inferior and medial within the ethmoid cavity and entered the supraorbital ethmoid cell on the left and a polyp was evacuated.  Next I directed the straight suction inferior to the infraorbital cell and entered the face of the sphenoid sinus inferior and medially and similarly removed an inflammatory polyp with the microdebrider blade.  There is no bleeding.  The sphenoid sinus mucosa appears grossly healthy.  Ancef irrigation was suctioning was similarly performed    Next I turned  my attention back to the anterior ethmoid cavity and directed the curved suction superiorly to identify the agar cell diffuse polyps were noted throughout the frontal recess which were cleared with a up-biting Blakesley and giraffe forceps.  The skull base was preserved.  I entered the frontal sinuses with the relieve a guidewire and the frontal sinus was dilated and 3 serial dilations at 12 mm atmospheric pressure until widely patent.  The sinuses irrigated with Ancef saline and gently suctioned.  Additional polyps removed with giraffe forceps.  At this point a contour implant was placed into the frontal recess on the left and a propel implant was placed into the left maxillary sinus.  Nasal pore divided in half and soaked in Kenalog was placed into the ethmoid cavity.  Hemostasis is adequate.    In a similar fashion I performed a right sided maxillary to ostomies polypectomy total ethmoidectomy frontal sinusotomy and sphenoidotomy.  The sphenoid sinus was larger on the right side.  Similar diffuse polyposis was noted with polypoid degeneration of the ethmoid cavity.  The maxillary sinus on the right also had healthy mucosa post polypectomy.  A contour implant was placed similarly into the right frontal sinus and a propel implant was placed into the right maxillary sinus.  Hemostasis is achieved with scant use of suction cautery and is adequate.  Nasal pore soaked in Kenalog was similarly placed into the ethmoid cavity.      Attention was then turned to the turbinate reduction 1% lidocaine with 1-100,000 epinephrine was used to anesthetize the inferior turbinates bilaterally submucosal reduction was then performed at each of the demarcations with the Coblation wand.  Coagulation was used at a setting of 2 hemostasis is adequate outfracture was performed bilaterally with a [place].    He was then handed  back over to anesthesia awakened and brought to recovery in stable condition having tolerated the procedure  well

## 2020-01-08 NOTE — DISCHARGE INSTRUCTIONS
Instructions for Sinus Surgery    Recovery - Everyone recovers differently from a general anesthetic.  Symptoms such as fatigue, nausea, light-headedness, and sometimes a low grade fever (up to 100 degrees) are not unusual.  As your body removes the anesthetic drugs from circulation, these symptoms will resolve.  Your nose will be sore after surgery, and you may even have symptoms similar to a sinus infection with headache, congestion, and pressure.  These will resolve with healing.  For several days you may experience bloody drainage from the nose, please use the drip pad as necessary for this.  If there is persistent bleeding, please call the office during business hours or the on call ENT physician after hours.  There are no diet restrictions after sinus surgery, and you can resume your home medications.      Please do not blow your nose until 1 week after surgery.  At 1 week you may gently blow your nose, unless otherwise indicated by Dr. Chacon.     Limit your activity to no strenuous activities until I see you for the first follow-up visit in approximately 2 weeks.      Medications - You were sent home with narcotic pain medication.  If you can tolerate the discomfort during your recovery by using just plain Tylenol or ibuprofen (advil), please do so.  However, do not hesitate to use the stronger pain medication if needed.  If you were sent home with an antibiotic, it is primarily used to help the healing process.  If it causes loose bowel movements or other signs of intolerance, it is appropriate to discontinue it.  By far the most important measure you can take to speed recovery, and maximize the chances of long term success of sinus surgery is using the sinus rinses at least three time per day for the first month after surgery.       Start Preet Med saline irrigation tomorrow and use at least 5 times daily.     I recommend 2 preet med bottles, one to add the budesonide to and irrigate with the budesonide  rinse twice a day for 2 months.    In the other preet med bottle use only the saline solution, and irrigate with this at least 3 additional times daily.    Perform gentle irrigation for the first week.  Starting 1 week after surgery, you should increase the volume of preet med saline irrigation to each nostril, continuing to use the rinses in an alternating fashion at least 5 times daily.  You cannot use too much of the preet med saline, but please limit budesonide rinses to twice daily.    At 2 weeks after surgery, you may also restart nasal steroids (flonase, nasonex, etc).        Complications - Problems related to sinus surgery almost always are detected during the operation, and special instruction will be given in that situation.  However, unexpected things can happen, and are all related to the structures around the sinus cavities.  Symptoms that should alert you to a possible problem include: severe eye pain or eye swelling, persistent heavy bleeding from the nose, and high fevers with headache and neck pain.  Any of these symptoms should be called into my office or to the on call ENT if after hours.  The most common non-emergency complication of sinus surgery is the formation of scar tissue which can re-block the sinuses.  This is addressed below.    Follow-up -  As you have noted, there are quite a few follow-up visits after sinus surgery.  This is done to aggressively manage the most common complication of this technique, which is scar tissue blocking the sinuses.  These visits will require the examination of your nose and possibly removal of crusts of dry mucous and blood, with possible removal of early scar tissue.  Please prepare for these visits by using your sinus rinses.    If there are any questions or issues with the above, or if there are other issues that concern you, always feel free to call the clinic and I am happy to speak with you as soon as I can.    Cherelle Chacon,  TYESHA.  Otolaryngology/Head and Neck Surgery  Allergy    [phone] office

## 2020-01-08 NOTE — ANESTHESIA POSTPROCEDURE EVALUATION
Patient: Atul Lam    Procedure(s):  Bilateral Endoscopic Sinus Surgery with Polypectomy, Frozens  TURBINATE REDUCTION, PROPEL IMPLANT TIMES 4    Diagnosis:Chronic pansinusitis [J32.4]  Nasal polyp [J33.9]  Nasal turbinate hypertrophy [J34.3]  Diagnosis Additional Information: No value filed.    Anesthesia Type:  General, ETT    Note:  Anesthesia Post Evaluation    Patient location during evaluation: PACU  Patient participation: Able to fully participate in evaluation  Level of consciousness: awake and alert  Pain management: adequate  Airway patency: patent  Cardiovascular status: acceptable  Respiratory status: acceptable  Hydration status: acceptable  PONV: none             Last vitals:  Vitals:    01/08/20 1225 01/08/20 1230 01/08/20 1235   BP: 142/99 144/98 141/92   Pulse: 84 73 73   Resp: 15 16 18   Temp:      SpO2: 94% 93% 96%         Electronically Signed By: MARYLIN West CRNA  January 8, 2020  12:44 PM

## 2020-01-09 ENCOUNTER — TELEPHONE (OUTPATIENT)
Dept: FAMILY MEDICINE | Facility: OTHER | Age: 55
End: 2020-01-09

## 2020-01-09 DIAGNOSIS — E11.9 TYPE 2 DIABETES MELLITUS WITHOUT COMPLICATION, WITHOUT LONG-TERM CURRENT USE OF INSULIN (H): ICD-10-CM

## 2020-01-09 RX ORDER — METFORMIN HCL 500 MG
1000 TABLET, EXTENDED RELEASE 24 HR ORAL 2 TIMES DAILY WITH MEALS
Qty: 360 TABLET | Refills: 1 | Status: SHIPPED | OUTPATIENT
Start: 2020-01-09 | End: 2020-10-05

## 2020-01-09 NOTE — TELEPHONE ENCOUNTER
Wasn't sure if you were able to see prior note. Pt stated he will be continuing a low dose of prednisone per . please advise r/t Metformin dose.

## 2020-01-22 ENCOUNTER — OFFICE VISIT (OUTPATIENT)
Dept: OTOLARYNGOLOGY | Facility: OTHER | Age: 55
End: 2020-01-22
Attending: NURSE PRACTITIONER
Payer: COMMERCIAL

## 2020-01-22 VITALS
HEIGHT: 74 IN | BODY MASS INDEX: 32.34 KG/M2 | HEART RATE: 84 BPM | DIASTOLIC BLOOD PRESSURE: 86 MMHG | SYSTOLIC BLOOD PRESSURE: 128 MMHG | WEIGHT: 252 LBS | TEMPERATURE: 97.4 F | OXYGEN SATURATION: 96 %

## 2020-01-22 DIAGNOSIS — Z87.09 HISTORY OF NASAL POLYP: ICD-10-CM

## 2020-01-22 DIAGNOSIS — Z98.890 S/P FUNCTIONAL ENDOSCOPIC SINUS SURGERY: Primary | ICD-10-CM

## 2020-01-22 PROCEDURE — 99213 OFFICE O/P EST LOW 20 MIN: CPT | Mod: 25 | Performed by: NURSE PRACTITIONER

## 2020-01-22 PROCEDURE — 31231 NASAL ENDOSCOPY DX: CPT | Performed by: NURSE PRACTITIONER

## 2020-01-22 ASSESSMENT — MIFFLIN-ST. JEOR: SCORE: 2062.81

## 2020-01-22 ASSESSMENT — PAIN SCALES - GENERAL: PAINLEVEL: NO PAIN (0)

## 2020-01-22 NOTE — LETTER
1/22/2020         RE: Atul Lam  [address]  [place] [address]        Dear Colleague,    Thank you for referring your patient, Atul Lam, to the [facility] [place]. Please see a copy of my visit note below.    Otolaryngology Note         Chief Complaint:     Patient presents with:  Surgical Followup: Patient here today for post-op bilateral endoscopic Sinus Surgery with Polypectomy, Frozens           History of Present Illness:     Atul Lam is a 54 year old male seen today for first postoperative visit, status post endoscopic sinus surgery on  1/8/2020.     Since discharge, there has been the expected amount of congestion, facial pressure, and mild bloody drainage. Bloody drainage noted only with rinses, no dripping or oozing.  No changes in vision, no fevers or chills.  Doing nasal saline irrigation with budesonide nasal rinses twice daily.  Unfortunately he has not been doing any regular saline rinses.  He states that his work schedule does not allow time for rinsing.     He completed oral prednisone as directed, tolerated well.  He completed prednisone.     He feels he can breath through his nose, he was completely plugged prior to surgery.      He has not required any post op pain medications.      Pre-op Diagnosis: Chronic recurrent sinusitis with nasal polyps, aspirin exacerbated airway disease, bilateral inferior turbinate hypertrophy  Post-op Diagnosis:  same  Procedures:    1.  Bilateral frontal sinusotomy  2.  Bilateral total ethmoidectomy  3.  Bilateral sphenoidotomy  4.  Bilateral maxillary antrostomy with tissue removal  5.  Bilateral submucosal reduction inferior turbinates   Above sinus procedures performed with polypectomy and with Medtronic navigation     Sinus procedures performed with Medtronic navigation  Surgeon:  Cherelle Chacon D.O.  Anesthesia:  General endotracheal  EBL:  25 ml  Findings: diffuse inflammatory polyposis bilaterally    maxillary sinus mucosa is flat and healthy post polyposis there is moderate polypoid degeneration of the mucosa throughout the ethmoid and frontal sinuses  Complications:  none  Condition:  stable           Medications:     Current Outpatient Rx   Medication Sig Dispense Refill     blood glucose monitoring (BELL CONTOUR NEXT) test strip Use to test blood sugar 2 times daily. 100 each 11     blood glucose monitoring (BELL MICROLET) lancets Use to test blood sugar 2 times daily. 100 each 11     budesonide (PULMICORT) 0.5 MG/2ML neb solution Squirt entire vial into previously made preet med saline bottle, mix, and irrigate each nostril until entire bottle empty.  Do this twice daily. 3 Box 11     loratadine (CLARITIN) 10 MG tablet Take 1 tablet (10 mg) by mouth daily 90 tablet 3     metFORMIN (GLUCOPHAGE-XR) 500 MG 24 hr tablet Take 2 tablets (1,000 mg) by mouth 2 times daily (with meals) 360 tablet 1     simvastatin (ZOCOR) 40 MG tablet Take 1 tablet (40 mg) by mouth At Bedtime 90 tablet 1     fluticasone (FLONASE) 50 MCG/ACT nasal spray Spray 2 sprays into both nostrils 2 times daily (Patient not taking: Reported on 1/22/2020) 16 g 12            Allergies:     Allergies: Animal dander; Aspirin; Dust mites; Lisinopril; Losartan; and Morphine          Past Medical History:     Past Medical History:   Diagnosis Date     Congenital hiatus hernia      Diabetes (H)      Gastroesophageal reflux disease      Hiatal hernia      Neck mass      [place] tumor             Past Surgical History:     Past Surgical History:   Procedure Laterality Date     ENDOSCOPIC SINUS SURGERY N/A 1/8/2020    Procedure: Bilateral Endoscopic Sinus Surgery with Polypectomy, Frozens;  Surgeon: Cherelle Chacon MD;  Location: [facility] OR     ENT SURGERY      T and A     PAROTIDECTOMY Right 4/10/2018    Procedure: PAROTIDECTOMY;  Right Superficial Parotidectomy;  Surgeon: Alexandra Holbrook MD;  Location: [facility] OR     SEPTOPLASTY, TURBINOPLASTY,  "COMBINED N/A 2020    Procedure: TURBINATE REDUCTION, PROPEL IMPLANT TIMES 4;  Surgeon: Cherelle Chacon MD;  Location: [facility] OR       ENT family history reviewed         Social History:     Social History     Tobacco Use     Smoking status: Former Smoker     Years: 30.00     Types: Dip, chew, snus or snuff     Last attempt to quit: 2017     Years since quittin.3     Smokeless tobacco: Current User     Types: Chew   Substance Use Topics     Alcohol use: Yes     Comment: 1 per mo     Drug use: No            Review of Systems:     ROS: See HPI         Physical Exam:     /86 (Cuff Size: Adult Large)   Pulse 84   Temp 97.4  F (36.3  C) (Tympanic)   Ht 1.88 m (6' 2\")   Wt 114.3 kg (252 lb)   SpO2 96%   BMI 32.35 kg/m       General - The patient is well nourished and well developed, and appears to have good nutritional status.  Alert and oriented to person and place, answers questions and cooperates with examination appropriately.   Head and Face - Normocephalic and atraumatic, with no gross asymmetry noted of the contour of the facial features.  The facial nerve is intact, with strong symmetric movements.  Eyes - Extraocular movements intact, sclera were not icteric or injected, conjunctiva were pink and moist.  Ears -The external auditory canals are patent, the tympanic membranes are intact without effusion, retraction or mass.  Bony landmarks are intact.  Mouth - Examination of the oral cavity shows pink, healthy, moist mucosa.  No lesions or ulceration noted.  The dentition is in fair condition.  The tongue is mobile and midline.  Nose - External contour symmetric.     To further evaluate the nasal cavity in the postoperative state, I performed rigid nasal endoscopy. I first sprayed the nasal cavity bilaterally with a mix of lidocaine and neosynephrine. I used a 2.7mm, zero degree rigid nasal endoscope for examination.     Septum midline and intact with normal appearing mucosa.     Left side: "    Inferior turbinate reduced, healing well.   Using a 8 rosado, loose debris suctioned from inferior/middle meatus. Crusted secretions remain lateral to MT, did not want to disrupt and cause bleeding. I was not able to view maxillary antrostomy. The middle meatus showed some remaining nasapore, this was not suctioned, was unable to pass scope through to NP     Right side:    Inferior turbinate reduced and healing well.  Middle turbinate normal.  Mucous secretions removed from inferior/middle meatus with 8 rosado. I could not view maxillary antrostomy.      No purulence or polypoid findings.   Bilateral sphenoethmoid recess without bleeding/purulence.     Bilaterally, there are no synechiae or touch points noted.           Assessment and Plan:       ICD-10-CM    1. S/P functional endoscopic sinus surgery Z98.890    2. History of nasal polyp Z87.09      Continue with budesonide rinses twice daily and regular saline rinses 3 times per day, this was re-inforced several times with patient.   You can restart nasal steroids (flonase) today  Continue Claritin (loratidine) until 5 days prior to allergy testing (Feb 18 - stop taking)  Follow up with Dr Chacon on 2/17/20   Call if you have any trouble prior    Cynthia SELF  [facility] ENT  4:59 PM  January 22, 2020      Again, thank you for allowing me to participate in the care of your patient.        Sincerely,        Cynthia Laura NP

## 2020-01-22 NOTE — PROGRESS NOTES
Otolaryngology Note         Chief Complaint:     Patient presents with:  Surgical Followup: Patient here today for post-op bilateral endoscopic Sinus Surgery with Polypectomy, Frozens           History of Present Illness:     Atul Lam is a 54 year old male seen today for first postoperative visit, status post endoscopic sinus surgery on 1/8/2020.     Since discharge, there has been the expected amount of congestion, facial pressure, and mild bloody drainage. Bloody drainage noted only with rinses, no dripping or oozing.  No changes in vision, no fevers or chills.  Doing nasal saline irrigation with budesonide nasal rinses twice daily.  Unfortunately he has not been doing any regular saline rinses.  He states that his work schedule does not allow time for rinsing.     He completed oral prednisone as directed, tolerated well.  He completed prednisone.     He feels he can breath through his nose, he was completely plugged prior to surgery.      He has not required any post op pain medications.      Pre-op Diagnosis: Chronic recurrent sinusitis with nasal polyps, aspirin exacerbated airway disease, bilateral inferior turbinate hypertrophy  Post-op Diagnosis:  same  Procedures:    1.  Bilateral frontal sinusotomy  2.  Bilateral total ethmoidectomy  3.  Bilateral sphenoidotomy  4.  Bilateral maxillary antrostomy with tissue removal  5.  Bilateral submucosal reduction inferior turbinates   Above sinus procedures performed with polypectomy and with Medtronic navigation     Sinus procedures performed with Percolate navigation  Surgeon:  Cherelle Chacon D.O.  Anesthesia:  General endotracheal  EBL:  25 ml  Findings: diffuse inflammatory polyposis bilaterally   maxillary sinus mucosa is flat and healthy post polyposis there is moderate polypoid degeneration of the mucosa throughout the ethmoid and frontal sinuses  Complications:  none  Condition:  stable          Medications:     Current Outpatient Rx    Medication Sig Dispense Refill     blood glucose monitoring (BELL CONTOUR NEXT) test strip Use to test blood sugar 2 times daily. 100 each 11     blood glucose monitoring (BELL MICROLET) lancets Use to test blood sugar 2 times daily. 100 each 11     budesonide (PULMICORT) 0.5 MG/2ML neb solution Squirt entire vial into previously made preet med saline bottle, mix, and irrigate each nostril until entire bottle empty.  Do this twice daily. 3 Box 11     loratadine (CLARITIN) 10 MG tablet Take 1 tablet (10 mg) by mouth daily 90 tablet 3     metFORMIN (GLUCOPHAGE-XR) 500 MG 24 hr tablet Take 2 tablets (1,000 mg) by mouth 2 times daily (with meals) 360 tablet 1     simvastatin (ZOCOR) 40 MG tablet Take 1 tablet (40 mg) by mouth At Bedtime 90 tablet 1     fluticasone (FLONASE) 50 MCG/ACT nasal spray Spray 2 sprays into both nostrils 2 times daily (Patient not taking: Reported on 1/22/2020) 16 g 12            Allergies:     Allergies: Animal dander; Aspirin; Dust mites; Lisinopril; Losartan; and Morphine          Past Medical History:     Past Medical History:   Diagnosis Date     Congenital hiatus hernia      Diabetes (H)      Gastroesophageal reflux disease      Hiatal hernia      Neck mass      Warthin tumor             Past Surgical History:     Past Surgical History:   Procedure Laterality Date     ENDOSCOPIC SINUS SURGERY N/A 1/8/2020    Procedure: Bilateral Endoscopic Sinus Surgery with Polypectomy, Frozens;  Surgeon: Cherelle Chacon MD;  Location: [facility]     ENT SURGERY      T and A     PAROTIDECTOMY Right 4/10/2018    Procedure: PAROTIDECTOMY;  Right Superficial Parotidectomy;  Surgeon: Alexandra Holbrook MD;  Location: [facility] OR     SEPTOPLASTY, TURBINOPLASTY, COMBINED N/A 1/8/2020    Procedure: TURBINATE REDUCTION, PROPEL IMPLANT TIMES 4;  Surgeon: Cherelle Chacon MD;  Location: [facility]       ENT family history reviewed         Social History:     Social History     Tobacco Use     Smoking status: Former  "Smoker     Years: 30.00     Types: Dip, chew, snus or snuff     Last attempt to quit: 2017     Years since quittin.3     Smokeless tobacco: Current User     Types: Chew   Substance Use Topics     Alcohol use: Yes     Comment: 1 per mo     Drug use: No            Review of Systems:     ROS: See HPI         Physical Exam:     /86 (Cuff Size: Adult Large)   Pulse 84   Temp 97.4  F (36.3  C) (Tympanic)   Ht 1.88 m (6' 2\")   Wt 114.3 kg (252 lb)   SpO2 96%   BMI 32.35 kg/m      General - The patient is well nourished and well developed, and appears to have good nutritional status.  Alert and oriented to person and place, answers questions and cooperates with examination appropriately.   Head and Face - Normocephalic and atraumatic, with no gross asymmetry noted of the contour of the facial features.  The facial nerve is intact, with strong symmetric movements.  Eyes - Extraocular movements intact, sclera were not icteric or injected, conjunctiva were pink and moist.  Ears -The external auditory canals are patent, the tympanic membranes are intact without effusion, retraction or mass.  Bony landmarks are intact.  Mouth - Examination of the oral cavity shows pink, healthy, moist mucosa.  No lesions or ulceration noted.  The dentition is in fair condition.  The tongue is mobile and midline.  Nose - External contour symmetric.     To further evaluate the nasal cavity in the postoperative state, I performed rigid nasal endoscopy. I first sprayed the nasal cavity bilaterally with a mix of lidocaine and neosynephrine. I used a 2.7mm, zero degree rigid nasal endoscope for examination.     Septum midline and intact with normal appearing mucosa.     Left side:    Inferior turbinate reduced, healing well.   Using a 8 rosado, loose debris suctioned from inferior/middle meatus. Crusted secretions remain lateral to MT, did not want to disrupt and cause bleeding. I was not able to view maxillary antrostomy. The " middle meatus showed some remaining nasapore, this was not suctioned, was unable to pass scope through to NP     Right side:    Inferior turbinate reduced and healing well.  Middle turbinate normal. Mucous secretions removed from inferior/middle meatus with 8 rosado. I could not view maxillary antrostomy.      No purulence or polypoid findings.   Bilateral sphenoethmoid recess without bleeding/purulence.     Bilaterally, there are no synechiae or touch points noted.           Assessment and Plan:       ICD-10-CM    1. S/P functional endoscopic sinus surgery Z98.890    2. History of nasal polyp Z87.09      Continue with budesonide rinses twice daily and regular saline rinses 3 times per day, this was re-inforced several times with patient.   You can restart nasal steroids (flonase) today  Continue Claritin (loratidine) until 5 days prior to allergy testing (Feb 18 - stop taking)  Follow up with Dr Chacon on 2/17/20   Call if you have any trouble prior    Cynthia SELF  [facility]  4:59 PM  January 22, 2020

## 2020-01-22 NOTE — PATIENT INSTRUCTIONS
Thank you for allowing Cynthia Laura NP and our ENT team to participate in your care.  If your medications are too expensive, please give the nurse a call.  We can possibly change this medication.  If you have a scheduling or an appointment question please contact our Health Unit Coordinator at their direct line [phone].   ALL nursing questions or concerns can be directed to your ENT Nurse--Marielena: [phone]    Continue with budesonide rinses twice daily and regular saline rinses 3 times per day  You can restart nasal steroids (flonase) today  Continue Claritin (loratidine) until 5 days prior to allergy testing (Feb 18 - stop taking)  Follow up with Dr Chacon on 2/17/20  Call if you have any trouble prior

## 2020-01-22 NOTE — NURSING NOTE
"Chief Complaint   Patient presents with     Surgical Followup     Patient here today for post-op bilateral endoscopic Sinus Surgery with Polypectomy, Frozens       Initial /86 (Cuff Size: Adult Large)   Pulse 84   Temp 97.4  F (36.3  C) (Tympanic)   Ht 1.88 m (6' 2\")   Wt 114.3 kg (252 lb)   SpO2 96%   BMI 32.35 kg/m   Estimated body mass index is 32.35 kg/m  as calculated from the following:    Height as of this encounter: 1.88 m (6' 2\").    Weight as of this encounter: 114.3 kg (252 lb).  Medication Reconciliation: complete  Roxana Wilson LPN    "

## 2020-02-14 NOTE — PROGRESS NOTES
"01Otolaryngology Progress Note          Atul Lam is a 54 year old male   Presents status post endoscopic sinus surgery performed on January 8.  Significant history of chronic recurrent sinusitis with nasal polyps and aspirin exacerbated airway disease    He seems to be able to tolerate ibuprofen    Denies heavy bleeding or pain  Breathing well out of his nose    Procedures:    1.  Bilateral frontal sinusotomy  2.  Bilateral total ethmoidectomy  3.  Bilateral sphenoidotomy  4.  Bilateral maxillary antrostomy with tissue removal  5.  Bilateral submucosal reduction inferior turbinates    Findings: diffuse inflammatory polyposis bilaterally   maxillary sinus mucosa is flat and healthy post polyposis there is moderate polypoid degeneration of the mucosa throughout the ethmoid and frontal sinuses    SPECIMEN(S):   A: Nasal polyp(s), bilateral   B: Sinus contents, bilateral     FINAL DIAGNOSIS:   A: Nasal polyps, bilateral, polypectomy   -Multiple fragments of benign inflammatory nasal polyps     B: Sinus contents, bilateral, removal   -Chronic sinusitis with fragments of benign inflammatory polyps and bone     100 eos/hpf    Using budesonide irrigations bid    IDT pending later this week    Recent dental extractions, on amozil and prednisone taper for this reason    Physical Exam  /72 (BP Location: Right arm, Cuff Size: Adult Large)   Pulse 79   Temp 96.5  F (35.8  C) (Tympanic)   Ht 1.88 m (6' 2\")   Wt 109.3 kg (241 lb)   SpO2 97%   BMI 30.94 kg/m    General - The patient is well nourished and well developed, and appears to have good nutritional status.  Alert and oriented to person and place, interactive.  Head and Face - Normocephalic and atraumatic, with no gross asymmetry noted of the contour of the facial features.  The facial nerve is intact, with strong symmetric movements.  Eyes - Extraocular movements intact.   Nose - Nasal mucosa is pink and moist with no abnormal mucus.  The septum was " grossly midline and non-obstructive, turbinates of normal size and position.  No polyps, masses, or purulence noted on examination.  Mouth - Examination of the oral cavity shows pink, healthy, moist mucosa.  No lesions or ulceration noted.  The dentition are in good repair.  Dentures in place    To evaluate the nose and sinuses in the post operative state, I performed rigid nasal endoscopy. The LPN had previously sprayed both nares with lidocaine and neosynephrine.    I began with the LEFT side using a 0 degree rigid nasal endoscope, and then similarly examined the RIGHT side    Findings:  Inferior turbinates:  Lateralized  I used an 8 Adorno to move dried blood clots and mostly resolved propel implants from the maxillary sinus and frontal recess  Middle turbinate and middle meatus:  No purulence, no polyposis, no synechiae  Antrostomy patent  Ethmoid cavity patent  Mucosa is  healthy throughout without polyps nor polypoid degeneration  NP clear  Tolerated well      Preop SNOT:  40  Postop SNOT:  2    Impression/Plan  Atul Lam is a 54 year old male    ICD-10-CM    1. S/P FESS (functional endoscopic sinus surgery) Z98.890 fluticasone (FLONASE) 50 MCG/ACT nasal spray     budesonide (PULMICORT) 0.5 MG/2ML neb solution   2. Disorder of respiratory system exacerbated by aspirin J98.4     T39.015A    3. Perennial allergic rhinitis J30.89        eosonophilic CRS  Intradermal testing pending, consider IT if positives based on inflammatory findings    His sinuses look excellent he is thankful and breathing well of his nose  Excellent quantifiable results based in SNOT improvement    Continue Budesonide Rinses Twice Daily  restart Flonase  I reviewed his current medications he is not taking his loratadine and it is okay for him to be on prednisone and Amoxil prior to testing for his recent dental procedure      Follow up with ENT in 3 Months    Budesonide nasal saline irrigation per instructions:  -Obtain Kip Med  Sinus rinse over the counter.    -Use warm distilled water and 2 packets of the salt solution that comes with the bottle, dissolve in bottle up to the 240 mL samina.  -Add 1 vial of budesonide.  -Irrigate each side of your nose leaning over the sink, using 1/3 to 1/2 the volume of the bottle in each nostril every irrigation.  Irrigate 2 times daily.  -If additional rinses are needed/recommended, you may use the plan K[employer] Med Sinus irrigation without the use of added budesonide.       Cherelle Chacon D.O.  Otolaryngology/Head and Neck Surgery  Allergy

## 2020-02-17 ENCOUNTER — OFFICE VISIT (OUTPATIENT)
Dept: OTOLARYNGOLOGY | Facility: OTHER | Age: 55
End: 2020-02-17
Attending: OTOLARYNGOLOGY
Payer: COMMERCIAL

## 2020-02-17 VITALS
WEIGHT: 241 LBS | BODY MASS INDEX: 30.93 KG/M2 | SYSTOLIC BLOOD PRESSURE: 134 MMHG | HEART RATE: 79 BPM | OXYGEN SATURATION: 97 % | HEIGHT: 74 IN | TEMPERATURE: 96.5 F | DIASTOLIC BLOOD PRESSURE: 72 MMHG

## 2020-02-17 DIAGNOSIS — Z88.6 DISORDER OF RESPIRATORY SYSTEM EXACERBATED BY ASPIRIN: ICD-10-CM

## 2020-02-17 DIAGNOSIS — J30.89 PERENNIAL ALLERGIC RHINITIS: ICD-10-CM

## 2020-02-17 DIAGNOSIS — J45.909 DISORDER OF RESPIRATORY SYSTEM EXACERBATED BY ASPIRIN: ICD-10-CM

## 2020-02-17 DIAGNOSIS — Z98.890 S/P FESS (FUNCTIONAL ENDOSCOPIC SINUS SURGERY): Primary | ICD-10-CM

## 2020-02-17 DIAGNOSIS — J33.9 DISORDER OF RESPIRATORY SYSTEM EXACERBATED BY ASPIRIN: ICD-10-CM

## 2020-02-17 LAB — COPATH REPORT: NORMAL

## 2020-02-17 PROCEDURE — 31237 NSL/SINS NDSC SURG BX POLYPC: CPT | Performed by: OTOLARYNGOLOGY

## 2020-02-17 PROCEDURE — 99213 OFFICE O/P EST LOW 20 MIN: CPT | Mod: 25 | Performed by: OTOLARYNGOLOGY

## 2020-02-17 RX ORDER — AMOXICILLIN 500 MG/1
500 TABLET, FILM COATED ORAL 2 TIMES DAILY
COMMUNITY
End: 2020-02-24

## 2020-02-17 RX ORDER — FLUTICASONE PROPIONATE 50 MCG
2 SPRAY, SUSPENSION (ML) NASAL 2 TIMES DAILY
Qty: 16 G | Refills: 12 | Status: SHIPPED | OUTPATIENT
Start: 2020-02-17 | End: 2023-07-28

## 2020-02-17 RX ORDER — BUDESONIDE 0.5 MG/2ML
INHALANT ORAL
Qty: 3 BOX | Refills: 11 | Status: SHIPPED | OUTPATIENT
Start: 2020-02-17 | End: 2020-02-24

## 2020-02-17 ASSESSMENT — MIFFLIN-ST. JEOR: SCORE: 2012.92

## 2020-02-17 ASSESSMENT — PAIN SCALES - GENERAL: PAINLEVEL: NO PAIN (0)

## 2020-02-17 NOTE — NURSING NOTE
"Chief Complaint   Patient presents with     Surgical Followup     S/P FESS with Polypectomy, Turbinate Reduction on 1/8/20       Initial /72 (BP Location: Right arm, Cuff Size: Adult Large)   Pulse 79   Temp 96.5  F (35.8  C) (Tympanic)   Ht 1.88 m (6' 2\")   Wt 109.3 kg (241 lb)   SpO2 97%   BMI 30.94 kg/m   Estimated body mass index is 30.94 kg/m  as calculated from the following:    Height as of this encounter: 1.88 m (6' 2\").    Weight as of this encounter: 109.3 kg (241 lb).  Medication Reconciliation: complete  Katy Kang LPN    "

## 2020-02-17 NOTE — PATIENT INSTRUCTIONS
Thank you for allowing Dr. Chacon and our ENT team to participate in your care.  If your medications are too expensive, please give the nurse a call.  We can possibly change this medication.  If you have a scheduling or an appointment question please contact our Health Unit Coordinator at their direct line [phone].   ALL nursing questions or concerns can be directed to your ENT nurse at: [phone] - Roxana    Continue Budesonide Rinses Twice Daily  Continue Flonase  Follow up with ENT in 3 Months    Budesonide nasal saline irrigation per instructions:  -Obtain Kip Med Sinus rinse over the counter.    -Use warm distilled water and 2 packets of the salt solution that comes with the bottle, dissolve in bottle up to the 240 mL samina.  -Add 1 vial of budesonide.  -Irrigate each side of your nose leaning over the sink, using 1/3 to 1/2 the volume of the bottle in each nostril every irrigation.  Irrigate 2 times daily.  -If additional rinses are needed/recommended, you may use the plan Kip Med Sinus irrigation without the use of added budesonide.

## 2020-02-17 NOTE — LETTER
"    2/17/2020         RE: Atul Lam  [address]  [place] [address]        Dear Colleague,    Thank you for referring your patient, Atul Lam, to the [facility]. Please see a copy of my visit note below.    01Otolaryngology Progress Note          Atul Lam is a 54 year old male   Presents status post endoscopic sinus surgery performed on January 8.  Significant history of chronic recurrent sinusitis with nasal polyps and aspirin exacerbated airway disease    He seems to be able to tolerate ibuprofen    Denies heavy bleeding or pain  Breathing well out of his nose    Procedures:    1.  Bilateral frontal sinusotomy  2.  Bilateral total ethmoidectomy  3.  Bilateral sphenoidotomy  4.  Bilateral maxillary antrostomy with tissue removal  5.  Bilateral submucosal reduction inferior turbinates    Findings: diffuse inflammatory polyposis bilaterally   maxillary sinus mucosa is flat and healthy post polyposis there is moderate polypoid degeneration of the mucosa throughout the ethmoid and frontal sinuses    SPECIMEN(S):   A: Nasal polyp(s), bilateral   B: Sinus contents, bilateral     FINAL DIAGNOSIS:   A: Nasal polyps, bilateral, polypectomy   -Multiple fragments of benign inflammatory nasal polyps     B: Sinus contents, bilateral, removal   -Chronic sinusitis with fragments of benign inflammatory polyps and bone     No mention of eosinophil count not contacted the pathologist regarding an addendum    Using budesonide irrigations bid    IDT pending later this week    Recent dental extractions, on amozil and prednisone taper for this reason    Physical Exam  /72 (BP Location: Right arm, Cuff Size: Adult Large)   Pulse 79   Temp 96.5  F (35.8  C) (Tympanic)   Ht 1.88 m (6' 2\")   Wt 109.3 kg (241 lb)   SpO2 97%   BMI 30.94 kg/m     General - The patient is well nourished and well developed, and appears to have good nutritional status.  Alert and oriented to person and " place, interactive.  Head and Face - Normocephalic and atraumatic, with no gross asymmetry noted of the contour of the facial features.  The facial nerve is intact, with strong symmetric movements.  Eyes - Extraocular movements intact.   Nose - Nasal mucosa is pink and moist with no abnormal mucus.  The septum was grossly midline and non-obstructive, turbinates of normal size and position.  No polyps, masses, or purulence noted on examination.  Mouth - Examination of the oral cavity shows pink, healthy, moist mucosa.  No lesions or ulceration noted.  The dentition are in good repair.  Dentures in place    To evaluate the nose and sinuses in the post operative state, I performed rigid nasal endoscopy. The LPN had previously sprayed both nares with lidocaine and neosynephrine.    I began with the LEFT side using a 0 degree rigid nasal endoscope, and then similarly examined the RIGHT side    Findings:  Inferior turbinates:  Lateralized  I used an 8 Adorno to move dried blood clots and mostly resolved propel implants from the maxillary sinus and frontal recess  Middle turbinate and middle meatus:  No purulence, no polyposis, no synechiae  Antrostomy patent  Ethmoid cavity patent  Mucosa is  healthy throughout without polyps nor polypoid degeneration  NP clear  Tolerated well      Preop SNOT:  40  Postop SNOT:  2    Impression/Plan  Atul Lam is a 54 year old male    ICD-10-CM    1. S/P FESS (functional endoscopic sinus surgery) Z98.890 fluticasone (FLONASE) 50 MCG/ACT nasal spray     budesonide (PULMICORT) 0.5 MG/2ML neb solution   2. Disorder of respiratory system exacerbated by aspirin J98.4     T39.015A    3. Perennial allergic rhinitis J30.89      His sinuses look excellent he is thankful and breathing well of his nose  Excellent quantifiable results based in SNOT improvement    Continue Budesonide Rinses Twice Daily  restart Flonase  I reviewed his current medications he is not taking his loratadine and it  is okay for him to be on prednisone and Amoxil prior to testing for his recent dental procedure      Follow up with ENT in 3 Months    Budesonide nasal saline irrigation per instructions:  -Obtain Kip Med Sinus rinse over the counter.    -Use warm distilled water and 2 packets of the salt solution that comes with the bottle, dissolve in bottle up to the 240 mL samina.  -Add 1 vial of budesonide.  -Irrigate each side of your nose leaning over the sink, using 1/3 to 1/2 the volume of the bottle in each nostril every irrigation.  Irrigate 2 times daily.  -If additional rinses are needed/recommended, you may use the plan Kip Med Sinus irrigation without the use of added budesonide.       Cherelle Chacon D.O.  Otolaryngology/Head and Neck Surgery  Allergy            Again, thank you for allowing me to participate in the care of your patient.        Sincerely,        Cherelle Chacon MD

## 2020-02-23 NOTE — PROGRESS NOTES
Otolaryngology Note         Chief Complaint:     Patient presents with:  Allergies: Patient here for f/u MQT  Other: MQT allergy skin testing           History of Present Illness:     Atul Lam is a 54 year old male seen today for follow up MQT    Atul did well with allergy testing.  No oral swelling, excessive itching, or wheezing.      He reports he has been doing well post op FESS.  He is breathing through his nose well.  No fevers, chills, malodorous drainage.  He is happy with his results.  He continues with Budesonide rinses 2 times per day.      MQT 2/10/2020:  Dilution 6 (high) none  Dilution 5 (moderate) none  Dilution 2 (low) Ragweed, pigweed, thistle, grass, birch, maple    He is not interested in immunotherapy nor is immunotherapy recommended for his minimal positives.           Medications:     Current Outpatient Rx   Medication Sig Dispense Refill     blood glucose monitoring (BELL CONTOUR NEXT) test strip Use to test blood sugar 2 times daily. 100 each 11     blood glucose monitoring (BELL MICROLET) lancets Use to test blood sugar 2 times daily. 100 each 11     budesonide (PULMICORT) 0.5 MG/2ML neb solution Squirt entire vial into previously made preet med saline bottle, mix, and irrigate each nostril until entire bottle empty.  Do this twice daily. 3 Box 11     fluticasone (FLONASE) 50 MCG/ACT nasal spray Spray 2 sprays into both nostrils 2 times daily 16 g 12     metFORMIN (GLUCOPHAGE-XR) 500 MG 24 hr tablet Take 2 tablets (1,000 mg) by mouth 2 times daily (with meals) 360 tablet 1     simvastatin (ZOCOR) 40 MG tablet Take 1 tablet (40 mg) by mouth At Bedtime 90 tablet 1     loratadine (CLARITIN) 10 MG tablet Take 1 tablet (10 mg) by mouth daily (Patient not taking: Reported on 2/24/2020) 90 tablet 3            Allergies:     Allergies: Animal dander; Aspirin; Dust mites; Lisinopril; Losartan; and Morphine          Past Medical History:     Past Medical History:   Diagnosis Date      "Congenital hiatus hernia      Diabetes (H)      Gastroesophageal reflux disease      Hiatal hernia      Neck mass      Warthin tumor             Past Surgical History:     Past Surgical History:   Procedure Laterality Date     ENDOSCOPIC SINUS SURGERY N/A 2020    Procedure: Bilateral Endoscopic Sinus Surgery with Polypectomy, Frozens;  Surgeon: Cherelle Chacon MD;  Location: [facility] OR     ENT SURGERY      T and A     PAROTIDECTOMY Right 4/10/2018    Procedure: PAROTIDECTOMY;  Right Superficial Parotidectomy;  Surgeon: Alexandra Holbrook MD;  Location: [facility]     SEPTOPLASTY, TURBINOPLASTY, COMBINED N/A 2020    Procedure: TURBINATE REDUCTION, PROPEL IMPLANT TIMES 4;  Surgeon: Cherelle Chacon MD;  Location: [facility] OR       ENT family history reviewed         Social History:     Social History     Tobacco Use     Smoking status: Former Smoker     Years: 30.00     Types: Dip, chew, snus or snuff     Last attempt to quit: 2017     Years since quittin.4     Smokeless tobacco: Former User     Types: Chew     Quit date: 2020   Substance Use Topics     Alcohol use: Yes     Comment: 1 per mo     Drug use: No            Review of Systems:     ROS: See HPI         Physical Exam:     /68 (BP Location: Right arm, Patient Position: Sitting, Cuff Size: Adult Regular)   Pulse 76   Temp 97.5  F (36.4  C) (Oral)   Ht 1.88 m (6' 2\")   Wt 112.9 kg (249 lb)   SpO2 96%   BMI 31.97 kg/m       General - The patient is well nourished and well developed, and appears to have good nutritional status.  Alert and oriented to person and place, answers questions and cooperates with examination appropriately.   Head and Face - Normocephalic and atraumatic, with no gross asymmetry noted.  The facial nerve is intact, with strong symmetric movements.  Voice and Breathing - The patient was breathing comfortably without the use of accessory muscles. There was no wheezing, stridor. The patients voice was clear and " strong, and had appropriate pitch and quality.  Ears - External ear normal. Canals are patent. Right tympanic membrane is intact without effusion, retraction or mass. Left tympanic membrane is intact without effusion, retraction or mass.  Eyes - Extraocular movements intact, sclera were not icteric or injected, conjunctiva were pink and moist.  Mouth - Examination of the oral cavity showed pink, healthy oral mucosa. Dentition in good condition, upper and lower dentures appear to fit well. No lesions or ulcerations noted. The tongue was mobile and midline.   Throat - The walls of the oropharynx were smooth, pink, moist, symmetric, and had no lesions or ulcerations.  The tonsillar pillars and soft palate were symmetric. The uvula was midline on elevation.    Neck - Normal midline excursion of the laryngotracheal complex during swallowing.  Full range of motion on passive movement.  Palpation of the occipital, submental, submandibular, internal jugular chain, and supraclavicular nodes did not demonstrate any abnormal lymph nodes or masses.  Palpation of the thyroid was soft and smooth, with no nodules or goiter appreciated.  The trachea was mobile and midline.  Nose - External contour is symmetric, no gross deflection or scars.  Nasal mucosa is pink and moist with no abnormal mucus.  The septum and turbinates were evaluated with nasal speculum, turbinates are lateralized.  No polyps, masses, or purulence noted on examination.         Assessment and Plan:       ICD-10-CM    1. S/P FESS (functional endoscopic sinus surgery) Z98.890    2. Disorder of respiratory system exacerbated by aspirin J98.4     T39.015A      Continue rinses twice daily, keep scheduled appointment for follow up in May    Cynthia SELF  [facility]  3:00 PM  February 24, 2020

## 2020-02-24 ENCOUNTER — OFFICE VISIT (OUTPATIENT)
Dept: OTOLARYNGOLOGY | Facility: OTHER | Age: 55
End: 2020-02-24
Attending: NURSE PRACTITIONER
Payer: COMMERCIAL

## 2020-02-24 ENCOUNTER — OFFICE VISIT (OUTPATIENT)
Dept: ALLERGY | Facility: OTHER | Age: 55
End: 2020-02-24
Attending: NURSE PRACTITIONER
Payer: COMMERCIAL

## 2020-02-24 VITALS
BODY MASS INDEX: 31.95 KG/M2 | SYSTOLIC BLOOD PRESSURE: 115 MMHG | OXYGEN SATURATION: 96 % | HEIGHT: 74 IN | WEIGHT: 249 LBS | HEART RATE: 76 BPM | DIASTOLIC BLOOD PRESSURE: 68 MMHG | TEMPERATURE: 97.5 F

## 2020-02-24 DIAGNOSIS — J45.909 DISORDER OF RESPIRATORY SYSTEM EXACERBATED BY ASPIRIN: ICD-10-CM

## 2020-02-24 DIAGNOSIS — Z88.6 DISORDER OF RESPIRATORY SYSTEM EXACERBATED BY ASPIRIN: ICD-10-CM

## 2020-02-24 DIAGNOSIS — J32.4 CHRONIC PANSINUSITIS: ICD-10-CM

## 2020-02-24 DIAGNOSIS — Z98.890 S/P FESS (FUNCTIONAL ENDOSCOPIC SINUS SURGERY): Primary | ICD-10-CM

## 2020-02-24 DIAGNOSIS — J33.9 NASAL POLYP: ICD-10-CM

## 2020-02-24 DIAGNOSIS — J33.9 DISORDER OF RESPIRATORY SYSTEM EXACERBATED BY ASPIRIN: ICD-10-CM

## 2020-02-24 PROCEDURE — 95024 IQ TESTS W/ALLERGENIC XTRCS: CPT

## 2020-02-24 PROCEDURE — 99213 OFFICE O/P EST LOW 20 MIN: CPT | Performed by: NURSE PRACTITIONER

## 2020-02-24 PROCEDURE — 95004 PERQ TESTS W/ALRGNC XTRCS: CPT

## 2020-02-24 ASSESSMENT — MIFFLIN-ST. JEOR: SCORE: 2049.21

## 2020-02-24 NOTE — NURSING NOTE
"Chief Complaint   Patient presents with     Allergies     Patient here for f/u MQT     Other     MQT allergy skin testing       Initial /68 (BP Location: Right arm, Patient Position: Sitting, Cuff Size: Adult Regular)   Pulse 76   Temp 97.5  F (36.4  C) (Oral)   Ht 1.88 m (6' 2\")   Wt 112.9 kg (249 lb)   SpO2 96%   BMI 31.97 kg/m   Estimated body mass index is 31.97 kg/m  as calculated from the following:    Height as of this encounter: 1.88 m (6' 2\").    Weight as of this encounter: 112.9 kg (249 lb).  Medication Reconciliation: complete     Prior to testing, the patient's identity is verified using name and date of birth.  Atul presents for allergy skin testing.      The patient's symptoms have included a long history of sinus infections.  Atul notes these infections occur throughout the year, with no relation to season.      Atul underwent FESS  in January of this year for pansinusitis, polyps and turbinate hypertrophy, by Dr. Chacon.  He had a turbinate reduction, and propel implants placed, as well.   His nasal breathing has improved since surgery.  He notes having  multiple nasal polyps removed.  He states he is using medication and rinses as directed.      The patient lives in a single family home that is 90+ years old.  The home is [place] in [facility].  It has a basement but there are no suspected mold, water and/or moisture issues  in the home.  It does not have carpet. The home is currently being remodeled by the patient.  There is forced air gas  heat, and central air conditioning.       Atul has 1 inside dog, a poodle, that sleeps with him..    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    The patient denies allergy testing in the past.    All of the patients medications are reviewed prior to testing.  All appropriate medications have been stopped.         Consent is signed by the patient and signature is verified.    MQT/ID test is performed per protocol.  Initial testing " on his back showed no positive results, other than the positive control that measured  9 mm.  The negative control and all allergens measured 2 mm, or less.      Therefore, intradermal controls were placed on his right arm.  After 10 minutes, positive results were 11 and negative results were 4.    Intradermal injections were completed on his left arm, due to the patient's significant history. Minimal positive results were noted only to weeds, grass, birch and maple. The patient tolerated testing well.      Benadryl cooling gel is applied to all test sites, and the 2 children's chewable Claritin are administered to the patient; both per standing orders for post test itching.    All findings are recorded on the paper flow sheet. Results are reviewed with the patient.  He is given written information regarding allergy.      The patient will follow-up with Cynthia Laura CNP for treatment plan.      Irma Lala RN

## 2020-02-24 NOTE — LETTER
2/24/2020         RE: Atul Lam  [address]  [place] [address]        Dear Colleague,    Thank you for referring your patient, Atul Lam, to the [facility] [place]. Please see a copy of my visit note below.    Otolaryngology Note         Chief Complaint:     Patient presents with:  Allergies: Patient here for f/u MQT  Other: MQT allergy skin testing           History of Present Illness:     Atul Lam is a 54 year old male seen today for follow up MQT    Atul did well with allergy testing.  No oral swelling, excessive itching, or wheezing.      He reports he has been doing well post op FESS.  He is breathing through his nose well.  No fevers, chills, malodorous drainage.  He is happy with his results.  He continues with Budesonide rinses 2 times per day.      MQT 2/10/2020:  Dilution 6 (high) none  Dilution 5 (moderate) none  Dilution 2 (low) Ragweed, pigweed, thistle, grass, birch, maple    He is not interested in immunotherapy nor is immunotherapy recommended for his minimal positives.           Medications:     Current Outpatient Rx   Medication Sig Dispense Refill     blood glucose monitoring (BELL CONTOUR NEXT) test strip Use to test blood sugar 2 times daily. 100 each 11     blood glucose monitoring (BELL MICROLET) lancets Use to test blood sugar 2 times daily. 100 each 11     budesonide (PULMICORT) 0.5 MG/2ML neb solution Squirt entire vial into previously made preet med saline bottle, mix, and irrigate each nostril until entire bottle empty.  Do this twice daily. 3 Box 11     fluticasone (FLONASE) 50 MCG/ACT nasal spray Spray 2 sprays into both nostrils 2 times daily 16 g 12     metFORMIN (GLUCOPHAGE-XR) 500 MG 24 hr tablet Take 2 tablets (1,000 mg) by mouth 2 times daily (with meals) 360 tablet 1     simvastatin (ZOCOR) 40 MG tablet Take 1 tablet (40 mg) by mouth At Bedtime 90 tablet 1     loratadine (CLARITIN) 10 MG tablet Take 1 tablet (10 mg) by mouth daily  "(Patient not taking: Reported on 2020) 90 tablet 3            Allergies:     Allergies: Animal dander; Aspirin; Dust mites; Lisinopril; Losartan; and Morphine          Past Medical History:     Past Medical History:   Diagnosis Date     Congenital hiatus hernia      Diabetes (H)      Gastroesophageal reflux disease      Hiatal hernia      Neck mass      Warthin tumor             Past Surgical History:     Past Surgical History:   Procedure Laterality Date     ENDOSCOPIC SINUS SURGERY N/A 2020    Procedure: Bilateral Endoscopic Sinus Surgery with Polypectomy, Frozens;  Surgeon: Cherelle Chacon MD;  Location: [facility]     ENT SURGERY      T and A     PAROTIDECTOMY Right 4/10/2018    Procedure: PAROTIDECTOMY;  Right Superficial Parotidectomy;  Surgeon: Alexandra Holbrook MD;  Location: [facility]     SEPTOPLASTY, TURBINOPLASTY, COMBINED N/A 2020    Procedure: TURBINATE REDUCTION, PROPEL IMPLANT TIMES 4;  Surgeon: Cherelle Chacon MD;  Location: [facility] OR       ENT family history reviewed         Social History:     Social History     Tobacco Use     Smoking status: Former Smoker     Years: 30.00     Types: Dip, chew, snus or snuff     Last attempt to quit: 2017     Years since quittin.4     Smokeless tobacco: Former User     Types: Chew     Quit date: 2020   Substance Use Topics     Alcohol use: Yes     Comment: 1 per mo     Drug use: No            Review of Systems:     ROS: See HPI         Physical Exam:     /68 (BP Location: Right arm, Patient Position: Sitting, Cuff Size: Adult Regular)   Pulse 76   Temp 97.5  F (36.4  C) (Oral)   Ht 1.88 m (6' 2\")   Wt 112.9 kg (249 lb)   SpO2 96%   BMI 31.97 kg/m        General - The patient is well nourished and well developed, and appears to have good nutritional status.  Alert and oriented to person and place, answers questions and cooperates with examination appropriately.   Head and Face - Normocephalic and atraumatic, with no gross " asymmetry noted.  The facial nerve is intact, with strong symmetric movements.  Voice and Breathing - The patient was breathing comfortably without the use of accessory muscles. There was no wheezing, stridor. The patients voice was clear and strong, and had appropriate pitch and quality.  Ears - External ear normal. Canals are patent. Right tympanic membrane is intact without effusion, retraction or mass. Left tympanic membrane is intact without effusion, retraction or mass.  Eyes - Extraocular movements intact, sclera were not icteric or injected, conjunctiva were pink and moist.  Mouth - Examination of the oral cavity showed pink, healthy oral mucosa. Dentition in good condition, upper and lower dentures appear to fit well. No lesions or ulcerations noted. The tongue was mobile and midline.   Throat - The walls of the oropharynx were smooth, pink, moist, symmetric, and had no lesions or ulcerations.  The tonsillar pillars and soft palate were symmetric. The uvula was midline on elevation.    Neck - Normal midline excursion of the laryngotracheal complex during swallowing.  Full range of motion on passive movement.  Palpation of the occipital, submental, submandibular, internal jugular chain, and supraclavicular nodes did not demonstrate any abnormal lymph nodes or masses.  Palpation of the thyroid was soft and smooth, with no nodules or goiter appreciated.  The trachea was mobile and midline.  Nose - External contour is symmetric, no gross deflection or scars.  Nasal mucosa is pink and moist with no abnormal mucus.  The septum and turbinates were evaluated with nasal speculum, turbinates are lateralized.  No polyps, masses, or purulence noted on examination.         Assessment and Plan:       ICD-10-CM    1. S/P FESS (functional endoscopic sinus surgery) Z98.890    2. Disorder of respiratory system exacerbated by aspirin J98.4     T39.015A      Continue rinses twice daily, keep scheduled appointment for follow up  in May    Cynthia SELF  [facility]  3:00 PM  February 24, 2020      Again, thank you for allowing me to participate in the care of your patient.        Sincerely,        Cynthia Laura NP

## 2020-02-24 NOTE — PROGRESS NOTES
Atul was seen for allergy skin testing. Patient was seen by this nurse in conjunction with ENT provider. All encounter details are documented in ENT Provider's appointment from this same date. Please see referenced encounter for this visits documentation.   Irma Lala RN

## 2020-03-23 DIAGNOSIS — E11.9 TYPE 2 DIABETES MELLITUS WITHOUT COMPLICATION, WITHOUT LONG-TERM CURRENT USE OF INSULIN (H): ICD-10-CM

## 2020-03-23 DIAGNOSIS — E78.5 HYPERLIPIDEMIA, UNSPECIFIED HYPERLIPIDEMIA TYPE: ICD-10-CM

## 2020-03-23 RX ORDER — SIMVASTATIN 40 MG
TABLET ORAL
Qty: 90 TABLET | Refills: 0 | Status: SHIPPED | OUTPATIENT
Start: 2020-03-23 | End: 2020-11-20

## 2020-03-23 NOTE — TELEPHONE ENCOUNTER
simvastatin (ZOCOR) 40 MG tablet         Last Written Prescription Date:  8/8/19  Last Fill Quantity: 90,   # refills: 1  Last Office Visit: 1/6/20  Future Office visit:    Next 5 appointments (look out 90 days)    May 18, 2020  9:00 AM CDT  (Arrive by 8:45 AM)  Return Visit with Cynthia Laura NP  [facility] [place] ([facility] [address] [facility] [place] [address]  [phone]           Routing refill request to provider for review/approval because:    Statins protocol failed due to:       LDL on file in past 12 months    LDL Cholesterol Calculated   Date Value Ref Range Status   12/07/2018 67 <100 mg/dL Final     Comment:     Desirable:       <100 mg/dl

## 2020-11-13 ENCOUNTER — TELEPHONE (OUTPATIENT)
Dept: FAMILY MEDICINE | Facility: OTHER | Age: 55
End: 2020-11-13

## 2020-11-13 DIAGNOSIS — E11.9 TYPE 2 DIABETES MELLITUS WITHOUT COMPLICATION, WITHOUT LONG-TERM CURRENT USE OF INSULIN (H): Primary | ICD-10-CM

## 2020-11-17 NOTE — PROGRESS NOTES
Subjective     Atul Lam is a 55 year old male who presents to clinic today for the following health issues:    HPI         Diabetes Follow-up  How often are you checking your blood sugar? A few times a month - fasting around 100  What time of day are you checking your blood sugars (select all that apply)?  random per pt  Have you had any blood sugars above 200?  No  Have you had any blood sugars below 70?  No    What symptoms do you notice when your blood sugar is low?  Not applicable    What concerns do you have today about your diabetes? None     Do you have any of these symptoms? (Select all that apply)  No numbness or tingling in feet.  No redness, sores or blisters on feet.  No complaints of excessive thirst.  No reports of blurry vision.  No significant changes to weight.    Have you had a diabetic eye exam in the last 12 months? Yes - Levi     Due for eye exam and foot exam    Due for flu, hep B, shingrix - declines    Due for labs - is fasting    Down 20 pounds since last year    Only taking Metformin; ran out of statin      BP Readings from Last 2 Encounters:   11/20/20 128/78   02/24/20 115/68     Hemoglobin A1C (%)   Date Value   01/06/2020 7.7 (H)   08/08/2019 6.7 (H)     LDL Cholesterol Calculated (mg/dL)   Date Value   12/07/2018 67   02/12/2018 110 (H)         Hyperlipidemia Follow-Up    Are you regularly taking any medication or supplement to lower your cholesterol?  Pt prescribed Zocor but hasnt taken for a while do to needing refill/appt    Are you having muscle aches or other side effects that you think could be caused by your cholesterol lowering medication?  Pt states he was having problems with one of the medications- Joint pain- unsure if Zocor or not      How many servings of fruits and vegetables do you eat daily?  2-3    On average, how many sweetened beverages do you drink each day (Examples: soda, juice, sweet tea, etc.  Do NOT count diet or artificially sweetened beverages)?    "0    How many days per week do you exercise enough to make your heart beat faster? 7    How many minutes a day do you exercise enough to make your heart beat faster? 5-10  How many days per week do you miss taking your medication? Pt has missed some days do to needing appt/refills    Due for colon screening.  Declines colonoscopy.  Willing to do Cologuard.    Would like to do sleep study before end of year.  Prior sinus surgery.  Had been advised to have one after anesthesia.  Snores loudly.  No witnessed apnea.  Daytime fatigue.  No hypertension.  Does have intermittent headaches.    Review of Systems   Constitutional, HEENT, cardiovascular, pulmonary, gi and gu systems are negative, except as otherwise noted.      Objective    /78 (BP Location: Left arm, Patient Position: Sitting, Cuff Size: Adult Regular)   Pulse 78   Temp 97.4  F (36.3  C) (Tympanic)   Ht 1.88 m (6' 2\")   Wt 104.3 kg (230 lb)   SpO2 98%   BMI 29.53 kg/m    Body mass index is 29.53 kg/m .  Physical Exam   GENERAL: healthy, alert and no distress  EYES: Eyes grossly normal to inspection, PERRL and conjunctivae and sclerae normal  NECK: no adenopathy, no asymmetry, masses, or scars and thyroid normal to palpation  RESP: lungs clear to auscultation - no rales, rhonchi or wheezes  CV: regular rate and rhythm, normal S1 S2, no S3 or S4, no murmur, click or rub, no peripheral edema and peripheral pulses strong  ABDOMEN: soft, nontender, no hepatosplenomegaly, no masses and bowel sounds normal  MS: no gross musculoskeletal defects noted, no edema  NEURO: Normal strength and tone, mentation intact and speech normal  PSYCH: mentation appears normal, affect normal/bright  Diabetic foot exam: normal DP and PT pulses, no trophic changes or ulcerative lesions, normal sensory exam and normal monofilament exam            Assessment & Plan     Type 2 diabetes mellitus without complication, without long-term current use of insulin (H)  Sporadic " "readings at goal.  Full labs today.  Foot exam normal.  Eye exam referral.  - Hemoglobin A1c  - Albumin Random Urine Quantitative with Creat Ratio  - TSH with free T4 reflex  - metFORMIN (GLUCOPHAGE-XR) 500 MG 24 hr tablet; TAKE 2 TABLETS BY MOUTH TWICE DAILY WITH MEALS  - simvastatin (ZOCOR) 40 MG tablet; TAKE 1 TABLET BY MOUTH ONCE DAILY AT BEDTIME    Hyperlipidemia, unspecified hyperlipidemia type  Restart statin.  - Comprehensive metabolic panel (BMP + Alb, Alk Phos, ALT, AST, Total. Bili, TP)  - Lipid Profile (Chol, Trig, HDL, LDL calc)  - simvastatin (ZOCOR) 40 MG tablet; TAKE 1 TABLET BY MOUTH ONCE DAILY AT BEDTIME    Special screening for malignant neoplasms, colon  Cologuard kit form completed.  - EYE ADULT REFERRAL; Future    Encounter for immunization  Declines    Need for prophylactic vaccination and inoculation against influenza  Declines    Snoring  Sleep study referral.  Would like to get it done before end of year - insurance deductible.   - SLEEP EVALUATION & MANAGEMENT REFERRAL - ADULT -[facility] [place] [phone] (Age 5 and up); Future    Fatigue, unspecified type  - CBC with platelets and differential  - TSH with free T4 reflex  - SLEEP EVALUATION & MANAGEMENT REFERRAL - ADULT -[facility] [place] [phone] (Age 5 and up); Future     BMI:   Estimated body mass index is 29.53 kg/m  as calculated from the following:    Height as of this encounter: 1.88 m (6' 2\").    Weight as of this encounter: 104.3 kg (230 lb).   Weight management plan: Discussed healthy diet and exercise guidelines         Patient Instructions   Will call with lab results.  Medications refilled.  Cologuard to be removed.  Vaccines declined.  Eye exam referral done.  Sleep study referral done.        Return in about 6 months (around 5/20/2021) for diabetes.    Zoraida Bahena MD  [facility] [place]    "

## 2020-11-20 ENCOUNTER — OFFICE VISIT (OUTPATIENT)
Dept: FAMILY MEDICINE | Facility: OTHER | Age: 55
End: 2020-11-20
Attending: FAMILY MEDICINE
Payer: COMMERCIAL

## 2020-11-20 VITALS
BODY MASS INDEX: 29.52 KG/M2 | OXYGEN SATURATION: 98 % | DIASTOLIC BLOOD PRESSURE: 78 MMHG | WEIGHT: 230 LBS | HEART RATE: 78 BPM | TEMPERATURE: 97.4 F | HEIGHT: 74 IN | SYSTOLIC BLOOD PRESSURE: 128 MMHG

## 2020-11-20 DIAGNOSIS — Z23 NEED FOR PROPHYLACTIC VACCINATION AND INOCULATION AGAINST INFLUENZA: ICD-10-CM

## 2020-11-20 DIAGNOSIS — E11.9 TYPE 2 DIABETES MELLITUS WITHOUT COMPLICATION, WITHOUT LONG-TERM CURRENT USE OF INSULIN (H): Primary | ICD-10-CM

## 2020-11-20 DIAGNOSIS — Z12.11 SPECIAL SCREENING FOR MALIGNANT NEOPLASMS, COLON: ICD-10-CM

## 2020-11-20 DIAGNOSIS — R53.83 FATIGUE, UNSPECIFIED TYPE: ICD-10-CM

## 2020-11-20 DIAGNOSIS — E78.5 HYPERLIPIDEMIA, UNSPECIFIED HYPERLIPIDEMIA TYPE: ICD-10-CM

## 2020-11-20 DIAGNOSIS — Z23 ENCOUNTER FOR IMMUNIZATION: ICD-10-CM

## 2020-11-20 DIAGNOSIS — R06.83 SNORING: ICD-10-CM

## 2020-11-20 LAB
ALBUMIN SERPL-MCNC: 3.7 G/DL (ref 3.4–5)
ALP SERPL-CCNC: 89 U/L (ref 40–150)
ALT SERPL W P-5'-P-CCNC: 35 U/L (ref 0–70)
ANION GAP SERPL CALCULATED.3IONS-SCNC: 4 MMOL/L (ref 3–14)
AST SERPL W P-5'-P-CCNC: 17 U/L (ref 0–45)
BASOPHILS # BLD AUTO: 0.1 10E9/L (ref 0–0.2)
BASOPHILS NFR BLD AUTO: 1.1 %
BILIRUB SERPL-MCNC: 0.3 MG/DL (ref 0.2–1.3)
BUN SERPL-MCNC: 15 MG/DL (ref 7–30)
CALCIUM SERPL-MCNC: 8.5 MG/DL (ref 8.5–10.1)
CHLORIDE SERPL-SCNC: 105 MMOL/L (ref 94–109)
CHOLEST SERPL-MCNC: 220 MG/DL
CO2 SERPL-SCNC: 27 MMOL/L (ref 20–32)
CREAT SERPL-MCNC: 0.77 MG/DL (ref 0.66–1.25)
CREAT UR-MCNC: 187 MG/DL
DIFFERENTIAL METHOD BLD: NORMAL
EOSINOPHIL # BLD AUTO: 0.2 10E9/L (ref 0–0.7)
EOSINOPHIL NFR BLD AUTO: 2.4 %
ERYTHROCYTE [DISTWIDTH] IN BLOOD BY AUTOMATED COUNT: 13 % (ref 10–15)
EST. AVERAGE GLUCOSE BLD GHB EST-MCNC: 295 MG/DL
GFR SERPL CREATININE-BSD FRML MDRD: >90 ML/MIN/{1.73_M2}
GLUCOSE SERPL-MCNC: 277 MG/DL (ref 70–99)
HBA1C MFR BLD: 11.9 % (ref 0–5.6)
HCT VFR BLD AUTO: 42.3 % (ref 40–53)
HDLC SERPL-MCNC: 40 MG/DL
HGB BLD-MCNC: 14.4 G/DL (ref 13.3–17.7)
IMM GRANULOCYTES # BLD: 0 10E9/L (ref 0–0.4)
IMM GRANULOCYTES NFR BLD: 0.6 %
LDLC SERPL CALC-MCNC: ABNORMAL MG/DL
LYMPHOCYTES # BLD AUTO: 1.7 10E9/L (ref 0.8–5.3)
LYMPHOCYTES NFR BLD AUTO: 25.9 %
MCH RBC QN AUTO: 28.5 PG (ref 26.5–33)
MCHC RBC AUTO-ENTMCNC: 34 G/DL (ref 31.5–36.5)
MCV RBC AUTO: 84 FL (ref 78–100)
MICROALBUMIN UR-MCNC: 209 MG/L
MICROALBUMIN/CREAT UR: 111.76 MG/G CR (ref 0–17)
MONOCYTES # BLD AUTO: 0.4 10E9/L (ref 0–1.3)
MONOCYTES NFR BLD AUTO: 6.6 %
NEUTROPHILS # BLD AUTO: 4 10E9/L (ref 1.6–8.3)
NEUTROPHILS NFR BLD AUTO: 63.4 %
NONHDLC SERPL-MCNC: 180 MG/DL
NRBC # BLD AUTO: 0 10*3/UL
NRBC BLD AUTO-RTO: 0 /100
PLATELET # BLD AUTO: 238 10E9/L (ref 150–450)
POTASSIUM SERPL-SCNC: 3.9 MMOL/L (ref 3.4–5.3)
PROT SERPL-MCNC: 8 G/DL (ref 6.8–8.8)
RBC # BLD AUTO: 5.06 10E12/L (ref 4.4–5.9)
SODIUM SERPL-SCNC: 136 MMOL/L (ref 133–144)
TRIGL SERPL-MCNC: 479 MG/DL
TSH SERPL DL<=0.005 MIU/L-ACNC: 0.92 MU/L (ref 0.4–4)
WBC # BLD AUTO: 6.4 10E9/L (ref 4–11)

## 2020-11-20 PROCEDURE — 80050 GENERAL HEALTH PANEL: CPT | Performed by: FAMILY MEDICINE

## 2020-11-20 PROCEDURE — 83036 HEMOGLOBIN GLYCOSYLATED A1C: CPT | Performed by: FAMILY MEDICINE

## 2020-11-20 PROCEDURE — 36415 COLL VENOUS BLD VENIPUNCTURE: CPT | Performed by: FAMILY MEDICINE

## 2020-11-20 PROCEDURE — 80061 LIPID PANEL: CPT | Performed by: FAMILY MEDICINE

## 2020-11-20 PROCEDURE — 82043 UR ALBUMIN QUANTITATIVE: CPT | Performed by: FAMILY MEDICINE

## 2020-11-20 PROCEDURE — 99214 OFFICE O/P EST MOD 30 MIN: CPT | Performed by: FAMILY MEDICINE

## 2020-11-20 RX ORDER — SIMVASTATIN 40 MG
TABLET ORAL
Qty: 90 TABLET | Refills: 3 | Status: SHIPPED | OUTPATIENT
Start: 2020-11-20 | End: 2022-02-04

## 2020-11-20 RX ORDER — METFORMIN HCL 500 MG
TABLET, EXTENDED RELEASE 24 HR ORAL
Qty: 360 TABLET | Refills: 3 | Status: SHIPPED | OUTPATIENT
Start: 2020-11-20 | End: 2022-01-10

## 2020-11-20 ASSESSMENT — MIFFLIN-ST. JEOR: SCORE: 1958.02

## 2020-11-20 ASSESSMENT — PAIN SCALES - GENERAL: PAINLEVEL: NO PAIN (0)

## 2020-11-20 NOTE — NURSING NOTE
"Chief Complaint   Patient presents with     Recheck Medication     Diabetes     Lipids       Initial /78 (BP Location: Left arm, Patient Position: Sitting, Cuff Size: Adult Regular)   Pulse 78   Temp 97.4  F (36.3  C) (Tympanic)   Ht 1.88 m (6' 2\")   Wt 104.3 kg (230 lb)   SpO2 98%   BMI 29.53 kg/m   Estimated body mass index is 29.53 kg/m  as calculated from the following:    Height as of this encounter: 1.88 m (6' 2\").    Weight as of this encounter: 104.3 kg (230 lb).  Medication Reconciliation: complete  Angelita Arevalo LPN  "

## 2020-11-20 NOTE — PATIENT INSTRUCTIONS
Will call with lab results.  Medications refilled.  Cologuard to be removed.  Vaccines declined.  Eye exam referral done.  Sleep study referral done.

## 2020-11-20 NOTE — PROGRESS NOTES
Called our sleep lab, they are full for November but there are openings for December, she is sending him a packet to fill out

## 2020-11-20 NOTE — Clinical Note
Sleep study referral - if booking far out here - willing to go to [place].  Wants done before end of year if possible.

## 2020-12-02 ENCOUNTER — TELEPHONE (OUTPATIENT)
Dept: FAMILY MEDICINE | Facility: OTHER | Age: 55
End: 2020-12-02

## 2020-12-02 NOTE — TELEPHONE ENCOUNTER
----- Message from Catalina Lindquist LPN sent at 11/16/2020  3:35 PM CST -----  Regarding: FW: referral    ----- Message -----  From: Barbara Robles  Sent: 11/13/2020   1:30 PM CST  To:  Family Practice  Subject: referral                                         Need new referral for Diabetes.     Thank you   Barbara

## 2020-12-04 ENCOUNTER — TRANSFERRED RECORDS (OUTPATIENT)
Dept: HEALTH INFORMATION MANAGEMENT | Facility: HOSPITAL | Age: 55
End: 2020-12-04

## 2020-12-04 ENCOUNTER — HOSPITAL ENCOUNTER (OUTPATIENT)
Dept: EDUCATION SERVICES | Facility: HOSPITAL | Age: 55
Discharge: HOME | End: 2020-12-04
Attending: NURSE PRACTITIONER | Admitting: FAMILY MEDICINE
Payer: COMMERCIAL

## 2020-12-04 VITALS
HEART RATE: 78 BPM | BODY MASS INDEX: 29.95 KG/M2 | DIASTOLIC BLOOD PRESSURE: 81 MMHG | WEIGHT: 233.3 LBS | SYSTOLIC BLOOD PRESSURE: 122 MMHG | OXYGEN SATURATION: 98 % | RESPIRATION RATE: 16 BRPM

## 2020-12-04 DIAGNOSIS — E11.65 TYPE 2 DIABETES MELLITUS WITH HYPERGLYCEMIA, WITHOUT LONG-TERM CURRENT USE OF INSULIN (H): ICD-10-CM

## 2020-12-04 LAB — COLOGUARD-ABSTRACT: POSITIVE

## 2020-12-04 PROCEDURE — G0108 DIAB MANAGE TRN  PER INDIV: HCPCS | Performed by: DIETITIAN, REGISTERED

## 2020-12-04 ASSESSMENT — PAIN SCALES - GENERAL: PAINLEVEL: NO PAIN (0)

## 2020-12-04 NOTE — LETTER
"    12/4/2020        RE: Atul Lam  [address]  [place] [address]        Diabetes Self-Management Education & Support    Presents for: Individual review    SUBJECTIVE/OBJECTIVE:  Presents for: Individual review  Accompanied by: Self(Wife is Leah)  Diabetes education in the past 24mo: Yes  Focus of Visit: Reducing Risks  Diabetes type: Type 2  Date of diagnosis: 2008  Disease course: Worsening  How confident are you filling out medical forms by yourself:: Quite a bit  Diabetes management related comments/concerns: A1c has trended up.  Transportation concerns: No  Difficulty affording diabetes medication?: No  Difficulty affording diabetes testing supplies?: No  Other concerns:: None  Cultural Influences/Ethnic Background:  American    Diabetes Symptoms & Complications:  Fatigue: Yes  Neuropathy: No  Polydipsia: No  Polyphagia: No  Polyuria: No  Visual change: No  Slow healing wounds: No  Symptom course: Stable  Weight trend: Decreasing(Weight is down 25# since last visit to [facility] 2/2019)  Complications assessed today?: No    Patient Problem List and Family Medical History reviewed for relevant medical history, current medical status, and diabetes risk factors.    Vitals:  /81   Pulse 78   Resp 16   Wt 105.8 kg (233 lb 4.8 oz)   SpO2 98%   BMI 29.95 kg/m    Estimated body mass index is 29.95 kg/m  as calculated from the following:    Height as of 11/20/20: 1.88 m (6' 2\").    Weight as of this encounter: 105.8 kg (233 lb 4.8 oz).   Last 3 BP:   BP Readings from Last 3 Encounters:   12/04/20 122/81   11/20/20 128/78   02/24/20 115/68       History   Smoking Status     Former Smoker     Years: 30.00     Types: Dip, chew, snus or snuff     Quit date: 9/12/2017   Smokeless Tobacco     Former User     Types: Chew     Quit date: 2/14/2020       Labs:  Lab Results   Component Value Date    A1C 11.9 11/20/2020     Lab Results   Component Value Date     11/20/2020     Lab Results   Component Value Date "    LDL  11/20/2020     Cannot estimate LDL when triglyceride exceeds 400 mg/dL     HDL Cholesterol   Date Value Ref Range Status   11/20/2020 40 >39 mg/dL Final   ]  GFR Estimate   Date Value Ref Range Status   11/20/2020 >90 >60 mL/min/[1.73_m2] Final     Comment:     Starting 12/18/2018, serum creatinine based estimated GFR (eGFR) will be   calculated using the Chronic Kidney Disease Epidemiology Collaboration   (CKD-EPI) equation.       GFR Estimate If Black   Date Value Ref Range Status   11/20/2020 >90 >60 mL/min/[1.73_m2] Final     Comment:     Starting 12/18/2018, serum creatinine based estimated GFR (eGFR) will be   calculated using the Chronic Kidney Disease Epidemiology Collaboration   (CKD-EPI) equation.       Lab Results   Component Value Date    CR 0.77 11/20/2020     No results found for: MICROALBUMIN    Healthy Eating:  Healthy Eating Assessed Today: Yes  Cultural/Bahai diet restrictions?: No  Meal planning/habits: Other(Pt recently started reducing carbohydrate intake.)  Meals include: Breakfast, Lunch, Dinner  Breakfast: 2 eggs or 2 waffles or 2 pancakes - sugar free syrup - coffee with sugar free cream  Lunch: Packs the following and grazes at work - sandwich, salad with ranch dressing, sugar free jello, sugar free cookies  Dinner: Eats 12 am-2 am when gets home - steamer packet with veggies/pasta/sauce with few frozen meatballs  Other: Pt works afternoon shift - 12 pm - 12 am.  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 : No  Blood Glucose Meter: ContourNext  Times checking blood sugar at home (number): Other(Pt does not seem to test routinely.)    Taking Medications:  Diabetes  Medication(s)     Biguanides       metFORMIN (GLUCOPHAGE-XR) 500 MG 24 hr tablet    TAKE 2 TABLETS BY MOUTH TWICE DAILY WITH MEALS        Taking Medication Assessed Today: Yes  Current Treatments: Diet, Oral Medication (taken by mouth)(Metformin ER 1000 mg bid.)  Problems taking diabetes medications regularly?: No  Diabetes medication side effects?: Yes(Pt had low's when on Glimepiride in past.)    Problem Solving:  Problem Solving Assessed Today: No    Reducing Risks:  Reducing Risks Assessed Today: No  Has dilated eye exam at least once a year?: No(Scheduled)  Sees dentist every 6 months?: No  Feet checked by healthcare provider in the last year?: Yes    Healthy Coping:  Healthy Coping Assessed Today: Yes  Emotional response to diabetes: Acceptance  Stage of change: ACTION (Actively working towards change)  Patient Activation Measure Survey Score:  GRUPO Score (Last Two) 12/7/2018   GRUPO Raw Score 29   Activation Score 52.9   GRUPO Level 2       Diabetes knowledge and skills assessment:   Patient is knowledgeable in diabetes management concepts related to: Healthy Eating and Taking Medication    Patient needs further education on the following diabetes management concepts: Being Active, Monitoring and Reducing Risks    Based on learning assessment above, most appropriate setting for further diabetes education would be: Individual setting.      INTERVENTIONS:    Education provided today on:  AADE Self-Care Behaviors:  Being Active: relationship to blood glucose  Monitoring: individual blood glucose targets and frequency of monitoring  Taking Medication: Discussed additional diabetes medication options.   Reducing Risks: A1C - goals, relating to blood glucose levels, how often to check    Opportunities for ongoing education and support in diabetes-self management were discussed.    Pt verbalized understanding of concepts discussed and recommendations provided today.       Education Materials Provided:  No new materials  today.     ASSESSMENT  Pt's recent A1c elevated at 11.9%.  Pt reports that since then he has made changes to his diet.  He feels this will improve glucose levels.  He did not bring meter and does not test regularly so no data to assess.  Strips may be .  Suspect he will need additional medication therapy - discussed with him.    Patient's most recent   Lab Results   Component Value Date    A1C 11.9 2020    is not meeting goal of <7.0    PLAN  Continue efforts to eat a healthy, low carbohydrate diet.   Try to get some daily exercise.   Test glucose 2x/day- fasting and when gets home from work at 12 am which is approximately 2-3 hours after eating.    new test strips for meter.  Keep taking current dose of Metformin.   See Patient Instructions for co-developed, patient-stated behavior change goals.  AVS printed and provided to patient today.   Follow up in 2 weeks.  Bring meter.  Add additional medication at that time as indicated.     Time Spent: 38 minutes  Encounter Type: Individual    Any diabetes medication dose changes were made via the CDE Protocol and Collaborative Practice Agreement with the patient's referring provider. A copy of this encounter was shared with the provider.          Sincerely,        Chica Guzman RD

## 2020-12-04 NOTE — PROGRESS NOTES
"Diabetes Self-Management Education & Support    Presents for: Individual review    SUBJECTIVE/OBJECTIVE:  Presents for: Individual review  Accompanied by: Self(Wife is Leah)  Diabetes education in the past 24mo: Yes  Focus of Visit: Reducing Risks  Diabetes type: Type 2  Date of diagnosis: 2008  Disease course: Worsening  How confident are you filling out medical forms by yourself:: Quite a bit  Diabetes management related comments/concerns: A1c has trended up.  Transportation concerns: No  Difficulty affording diabetes medication?: No  Difficulty affording diabetes testing supplies?: No  Other concerns:: None  Cultural Influences/Ethnic Background:  American    Diabetes Symptoms & Complications:  Fatigue: Yes  Neuropathy: No  Polydipsia: No  Polyphagia: No  Polyuria: No  Visual change: No  Slow healing wounds: No  Symptom course: Stable  Weight trend: Decreasing(Weight is down 25# since last visit to [facility] 2/2019)  Complications assessed today?: No    Patient Problem List and Family Medical History reviewed for relevant medical history, current medical status, and diabetes risk factors.    Vitals:  /81   Pulse 78   Resp 16   Wt 105.8 kg (233 lb 4.8 oz)   SpO2 98%   BMI 29.95 kg/m    Estimated body mass index is 29.95 kg/m  as calculated from the following:    Height as of 11/20/20: 1.88 m (6' 2\").    Weight as of this encounter: 105.8 kg (233 lb 4.8 oz).   Last 3 BP:   BP Readings from Last 3 Encounters:   12/04/20 122/81   11/20/20 128/78   02/24/20 115/68       History   Smoking Status     Former Smoker     Years: 30.00     Types: Dip, chew, snus or snuff     Quit date: 9/12/2017   Smokeless Tobacco     Former User     Types: Chew     Quit date: 2/14/2020       Labs:  Lab Results   Component Value Date    A1C 11.9 11/20/2020     Lab Results   Component Value Date     11/20/2020     Lab Results   Component Value Date    LDL  11/20/2020     Cannot estimate LDL when triglyceride exceeds 400 mg/dL "     HDL Cholesterol   Date Value Ref Range Status   11/20/2020 40 >39 mg/dL Final   ]  GFR Estimate   Date Value Ref Range Status   11/20/2020 >90 >60 mL/min/[1.73_m2] Final     Comment:     Starting 12/18/2018, serum creatinine based estimated GFR (eGFR) will be   calculated using the Chronic Kidney Disease Epidemiology Collaboration   (CKD-EPI) equation.       GFR Estimate If Black   Date Value Ref Range Status   11/20/2020 >90 >60 mL/min/[1.73_m2] Final     Comment:     Starting 12/18/2018, serum creatinine based estimated GFR (eGFR) will be   calculated using the Chronic Kidney Disease Epidemiology Collaboration   (CKD-EPI) equation.       Lab Results   Component Value Date    CR 0.77 11/20/2020     No results found for: MICROALBUMIN    Healthy Eating:  Healthy Eating Assessed Today: Yes  Cultural/Pentecostal diet restrictions?: No  Meal planning/habits: Other(Pt recently started reducing carbohydrate intake.)  Meals include: Breakfast, Lunch, Dinner  Breakfast: 2 eggs or 2 waffles or 2 pancakes - sugar free syrup - coffee with sugar free cream  Lunch: Packs the following and grazes at work - sandwich, salad with ranch dressing, sugar free jello, sugar free cookies  Dinner: Eats 12 am-2 am when gets home - steamer packet with veggies/pasta/sauce with few frozen meatballs  Other: Pt works afternoon shift - 12 pm - 12 am.  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 : No  Blood Glucose Meter: ContourNext  Times checking blood sugar at home (number): Other(Pt does not seem to test routinely.)    Taking Medications:  Diabetes Medication(s)     Biguanides       metFORMIN (GLUCOPHAGE-XR) 500 MG 24 hr tablet    TAKE 2  TABLETS BY MOUTH TWICE DAILY WITH MEALS        Taking Medication Assessed Today: Yes  Current Treatments: Diet, Oral Medication (taken by mouth)(Metformin ER 1000 mg bid.)  Problems taking diabetes medications regularly?: No  Diabetes medication side effects?: Yes(Pt had low's when on Glimepiride in past.)    Problem Solving:  Problem Solving Assessed Today: No    Reducing Risks:  Reducing Risks Assessed Today: No  Has dilated eye exam at least once a year?: No(Scheduled)  Sees dentist every 6 months?: No  Feet checked by healthcare provider in the last year?: Yes    Healthy Coping:  Healthy Coping Assessed Today: Yes  Emotional response to diabetes: Acceptance  Stage of change: ACTION (Actively working towards change)  Patient Activation Measure Survey Score:  GRUPO Score (Last Two) 12/7/2018   GRUPO Raw Score 29   Activation Score 52.9   GRUPO Level 2       Diabetes knowledge and skills assessment:   Patient is knowledgeable in diabetes management concepts related to: Healthy Eating and Taking Medication    Patient needs further education on the following diabetes management concepts: Being Active, Monitoring and Reducing Risks    Based on learning assessment above, most appropriate setting for further diabetes education would be: Individual setting.      INTERVENTIONS:    Education provided today on:  AADE Self-Care Behaviors:  Being Active: relationship to blood glucose  Monitoring: individual blood glucose targets and frequency of monitoring  Taking Medication: Discussed additional diabetes medication options.   Reducing Risks: A1C - goals, relating to blood glucose levels, how often to check    Opportunities for ongoing education and support in diabetes-self management were discussed.    Pt verbalized understanding of concepts discussed and recommendations provided today.       Education Materials Provided:  No new materials today.     ASSESSMENT  Pt's recent A1c elevated at 11.9%.  Pt reports that since then he has  made changes to his diet.  He feels this will improve glucose levels.  He did not bring meter and does not test regularly so no data to assess.  Strips may be .  Suspect he will need additional medication therapy - discussed with him.    Patient's most recent   Lab Results   Component Value Date    A1C 11.9 2020    is not meeting goal of <7.0    PLAN  Continue efforts to eat a healthy, low carbohydrate diet.   Try to get some daily exercise.   Test glucose 2x/day- fasting and when gets home from work at 12 am which is approximately 2-3 hours after eating.    new test strips for meter.  Keep taking current dose of Metformin.   See Patient Instructions for co-developed, patient-stated behavior change goals.  AVS printed and provided to patient today.   Follow up in 2 weeks.  Bring meter.  Add additional medication at that time as indicated.     Time Spent: 38 minutes  Encounter Type: Individual    Any diabetes medication dose changes were made via the CDE Protocol and Collaborative Practice Agreement with the patient's referring provider. A copy of this encounter was shared with the provider.

## 2020-12-04 NOTE — PATIENT INSTRUCTIONS
-Keep trying to limit foods high in carbohydrates.   -Try to get some exercise most days of the week.  -Test glucose 2x/day - fasting and 2 hours after a meal.  -Target levels are fasting  and two hours after a meal.  - new test strips at [facility].   -Follow up December 18th at 10:30 am.   Bring you meter.    -Call with any concerns - HUMA Marino, -[phone].

## 2020-12-07 ENCOUNTER — DOCUMENTATION ONLY (OUTPATIENT)
Dept: SLEEP MEDICINE | Facility: HOSPITAL | Age: 55
End: 2020-12-07

## 2020-12-07 DIAGNOSIS — J33.9 DISORDER OF RESPIRATORY SYSTEM EXACERBATED BY ASPIRIN: ICD-10-CM

## 2020-12-07 DIAGNOSIS — E11.9 TYPE 2 DIABETES MELLITUS WITHOUT COMPLICATION, WITHOUT LONG-TERM CURRENT USE OF INSULIN (H): ICD-10-CM

## 2020-12-07 DIAGNOSIS — J45.909 DISORDER OF RESPIRATORY SYSTEM EXACERBATED BY ASPIRIN: ICD-10-CM

## 2020-12-07 DIAGNOSIS — Z88.6 DISORDER OF RESPIRATORY SYSTEM EXACERBATED BY ASPIRIN: ICD-10-CM

## 2020-12-07 DIAGNOSIS — E88.810 DYSMETABOLIC SYNDROME X: Primary | ICD-10-CM

## 2020-12-07 DIAGNOSIS — R06.81 APNEA: ICD-10-CM

## 2020-12-07 DIAGNOSIS — R06.83 SNORING: ICD-10-CM

## 2020-12-07 NOTE — PROGRESS NOTES
SLEEP HISTORY QUESTIONNAIRE    Please describe the main reason for your sleep appointment? unanswered    How long has this been a problem?     Have you been diagnosed with a sleep problem in the past? NO    If so, what?     What treatment was recommended?     Have you had a sleep study in the past? NO    If yes, where and when?     Sleep Habits:   Do you read in bed? No  Do you eat in bed? Yes  Do you watch TV in bed? Yes  Do you work in bed? No  Do you use a phone or computer in bed? Yes    Is you sleep disturbed by:   Bed partner: No  Children: No  Noise: No   Pets: No  Other:       On two or more nights per week, do you drink alcohol to help you fall asleep?NO    On two or more nights per week, do you take melatonin to help you fall asleep? NO    On two or more nights per week, do you take over the counter medicine to fall asleep?  NO    Do you take drinks with caffeine (coffee, tea, soda, energy drinks)? YES    Do you have 3 or more caffeine drinks in a day? YES    Do you have caffeine drinks within 6 hours of bedtime? YES    Do you smoke or use tobacco? NO    Do you exercise? NO    Sleep Routine:   Using a 24 Hour Clock    What time do you usually get into bed on workdays? 2 am    Weekend/non work days? 10 pm    What time do you get out of bed on workdays? 10 am      Weekend/non work days?8 am    Do you work the evening or night shift or do your shifts rotate? YES    How long does it usually take to fall to sleep? 10 minutes    How many times do you wake during the night? 3    How much time do you feel that you are awake during the entire night? 40 minutes    How long does it take for you to fall back to sleep after you wake up? 10 minutes    Why do you think you wake up? Bathroom break    What do you do when you wake up?     How much sleep do you think you get on work nights? 6 hours    How much sleep do you think you get on weekends/non work days? 8 hours    How much sleep do you think you need to feel your  best? 6-7 hours    How many days during a week do you take a nap on average? 0    What is the average length of your naps?     Do you feel better after taking a nap? NO    If you could chose the best sleep schedule for you, what time would you go to bed? 10 pm  What time would you get up? 6 am    Do you read in bed? NO    Do you eat in bed? YES    Do you watch TV in bed? YES    Do you do work in bed? NO    Do you use a computer or phone in bed? YES    Sleep Disruptions?   Leg movements:  Do you ever have restless, crawling, aching or other unusual feelings in your legs? NO    Do you ever wake yourself by kicking your legs during the night? NO    Are the sheets and blankets messed up or tossed about when you get up? NO    Night-time behaviors:   Do you have nightmares or night terrors? NO   How often?     Have you had times when you were sleep walking? NO    Have you been seen doing anything unusual while you sleep at nights? NO  What?   How often?     Have you ever hurt yourself or someone else while you were sleeping? NO  Please describe:     Do you clench or grind your teeth during the night? no    Sleep Apnea (pauses in breathing during sleep):  Do you wake with a headache in the morning? YES  How often? 2 days    Does your bed partner, family or friends ever say that you snore? YES  How many nights per week do you snore? all  Can snoring be heard outside the bedroom? yes    Do you ever wake yourself up from snoring, gasping or choking? YES    Have you ever been told that you stop breathing or have pauses in your breathing? YES    Do you wake in the morning with a dry throat or mouth? YES    Do you have trouble breathing through your nose? NO    Do you have problems with heartburn, reflux or a hiatal hernia? YES    Which positions do you usually sleep in? (stomach, back, sides, all) sides    Do you use oxygen or any other medical equipment when you sleep? NO    Do members of your family (related by blood) snore?  YES    Have any members of your family been diagnosed with with sleep apnea? YES    Do other members of your family have restless leg? NO    Do other members of your family have sleep walking? NO    Have you ever had an accident, or near accident due to sleepiness while driving? NO    Does your sleepiness affect your work on the job or at school? YES    Do you ever fall asleep by accident while doing a task? YES    Have you had sudden muscle weakness when laughing, angry or surprised? NO    Have you ever been unable to move your body when falling asleep or waking up? NO    Do you ever have trouble  your dreams from real life events? NO  Please describe:     Physical Health: (including illness and injury): During the past 30 days, on how many days was your physical health not good? 0/30 days     Mental Health: (including stress, depression, and problems with emotions): During the last 30 days, how may days was your mental health not good? 0/30 days.     During the past 30 days, on how many days did poor physical or mental health keep you from doing your usual activities? This might be self-care, work, or play? 0/30 days.     Social History:   Marital status:     Who lives in your home with you? wife    Mother (alive or dead)? alive If has , from what?   Father (alive or dead)? dead If has , from what? heart    Siblings: YES  Have any ? YES  If so, from what? accident    Currently working? YES  If yes, work:   Former jobs:      Sleepiness Scale:   Sitting and reading    Watching TV 3   Sitting in a public place 0   Riding in a car 2   Lying down to rest in the afternoon 1   Sitting and talking to someone 0   Sitting quietly after a lunch without alcohol 0   In a car, stopping for a few minutes in traffic 0       Surgical History:   Past Surgical History:   Procedure Laterality Date     ENDOSCOPIC SINUS SURGERY N/A 2020    Procedure: Bilateral Endoscopic Sinus Surgery with Polypectomy,  [place];  Surgeon: Cherelle Chacon MD;  Location: [facility] OR     ENT SURGERY      T and A     PAROTIDECTOMY Right 4/10/2018    Procedure: PAROTIDECTOMY;  Right Superficial Parotidectomy;  Surgeon: Alexandra Holbrook MD;  Location: [facility] OR     SEPTOPLASTY, TURBINOPLASTY, COMBINED N/A 1/8/2020    Procedure: TURBINATE REDUCTION, PROPEL IMPLANT TIMES 4;  Surgeon: Cherelle Chacon MD;  Location: [facility] OR       Medical Conditions:   Past Medical History:   Diagnosis Date     Congenital hiatus hernia      Diabetes ([facility])      Gastroesophageal reflux disease      Hiatal hernia      Neck mass      Warthin tumor        Medications:   Current Outpatient Medications   Medication Sig     blood glucose (BELL CONTOUR NEXT) test strip Use to test blood sugar 2 times daily.     blood glucose monitoring (BELL MICROLET) lancets Use to test blood sugar 2 times daily.     budesonide (PULMICORT) 0.5 MG/2ML neb solution Squirt entire vial into previously made preet med saline bottle, mix, and irrigate each nostril until entire bottle empty.  Do this twice daily.     fluticasone (FLONASE) 50 MCG/ACT nasal spray Spray 2 sprays into both nostrils 2 times daily     metFORMIN (GLUCOPHAGE-XR) 500 MG 24 hr tablet TAKE 2 TABLETS BY MOUTH TWICE DAILY WITH MEALS     simvastatin (ZOCOR) 40 MG tablet TAKE 1 TABLET BY MOUTH ONCE DAILY AT BEDTIME     No current facility-administered medications for this visit.        Are you currently having any of the following symptoms?   General:   Obvious weight gain or loss NO  Fever, chills or sweats NO  Drug allergies:     Eyes:   Changes in vision NO  Blind spots NO  Double vision NO  Other     Ear, Nose and Throat:   Ear pain NO  Sore throat NO  Sinus pain NO  Post-nasal drip NO  Runny nose NO  Bloody nose NO    Heart:   Rapid or irregular heart beat NO  Chest pain or pressure NO  Out of breath when lying down NO  Swelling in feet or legs NO  High blood pressure NO  Heart disease NO    Nervous system    Headaches YES  Weakness in arms or legs NO  Numbness in arms of legs NO  Other:     Skin  Rashes NO  New moles or skin changes NO  Other     Lungs  Shortness of breath at rest   Shortness of breath with activity   Dry cough   Coughing up mucous or phlegm   Coughing up blood   Wheezing when breathing    Lymph System  Swollen lymph nodes   New lumps or bumps   Changes in breasts or discharge     Digestive System   Nausea or vomiting   Loose or watery stools   Hard, dry stools (constipation)   Fat or grease in stools   Blood in stools   Stools are black or bloody   Abdominal (belly) pain     Urinary Tract   Pain when you urinate (pee)   Blood in your urine   Urinate (pee) more than normal   Irregular periods     Muscles and bones   Muscle pain   Joint or bone pain   Swollen joints   Other     Glands  Increased thirst or urination   Diabetes   Morning glucose:   Afternoon glucose:     Mental Health  Depression   Anxiety   Other mental health issues:      HPI      ROS      Physical Exam

## 2020-12-07 NOTE — Clinical Note
Chart ready to review but incomplete   No sleep problem listed and only half the back page I will contact him if  you do not have enough info

## 2020-12-07 NOTE — PROGRESS NOTES
"Chart review prior to sleep testing.    Patient Summary:  55 year old yo male who is referred for concern for sleep-disordered breathing.    Past Medical History:   Diagnosis Date     Congenital hiatus hernia      Diabetes (H)      Gastroesophageal reflux disease      Hiatal hernia      Neck mass      Warthin tumor        Current Outpatient Medications   Medication     blood glucose (BELL CONTOUR NEXT) test strip     blood glucose monitoring (BELL MICROLET) lancets     budesonide (PULMICORT) 0.5 MG/2ML neb solution     fluticasone (FLONASE) 50 MCG/ACT nasal spray     metFORMIN (GLUCOPHAGE-XR) 500 MG 24 hr tablet     simvastatin (ZOCOR) 40 MG tablet     No current facility-administered medications for this visit.        STOP-BANG score of 4, with unknown neck circumference.  [place] score of 6.  BMI of Estimated body mass index is 29.95 kg/m  as calculated from the following:    Height as of 11/20/20: 1.88 m (6' 2\").    Weight as of 12/4/20: 105.8 kg (233 lb 4.8 oz).     Per questionnaire: not answered    Caffeine use:  Yes for 3+ per day.  Yes for within 6 hours of bed.    Tobacco use: No    Sleep pattern:  Yes for shift work, rotating shifts  Workdays.  2am - 10am, total sleep time 8 hours.  Weekends.  10pm - 8am, total sleep time 6 hours.  Time to fall asleep: ~10 minutes.  Awakenings: 3 times per night, 10 minutes to return to sleep.  Napping.  0 days per week, - hours per nap.    No for RLS screen.  No for sleep walking.  No for dream enactment behavior.  No for bruxism.    Yes for morning headaches.  Yes for snoring.  Yes for observed apnea.  Yes for FHx of MIRIAN.    SHx:  , lives with wife.  Is currently employed.    A/P:  1.)  High likelihood of MIRIAN with STOP-BANG score of 4.   - Would appear to be candidate for either home sleep testing or in-lab PSG.    ---  This note was written with the assistance of the Dragon voice-dictation technology software. The final document, although reviewed, may contain " errors. For corrections, please contact the office.    Mack Lofton MD    Sleep Medicine  [facility]  ([phone])  [facility]  ([phone])

## 2020-12-07 NOTE — PROGRESS NOTES
STOP BANG       Name: Atul Lam MRN# [id number]   Age: 55 year old YOB: 1965     Stop Bang questionnaire completed with a score of >3 to allow for HST     Have you been told you snore loudly (louder than talking or loud enough to be heard through doors)? YES    Do you often feel tired, fatigued, or sleepy during the daytime? NO    Has anyone observed you stop breathing during your sleep? YES    Do you have or are you being treated for high blood pressure? NO    Is your BMI greater than 35? NO    Is your neck size circumference 16 inches or greater? NO    Are you over 50 years old? YES    Stop Bang Score (# of yes): 4

## 2020-12-15 ENCOUNTER — TELEPHONE (OUTPATIENT)
Dept: FAMILY MEDICINE | Facility: OTHER | Age: 55
End: 2020-12-15

## 2020-12-15 ENCOUNTER — OFFICE VISIT (OUTPATIENT)
Dept: SLEEP MEDICINE | Facility: HOSPITAL | Age: 55
End: 2020-12-15
Attending: FAMILY MEDICINE
Payer: COMMERCIAL

## 2020-12-15 DIAGNOSIS — R19.5 POSITIVE COLORECTAL CANCER SCREENING USING COLOGUARD TEST: Primary | ICD-10-CM

## 2020-12-15 DIAGNOSIS — G47.33 OSA (OBSTRUCTIVE SLEEP APNEA): Primary | ICD-10-CM

## 2020-12-15 PROCEDURE — 99207 PR NO CHARGE NURSE ONLY: CPT

## 2020-12-15 NOTE — TELEPHONE ENCOUNTER
I called pt with results that his cologuard came back positive. I have pended a referral to general surgery per protocol.

## 2020-12-16 ENCOUNTER — DOCUMENTATION ONLY (OUTPATIENT)
Dept: SLEEP MEDICINE | Facility: HOSPITAL | Age: 55
End: 2020-12-16
Attending: FAMILY MEDICINE
Payer: COMMERCIAL

## 2020-12-16 DIAGNOSIS — R06.81 APNEA: ICD-10-CM

## 2020-12-16 DIAGNOSIS — R06.83 SNORING: ICD-10-CM

## 2020-12-16 DIAGNOSIS — J33.9 DISORDER OF RESPIRATORY SYSTEM EXACERBATED BY ASPIRIN: ICD-10-CM

## 2020-12-16 DIAGNOSIS — J45.909 DISORDER OF RESPIRATORY SYSTEM EXACERBATED BY ASPIRIN: ICD-10-CM

## 2020-12-16 DIAGNOSIS — Z88.6 DISORDER OF RESPIRATORY SYSTEM EXACERBATED BY ASPIRIN: ICD-10-CM

## 2020-12-16 DIAGNOSIS — E11.9 TYPE 2 DIABETES MELLITUS WITHOUT COMPLICATION, WITHOUT LONG-TERM CURRENT USE OF INSULIN (H): ICD-10-CM

## 2020-12-16 DIAGNOSIS — E88.810 DYSMETABOLIC SYNDROME X: ICD-10-CM

## 2020-12-16 PROCEDURE — G0399 HOME SLEEP TEST/TYPE 3 PORTA: HCPCS | Mod: 26

## 2020-12-16 PROCEDURE — G0399 HOME SLEEP TEST/TYPE 3 PORTA: HCPCS

## 2020-12-16 NOTE — PROGRESS NOTES
This HSAT was performed using a Noxturnal T3 device which recorded snore, sound, movement activity, body position, nasal pressure, oronasal thermal airflow, pulse, oximetry and both chest and abdominal respiratory effort. HSAT data was restricted to the time patient states they were in bed.     HSAT was scored using 1B 4% hypopnea rule.     HST AHI (Non-PAT): 20.1     Snoring was reported as mild and moderate.  Time with SpO2 below 89% was 7 minutes.   Overall signal quality was good     Pt will follow up with sleep provider to determine appropriate therapy. Patient tolerated test well

## 2020-12-17 ENCOUNTER — PREP FOR PROCEDURE (OUTPATIENT)
Dept: SURGERY | Facility: OTHER | Age: 55
End: 2020-12-17

## 2020-12-17 ENCOUNTER — TELEPHONE (OUTPATIENT)
Dept: SURGERY | Facility: OTHER | Age: 55
End: 2020-12-17

## 2020-12-17 DIAGNOSIS — R19.5 POSITIVE COLORECTAL CANCER SCREENING USING COLOGUARD TEST: Primary | ICD-10-CM

## 2020-12-17 NOTE — TELEPHONE ENCOUNTER
Patient called he called asking for a referral to a different facility due to [facility] not having a colonoscopy until next year. Patient unable to say when colonoscopy could be scheduled. Nurse informed patient to call and see where he could get a colonoscopy sooner and we could send in a referral for him. Patient verbalized understanding.

## 2020-12-17 NOTE — TELEPHONE ENCOUNTER
Patient scheduled colonoscopy. Positive colorectal cancer screening using Cologuard test.PCP is Dr. Zoraida Pham. Dr. Roblero will be doing colonoscopy on December 31,2020. Malka Owen LPN

## 2020-12-17 NOTE — PROCEDURES
"HOME SLEEP STUDY INTERPRETATION    Patient: Atul Lam  MRN: [id number]  YOB: 1965  Study Date: 2020  Referring Provider: Zoraida Pham  Ordering Provider: Mack Lofton MD     Indications for Home Study: Atul Lam is a 55 year old male with a history of DM II who presents with symptoms suggestive of obstructive sleep apnea.    Estimated body mass index is 29.95 kg/m  as calculated from the following:    Height as of 20: 1.88 m (6' 2\").    Weight as of 20: 105.8 kg (233 lb 4.8 oz).  Hawley Sleepiness Scale:   STOP-BAN/8    Data: A full night home sleep study was performed recording the standard physiologic parameters including body position, movement, sound, nasal pressure, thermal oral airflow, chest and abdominal movements with respiratory inductance plethysmography, and oxygen saturation by pulse oximetry. Pulse rate was estimated by oximetry recording. This study was considered adequate based on > 4 hours of quality oximetry and respiratory recording. As specified by the AASM Manual for the Scoring of Sleep and Associated events, version 2.3, Rule VIII.D 1B, 4% oxygen desaturation scoring for hypopneas is used as a standard of care on all home sleep apnea testing.    Analysis Time:  599.9 minutes    Respiration:   Sleep Associated Hypoxemia: sustained hypoxemia was not present. Baseline oxygen saturation was 95.5%.  Time with saturation less than or equal to 88% was 7 minutes. The lowest oxygen saturation was 79%.   Snoring: Snoring was present, though rare.  Respiratory events: The home study revealed a presence of 14 obstructive apneas and 7 mixed and central apneas. There were 180 hypopneas resulting in a combined apnea/hypopnea index [AHI] of 20.1 events per hour.  AHI was 26.7 per hour supine, - per hour prone, 8.1 per hour on left side, and 3.6 per hour on right side.   Pattern: Excluding events noted above, respiratory rate and pattern was " Normal.    Position: Percent of time spent: supine - 65.2%, prone - 0%, on left - 32.1%, on right - 2.8%.    Heart Rate: By pulse oximetry normal rate was noted.     Assessment:   - Moderate obstructive sleep apnea.  - Strong positional component observed with supine AHI 26.7 and lateral AHI < 10.  - Sleep associated hypoxemia was present, though mild.    Recommendations:  Consider auto-CPAP at 5-15 cmH2O, oral appliance therapy, positional therapy or polysomnography with full night PAP titration.  Suggest optimizing sleep hygiene and avoiding sleep deprivation.  Weight management.    Diagnosis Code(s): Obstructive Sleep Apnea G47.33, Hypoxemia G47.36    Mack Lofton MD, MD, December 17, 2020   Diplomate, American Board of Family Medicine, Sleep Medicine

## 2020-12-18 ENCOUNTER — HOSPITAL ENCOUNTER (OUTPATIENT)
Dept: EDUCATION SERVICES | Facility: HOSPITAL | Age: 55
Discharge: HOME | End: 2020-12-18
Attending: FAMILY MEDICINE | Admitting: FAMILY MEDICINE
Payer: COMMERCIAL

## 2020-12-18 VITALS
DIASTOLIC BLOOD PRESSURE: 82 MMHG | SYSTOLIC BLOOD PRESSURE: 122 MMHG | OXYGEN SATURATION: 97 % | BODY MASS INDEX: 29.76 KG/M2 | HEART RATE: 75 BPM | WEIGHT: 231.8 LBS

## 2020-12-18 DIAGNOSIS — E11.65 TYPE 2 DIABETES MELLITUS WITH HYPERGLYCEMIA, WITHOUT LONG-TERM CURRENT USE OF INSULIN (H): Primary | ICD-10-CM

## 2020-12-18 PROBLEM — R19.5 POSITIVE COLORECTAL CANCER SCREENING USING COLOGUARD TEST: Status: ACTIVE | Noted: 2020-12-18

## 2020-12-18 PROCEDURE — G0108 DIAB MANAGE TRN  PER INDIV: HCPCS | Performed by: DIETITIAN, REGISTERED

## 2020-12-18 RX ORDER — DULAGLUTIDE 0.75 MG/.5ML
0.75 INJECTION, SOLUTION SUBCUTANEOUS
Qty: 2 ML | Refills: 1 | Status: SHIPPED | OUTPATIENT
Start: 2020-12-18 | End: 2021-03-29

## 2020-12-18 ASSESSMENT — ANXIETY QUESTIONNAIRES
3. WORRYING TOO MUCH ABOUT DIFFERENT THINGS: NOT AT ALL
6. BECOMING EASILY ANNOYED OR IRRITABLE: NOT AT ALL
5. BEING SO RESTLESS THAT IT IS HARD TO SIT STILL: NOT AT ALL
4. TROUBLE RELAXING: NOT AT ALL
1. FEELING NERVOUS, ANXIOUS, OR ON EDGE: NOT AT ALL
IF YOU CHECKED OFF ANY PROBLEMS ON THIS QUESTIONNAIRE, HOW DIFFICULT HAVE THESE PROBLEMS MADE IT FOR YOU TO DO YOUR WORK, TAKE CARE OF THINGS AT HOME, OR GET ALONG WITH OTHER PEOPLE: NOT DIFFICULT AT ALL
2. NOT BEING ABLE TO STOP OR CONTROL WORRYING: NOT AT ALL
GAD7 TOTAL SCORE: 0
7. FEELING AFRAID AS IF SOMETHING AWFUL MIGHT HAPPEN: NOT AT ALL

## 2020-12-18 ASSESSMENT — PAIN SCALES - GENERAL: PAINLEVEL: NO PAIN (0)

## 2020-12-18 ASSESSMENT — PATIENT HEALTH QUESTIONNAIRE - PHQ9: SUM OF ALL RESPONSES TO PHQ QUESTIONS 1-9: 0

## 2020-12-18 NOTE — PROGRESS NOTES
"Diabetes Self-Management Education & Support    Presents for: Individual review    SUBJECTIVE/OBJECTIVE:  Presents for: Individual review  Accompanied by: Self(Wife is Leah)  Diabetes education in the past 24mo: Yes  Focus of Visit: Reducing Risks, Taking Medication  Diabetes type: Type 2  Date of diagnosis: 2008  Disease course: Worsening  How confident are you filling out medical forms by yourself:: Quite a bit  Diabetes management related comments/concerns: A1c has trended up.  Transportation concerns: No  Difficulty affording diabetes medication?: No  Difficulty affording diabetes testing supplies?: No  Other concerns:: None  Cultural Influences/Ethnic Background:  American    Diabetes Symptoms & Complications:  Fatigue: Yes  Neuropathy: No  Polydipsia: No  Polyphagia: No  Polyuria: No  Visual change: No  Slow healing wounds: No  Symptom course: Stable  Weight trend: Decreasing(Weight is down 25# since visit to [facility] 2/2019)  Complications assessed today?: No    Patient Problem List and Family Medical History reviewed for relevant medical history, current medical status, and diabetes risk factors.    Vitals:  /82   Pulse 75   Wt 105.1 kg (231 lb 12.8 oz)   SpO2 97%   BMI 29.76 kg/m    Estimated body mass index is 29.76 kg/m  as calculated from the following:    Height as of 11/20/20: 1.88 m (6' 2\").    Weight as of this encounter: 105.1 kg (231 lb 12.8 oz).   Last 3 BP:   BP Readings from Last 3 Encounters:   12/18/20 122/82   12/04/20 122/81   11/20/20 128/78       History   Smoking Status     Former Smoker     Years: 30.00     Types: Dip, chew, snus or snuff     Quit date: 9/12/2017   Smokeless Tobacco     Former User     Types: Chew     Quit date: 2/14/2020       Labs:  Lab Results   Component Value Date    A1C 11.9 11/20/2020     Lab Results   Component Value Date     11/20/2020     Lab Results   Component Value Date    LDL  11/20/2020     Cannot estimate LDL when triglyceride exceeds 400 " mg/dL     HDL Cholesterol   Date Value Ref Range Status   11/20/2020 40 >39 mg/dL Final   ]  GFR Estimate   Date Value Ref Range Status   11/20/2020 >90 >60 mL/min/[1.73_m2] Final     Comment:     Starting 12/18/2018, serum creatinine based estimated GFR (eGFR) will be   calculated using the Chronic Kidney Disease Epidemiology Collaboration   (CKD-EPI) equation.       GFR Estimate If Black   Date Value Ref Range Status   11/20/2020 >90 >60 mL/min/[1.73_m2] Final     Comment:     Starting 12/18/2018, serum creatinine based estimated GFR (eGFR) will be   calculated using the Chronic Kidney Disease Epidemiology Collaboration   (CKD-EPI) equation.       Lab Results   Component Value Date    CR 0.77 11/20/2020     No results found for: MICROALBUMIN    Healthy Eating:  Healthy Eating Assessed Today: Yes  Cultural/Sikh diet restrictions?: No  Meal planning/habits: Other(Pt recently started reducing carbohydrate intake.)  Meals include: Breakfast, Lunch, Dinner  Breakfast: 2 eggs or 2 waffles or 2 pancakes - sugar free syrup - coffee with sugar free cream  Lunch: Packs the following and grazes at work - sandwich, salad with ranch dressing, sugar free jello, sugar free cookies  Dinner: Eats 12 am-2 am when gets home - steamer packet with veggies/pasta/sauce with few frozen meatballs  Other: Pt works afternoon shift - 12 pm - 12 am.  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 : Yes  Blood Glucose Meter: ContourNext  Times checking blood sugar at home (number): Other(Pt has seven tests int he past two weeks.)  Blood glucose trend: Increasing  Fasting-186, 200  Evening after comes home from work and eats - 172, 185,  165, 275, 177    Taking Medications:  Diabetes Medication(s)     Biguanides       metFORMIN (GLUCOPHAGE-XR) 500 MG 24 hr tablet    TAKE 2 TABLETS BY MOUTH TWICE DAILY WITH MEALS    Incretin Mimetic Agents (GLP-1 Receptor Agonists)       dulaglutide (TRULICITY) 0.75 MG/0.5ML pen    Inject 0.75 mg Subcutaneous every 7 days        Taking Medication Assessed Today: Yes  Current Treatments: Diet, Oral Medication (taken by mouth)(Metformin ER 1000 mg bid.)  Problems taking diabetes medications regularly?: No  Diabetes medication side effects?: Yes(Pt had low's when on Glimepiride in past.)    Problem Solving:  Problem Solving Assessed Today: No    Reducing Risks:  Reducing Risks Assessed Today: No  Has dilated eye exam at least once a year?: No(Scheduled)  Sees dentist every 6 months?: No  Feet checked by healthcare provider in the last year?: Yes    Healthy Coping:  Healthy Coping Assessed Today: Yes  Emotional response to diabetes: Acceptance  Stage of change: ACTION (Actively working towards change)  Patient Activation Measure Survey Score:  GRUPO Score (Last Two) 12/7/2018   GRUPO Raw Score 29   Activation Score 52.9   GRUPO Level 2     Diabetes knowledge and skills assessment:   Patient is knowledgeable in diabetes management concepts related to: Monitoring and Taking Medication    Patient needs further education on the following diabetes management concepts: Healthy Eating, Being Active, Monitoring, Taking Medication and Reducing Risks    Based on learning assessment above, most appropriate setting for further diabetes education would be: Individual setting.      INTERVENTIONS:    Education provided today on:  AADE Self-Care Behaviors:  Taking Medication:  Discussed need for additional medication for improved glucose control.  Pt is open to taking additional medication.  Reviewed options with decision made to try GLP-1 medication.  Spoke with provider who gave verbal okay to begin GLP-1.  Reviewed - action of prescribed  medication, drawing up, administering and storing injectable diabetes medications, proper site selection and rotation for injections, side effects of prescribed medications, when to take medications.    Opportunities for ongoing education and support in diabetes-self management were discussed.    Pt verbalized understanding of concepts discussed and recommendations provided today.       Education Materials Provided:  Sample Trulicity box of two pens (0.75 mg)    ASSESSMENT:  Glucose levels remain elevated.  Pt is agreeable to additional medication.  Reviewed options and he desires GLP-1 medication - weekly preferred if insurance will cover.     Patient's most recent   Lab Results   Component Value Date    A1C 11.9 11/20/2020    is not meeting goal of <7.0    PLAN  Continue efforts to eat a healthy diet.   Try to get some exercise as able.  Test glucose 2x/day.   Keep taking current dose of Metformin.   Begin 0.75 mg Trulicity weekly.  Call if side effects occur.   See Patient Instructions for co-developed, patient-stated behavior change goals.  AVS printed and provided to patient today.  Follow up in one month.      Time Spent: 40 minutes  Encounter Type: Individual    Any diabetes medication dose changes were made via the CDE Protocol and Collaborative Practice Agreement with the patient's referring provider. A copy of this encounter was shared with the provider.

## 2020-12-18 NOTE — LETTER
"    12/18/2020        RE: Atul Lam  [address]  [place] [address]        Diabetes Self-Management Education & Support    Presents for: Individual review    SUBJECTIVE/OBJECTIVE:  Presents for: Individual review  Accompanied by: Self(Wife is Leah)  Diabetes education in the past 24mo: Yes  Focus of Visit: Reducing Risks, Taking Medication  Diabetes type: Type 2  Date of diagnosis: 2008  Disease course: Worsening  How confident are you filling out medical forms by yourself:: Quite a bit  Diabetes management related comments/concerns: A1c has trended up.  Transportation concerns: No  Difficulty affording diabetes medication?: No  Difficulty affording diabetes testing supplies?: No  Other concerns:: None  Cultural Influences/Ethnic Background:  American    Diabetes Symptoms & Complications:  Fatigue: Yes  Neuropathy: No  Polydipsia: No  Polyphagia: No  Polyuria: No  Visual change: No  Slow healing wounds: No  Symptom course: Stable  Weight trend: Decreasing(Weight is down 25# since visit to [facility] 2/2019)  Complications assessed today?: No    Patient Problem List and Family Medical History reviewed for relevant medical history, current medical status, and diabetes risk factors.    Vitals:  /82   Pulse 75   Wt 105.1 kg (231 lb 12.8 oz)   SpO2 97%   BMI 29.76 kg/m    Estimated body mass index is 29.76 kg/m  as calculated from the following:    Height as of 11/20/20: 1.88 m (6' 2\").    Weight as of this encounter: 105.1 kg (231 lb 12.8 oz).   Last 3 BP:   BP Readings from Last 3 Encounters:   12/18/20 122/82   12/04/20 122/81   11/20/20 128/78       History   Smoking Status     Former Smoker     Years: 30.00     Types: Dip, chew, snus or snuff     Quit date: 9/12/2017   Smokeless Tobacco     Former User     Types: Chew     Quit date: 2/14/2020       Labs:  Lab Results   Component Value Date    A1C 11.9 11/20/2020     Lab Results   Component Value Date     11/20/2020     Lab Results   Component Value " Date    LDL  11/20/2020     Cannot estimate LDL when triglyceride exceeds 400 mg/dL     HDL Cholesterol   Date Value Ref Range Status   11/20/2020 40 >39 mg/dL Final   ]  GFR Estimate   Date Value Ref Range Status   11/20/2020 >90 >60 mL/min/[1.73_m2] Final     Comment:     Starting 12/18/2018, serum creatinine based estimated GFR (eGFR) will be   calculated using the Chronic Kidney Disease Epidemiology Collaboration   (CKD-EPI) equation.       GFR Estimate If Black   Date Value Ref Range Status   11/20/2020 >90 >60 mL/min/[1.73_m2] Final     Comment:     Starting 12/18/2018, serum creatinine based estimated GFR (eGFR) will be   calculated using the Chronic Kidney Disease Epidemiology Collaboration   (CKD-EPI) equation.       Lab Results   Component Value Date    CR 0.77 11/20/2020     No results found for: MICROALBUMIN    Healthy Eating:  Healthy Eating Assessed Today: Yes  Cultural/Caodaism diet restrictions?: No  Meal planning/habits: Other(Pt recently started reducing carbohydrate intake.)  Meals include: Breakfast, Lunch, Dinner  Breakfast: 2 eggs or 2 waffles or 2 pancakes - sugar free syrup - coffee with sugar free cream  Lunch: Packs the following and grazes at work - sandwich, salad with ranch dressing, sugar free jello, sugar free cookies  Dinner: Eats 12 am-2 am when gets home - steamer packet with veggies/pasta/sauce with few frozen meatballs  Other: Pt works afternoon shift - 12 pm - 12 am.  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 : Yes  Blood Glucose Meter: ContourNext  Times checking blood sugar at home (number): Other(Pt has seven tests int he past two weeks.)  Blood glucose trend:  Increasing  Fasting-186, 200  Evening after comes home from work and eats - 172, 185, 165, 275, 177    Taking Medications:  Diabetes Medication(s)     Biguanides       metFORMIN (GLUCOPHAGE-XR) 500 MG 24 hr tablet    TAKE 2 TABLETS BY MOUTH TWICE DAILY WITH MEALS    Incretin Mimetic Agents (GLP-1 Receptor Agonists)       dulaglutide (TRULICITY) 0.75 MG/0.5ML pen    Inject 0.75 mg Subcutaneous every 7 days        Taking Medication Assessed Today: Yes  Current Treatments: Diet, Oral Medication (taken by mouth)(Metformin ER 1000 mg bid.)  Problems taking diabetes medications regularly?: No  Diabetes medication side effects?: Yes(Pt had low's when on Glimepiride in past.)    Problem Solving:  Problem Solving Assessed Today: No    Reducing Risks:  Reducing Risks Assessed Today: No  Has dilated eye exam at least once a year?: No(Scheduled)  Sees dentist every 6 months?: No  Feet checked by healthcare provider in the last year?: Yes    Healthy Coping:  Healthy Coping Assessed Today: Yes  Emotional response to diabetes: Acceptance  Stage of change: ACTION (Actively working towards change)  Patient Activation Measure Survey Score:  GRUPO Score (Last Two) 12/7/2018   GRUPO Raw Score 29   Activation Score 52.9   GRUPO Level 2     Diabetes knowledge and skills assessment:   Patient is knowledgeable in diabetes management concepts related to: Monitoring and Taking Medication    Patient needs further education on the following diabetes management concepts: Healthy Eating, Being Active, Monitoring, Taking Medication and Reducing Risks    Based on learning assessment above, most appropriate setting for further diabetes education would be: Individual setting.      INTERVENTIONS:    Education provided today on:  AADE Self-Care Behaviors:  Taking Medication:  Discussed need for additional medication for improved glucose control.  Pt is open to taking additional medication.  Reviewed options with decision made to try GLP-1 medication.  Spoke  with provider who gave verbal okay to begin GLP-1.  Reviewed - action of prescribed medication, drawing up, administering and storing injectable diabetes medications, proper site selection and rotation for injections, side effects of prescribed medications, when to take medications.    Opportunities for ongoing education and support in diabetes-self management were discussed.    Pt verbalized understanding of concepts discussed and recommendations provided today.       Education Materials Provided:  Sample Trulicity box of two pens (0.75 mg)    ASSESSMENT:  Glucose levels remain elevated.  Pt is agreeable to additional medication.  Reviewed options and he desires GLP-1 medication - weekly preferred if insurance will cover.     Patient's most recent   Lab Results   Component Value Date    A1C 11.9 11/20/2020    is not meeting goal of <7.0    PLAN  Continue efforts to eat a healthy diet.   Try to get some exercise as able.  Test glucose 2x/day.   Keep taking current dose of Metformin.   Begin 0.75 mg Trulicity weekly.  Call if side effects occur.   See Patient Instructions for co-developed, patient-stated behavior change goals.  AVS printed and provided to patient today.  Follow up in one month.      Time Spent: 40 minutes  Encounter Type: Individual    Any diabetes medication dose changes were made via the CDE Protocol and Collaborative Practice Agreement with the patient's referring provider. A copy of this encounter was shared with the provider.          Sincerely,        Chica Guzman RD

## 2020-12-18 NOTE — PATIENT INSTRUCTIONS
-Keep limiting foods high in carbohydrates.    -Test glucose 2x/day - fasting and in evening - try for 2 hours after eating.   -Target levels are fasting  and 2 hours after eating less than 180.  -Keep taking your current dose of Metformin.  -Begin Trulicity 0.75 mg weekly.    -Call with any side effects or concerns.  -Follow up in one month.  Bring your meter.  -HUMA Marino, -[phone].

## 2020-12-19 ASSESSMENT — ANXIETY QUESTIONNAIRES: GAD7 TOTAL SCORE: 0

## 2020-12-22 ENCOUNTER — VIRTUAL VISIT (OUTPATIENT)
Dept: SLEEP MEDICINE | Facility: HOSPITAL | Age: 55
End: 2020-12-22
Attending: FAMILY MEDICINE
Payer: COMMERCIAL

## 2020-12-22 DIAGNOSIS — G47.33 OSA (OBSTRUCTIVE SLEEP APNEA): Primary | ICD-10-CM

## 2020-12-22 DIAGNOSIS — E11.9 TYPE 2 DIABETES MELLITUS WITHOUT COMPLICATION, WITHOUT LONG-TERM CURRENT USE OF INSULIN (H): ICD-10-CM

## 2020-12-22 PROCEDURE — 99213 OFFICE O/P EST LOW 20 MIN: CPT | Mod: 95 | Performed by: FAMILY MEDICINE

## 2020-12-23 ENCOUNTER — ANESTHESIA EVENT (OUTPATIENT)
Dept: SURGERY | Facility: HOSPITAL | Age: 55
End: 2020-12-23
Payer: COMMERCIAL

## 2020-12-23 ASSESSMENT — LIFESTYLE VARIABLES: TOBACCO_USE: 1

## 2020-12-23 NOTE — ANESTHESIA PREPROCEDURE EVALUATION
Anesthesia Pre-Procedure Evaluation    Patient: Atul Lam   MRN: [id number] : 1965          Preoperative Diagnosis: Positive colorectal cancer screening using Cologuard test [R19.5]    Procedure(s):  COLONOSCOPY    Past Medical History:   Diagnosis Date     Congenital hiatus hernia      Diabetes (H)      Gastroesophageal reflux disease      Hiatal hernia      Neck mass      Warthin tumor      Past Surgical History:   Procedure Laterality Date     ENDOSCOPIC SINUS SURGERY N/A 2020    Procedure: Bilateral Endoscopic Sinus Surgery with Polypectomy, Frozens;  Surgeon: Cherelle Chacon MD;  Location: [facility]     ENT SURGERY      T and A     PAROTIDECTOMY Right 4/10/2018    Procedure: PAROTIDECTOMY;  Right Superficial Parotidectomy;  Surgeon: Alexandra Holbrook MD;  Location: [facility]     SEPTOPLASTY, TURBINOPLASTY, COMBINED N/A 2020    Procedure: TURBINATE REDUCTION, PROPEL IMPLANT TIMES 4;  Surgeon: Cherelle Chacon MD;  Location: [facility]       Anesthesia Evaluation     . Pt has had prior anesthetic. Type: General and MAC           ROS/MED HX    ENT/Pulmonary:     (+)sleep apnea, other ENT- chronic pansinusitis, nasal turbinate hypertrophy, tobacco use (former smoker, currently chews), Past use , . Other pulmonary disease nasal polyps and ASA allergy.    Neurologic:  - neg neurologic ROS     Cardiovascular:     (+) Dyslipidemia, ----. : . . . :. .       METS/Exercise Tolerance:  >4 METS   Hematologic:  - neg hematologic  ROS       Musculoskeletal:  - neg musculoskeletal ROS       GI/Hepatic: Comment: Hiatal hernia.  Rare GERD symptoms    (+) GERD hiatal hernia, bowel prep, Other GI/Hepatic positive Cologuard      Renal/Genitourinary:  - ROS Renal section negative       Endo: Comment: BG two days ago was 100 per patient report    (+) type II DM Last HgA1c: 11.9 date: 2020 Not using insulin .      Psychiatric:  - neg psychiatric ROS       Infectious Disease:  - neg infectious disease ROS   "     Malignancy:      - no malignancy   Other:    - neg other ROS                      Physical Exam  Normal systems: cardiovascular and pulmonary    Airway   Mallampati: I  TM distance: >3 FB  Neck ROM: full    Dental   (+) missing, lower dentures and upper dentures    Cardiovascular   Rhythm and rate: regular and normal      Pulmonary    breath sounds clear to auscultation            Lab Results   Component Value Date    WBC 6.4 11/20/2020    HGB 14.4 11/20/2020    HCT 42.3 11/20/2020     11/20/2020     11/20/2020    POTASSIUM 3.9 11/20/2020    CHLORIDE 105 11/20/2020    CO2 27 11/20/2020    BUN 15 11/20/2020    CR 0.77 11/20/2020     (H) 11/20/2020    NEFTALI 8.5 11/20/2020    ALBUMIN 3.7 11/20/2020    PROTTOTAL 8.0 11/20/2020    ALT 35 11/20/2020    AST 17 11/20/2020    ALKPHOS 89 11/20/2020    BILITOTAL 0.3 11/20/2020    TSH 0.92 11/20/2020       Preop Vitals  BP Readings from Last 3 Encounters:   12/18/20 122/82   12/04/20 122/81   11/20/20 128/78    Pulse Readings from Last 3 Encounters:   12/18/20 75   12/04/20 78   11/20/20 78      Resp Readings from Last 3 Encounters:   12/04/20 16   01/08/20 (!) 5   08/10/18 16    SpO2 Readings from Last 3 Encounters:   12/18/20 97%   12/04/20 98%   11/20/20 98%      Temp Readings from Last 1 Encounters:   11/20/20 97.4  F (36.3  C) (Tympanic)    Ht Readings from Last 1 Encounters:   11/20/20 1.88 m (6' 2\")      Wt Readings from Last 1 Encounters:   12/18/20 105.1 kg (231 lb 12.8 oz)    Estimated body mass index is 29.76 kg/m  as calculated from the following:    Height as of 11/20/20: 1.88 m (6' 2\").    Weight as of 12/18/20: 105.1 kg (231 lb 12.8 oz).       Anesthesia Plan      History & Physical Review  History and physical reviewed and following examination; no interval change.    ASA Status:  2 .    NPO Status:  > 8 hours    Plan for MAC with Propofol induction. Reason for MAC:  Deep or markedly invasive procedure (G8)           Postoperative " Care      Consents  Anesthetic plan, risks, benefits and alternatives discussed with:  Patient..                 MARYLIN Barrera CRNA

## 2020-12-26 ENCOUNTER — OFFICE VISIT (OUTPATIENT)
Dept: FAMILY MEDICINE | Facility: OTHER | Age: 55
End: 2020-12-26
Attending: FAMILY MEDICINE
Payer: COMMERCIAL

## 2020-12-26 DIAGNOSIS — R19.5 POSITIVE COLORECTAL CANCER SCREENING USING COLOGUARD TEST: ICD-10-CM

## 2020-12-26 PROCEDURE — U0003 INFECTIOUS AGENT DETECTION BY NUCLEIC ACID (DNA OR RNA); SEVERE ACUTE RESPIRATORY SYNDROME CORONAVIRUS 2 (SARS-COV-2) (CORONAVIRUS DISEASE [COVID-19]), AMPLIFIED PROBE TECHNIQUE, MAKING USE OF HIGH THROUGHPUT TECHNOLOGIES AS DESCRIBED BY CMS-2020-01-R: HCPCS | Performed by: SURGERY

## 2020-12-27 LAB
SARS-COV-2 RNA SPEC QL NAA+PROBE: NOT DETECTED
SPECIMEN SOURCE: NORMAL

## 2020-12-28 ENCOUNTER — DOCUMENTATION ONLY (OUTPATIENT)
Dept: SLEEP MEDICINE | Facility: CLINIC | Age: 55
End: 2020-12-28

## 2020-12-28 ENCOUNTER — TELEPHONE (OUTPATIENT)
Dept: SURGERY | Facility: OTHER | Age: 55
End: 2020-12-28

## 2020-12-28 NOTE — PROGRESS NOTES
"Atul Lam is a 55 year old male who is being evaluated via a billable video visit.      The patient has been notified of following:     \"This video visit will be conducted via a call between you and your physician/provider. We have found that certain health care needs can be provided without the need for an in-person physical exam.  This service lets us provide the care you need with a video conversation.  If a prescription is necessary we can send it directly to your pharmacy.  If lab work is needed we can place an order for that and you can then stop by our lab to have the test done at a later time.    Video visits are billed at different rates depending on your insurance coverage.  Please reach out to your insurance provider with any questions.    If during the course of the call the physician/provider feels a video visit is not appropriate, you will not be charged for this service.\"    Patient has given verbal consent for Video visit? Yes  How would you like to obtain your AVS?   If you are dropped from the video visit, the video invite should be resent to:   Will anyone else be joining your video visit? No        Video-Visit Details    Type of service:  Video Visit    Video Start Time: 3pm  Video End Time: 3:25pm    Originating Location (pt. Location): Home    Distant Location (provider location):  [facility]     Platform used for Video Visit: Medivo    Virtual visit to review home sleep testing results.    Assessment:  -Moderate obstructive sleep apnea with mild sleep associated hypoxemia  -Comorbid type 2 diabetes  -Sleep concerns of poor sleep quality, daytime fatigue, loud snoring    Plan:  -He is motivated to try CPAP given that his brother uses a bilevel and has done incredibly well with it.  -Orders placed for CPAP auto titrate 5-15 cm H2O.  -Plan for follow-up in 1 month.      55 year old yo male who is referred for concern for sleep-disordered breathing.     Past Medical History        Past " "Medical History:   Diagnosis Date     Congenital hiatus hernia       Diabetes (H)       Gastroesophageal reflux disease       Hiatal hernia       Neck mass       Warthin tumor              Current Outpatient Medications   Medication     blood glucose (BELL CONTOUR NEXT) test strip     blood glucose monitoring (BELL MICROLET) lancets     budesonide (PULMICORT) 0.5 MG/2ML neb solution     fluticasone (FLONASE) 50 MCG/ACT nasal spray     metFORMIN (GLUCOPHAGE-XR) 500 MG 24 hr tablet     simvastatin (ZOCOR) 40 MG tablet      No current facility-administered medications for this visit.          STOP-BANG score of 4, with unknown neck circumference.  Jewett score of 6.  BMI of Estimated body mass index is 29.95 kg/m  as calculated from the following:    Height as of 11/20/20: 1.88 m (6' 2\").    Weight as of 12/4/20: 105.8 kg (233 lb 4.8 oz).      Per questionnaire: not answered     Caffeine use:  Yes for 3+ per day.  Yes for within 6 hours of bed.     Tobacco use: No     Sleep pattern:  Yes for shift work, rotating shifts  Workdays.  2am - 10am, total sleep time 8 hours.  Weekends.  10pm - 8am, total sleep time 6 hours.  Time to fall asleep: ~10 minutes.  Awakenings: 3 times per night, 10 minutes to return to sleep.  Napping.  0 days per week, - hours per nap.     No for RLS screen.  No for sleep walking.  No for dream enactment behavior.  No for bruxism.     Yes for morning headaches.  Yes for snoring.  Yes for observed apnea.  Yes for FHx of MIRIAN.     SHx:  , lives with wife.  Is currently employed.    HOME SLEEP STUDY INTERPRETATION     Patient: Atul Lam  MRN: [id number]  YOB: 1965  Study Date: 12/16/2020  Referring Provider: Zoraida Pham  Ordering Provider: Mack Lofton MD     Indications for Home Study: Atul Lam is a 55 year old male with a history of DM II who presents with symptoms suggestive of obstructive sleep apnea.     Estimated body mass index is 29.95 kg/m  " "as calculated from the following:    Height as of 20: 1.88 m (6' 2\").    Weight as of 20: 105.8 kg (233 lb 4.8 oz).  Stockwell Sleepiness Scale:   STOP-BAN/8     Data: A full night home sleep study was performed recording the standard physiologic parameters including body position, movement, sound, nasal pressure, thermal oral airflow, chest and abdominal movements with respiratory inductance plethysmography, and oxygen saturation by pulse oximetry. Pulse rate was estimated by oximetry recording. This study was considered adequate based on > 4 hours of quality oximetry and respiratory recording. As specified by the AASM Manual for the Scoring of Sleep and Associated events, version 2.3, Rule VIII.D 1B, 4% oxygen desaturation scoring for hypopneas is used as a standard of care on all home sleep apnea testing.     Analysis Time:  599.9 minutes     Respiration:   Sleep Associated Hypoxemia: sustained hypoxemia was not present. Baseline oxygen saturation was 95.5%.  Time with saturation less than or equal to 88% was 7 minutes. The lowest oxygen saturation was 79%.   Snoring: Snoring was present, though rare.  Respiratory events: The home study revealed a presence of 14 obstructive apneas and 7 mixed and central apneas. There were 180 hypopneas resulting in a combined apnea/hypopnea index [AHI] of 20.1 events per hour.  AHI was 26.7 per hour supine, - per hour prone, 8.1 per hour on left side, and 3.6 per hour on right side.   Pattern: Excluding events noted above, respiratory rate and pattern was Normal.     Position: Percent of time spent: supine - 65.2%, prone - 0%, on left - 32.1%, on right - 2.8%.     Heart Rate: By pulse oximetry normal rate was noted.      Assessment:   - Moderate obstructive sleep apnea.  - Strong positional component observed with supine AHI 26.7 and lateral AHI < 10.  - Sleep associated hypoxemia was present, though mild.     Recommendations:  Consider auto-CPAP at 5-15 cmH2O, " oral appliance therapy, positional therapy or polysomnography with full night PAP titration.  Suggest optimizing sleep hygiene and avoiding sleep deprivation.  Weight management.    10 point ROS of systems including Constitutional, Eyes, Respiratory, Cardiovascular, Gastroenterology, Genitourinary, Integumentary, Muscularskeletal, Psychiatric were all negative except for pertinent positives noted in my HPI.    Physical Exam  Constitutional:       General: He is not in acute distress.     Appearance: Normal appearance. He is not ill-appearing, toxic-appearing or diaphoretic.   HENT:      Head: Normocephalic and atraumatic.      Right Ear: External ear normal.      Left Ear: External ear normal.      Nose: Nose normal.   Eyes:      Conjunctiva/sclera: Conjunctivae normal.   Pulmonary:      Effort: Pulmonary effort is normal. No respiratory distress.   Skin:     Coloration: Skin is not jaundiced or pale.      Findings: No bruising or erythema.   Neurological:      General: No focal deficit present.      Mental Status: He is alert and oriented to person, place, and time.   Psychiatric:         Mood and Affect: Mood normal.         Behavior: Behavior normal.         Thought Content: Thought content normal.         Judgment: Judgment normal.           ---  This note was written with the assistance of the Dragon voice-dictation technology software. The final document, although reviewed, may contain errors. For corrections, please contact the office.    Mack Lofton MD    Sleep Medicine  [facility]  ([phone])  [facility]  ([phone])

## 2020-12-29 ENCOUNTER — DOCUMENTATION ONLY (OUTPATIENT)
Dept: SLEEP MEDICINE | Facility: HOSPITAL | Age: 55
End: 2020-12-29

## 2020-12-31 ENCOUNTER — TELEPHONE (OUTPATIENT)
Dept: SURGERY | Facility: OTHER | Age: 55
End: 2020-12-31

## 2020-12-31 ENCOUNTER — ANESTHESIA (OUTPATIENT)
Dept: SURGERY | Facility: HOSPITAL | Age: 55
End: 2020-12-31
Payer: COMMERCIAL

## 2020-12-31 ENCOUNTER — HOSPITAL ENCOUNTER (OUTPATIENT)
Facility: HOSPITAL | Age: 55
Discharge: HOME | End: 2020-12-31
Attending: SURGERY | Admitting: SURGERY
Payer: COMMERCIAL

## 2020-12-31 VITALS
WEIGHT: 226 LBS | RESPIRATION RATE: 18 BRPM | BODY MASS INDEX: 29 KG/M2 | HEART RATE: 77 BPM | OXYGEN SATURATION: 94 % | TEMPERATURE: 97 F | HEIGHT: 74 IN | DIASTOLIC BLOOD PRESSURE: 94 MMHG | SYSTOLIC BLOOD PRESSURE: 128 MMHG

## 2020-12-31 DIAGNOSIS — K63.89 COLONIC MASS: Primary | ICD-10-CM

## 2020-12-31 DIAGNOSIS — R19.5 POSITIVE COLORECTAL CANCER SCREENING USING COLOGUARD TEST: ICD-10-CM

## 2020-12-31 PROCEDURE — 272N000001 HC OR GENERAL SUPPLY STERILE: Performed by: SURGERY

## 2020-12-31 PROCEDURE — 36415 COLL VENOUS BLD VENIPUNCTURE: CPT | Performed by: SURGERY

## 2020-12-31 PROCEDURE — 370N000002 HC ANESTHESIA TECHNICAL FEE, EACH ADDTL 15 MIN: Performed by: SURGERY

## 2020-12-31 PROCEDURE — 360N000014 HC SURGERY LEVEL 2 1ST 30 MIN: Performed by: SURGERY

## 2020-12-31 PROCEDURE — 45381 COLONOSCOPY SUBMUCOUS NJX: CPT | Mod: 51 | Performed by: SURGERY

## 2020-12-31 PROCEDURE — 250N000009 HC RX 250: Performed by: SURGERY

## 2020-12-31 PROCEDURE — 999N000136 HC STATISTIC PRE PROC ASSESS II: Performed by: SURGERY

## 2020-12-31 PROCEDURE — 250N000009 HC RX 250: Performed by: NURSE ANESTHETIST, CERTIFIED REGISTERED

## 2020-12-31 PROCEDURE — 360N000015 HC SURGERY LEVEL 2 EA 15 ADDTL MIN: Performed by: SURGERY

## 2020-12-31 PROCEDURE — 258N000003 HC RX IP 258 OP 636: Performed by: NURSE ANESTHETIST, CERTIFIED REGISTERED

## 2020-12-31 PROCEDURE — 370N000001 HC ANESTHESIA TECHNICAL FEE, 1ST 30 MIN: Performed by: SURGERY

## 2020-12-31 PROCEDURE — 761N000007 HC RECOVERY PHASE 2 EACH 15 MINS: Performed by: SURGERY

## 2020-12-31 PROCEDURE — 88305 TISSUE EXAM BY PATHOLOGIST: CPT | Mod: TC | Performed by: SURGERY

## 2020-12-31 PROCEDURE — 45385 COLONOSCOPY W/LESION REMOVAL: CPT | Performed by: SURGERY

## 2020-12-31 PROCEDURE — 45380 COLONOSCOPY AND BIOPSY: CPT | Performed by: NURSE ANESTHETIST, CERTIFIED REGISTERED

## 2020-12-31 PROCEDURE — 45380 COLONOSCOPY AND BIOPSY: CPT | Mod: 59 | Performed by: SURGERY

## 2020-12-31 PROCEDURE — 82378 CARCINOEMBRYONIC ANTIGEN: CPT | Performed by: SURGERY

## 2020-12-31 PROCEDURE — 250N000011 HC RX IP 250 OP 636: Performed by: NURSE ANESTHETIST, CERTIFIED REGISTERED

## 2020-12-31 RX ORDER — ONDANSETRON 4 MG/1
4 TABLET, ORALLY DISINTEGRATING ORAL EVERY 30 MIN PRN
Status: DISCONTINUED | OUTPATIENT
Start: 2020-12-31 | End: 2020-12-31 | Stop reason: HOSPADM

## 2020-12-31 RX ORDER — FENTANYL CITRATE 50 UG/ML
INJECTION, SOLUTION INTRAMUSCULAR; INTRAVENOUS PRN
Status: DISCONTINUED | OUTPATIENT
Start: 2020-12-31 | End: 2020-12-31

## 2020-12-31 RX ORDER — PROPOFOL 10 MG/ML
INJECTION, EMULSION INTRAVENOUS PRN
Status: DISCONTINUED | OUTPATIENT
Start: 2020-12-31 | End: 2020-12-31

## 2020-12-31 RX ORDER — ONDANSETRON 2 MG/ML
4 INJECTION INTRAMUSCULAR; INTRAVENOUS EVERY 30 MIN PRN
Status: DISCONTINUED | OUTPATIENT
Start: 2020-12-31 | End: 2020-12-31 | Stop reason: HOSPADM

## 2020-12-31 RX ORDER — LIDOCAINE 40 MG/G
CREAM TOPICAL
Status: DISCONTINUED | OUTPATIENT
Start: 2020-12-31 | End: 2020-12-31 | Stop reason: HOSPADM

## 2020-12-31 RX ORDER — LIDOCAINE HYDROCHLORIDE 20 MG/ML
INJECTION, SOLUTION INFILTRATION; PERINEURAL PRN
Status: DISCONTINUED | OUTPATIENT
Start: 2020-12-31 | End: 2020-12-31

## 2020-12-31 RX ORDER — LABETALOL 20 MG/4 ML (5 MG/ML) INTRAVENOUS SYRINGE
10
Status: DISCONTINUED | OUTPATIENT
Start: 2020-12-31 | End: 2020-12-31 | Stop reason: HOSPADM

## 2020-12-31 RX ORDER — NALOXONE HYDROCHLORIDE 0.4 MG/ML
0.4 INJECTION, SOLUTION INTRAMUSCULAR; INTRAVENOUS; SUBCUTANEOUS
Status: DISCONTINUED | OUTPATIENT
Start: 2020-12-31 | End: 2020-12-31 | Stop reason: HOSPADM

## 2020-12-31 RX ORDER — SODIUM CHLORIDE, SODIUM LACTATE, POTASSIUM CHLORIDE, CALCIUM CHLORIDE 600; 310; 30; 20 MG/100ML; MG/100ML; MG/100ML; MG/100ML
INJECTION, SOLUTION INTRAVENOUS CONTINUOUS
Status: DISCONTINUED | OUTPATIENT
Start: 2020-12-31 | End: 2020-12-31 | Stop reason: HOSPADM

## 2020-12-31 RX ORDER — NALOXONE HYDROCHLORIDE 0.4 MG/ML
0.2 INJECTION, SOLUTION INTRAMUSCULAR; INTRAVENOUS; SUBCUTANEOUS
Status: DISCONTINUED | OUTPATIENT
Start: 2020-12-31 | End: 2020-12-31 | Stop reason: HOSPADM

## 2020-12-31 RX ORDER — HYDRALAZINE HYDROCHLORIDE 20 MG/ML
2.5-5 INJECTION INTRAMUSCULAR; INTRAVENOUS EVERY 10 MIN PRN
Status: DISCONTINUED | OUTPATIENT
Start: 2020-12-31 | End: 2020-12-31 | Stop reason: HOSPADM

## 2020-12-31 RX ADMIN — PROPOFOL 50 MG: 10 INJECTION, EMULSION INTRAVENOUS at 11:23

## 2020-12-31 RX ADMIN — FENTANYL CITRATE 50 MCG: 50 INJECTION, SOLUTION INTRAMUSCULAR; INTRAVENOUS at 11:23

## 2020-12-31 RX ADMIN — PROPOFOL 50 MG: 10 INJECTION, EMULSION INTRAVENOUS at 11:37

## 2020-12-31 RX ADMIN — LIDOCAINE HYDROCHLORIDE 40 MG: 20 INJECTION, SOLUTION INFILTRATION; PERINEURAL at 11:10

## 2020-12-31 RX ADMIN — FENTANYL CITRATE 50 MCG: 50 INJECTION, SOLUTION INTRAMUSCULAR; INTRAVENOUS at 11:06

## 2020-12-31 RX ADMIN — PROPOFOL 100 MG: 10 INJECTION, EMULSION INTRAVENOUS at 11:20

## 2020-12-31 RX ADMIN — PROPOFOL 50 MG: 10 INJECTION, EMULSION INTRAVENOUS at 11:31

## 2020-12-31 RX ADMIN — PROPOFOL 50 MG: 10 INJECTION, EMULSION INTRAVENOUS at 11:39

## 2020-12-31 RX ADMIN — SODIUM CHLORIDE, POTASSIUM CHLORIDE, SODIUM LACTATE AND CALCIUM CHLORIDE: 600; 310; 30; 20 INJECTION, SOLUTION INTRAVENOUS at 10:30

## 2020-12-31 RX ADMIN — FENTANYL CITRATE 50 MCG: 50 INJECTION, SOLUTION INTRAMUSCULAR; INTRAVENOUS at 11:03

## 2020-12-31 RX ADMIN — PROPOFOL 50 MG: 10 INJECTION, EMULSION INTRAVENOUS at 11:18

## 2020-12-31 RX ADMIN — MIDAZOLAM 1 MG: 1 INJECTION INTRAMUSCULAR; INTRAVENOUS at 11:06

## 2020-12-31 RX ADMIN — PROPOFOL 50 MG: 10 INJECTION, EMULSION INTRAVENOUS at 11:26

## 2020-12-31 RX ADMIN — PROPOFOL 50 MG: 10 INJECTION, EMULSION INTRAVENOUS at 11:24

## 2020-12-31 RX ADMIN — MIDAZOLAM 1 MG: 1 INJECTION INTRAMUSCULAR; INTRAVENOUS at 11:03

## 2020-12-31 ASSESSMENT — MIFFLIN-ST. JEOR: SCORE: 1939.88

## 2020-12-31 NOTE — OR NURSING
Patient and responsible adult given discharge instructions with no questions regarding instructions. Godwin score 18. Pain level 0/10.  Pt is waiting for ride - wife to arrive in 15 minutes.

## 2020-12-31 NOTE — H&P
Surgery Consult Clinic Note      RE: Atul Lam  : 1965      Chief Complaint:  Positive cologuard  Colon cancer screening    History of Present Illness:  I am seeing Atul Lam at the request of Dr. Roblero for evaluation regarding meet and greet screening colonoscopy.  This is his first colonoscopy.  He denies family history of colon or rectal cancer, blood in stool, changes in bowel habits, weight loss, abdominal pain.  No previous abdominal surgeries.   He has no questions regarding  bowel prep.  Reports passing clear yellow liquid stools today.     He specifically denies fevers, chills, nausea, vomiting, chest pain, shortness of breath, palpitations, sore throat, cough, or generalized feeling ill.   He had a negative COVID 19 PCR test on 2020.    Medical history:  Past Medical History:   Diagnosis Date     Congenital hiatus hernia      Diabetes (H)      Gastroesophageal reflux disease      Hiatal hernia      Neck mass      Warthin tumor        Surgical history:  Past Surgical History:   Procedure Laterality Date     ENDOSCOPIC SINUS SURGERY N/A 2020    Procedure: Bilateral Endoscopic Sinus Surgery with Polypectomy, Frozens;  Surgeon: Cherelle Chacon MD;  Location: [facility]     ENT SURGERY      T and A     PAROTIDECTOMY Right 4/10/2018    Procedure: PAROTIDECTOMY;  Right Superficial Parotidectomy;  Surgeon: Alexandra Holbrook MD;  Location: [facility]     SEPTOPLASTY, TURBINOPLASTY, COMBINED N/A 2020    Procedure: TURBINATE REDUCTION, PROPEL IMPLANT TIMES 4;  Surgeon: Cherelle Chacon MD;  Location: [facility]       Family history:  Family History   Problem Relation Age of Onset     Diabetes Father      Cerebrovascular Disease Father        Medications:  Prior to Admission medications    Medication Sig Start Date End Date Taking? Authorizing Provider   blood glucose (BELL CONTOUR NEXT) test strip Use to test blood sugar 2 times daily. 20  Yes Zoraida Pham MD   blood  glucose monitoring (BELL MICROLET) lancets Use to test blood sugar 2 times daily. 12/4/20  Yes Zoraida Pham MD   dulaglutide (TRULICITY) 0.75 MG/0.5ML pen Inject 0.75 mg Subcutaneous every 7 days 12/18/20  Yes Zoraida Pham MD   metFORMIN (GLUCOPHAGE-XR) 500 MG 24 hr tablet TAKE 2 TABLETS BY MOUTH TWICE DAILY WITH MEALS 11/20/20  Yes Zoraida Pham MD   simvastatin (ZOCOR) 40 MG tablet TAKE 1 TABLET BY MOUTH ONCE DAILY AT BEDTIME 11/20/20  Yes Zoraida Pham MD   budesonide (PULMICORT) 0.5 MG/2ML neb solution Squirt entire vial into previously made preet med saline bottle, mix, and irrigate each nostril until entire bottle empty.  Do this twice daily. 12/30/19   Cherelle Chacon MD   fluticasone (FLONASE) 50 MCG/ACT nasal spray Spray 2 sprays into both nostrils 2 times daily 2/17/20   Cherelle Chacon MD       Allergies:  The patient is allergic to animal dander; aspirin; dust mites; lisinopril; losartan; and morphine.  .  Social history:  Social History     Tobacco Use     Smoking status: Former Smoker     Years: 30.00     Types: Dip, chew, snus or snuff     Quit date: 9/12/2017     Years since quitting: 3.3     Smokeless tobacco: Former User     Types: Chew     Quit date: 2/14/2020   Substance Use Topics     Alcohol use: Yes     Comment: 1 per mo     Marital status: .    Review of Systems:    Constitutional: Negative for fever, chills.   HENT: Negative for ear pain, congestion, sore throat, and ear discharge.    Eyes: Negative for blurred vision, double vision.   Respiratory: Negative for cough, hemoptysis, shortness of breath, wheezing and stridor.    Cardiovascular: Negative for chest pain, palpitations and orthopnea.   Gastrointestinal: Negative for heartburn, nausea, vomiting, abdominal pain and blood in stool.   Genitourinary: Negative for urgency, frequency   Musculoskeletal: Negative for myalgias, back pain and joint pain.   Neurological: Negative for tingling, speech  "change and headaches.   Endo/Heme/Allergies: Does not bruise/bleed easily.   Psychiatric/Behavioral: Negative for depression, suicidal ideas and hallucinations. The patient is not nervous/anxious.    Physical Examination:  /82   Temp 97  F (36.1  C) (Tympanic)   Resp 18   Ht 1.88 m (6' 2\")   Wt 102.5 kg (226 lb)   SpO2 95%   BMI 29.02 kg/m    Ht 1.88 m (6' 2\")   Wt 102.5 kg (226 lb)   BMI 29.02 kg/m    General: Alert and orientedx4, no acute distress, well-developed/well-nourished, ambulating without assistance  HEENT: normocephalic atraumatic, extraocular movements intact, sclerae anicteric, Trachea mideline  Chest:   Clear to auscultation bilaterally.  Cardiac: S1S2 , regular rate and rhythm without additional sounds  Abdomen: Soft, non-tender, non-distended  Extremities: Cursory exam unremarkable.  No peripheral edema noted.  Skin: Warm, dry, < 2 sec cap refill  Neuro: CN 2-12 grossly intact, no focal deficit, GCS 15  Psych: Pleasant, calm, asks appropriate questions      Assessment/Plan:  #1 Colonoscopy  #2 Positive cologuard    Atul Lam and I had a discussion about colonoscopies.  The indications, risks, benefits, althernatives and technical aspects of whole colon colonoscopy were outlined with risks including, but not limited to, perforation, bleeding and inability to visualize entire colon.  Management of each was reviewed including the risk for life saving surgery and possible admittance to the hospital.  His questions were asked and answered.  We will proceed with colonoscopy with Dr. Roblero as scheduled.  Josefa Chamorro [facility]  [address] [place] [address]    Referring Provider:  No referring provider defined for this encounter.     Primary Care Provider:  Zoraida Pham  "

## 2020-12-31 NOTE — OP NOTE
REPORT OF OPERATION  DATE OF PROCEDURE: 12/31/2020    PATIENT: Atul Lam    SURGERY PREFORMED:   Colonoscopy     with biopsy    with use of cauterized snare    with tattooing    PREOPERATIVE DIAGNOSIS: Positive ColoGuard    POSTOPERATIVE DIAGNOSIS:    Same   Colon polyps, Ascending colon and Colon mass, 20cm x 2 cm   Diverticulosis was not identified.   Hemorrhoids  were  identified.    SURGEON: Lionel Roblero MD    ASSISTANTS: None    ANESTHESIA: Monitored Anesthesia Care    COMPLICATIONS: None apparent    TRANSFUSIONS: None     TISSUE TO PATHOLOGY: Polyps from Ascending colon and 20cm x 2 cm,biopsy of colon mass at 40 were sent to pathology for pathological diagnosis.    FINDINGS: Colon polyps, Ascending colon and 20cm x 2 cm and Colon mass, 40 cm.  Diverticulosis was not identified.  Hemorrhoids  were  identified.    INDICATIONS: This is a 55 year old male in need of a colonoscopy for Positive ColoGuard.  The patient will be taken to the endoscopy suite for that procedure.    DESCRIPTIONS OF PROCEDURE IN DETAIL: After consent was obtained the patient was taken to the endoscopy suite and placed in the left lateral decubitus position.  The patient was identified and the correct patient was confirmed.  Monitored Anesthesia Care was given.  A time out was preformed verifying the correct patient and the correct procedure.  The entire operative team was in agreement.  All necessary equipment and supplies were in the room.    Rectal exam was preformed and no lesions of the anal canal were noted.  The colonoscope was inserted into the anus and passed without difficulty to the cecum.  The cecum was identified by the ileocecal valve, the coalescence of the tinea and the appendiceal orifice.  Upon withdrawal all walls of the colon were visualized.  Polyps were identified at Ascending colon and 20cm x 2 cm.  These were removed by snare.  40 cm was a large mass.  This was biopsied.  Tattooing of the location was  done.  No colitis were not seen.  Diverticulosis was not seen.  Upon reaching the rectum the scope was retroflexed and internal hemorrhoids  were  seen.  The scope was straightened back out and removed from the patient.  The patient was then taken to the recovery room in stable condition tolerating the procedure well.      Prep: poor    Withdrawal time was 15 minutes.    It is recommended that the patient have another colonoscopy in 1 years.

## 2020-12-31 NOTE — OR NURSING
Patient called with appointment times for CT scan Tuesday 1/5/2021 at 10:00 AM and appointment with Dr Roblero Wednesday 1/6/2021 at 10:05 AM. Patient did not have any questions. Instructed to call XRAY department by Monday at 1200 if he hasn't heard from them before then for instructions

## 2020-12-31 NOTE — ANESTHESIA POSTPROCEDURE EVALUATION
Patient: Atul Lam    Procedure(s):  COLONOSCOPY WITH BIOPSIES, POLYPECTOMY AND TATTOOING    Diagnosis:Positive colorectal cancer screening using Cologuard test [R19.5]  Diagnosis Additional Information: No value filed.    Anesthesia Type:  MAC    Note:  Anesthesia Post Evaluation    Patient location during evaluation: Phase 2 and Bedside  Patient participation: Able to fully participate in evaluation  Level of consciousness: awake and alert  Pain management: adequate  Airway patency: patent  Cardiovascular status: acceptable and stable  Respiratory status: acceptable and room air  Hydration status: acceptable  PONV: none     Anesthetic complications: None          Last vitals:  Vitals:    12/31/20 1150 12/31/20 1154 12/31/20 1155   BP: 94/84 94/84 (!) 113/49   Pulse: 81  79   Resp:      Temp:      SpO2:  95% 94%         Electronically Signed By: MARYLIN Serna CRNA  December 31, 2020  12:37 PM

## 2020-12-31 NOTE — ANESTHESIA CARE TRANSFER NOTE
Patient: Atul Lam    Procedure(s):  COLONOSCOPY WITH BIOPSIES, POLYPECTOMY AND TATTOOING    Diagnosis: Positive colorectal cancer screening using Cologuard test [R19.5]  Diagnosis Additional Information: No value filed.    Anesthesia Type:   MAC     Note:  Airway :Nasal Cannula  Patient transferred to:Phase II  Handoff Report: Identifed the Patient, Identified the Reponsible Provider, Reviewed the pertinent medical history, Discussed the surgical course, Reviewed Intra-OP anesthesia mangement and issues during anesthesia, Set expectations for post-procedure period and Allowed opportunity for questions and acknowledgement of understanding      Vitals: (Last set prior to Anesthesia Care Transfer)    CRNA VITALS  12/31/2020 1118 - 12/31/2020 1149      12/31/2020             Resp Rate (set):  8                Electronically Signed By: MARYLIN Galvez CRNA  December 31, 2020  11:49 AM

## 2020-12-31 NOTE — OR NURSING
Dr Roblero updated by Neri SPRAGUE RN - pt reports still having brown stool with clear yellow liquid stool.   To the bathroom now and states had two peanut size brown stool along with clear yellow liquid.

## 2020-12-31 NOTE — DISCHARGE INSTRUCTIONS
Post-Anesthesia Patient Instructions    IMMEDIATELY FOLLOWING SURGERY:  Do not drive or operate machinery for the first twenty four hours after surgery.  Do not make any important decisions for twenty four hours after surgery or while taking narcotic pain medications or sedatives.  If you develop intractable nausea and vomiting or a severe headache please notify your doctor immediately.    FOLLOW-UP:  Please make an appointment with your surgeon as instructed. You do not need to follow up with anesthesia unless specifically instructed to do so.    WOUND CARE INSTRUCTIONS (if applicable):  Keep a dry clean dressing on the anesthesia/puncture wound site if there is drainage.  Once the wound has quit draining you may leave it open to air.  Generally you should leave the bandage intact for twenty four hours unless there is drainage.  If the epidural site drains for more than 36-48 hours please call the anesthesia department.    QUESTIONS?:  Please feel free to call your physician or the hospital  if you have any questions, and they will be happy to assist you.               INSTRUCTIONS AFTER COLONOSCOPY    WHEN YOU ARE BACK HOME:    Plan to rest for an hour or two after you get home.    You may have some cramping or pressure until you pass gas.    You may resume your regular medications.    Eat a small, light meal at first, and then gradually return to normal meal sizes.    You will be contacted the next day to see how you are doing.  If you had a polyp removed:    Slight bleeding may occur.  You may have a slight blood stain on the toilet paper after a bowel movement.    To lessen the chance of bleeding, avoid heavy exercise for ONE WEEK.  This includes heavy lifting, vigorous sport activities, and heavy physical labor.  You may resume your normal sexual activity.      Avoid aspirin or aspirin products if instructed by your doctor.    If there is a polyp or biopsy, you will be contacted with results within  one week.     WHAT TO WATCH FOR:  Problems rarely occur after the exam; however, it is important for you to watch for early signs of possible problems.  If you have     Unusual pain in your abdomen    Nausea and vomiting that persists    Excessive bleeding    Black or bloody bowel movements    Fever or temperature above 100.6 F  Please call your doctor ([facility] [phone]) or go to the nearest hospital emergency room.

## 2021-01-01 LAB — CEA SERPL-MCNC: 1.6 UG/L (ref 0–2.5)

## 2021-01-04 ENCOUNTER — TELEPHONE (OUTPATIENT)
Dept: SURGERY | Facility: OTHER | Age: 56
End: 2021-01-04

## 2021-01-05 ENCOUNTER — HOSPITAL ENCOUNTER (OUTPATIENT)
Dept: CT IMAGING | Facility: HOSPITAL | Age: 56
Discharge: HOME | End: 2021-01-05
Attending: SURGERY | Admitting: SURGERY
Payer: COMMERCIAL

## 2021-01-05 DIAGNOSIS — K63.89 COLONIC MASS: ICD-10-CM

## 2021-01-05 LAB — COPATH REPORT: NORMAL

## 2021-01-05 PROCEDURE — 74177 CT ABD & PELVIS W/CONTRAST: CPT

## 2021-01-05 PROCEDURE — 255N000002 HC RX 255 OP 636: Performed by: RADIOLOGY

## 2021-01-05 RX ORDER — IOPAMIDOL 612 MG/ML
100 INJECTION, SOLUTION INTRAVASCULAR ONCE
Status: COMPLETED | OUTPATIENT
Start: 2021-01-05 | End: 2021-01-05

## 2021-01-05 RX ADMIN — IOPAMIDOL 100 ML: 612 INJECTION, SOLUTION INTRAVENOUS at 11:08

## 2021-01-06 ENCOUNTER — PREP FOR PROCEDURE (OUTPATIENT)
Dept: SURGERY | Facility: OTHER | Age: 56
End: 2021-01-06

## 2021-01-06 ENCOUNTER — OFFICE VISIT (OUTPATIENT)
Dept: SURGERY | Facility: OTHER | Age: 56
End: 2021-01-06
Attending: SURGERY
Payer: COMMERCIAL

## 2021-01-06 VITALS
BODY MASS INDEX: 29.52 KG/M2 | HEIGHT: 74 IN | DIASTOLIC BLOOD PRESSURE: 70 MMHG | HEART RATE: 81 BPM | SYSTOLIC BLOOD PRESSURE: 118 MMHG | WEIGHT: 230 LBS | TEMPERATURE: 97.2 F | OXYGEN SATURATION: 98 %

## 2021-01-06 DIAGNOSIS — K63.5 SIGMOID POLYP: Primary | ICD-10-CM

## 2021-01-06 DIAGNOSIS — K63.89 COLONIC MASS: Primary | ICD-10-CM

## 2021-01-06 PROCEDURE — 99215 OFFICE O/P EST HI 40 MIN: CPT | Performed by: SURGERY

## 2021-01-06 RX ORDER — ERYTHROMYCIN 250 MG/1
TABLET, COATED ORAL
Qty: 12 TABLET | Refills: 0 | Status: ON HOLD | OUTPATIENT
Start: 2021-01-06 | End: 2021-01-27

## 2021-01-06 RX ORDER — NEOMYCIN SULFATE 500 MG/1
TABLET ORAL
Qty: 6 TABLET | Refills: 0 | Status: ON HOLD | OUTPATIENT
Start: 2021-01-06 | End: 2021-01-27

## 2021-01-06 RX ORDER — BISACODYL 5 MG
TABLET, DELAYED RELEASE (ENTERIC COATED) ORAL
Qty: 4 TABLET | Refills: 0 | Status: CANCELLED | OUTPATIENT
Start: 2021-01-06

## 2021-01-06 ASSESSMENT — PAIN SCALES - GENERAL: PAINLEVEL: NO PAIN (0)

## 2021-01-06 ASSESSMENT — MIFFLIN-ST. JEOR: SCORE: 1958.02

## 2021-01-06 NOTE — PATIENT INSTRUCTIONS
We want your surgery to be as pleasant as possible. Please review the instructions below. If you have questions, you may contact us at any of the following numbers:     [facility] Health Unit Coordinator: [phone]  Clinic Nurse ([place]): [phone]  Surgery Education Nurse: [phone]    You are scheduled for Laparoscopic Hand-Assisted Sigmoid Colectomy.  Date of Procedure: 1/25/2021 with Dr. Lionel Roblero  Admit time: Surgery Department will call you the day before your procedure by 5pm with your admit time. If your surgery is on Monday, expect a call on Friday.  If you are not contacted by 5PM, please call admitting at [phone].   After hours or on weekends, call [phone] to postpone.     Call the surgery nurse if you become ill within 2 weeks of your procedure to reschedule.     Preop appointment needed within the 30 days prior to your procedure. Scheduled for 1/15/2021 at 3:45 with Dr. Pham.    COVID-19 test is needed 4 days before procedure in the morning. This testing is done in the [facility] on the West side of [facility] (weekdays and weekends) or in the [facility] at the [facility] (weekdays only).  Follow the signage for parking and bring your mobile phone if you have one to call the phone number on the sign outside the testing site for check-in. If you do not have a cell phone, please call the nurse for instructions on checking in.   This has been scheduled for 1/21/2021 at 9:00 at the [place] site.      7 DAYS BEFORE THE EXAM: 1/18/2021  Fill your prescription(s) at the pharmacy as soon as possible. (call ahead if more than 1 week)  Call the Surgery Education Nurse at [phone] and have a medication list ready.  No Aspirin or NSAIDS (Ibuprofen, Celebrex, Naproxen, etc) 7 days before surgery.   Stop fiber supplements, vitamins, iron and herbals.   Do not eat corn, nuts or seeds.  If you are prescribed blood thinners or insulin, talk to your primary  care provider for instructions on these medications.    2 DAYS BEFORE THE EXAM:  1/23/2021  Low fiber diet today. (No raw fruits/vegetables, foods with seeds/skins, or whole grains.)  Drink 4-6 large glasses of sports drink today and tomorrow.     1 DAY BEFORE THE EXAM:   1/24/2021  No solid foods or milk products after 12:00 AM (Midnight).   Drink only clear liquids all day (water, gatorade, soda, black coffee or tea without cream, apple or grape juice, samira-aid, jello without pieces, popcicles without pieces, etc).    At 12:00 PM (Noon): Drink one 8 oz. glass of Golytely every 10-15 minutes until the jug is empty. Drink each glass quickly. Stay near a toilet.   At 2:00 PM, 3:00 PM and 10:00 PM:  take 1,000 mg each of Erythromycin and Neomycin with a glass of clear liquids.    On The Night Before Your Surgery:  1.  Remove your jewelry and leave off until after surgery. Wash your hair and body with your regular soap/shampoo. Use a freshly washed washcloth. Include cleaning inside your belly button. Rinse off soap/shampoo.  2. Wash your body from neck to feet using 1/2 of the 1st Hibiclens (Chlorhexidine) bottle with your bare hands. Do not use the Hibiclens on your face, hair or genital area. Leave the soap on your body for one minute and rinse.  3. Repeat step 2 with the 2nd half of the bottle.  4. Rinse off all soap and dry with a freshly washed towel. Do not use lotions or hair products.  5. Sleep in freshly washed pajamas and freshly washed bedding.    DAY OF SURGERY:   1/25/2021  Nothing by mouth after midnight  If you must take medicine, you may take it with a sip of water.   Do not take diabetes medicine by mouth until after surgery.   If you have an inhaler, bring it to surgery.    On The Morning of Your Surgery:  1.Wash your hair and body with your regular soap/shampoo. Use another freshly washed washcloth. Rinse off.   2. Wash your body from neck to feet using 1/2 of the 2nd Hibiclens (Chlorhexidine)  bottle with your bare hands. Leave the soap on your body for one minute and rinse.  3. Repeat step 2 with the 2nd half of the bottle.  4. Rinse off all the soap and dry with another freshly washed towel. Do not use lotions or hair products.  5. Wear freshly washed clothing to the hospital.    No jewelry, make-up, nail polish, hair spray, lotions, or perfumes.   [place] in Admitting through the [place] Entrance.      TIPS FOR COLON CLEANSING BEFORE YOUR PROCEDURE  To get accurate results from your exam, your colon must be completely empty or you may need to repeat the colon prep and exam. If you followed instructions and your stool is clear or yellow liquid, you are ready. If you are not sure if your colon is clean, please call the clinic nurse.    You may use Tucks wipes, hemorrhoid treatments, hydrocortisone cream or alcohol-free baby wipes to ease anal irritation. You may also use Vaseline to help protect the skin.     You can squeeze some lemon juice or add a packet of Crystal Light lemon-lime or ice tea flavor into your preparation. You may try sucking on hard candy or a lemon or lime wedge; or washing your mouth out with water, clear soda or mouthwash.    To chill the solution, put it in your refrigerator or set it in a bowl of ice. Do not add ice in your glass. Remove from the refrigerator 15 minutes before drinking.    Quickly drink each glass. It may help to use a timer. If the liquid is too salty, use a straw. Even when you are sitting on the toilet, keep drinking every 10-15 minutes. If you have nausea or vomiting, take a break for 15 to 30 minutes and then resume drinking.    You will have loose watery stools and may also have chills. Dress for comfort. Expect to feel bloating, nausea and other discomfort until the stool clears from your colon.          Return to clinic for postoperative appointment after surgery--tentatively scheduled.

## 2021-01-06 NOTE — NURSING NOTE
"Chief Complaint   Patient presents with     Surgical Followup     ct and colonoscopy follow up       Initial /70 (BP Location: Right arm, Patient Position: Chair, Cuff Size: Adult Large)   Pulse 81   Temp 97.2  F (36.2  C) (Tympanic)   Ht 1.88 m (6' 2\")   Wt 104.3 kg (230 lb)   SpO2 98%   BMI 29.53 kg/m   Estimated body mass index is 29.53 kg/m  as calculated from the following:    Height as of this encounter: 1.88 m (6' 2\").    Weight as of this encounter: 104.3 kg (230 lb).  Medication Reconciliation: complete  Malka Owen LPN    "

## 2021-01-06 NOTE — PROGRESS NOTES
CLINIC NOTE - POST-OP ENDOSCOPY  1/6/2021    Patient:Atul Lam    Procedure: Colonoscopy with biopsies    This is a 55 year old male who is 1 weeks s/p Colonoscopy with biopsies.  At the time of endoscopy a probable lipoma in the cecum was noted.  Pathology came back as benign.  At 40 cm was a relatively large mass.  The pathology came back nonconcordant with what was seen.  There were 2 small polyps at 20 cm which came back as adenomatous polyps with no dysplasia.  Patient did have a CT of the abdomen pelvis which showed no abnormalities.  His CEA was normal.  He is here to discuss options.     Current Medications:  Current Outpatient Medications   Medication Sig Dispense Refill     blood glucose (BELL CONTOUR NEXT) test strip Use to test blood sugar 2 times daily. 100 each 11     blood glucose monitoring (BELL MICROLET) lancets Use to test blood sugar 2 times daily. 100 each 11     budesonide (PULMICORT) 0.5 MG/2ML neb solution Squirt entire vial into previously made preet med saline bottle, mix, and irrigate each nostril until entire bottle empty.  Do this twice daily. 3 Box 11     dulaglutide (TRULICITY) 0.75 MG/0.5ML pen Inject 0.75 mg Subcutaneous every 7 days 2 mL 1     erythromycin (E-MYCIN) 250 MG tablet Take 4 tabs po at 1400, 1500 and 2200 12 tablet 0     metFORMIN (GLUCOPHAGE-XR) 500 MG 24 hr tablet TAKE 2 TABLETS BY MOUTH TWICE DAILY WITH MEALS 360 tablet 3     neomycin (MYCIFRADIN) 500 MG tablet Take 2 tabs po at 1400, 1500 and 2200 6 tablet 0     polyethylene glycol (GOLYTELY) 236 g suspension On day before surgery at noon, drink 1 glass every 10 minutes until gone 4000 mL 0     simvastatin (ZOCOR) 40 MG tablet TAKE 1 TABLET BY MOUTH ONCE DAILY AT BEDTIME 90 tablet 3     fluticasone (FLONASE) 50 MCG/ACT nasal spray Spray 2 sprays into both nostrils 2 times daily (Patient not taking: Reported on 1/6/2021) 16 g 12       Allergies:  Allergies   Allergen Reactions     Animal Dander      Nasal  "congestion     Aspirin Nausea and Vomiting     Dust Mites      Nasal congestion     Lisinopril Hives     Losartan Hives     Morphine Nausea and Vomiting       PHYSICAL EXAM:   Vital signs: /70 (BP Location: Right arm, Patient Position: Chair, Cuff Size: Adult Large)   Pulse 81   Temp 97.2  F (36.2  C) (Tympanic)   Ht 1.88 m (6' 2\")   Wt 104.3 kg (230 lb)   SpO2 98%   BMI 29.53 kg/m     Weight: [unfilled]   BMI: Body mass index is 29.53 kg/m .   General: Normal, healthy, cooperative   Lungs: clear to auscultation   CV: Regular rate and rhythm without murmer   Abdominal: abdomen is soft without significant tenderness, masses, organomegaly or guarding       PATHOLOGY:  Copath Report   Date Value Ref Range Status   12/31/2020   Final    Patient Name: GILMER RODRIGUEZ  MR#: [id number]  Specimen #: [id number]  Collected: 12/31/2020  Received: 12/31/2020  Reported: 1/5/2021 13:51  Ordering Phy(s): ERICA MEJÍA  Additional Phy(s): PEBBLES MCDONALD    For improved result formatting, select 'View Enhanced Report Format' under   Linked Documents section.    SPECIMEN(S):  A: Colon biopsy, ascending  B: Colon biopsy, 40 cm  C: Colon polyp, 20 cm, x2    FINAL DIAGNOSIS:  A: Colon, right/ascending, biopsy  - No pathologic diagnosis    B: Colon, 40 cm, biopsy  - Increased smooth muscle, lamina propria  - Mildly enlarged otherwise unremarkable glands (see comment)    C: Colon, 20 cm, polypectomy x2  - Tubular adenoma  - No evidence of high-grade dysplasia or invasive malignancy  - Hyperplastic polyp    COMMENT:  B: The biopsy at 40 cm may represent a hyperplastic polyp or a mucosal   leiomyoma.  There is not enough tissue  for definitive diagnosis of mucosal leiomyoma.  The appearance of the   smooth muscle may be an artifact of  histologic sectioning.    I have personally reviewed all specimens and/or slides, including the   listed special stains, and used them  with my medical judgement to determine or confirm the " final diagnosis.    Electronically signed out by:    Mack Grace M.D.    CLINICAL HISTORY:  Positive colorectal cancer screening using Cologuard test [R19.5];   colonoscopy with biopsies, polypectomy and  tattooing.    GROSS:  A: There is a 3 mm piece of tan soft tissue. (1 TE in 1 block).    B: There are two pieces of tan soft tissue which are each 3 mm. (2 TE in 1   block).    C: There are two pieces of tan soft tissue.  Together they are 10 x 7 x 8   mm.  The larger of the tissue is  divided. (TE in 1 block). (Dictated by: Mack Grace MD 12/31/2020 02:29   PM)    MICROSCOPIC:  A: There is unremarkable colon mucosa.    B: There is colon.  Deeper sections are also performed.  A few of the   glands vary in size with increased mucin  production.  There is also a slight increase in smooth muscle within the   lamina propria.  There is a small  benign lymphoid aggregate.  The lamina propria has focal mild edema.    C: There is a tubular adenoma and there is a hyperplastic polyp.  The   tubular adenoma has glands vary in size  and shape with decreased mucin production.  The hyperplastic polyp has   abundant eosinophilic cytoplasm with  irregular margins.    CPT Codes  A: 25530-ES7  B: 82953-HE2  C: 22964-JK1    COLLECTION SITE:  Client: [facility]  Location: [facility] ()    The technical component of this testing was completed at the [facility], with the  professional component performed at the [facility], [address] [place] [address]  ([phone])         RADIOLOGY:    Results for orders placed during the hospital encounter of 01/05/21   CT Abdomen Pelvis w Contrast    Narrative EXAMINATION: CT ABDOMEN PELVIS W CONTRAST, 1/5/2021 11:13 AM    TECHNIQUE:  Helical CT images from the lung bases through the  symphysis pubis were obtained  with IV contrast. Contrast dose: ISOVUE  300 100 mL    COMPARISON: none    HISTORY: Colon cancer, monitor, stage  II/III; Colonic mass    FINDINGS:    There is dependent atelectasis at the lung bases.    The liver is free of masses or biliary ductal enlargement. No  calcified gallstones are seen.    The the spleen and pancreas appear normal.    The adrenal glands are normal.    The right and left kidneys are free of masses or hydronephrosis.    The periaortic lymph nodes are normal in caliber.    No intraperitoneal masses or inflammatory changes are noted.    In the pelvis the bladder and rectum appear normal.    Degenerative changes are present in the thoracic and lumbar spine mild  degenerative changes are present in both hips.      Impression IMPRESSION: No intra-abdominal masses or inflammatory changes are  noted.     JOSHUA BOWSER MD     I did review the CT personally and agree with the results.    LABORATORY:    CEA   Date Value Ref Range Status   12/31/2020 1.6 0 - 2.5 ug/L Final     Comment:     Assay Method:  Chemiluminescence using Siemens Centaur XP      ASSESSMENT:    55 year old male who is 2 weeks s/p Colonoscopy with biopsies.  At the time of endoscopy a relatively large mass was found at approximate 40 cm.  Pathology was nonconcordant with what was visualized.  Discussed the options with the patient which include repeating the colonoscopy to try to get better biopsies versus taken to the operating room for sigmoid colectomy.  Of note I do feel that sigmoid colectomy is warranted.  The patient is comfortable with proceeding with surgical intervention to remove the area.  He does understand that this may be a benign process but that is still needs to be removed because of its size.    PLAN:   Will schedule him for a hand-assisted laparoscopic sigmoid colectomy.  We will do a mechanical and antibiotic prep.  He will have a preop evaluation.

## 2021-01-15 ENCOUNTER — HEALTH MAINTENANCE LETTER (OUTPATIENT)
Age: 56
End: 2021-01-15

## 2021-01-18 ENCOUNTER — ANESTHESIA EVENT (OUTPATIENT)
Dept: SURGERY | Facility: HOSPITAL | Age: 56
DRG: 331 | End: 2021-01-18
Payer: COMMERCIAL

## 2021-01-18 ASSESSMENT — LIFESTYLE VARIABLES: TOBACCO_USE: 1

## 2021-01-18 NOTE — ANESTHESIA PREPROCEDURE EVALUATION
Anesthesia Pre-Procedure Evaluation    Patient: Atul Lam   MRN: [id number] : 1965        Preoperative Diagnosis: Sigmoid polyp [K63.5]   Procedure : Procedure(s):  COLECTOMY, LEFT, LAPAROSCOPIC, Hand assisted     Past Medical History:   Diagnosis Date     Congenital hiatus hernia      Diabetes (H)      Gastroesophageal reflux disease      Hiatal hernia      Neck mass      Warthin tumor       Past Surgical History:   Procedure Laterality Date     COLONOSCOPY N/A 2020    Procedure: COLONOSCOPY WITH BIOPSIES, POLYPECTOMY AND TATTOOING;  Surgeon: Lionel Roblero MD;  Location: [facility] OR     ENDOSCOPIC SINUS SURGERY N/A 2020    Procedure: Bilateral Endoscopic Sinus Surgery with Polypectomy, Frozens;  Surgeon: Cherelle Chacon MD;  Location: [facility] OR     ENT SURGERY      T and A     PAROTIDECTOMY Right 4/10/2018    Procedure: PAROTIDECTOMY;  Right Superficial Parotidectomy;  Surgeon: Alexandra Holbrook MD;  Location: [facility] OR     SEPTOPLASTY, TURBINOPLASTY, COMBINED N/A 2020    Procedure: TURBINATE REDUCTION, PROPEL IMPLANT TIMES 4;  Surgeon: Cherelle Chacon MD;  Location: [facility] OR      Allergies   Allergen Reactions     Animal Dander      Nasal congestion     Aspirin Nausea and Vomiting     Dust Mites      Nasal congestion     Lisinopril Hives     Losartan Hives     Morphine Nausea and Vomiting      Social History     Tobacco Use     Smoking status: Former Smoker     Years: 30.00     Types: Dip, chew, snus or snuff     Quit date: 2017     Years since quitting: 3.3     Smokeless tobacco: Former User     Types: Chew     Quit date: 2020   Substance Use Topics     Alcohol use: Yes     Comment: 1 per mo      Wt Readings from Last 1 Encounters:   21 104.3 kg (230 lb)        Anesthesia Evaluation   Pt has had prior anesthetic.     No history of anesthetic complications       ROS/MED HX  ENT/Pulmonary: Comment: Parotidectomy  Chronic pansinusitis  Nasal polyps  Warthin tumor     (+) sleep apnea, uses CPAP, MIRIAN risk factors, snores loudly, tobacco use, Past use, asthma Treatment: Nebulizer prn,      Neurologic:  - neg neurologic ROS     Cardiovascular:     (+) Dyslipidemia -----    METS/Exercise Tolerance: >4 METS    Hematologic:  - neg hematologic  ROS     Musculoskeletal:  - neg musculoskeletal ROS     GI/Hepatic:     (+) GERD, hiatal hernia,     Renal/Genitourinary:  - neg Renal ROS     Endo: Comment: dysmetabolic syndrome X    (+) type II DM, Last HgA1c: 7.7, Not using insulin, - not using insulin pump. Normal glucose range: 100s, Obesity,     Psychiatric/Substance Use:  - neg psychiatric ROS     Infectious Disease:  - neg infectious disease ROS     Malignancy:   (+) Malignancy, History of GI.GI CA status post Surgery.        Other:  - neg other ROS          Physical Exam    Airway        Mallampati: III   TM distance: < 3 FB   Neck ROM: full   Mouth opening: > 3 cm    Respiratory Devices and Support         Dental       (+) upper dentures and lower dentures      Cardiovascular          Rhythm and rate: regular and normal     Pulmonary   pulmonary exam normal        breath sounds clear to auscultation           OUTSIDE LABS:  CBC:   Lab Results   Component Value Date    WBC 6.4 11/20/2020    WBC 9.1 01/06/2020    HGB 14.4 11/20/2020    HGB 14.5 01/06/2020    HCT 42.3 11/20/2020    HCT 42.9 01/06/2020     11/20/2020     01/06/2020     BMP:   Lab Results   Component Value Date     11/20/2020     01/06/2020    POTASSIUM 3.9 11/20/2020    POTASSIUM 4.1 01/06/2020    CHLORIDE 105 11/20/2020    CHLORIDE 101 01/06/2020    CO2 27 11/20/2020    CO2 31 01/06/2020    BUN 15 11/20/2020    BUN 20 01/06/2020    CR 0.77 11/20/2020    CR 0.91 01/06/2020     (H) 11/20/2020     (H) 01/06/2020     COAGS: No results found for: PTT, INR, FIBR  POC:   Lab Results   Component Value Date     (H) 04/10/2018     HEPATIC:   Lab Results   Component Value Date     ALBUMIN 3.7 11/20/2020    PROTTOTAL 8.0 11/20/2020    ALT 35 11/20/2020    AST 17 11/20/2020    ALKPHOS 89 11/20/2020    BILITOTAL 0.3 11/20/2020     OTHER:   Lab Results   Component Value Date    A1C 11.9 (H) 11/20/2020    NEFTALI 8.5 11/20/2020    TSH 0.92 11/20/2020       Anesthesia Plan     History & Physical Review      ASA Status:  3. NPO Status:  NPO Appropriate. .  Plan for PNB with Intravenous induction. Maintenance will be Balanced.         PONV prophylaxis:  Ondansetron (or other 5HT-3) and Dexamethasone or Solumedrol.       Consents  Anesthesia Plan(s) and associated risks, benefits, and realistic alternatives discussed.    Questions answered and patient/representative(s) expressed understanding.    Discussed with:  Patient.             Postoperative Care  Postoperative pain management: IV analgesics and Peripheral nerve block (Single Shot).           MARYLIN Kulkarni CRNA

## 2021-01-19 NOTE — PROGRESS NOTES
[facility] [place]  [address]  [place] [address]  Phone: [phone]  Primary Provider: Zoraida Pham  Pre-op Performing Provider: ZORAIDA PHAM    PREOPERATIVE EVALUATION:  Today's date: 1/22/2021    Atul Lam is a 55 year old male who presents for a preoperative evaluation.    Surgical Information:  Surgery/Procedure: hand assisted laparoscopic sigmoid colectomy  Surgery Location: [facility] [place]  Surgeon:   Surgery Date: 1/25/21  Time of Surgery: unknown  Where patient plans to recover: At home with family  Fax number for surgical facility: Note does not need to be faxed, will be available electronically in Epic.    Type of Anesthesia Anticipated: to be determined    Subjective     HPI related to upcoming procedure: Patient had positive cologuard, then colonoscopy with biopsies.  Large mass - 40 cm was found.  Pathology was non concordant with what was visualized per surgery. Planning sigmoid colectomy.      Preop Questions 1/22/2021   1. Have you ever had a heart attack or stroke? No   2. Have you ever had surgery on your heart or blood vessels, such as a stent placement, a coronary artery bypass, or surgery on an artery in your head, neck, heart, or legs? No   3. Do you have chest pain with activity? No   4. Do you have a history of  heart failure? No   5. Do you currently have a cold, bronchitis or symptoms of other infection? No   6. Do you have a cough, shortness of breath, or wheezing? No   7. Do you or anyone in your family have previous history of blood clots? No   8. Do you or does anyone in your family have a serious bleeding problem such as prolonged bleeding following surgeries or cuts? No   9. Have you ever had problems with anemia or been told to take iron pills? No   10. Have you had any abnormal blood loss such as black, tarry or bloody stools? No   11. Have you ever had a blood transfusion? No   12. Are you willing to have a blood transfusion if it is medically  needed before, during, or after your surgery? Yes   13. Have you or any of your relatives ever had problems with anesthesia? No   14. Do you have sleep apnea, excessive snoring or daytime drowsiness? YES - sleep apnea; just got CPAP- just getting use to it   14a. Do you have a CPAP machine? Yes - instructed to bring to hospital   15. Do you have any artifical heart valves or other implanted medical devices like a pacemaker, defibrillator, or continuous glucose monitor? No   16. Do you have artificial joints? No   17. Are you allergic to latex? No     Health Care Directive:  Patient does not have a Health Care Directive or Living Will: Discussed advance care planning with patient; however, patient declined at this time.    Preoperative Review of :   reviewed - no record of controlled substances prescribed.      Status of Chronic Conditions:  See problem list for active medical problems.  Problems all longstanding and stable, except as noted/documented.  See ROS for pertinent symptoms related to these conditions.    DIABETES - Patient has a longstanding history of DiabetesType Type II . Patient is being treated with diet, oral agents and insulin injections and denies significant side effects. Control has been poor. Complicating factors include but are not limited to: hyperlipidemia. Last a1c 11% in 11/2020.  Trulicity x 1 month now.    Checking glucose daily.  From 180 prior to Trulicity to low 100s.  Usually checking fasting.  Sometimes 2 hours after eating.  No lows.  Takes Trulicity once per week - today - so will be mid week when has surgery.  Meeting with [facility] staff after this visit today as well.    HYPERLIPIDEMIA - Patient has a long history of significant Hyperlipidemia requiring medication for treatment with recent fair control. Patient reports no problems or side effects with the medication.     MIRIAN - CPAP.    Review of Systems  Constitutional, neuro, ENT, endocrine, pulmonary, cardiac,  "gastrointestinal, genitourinary, musculoskeletal, integument and psychiatric systems are negative, except as otherwise noted.    Patient Active Problem List    Diagnosis Date Noted     Sigmoid polyp 2021     Priority: Medium     Added automatically from request for surgery [id number]       MIRIAN (obstructive sleep apnea) 2020     Priority: Medium     Moderate obstructive sleep apnea with mild sleep associated hypoxemia    HOME SLEEP STUDY INTERPRETATION     Patient: Atul Lam  MRN: [id number]  YOB: 1965  Study Date: 2020  Referring Provider: Zoraida Pham  Ordering Provider: Mack Lofton MD     Indications for Home Study: Atul Lam is a 55 year old male with a history of DM II who presents with symptoms suggestive of obstructive sleep apnea.     Estimated body mass index is 29.95 kg/m  as calculated from the following:    Height as of 20: 1.88 m (6' 2\").    Weight as of 20: 105.8 kg (233 lb 4.8 oz).  [place] Sleepiness Scale:   STOP-BAN/8     Data: A full night home sleep study was performed recording the standard physiologic parameters including body position, movement, sound, nasal pressure, thermal oral airflow, chest and abdominal movements with respiratory inductance plethysmography, and oxygen saturation by pulse oximetry. Pulse rate was estimated by oximetry recording. This study was considered adequate based on > 4 hours of quality oximetry and respiratory recording. As specified by the AASM Manual for the Scoring of Sleep and Associated events, version 2.3, Rule VIII.D 1B, 4% oxygen desaturation scoring for hypopneas is used as a standard of care on all home sleep apnea testing.     Analysis Time:  599.9 minutes     Respiration:   Sleep Associated Hypoxemia: sustained hypoxemia was not present. Baseline oxygen saturation was 95.5%.  Time with saturation less than or equal to 88% was 7 minutes. The lowest oxygen saturation was 79%. "   Snoring: Snoring was present, though rare.  Respiratory events: The home study revealed a presence of 14 obstructive apneas and 7 mixed and central apneas. There were 180 hypopneas resulting in a combined apnea/hypopnea index [AHI] of 20.1 events per hour.  AHI was 26.7 per hour supine, - per hour prone, 8.1 per hour on left side, and 3.6 per hour on right side.   Pattern: Excluding events noted above, respiratory rate and pattern was Normal.     Position: Percent of time spent: supine - 65.2%, prone - 0%, on left - 32.1%, on right - 2.8%.     Heart Rate: By pulse oximetry normal rate was noted.      Assessment:   - Moderate obstructive sleep apnea.  - Strong positional component observed with supine AHI 26.7 and lateral AHI < 10.  - Sleep associated hypoxemia was present, though mild.     Recommendations:  Consider auto-CPAP at 5-15 cmH2O, oral appliance therapy, positional therapy or polysomnography with full night PAP titration.  Suggest optimizing sleep hygiene and avoiding sleep deprivation.  Weight management.         Positive colorectal cancer screening using Cologuard test 12/18/2020     Priority: Medium     Added automatically from request for surgery [id number]       chewing tobacco abuse counseling 12/30/2019     Priority: Medium     Disorder of respiratory system exacerbated by aspirin 12/30/2019     Priority: Medium     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     Priority: Medium      Past Medical History:   Diagnosis Date     Congenital hiatus hernia      Diabetes (H)      Gastroesophageal  reflux disease      Hiatal hernia      Neck mass      Warthin tumor      Past Surgical History:   Procedure Laterality Date     COLONOSCOPY N/A 12/31/2020    Procedure: COLONOSCOPY WITH BIOPSIES, POLYPECTOMY AND TATTOOING;  Surgeon: Lionel Roblero MD;  Location: [facility] OR     ENDOSCOPIC SINUS SURGERY N/A 1/8/2020    Procedure: Bilateral Endoscopic Sinus Surgery with Polypectomy, Frozens;  Surgeon: Cherelle Chacon MD;  Location: [facility] OR     ENT SURGERY      T and A     PAROTIDECTOMY Right 4/10/2018    Procedure: PAROTIDECTOMY;  Right Superficial Parotidectomy;  Surgeon: Alexandra Holbrook MD;  Location: [facility] OR     SEPTOPLASTY, TURBINOPLASTY, COMBINED N/A 1/8/2020    Procedure: TURBINATE REDUCTION, PROPEL IMPLANT TIMES 4;  Surgeon: Cherelle Chacon MD;  Location: [facility] OR     Current Outpatient Medications   Medication Sig Dispense Refill     blood glucose (BELL CONTOUR NEXT) test strip Use to test blood sugar 2 times daily. 100 each 11     blood glucose monitoring (BELL MICROLET) lancets Use to test blood sugar 2 times daily. 100 each 11     budesonide (PULMICORT) 0.5 MG/2ML neb solution Squirt entire vial into previously made preet med saline bottle, mix, and irrigate each nostril until entire bottle empty.  Do this twice daily. 3 Box 11     dulaglutide (TRULICITY) 0.75 MG/0.5ML pen Inject 0.75 mg Subcutaneous every 7 days 2 mL 1     erythromycin (E-MYCIN) 250 MG tablet Take 4 tabs po at 1400, 1500 and 2200 12 tablet 0     fenofibrate (LOFIBRA) 54 MG tablet Take 1 tablet (54 mg) by mouth daily 90 tablet 3     fluticasone (FLONASE) 50 MCG/ACT nasal spray Spray 2 sprays into both nostrils 2 times daily 16 g 12     metFORMIN (GLUCOPHAGE-XR) 500 MG 24 hr tablet TAKE 2 TABLETS BY MOUTH TWICE DAILY WITH MEALS 360 tablet 3     neomycin (MYCIFRADIN) 500 MG tablet Take 2 tabs po at 1400, 1500 and 2200 6 tablet 0     polyethylene glycol (GOLYTELY) 236 g suspension On day before surgery at noon, drink 1 glass every  "10 minutes until gone 4000 mL 0     simvastatin (ZOCOR) 40 MG tablet TAKE 1 TABLET BY MOUTH ONCE DAILY AT BEDTIME 90 tablet 3       Allergies   Allergen Reactions     Animal Dander      Nasal congestion     Aspirin Nausea and Vomiting     Dust Mites      Nasal congestion     Lisinopril Hives     Losartan Hives     Morphine Nausea and Vomiting        Social History     Tobacco Use     Smoking status: Former Smoker     Years: 30.00     Types: Dip, chew, snus or snuff     Quit date: 9/12/2017     Years since quitting: 3.3     Smokeless tobacco: Former User     Types: Chew     Quit date: 2/14/2020   Substance Use Topics     Alcohol use: Yes     Comment: 1 per mo     Family History   Problem Relation Age of Onset     Diabetes Father      Cerebrovascular Disease Father      History   Drug Use No         Objective     /70 (BP Location: Right arm, Patient Position: Chair, Cuff Size: Adult Large)   Pulse 87   Temp 96.4  F (35.8  C) (Tympanic)   Ht 1.88 m (6' 2\")   Wt 108.4 kg (239 lb)   SpO2 98%   BMI 30.69 kg/m      Physical Exam    GENERAL APPEARANCE: alert, no distress, cooperative and over weight     EYES: EOMI,  PERRL     HENT: ear canals and TM's normal and nose and mouth without ulcers or lesions     NECK: no adenopathy, no asymmetry, masses, or scars and thyroid normal to palpation     RESP: lungs clear to auscultation - no rales, rhonchi or wheezes     CV: regular rates and rhythm, normal S1 S2, no S3 or S4 and no murmur, click or rub     ABDOMEN:  soft, nontender, no HSM or masses and bowel sounds normal     MS: extremities normal- no gross deformities noted, no evidence of inflammation in joints, FROM in all extremities.     SKIN: no suspicious lesions or rashes     NEURO: Normal strength and tone, sensory exam grossly normal, mentation intact and speech normal     PSYCH: mentation appears normal. and affect normal/bright     LYMPHATICS: No cervical adenopathy    Recent Labs   Lab Test 11/20/20  1118 " 01/06/20  1509   HGB 14.4 14.5    284    137   POTASSIUM 3.9 4.1   CR 0.77 0.91   A1C 11.9* 7.7*        Diagnostics:  Recent Results (from the past 24 hour(s))   CBC with platelets and differential    Collection Time: 01/22/21  9:31 AM   Result Value Ref Range    WBC 5.8 4.0 - 11.0 10e9/L    RBC Count 5.19 4.4 - 5.9 10e12/L    Hemoglobin 14.8 13.3 - 17.7 g/dL    Hematocrit 45.0 40.0 - 53.0 %    MCV 87 78 - 100 fl    MCH 28.5 26.5 - 33.0 pg    MCHC 32.9 31.5 - 36.5 g/dL    RDW 13.6 10.0 - 15.0 %    Platelet Count 257 150 - 450 10e9/L    Diff Method Automated Method     % Neutrophils 52.0 %    % Lymphocytes 30.1 %    % Monocytes 9.9 %    % Eosinophils 6.1 %    % Basophils 1.4 %    % Immature Granulocytes 0.5 %    Nucleated RBCs 0 0 /100    Absolute Neutrophil 3.0 1.6 - 8.3 10e9/L    Absolute Lymphocytes 1.7 0.8 - 5.3 10e9/L    Absolute Monocytes 0.6 0.0 - 1.3 10e9/L    Absolute Eosinophils 0.4 0.0 - 0.7 10e9/L    Absolute Basophils 0.1 0.0 - 0.2 10e9/L    Abs Immature Granulocytes 0.0 0 - 0.4 10e9/L    Absolute Nucleated RBC 0.0    Comprehensive metabolic panel (BMP + Alb, Alk Phos, ALT, AST, Total. Bili, TP)    Collection Time: 01/22/21  9:31 AM   Result Value Ref Range    Sodium 139 133 - 144 mmol/L    Potassium 4.3 3.4 - 5.3 mmol/L    Chloride 107 94 - 109 mmol/L    Carbon Dioxide 27 20 - 32 mmol/L    Anion Gap 5 3 - 14 mmol/L    Glucose 201 (H) 70 - 99 mg/dL    Urea Nitrogen 13 7 - 30 mg/dL    Creatinine 0.86 0.66 - 1.25 mg/dL    GFR Estimate >90 >60 mL/min/[1.73_m2]    GFR Estimate If Black >90 >60 mL/min/[1.73_m2]    Calcium 9.1 8.5 - 10.1 mg/dL    Bilirubin Total 0.3 0.2 - 1.3 mg/dL    Albumin 3.9 3.4 - 5.0 g/dL    Protein Total 8.0 6.8 - 8.8 g/dL    Alkaline Phosphatase 71 40 - 150 U/L    ALT 51 0 - 70 U/L    AST 27 0 - 45 U/L   Lipid Profile (Chol, Trig, HDL, LDL calc)    Collection Time: 01/22/21  9:31 AM   Result Value Ref Range    Cholesterol 249 (H) <200 mg/dL    Triglycerides 688 (H) <150  mg/dL    HDL Cholesterol 39 (L) >39 mg/dL    LDL Cholesterol Calculated  <100 mg/dL     Cannot estimate LDL when triglyceride exceeds 400 mg/dL    Non HDL Cholesterol 210 (H) <130 mg/dL   TSH with free T4 reflex    Collection Time: 01/22/21  9:31 AM   Result Value Ref Range    TSH 1.23 0.40 - 4.00 mU/L   Hemoglobin A1c    Collection Time: 01/22/21  9:31 AM   Result Value Ref Range    Hemoglobin A1C 7.7 (H) 0 - 5.6 %   Albumin Random Urine Quantitative with Creat Ratio    Collection Time: 01/22/21  9:32 AM   Result Value Ref Range    Creatinine Urine 202 mg/dL    Albumin Urine mg/L 54 mg/L    Albumin Urine mg/g Cr 26.73 (H) 0 - 17 mg/g Cr        a1c reflection is missed - as Trulicity only x past month, a1c reflects 3 months.  Readings seem to be at goal overall now.    EKG: appears normal, NSR, normal axis, normal intervals, no acute ST/T changes c/w ischemia, no LVH by voltage criteria, unchanged from previous tracings    Revised Cardiac Risk Index (RCRI):  The patient has the following serious cardiovascular risks for perioperative complications:   - Diabetes Mellitus (on Insulin) = 1 point     RCRI Interpretation: 1 point: Class II (low risk - 0.9% complication rate)       Assessment & Plan   The proposed surgical procedure is considered INTERMEDIATE risk.    Atul was seen today for pre-op exam.    Diagnoses and all orders for this visit:    Preop general physical exam  -     CBC with platelets and differential  -     Comprehensive metabolic panel (BMP + Alb, Alk Phos, ALT, AST, Total. Bili, TP)  -     Lipid Profile (Chol, Trig, HDL, LDL calc)  -     TSH with free T4 reflex  -     Albumin Random Urine Quantitative with Creat Ratio  -     EKG 12-lead complete w/read - (Clinic Performed)  -     Hemoglobin A1c  -     Estimated Average Glucose    Colonic mass    Type 2 diabetes mellitus with hyperglycemia, with long-term current use of insulin (H)  -     EYE ADULT REFERRAL; Future  -     CBC with platelets and  differential  -     Comprehensive metabolic panel (BMP + Alb, Alk Phos, ALT, AST, Total. Bili, TP)  -     Lipid Profile (Chol, Trig, HDL, LDL calc)  -     TSH with free T4 reflex  -     Albumin Random Urine Quantitative with Creat Ratio  -     EKG 12-lead complete w/read - (Clinic Performed)  -     Hemoglobin A1c  -     LDL cholesterol direct    Hyperlipidemia, unspecified hyperlipidemia type  -     Comprehensive metabolic panel (BMP + Alb, Alk Phos, ALT, AST, Total. Bili, TP)  -     Lipid Profile (Chol, Trig, HDL, LDL calc)  -     LDL cholesterol direct    MIRIAN (obstructive sleep apnea)    Hypertriglyceridemia  -     fenofibrate (LOFIBRA) 54 MG tablet; Take 1 tablet (54 mg) by mouth daily         a1c much improved after 1 month Trulicity!  Not at goal of <7%, but suspect it would be after 3 months of insulin use.  Following with DRC.    Lipids elevated with triglycerides over 600.   Recommend fenofibrate for triglycerides.  Adding direct LDL to lab.  Recheck in 3 months fasting.    Urine with microalbuminuria.  Allergic to ACE inhibitors and ARBs    New medications sent to pharmacy.  Patient may elect to start after surgery.    Risks and Recommendations:  The patient has the following additional risks and recommendations for perioperative complications:   - Consult Hospitalist / IM to assist with post-op medical management  Diabetes:  - Patient is on insulin therapy; diabetic NPO guidelines provided and discussed.   Metformin to be held morning of surgery.  Resumed once taking PO and ok per surgery/hospital staff.    Obstructive Sleep Apnea:   Instructed to bring own CPAP to hospital.    Medication Instructions:  Patient is to take all scheduled medications on the day of surgery EXCEPT for modifications listed below:   - metformin: HOLD day of surgery.   - GLP-1 Injectable (exenitide, liraglutide, semaglutide, dulaglutide, etc.): HOLD day of surgery     RECOMMENDATION:  APPROVAL GIVEN to proceed with proposed  procedure, without further diagnostic evaluation.    Signed Electronically by: Zoraida Bahena MD    Copy of this evaluation report is provided to requesting physician.    Preop [facility] Preop Guidelines    Revised Cardiac Risk Index

## 2021-01-19 NOTE — PATIENT INSTRUCTIONS
"Bring CPAP to hospital.  No Aspirin/Ibuprofen products.  Hold your Metformin morning of surgery.  Will resume post op once eating ok and approved by hospital staff.  Hold your statin if advised by pharmacy.      Preparing for Your Surgery  Getting started  A surgery nurse will call you to review your health history and instructions. They will give you an arrival time based on your scheduled surgery time.  Please be ready to share the following:    Your doctor's clinic name and phone number    Your medical, surgical and anesthesia history    A list of allergies and sensitivities    A list of medicines, including herbal treatments and over-the-counter drugs    Whether the patient has a legal guardian (ask how to send us the papers in advance)  If your child is having surgery, please ask for a copy of Preparing for Your Child's Surgery.    Preparing for surgery    Within 30 days of surgery: Have an exam at your family clinic (primary care clinic), or go to a pre-operative clinic. This exam is called a \"History and Physical,\" or H&P.    At your H&P exam, talk to your care team about all medicines you take. If you need to stop any medicines before surgery, ask when to start taking them again.  ? We do this for your safety. Many medicines can make you bleed too much during surgery. Some change how well surgery (anesthesia) drugs work.    Call your insurance company to see what it will and won't pay for. Ask if they need to pre-approve the surgery. (If no insurance, call [phone].)    Call your surgeon's clinic if there's any change in your health. This includes signs of a cold or flu (sore throat, runny nose, cough, rash, fever). It also includes a scrape or scratch near the surgery site.    If you have questions on the day of surgery, call your surgery center.  Eating and drinking guidelines  For your safety: Unless your surgeon tells you otherwise, follow the guidelines below.    Eat and drink as usual until 8 hours " before surgery. After that, no food or milk.    Drink clear liquids until 2 hours before surgery. These are liquids you can see through, like water, Gatorade and Propel Water. You may also have black coffee and tea (no cream or milk).    Nothing by mouth within 2 hours of surgery. This includes gum, candy and breath mints.    Stop alcohol the midnight before surgery.    If your family clinic tells you to take medicine on the morning of surgery, it's okay to take it with a sip of water.  Preventing infection    Shower or bathe the night before and morning of your surgery. Follow the instructions your clinic gave you. (If no instructions, use regular soap.)    Don't shave or clip hair near your surgery site. This can lead to skin infection.    Don't smoke the morning of surgery. Smoking increases the risk of infection. You may chew nicotine gum up to 2 hours before surgery. A nicotine patch is okay.  ? Note: Some surgeries require you to completely quit smoking and nicotine. Check with your surgeon.    Your care team will make every effort to keep you safe from infection. We will:  ? Clean our hands often with soap and water (or an alcohol-based hand rub).  ? Clean the skin at your surgery site with a special soap that kills germs. We'll also remove hair from the site as needed.  ? Wear special hair covers, masks, gowns and gloves during surgery.  ? Give antibiotic medicine, if prescribed. Not all surgeries need antibiotics.  What to bring on the day of surgery    Photo ID and insurance card    Copy of your health care directive, if you have one    Glasses and hearing aides (bring cases)  ? You can't wear contacts during surgery    Inhaler and eye drops, if you use them (tell us about these when you arrive)    CPAP machine or breathing device, if you use them    A few personal items, if spending the night    If you have . . .  ? A pacemaker or ICD (cardiac defibrillator): Bring the ID card.  ? An implanted stimulator:  Bring the remote control.  ? A legal guardian: Bring a copy of the certified (court-stamped) guardianship papers.  Please remove any jewelry, including body piercings. Leave jewelry and other valuables at home.  If you're going home the day of surgery  Important: If you don't follow the rules below, we must cancel your surgery.     Arrange for someone to drive you home after surgery. You may not drive, take a taxi or take public transportation by yourself (unless you'll have local anesthesia only).    Arrange for a responsible adult to stay with you overnight. If you don't, we may keep you in the hospital overnight, and you may need to pay the costs yourself.  Questions?   If you have any questions for your care team, list them here: _________________________________________________________________________________________________________________________________________________________________________________________________________________________________________________________________________________________________________________________  For informational purposes only. Not to replace the advice of your health care provider. Copyright   1601-6533 [facility]. All rights reserved. Clinically reviewed by Wendy Castillo MD. [employer] [id number] - REV 07/19.

## 2021-01-21 ENCOUNTER — OFFICE VISIT (OUTPATIENT)
Dept: FAMILY MEDICINE | Facility: OTHER | Age: 56
End: 2021-01-21
Attending: FAMILY MEDICINE
Payer: COMMERCIAL

## 2021-01-21 DIAGNOSIS — K63.89 COLONIC MASS: ICD-10-CM

## 2021-01-21 LAB
SARS-COV-2 RNA RESP QL NAA+PROBE: NORMAL
SPECIMEN SOURCE: NORMAL

## 2021-01-21 PROCEDURE — U0005 INFEC AGEN DETEC AMPLI PROBE: HCPCS | Performed by: SURGERY

## 2021-01-21 PROCEDURE — U0003 INFECTIOUS AGENT DETECTION BY NUCLEIC ACID (DNA OR RNA); SEVERE ACUTE RESPIRATORY SYNDROME CORONAVIRUS 2 (SARS-COV-2) (CORONAVIRUS DISEASE [COVID-19]), AMPLIFIED PROBE TECHNIQUE, MAKING USE OF HIGH THROUGHPUT TECHNOLOGIES AS DESCRIBED BY CMS-2020-01-R: HCPCS | Performed by: SURGERY

## 2021-01-22 ENCOUNTER — HOSPITAL ENCOUNTER (OUTPATIENT)
Dept: EDUCATION SERVICES | Facility: HOSPITAL | Age: 56
End: 2021-01-22
Attending: NURSE PRACTITIONER
Payer: COMMERCIAL

## 2021-01-22 ENCOUNTER — OFFICE VISIT (OUTPATIENT)
Dept: FAMILY MEDICINE | Facility: OTHER | Age: 56
End: 2021-01-22
Attending: FAMILY MEDICINE
Payer: COMMERCIAL

## 2021-01-22 VITALS
HEIGHT: 74 IN | WEIGHT: 239 LBS | TEMPERATURE: 96.4 F | HEART RATE: 87 BPM | OXYGEN SATURATION: 98 % | BODY MASS INDEX: 30.67 KG/M2 | DIASTOLIC BLOOD PRESSURE: 70 MMHG | SYSTOLIC BLOOD PRESSURE: 122 MMHG

## 2021-01-22 VITALS
HEIGHT: 74 IN | WEIGHT: 239 LBS | OXYGEN SATURATION: 98 % | HEART RATE: 84 BPM | DIASTOLIC BLOOD PRESSURE: 84 MMHG | BODY MASS INDEX: 30.67 KG/M2 | RESPIRATION RATE: 16 BRPM | SYSTOLIC BLOOD PRESSURE: 127 MMHG

## 2021-01-22 DIAGNOSIS — Z01.818 PREOP GENERAL PHYSICAL EXAM: Primary | ICD-10-CM

## 2021-01-22 DIAGNOSIS — G47.33 OSA (OBSTRUCTIVE SLEEP APNEA): ICD-10-CM

## 2021-01-22 DIAGNOSIS — E11.65 TYPE 2 DIABETES MELLITUS WITH HYPERGLYCEMIA, WITH LONG-TERM CURRENT USE OF INSULIN (H): ICD-10-CM

## 2021-01-22 DIAGNOSIS — E11.65 TYPE 2 DIABETES MELLITUS WITH HYPERGLYCEMIA, WITHOUT LONG-TERM CURRENT USE OF INSULIN (H): Primary | ICD-10-CM

## 2021-01-22 DIAGNOSIS — Z79.4 TYPE 2 DIABETES MELLITUS WITH HYPERGLYCEMIA, WITH LONG-TERM CURRENT USE OF INSULIN (H): ICD-10-CM

## 2021-01-22 DIAGNOSIS — K63.89 COLONIC MASS: ICD-10-CM

## 2021-01-22 DIAGNOSIS — E78.1 HYPERTRIGLYCERIDEMIA: ICD-10-CM

## 2021-01-22 DIAGNOSIS — E78.5 HYPERLIPIDEMIA, UNSPECIFIED HYPERLIPIDEMIA TYPE: ICD-10-CM

## 2021-01-22 LAB
ALBUMIN SERPL-MCNC: 3.9 G/DL (ref 3.4–5)
ALP SERPL-CCNC: 71 U/L (ref 40–150)
ALT SERPL W P-5'-P-CCNC: 51 U/L (ref 0–70)
ANION GAP SERPL CALCULATED.3IONS-SCNC: 5 MMOL/L (ref 3–14)
AST SERPL W P-5'-P-CCNC: 27 U/L (ref 0–45)
BASOPHILS # BLD AUTO: 0.1 10E9/L (ref 0–0.2)
BASOPHILS NFR BLD AUTO: 1.4 %
BILIRUB SERPL-MCNC: 0.3 MG/DL (ref 0.2–1.3)
BUN SERPL-MCNC: 13 MG/DL (ref 7–30)
CALCIUM SERPL-MCNC: 9.1 MG/DL (ref 8.5–10.1)
CHLORIDE SERPL-SCNC: 107 MMOL/L (ref 94–109)
CHOLEST SERPL-MCNC: 249 MG/DL
CO2 SERPL-SCNC: 27 MMOL/L (ref 20–32)
CREAT SERPL-MCNC: 0.86 MG/DL (ref 0.66–1.25)
CREAT UR-MCNC: 202 MG/DL
DIFFERENTIAL METHOD BLD: NORMAL
EOSINOPHIL # BLD AUTO: 0.4 10E9/L (ref 0–0.7)
EOSINOPHIL NFR BLD AUTO: 6.1 %
ERYTHROCYTE [DISTWIDTH] IN BLOOD BY AUTOMATED COUNT: 13.6 % (ref 10–15)
EST. AVERAGE GLUCOSE BLD GHB EST-MCNC: 174 MG/DL
GFR SERPL CREATININE-BSD FRML MDRD: >90 ML/MIN/{1.73_M2}
GLUCOSE SERPL-MCNC: 201 MG/DL (ref 70–99)
HBA1C MFR BLD: 7.7 % (ref 0–5.6)
HCT VFR BLD AUTO: 45 % (ref 40–53)
HDLC SERPL-MCNC: 39 MG/DL
HGB BLD-MCNC: 14.8 G/DL (ref 13.3–17.7)
IMM GRANULOCYTES # BLD: 0 10E9/L (ref 0–0.4)
IMM GRANULOCYTES NFR BLD: 0.5 %
LABORATORY COMMENT REPORT: NORMAL
LDLC SERPL CALC-MCNC: ABNORMAL MG/DL
LYMPHOCYTES # BLD AUTO: 1.7 10E9/L (ref 0.8–5.3)
LYMPHOCYTES NFR BLD AUTO: 30.1 %
MCH RBC QN AUTO: 28.5 PG (ref 26.5–33)
MCHC RBC AUTO-ENTMCNC: 32.9 G/DL (ref 31.5–36.5)
MCV RBC AUTO: 87 FL (ref 78–100)
MICROALBUMIN UR-MCNC: 54 MG/L
MICROALBUMIN/CREAT UR: 26.73 MG/G CR (ref 0–17)
MONOCYTES # BLD AUTO: 0.6 10E9/L (ref 0–1.3)
MONOCYTES NFR BLD AUTO: 9.9 %
NEUTROPHILS # BLD AUTO: 3 10E9/L (ref 1.6–8.3)
NEUTROPHILS NFR BLD AUTO: 52 %
NONHDLC SERPL-MCNC: 210 MG/DL
NRBC # BLD AUTO: 0 10*3/UL
NRBC BLD AUTO-RTO: 0 /100
PLATELET # BLD AUTO: 257 10E9/L (ref 150–450)
POTASSIUM SERPL-SCNC: 4.3 MMOL/L (ref 3.4–5.3)
PROT SERPL-MCNC: 8 G/DL (ref 6.8–8.8)
RBC # BLD AUTO: 5.19 10E12/L (ref 4.4–5.9)
SARS-COV-2 RNA RESP QL NAA+PROBE: NEGATIVE
SODIUM SERPL-SCNC: 139 MMOL/L (ref 133–144)
SPECIMEN SOURCE: NORMAL
TRIGL SERPL-MCNC: 688 MG/DL
TSH SERPL DL<=0.005 MIU/L-ACNC: 1.23 MU/L (ref 0.4–4)
WBC # BLD AUTO: 5.8 10E9/L (ref 4–11)

## 2021-01-22 PROCEDURE — 36415 COLL VENOUS BLD VENIPUNCTURE: CPT | Performed by: FAMILY MEDICINE

## 2021-01-22 PROCEDURE — 93000 ELECTROCARDIOGRAM COMPLETE: CPT | Performed by: INTERNAL MEDICINE

## 2021-01-22 PROCEDURE — 80050 GENERAL HEALTH PANEL: CPT | Performed by: FAMILY MEDICINE

## 2021-01-22 PROCEDURE — 80061 LIPID PANEL: CPT | Performed by: FAMILY MEDICINE

## 2021-01-22 PROCEDURE — 99214 OFFICE O/P EST MOD 30 MIN: CPT | Mod: 25 | Performed by: FAMILY MEDICINE

## 2021-01-22 PROCEDURE — 82043 UR ALBUMIN QUANTITATIVE: CPT | Performed by: FAMILY MEDICINE

## 2021-01-22 PROCEDURE — 83721 ASSAY OF BLOOD LIPOPROTEIN: CPT | Performed by: FAMILY MEDICINE

## 2021-01-22 PROCEDURE — 83036 HEMOGLOBIN GLYCOSYLATED A1C: CPT | Performed by: FAMILY MEDICINE

## 2021-01-22 PROCEDURE — 97803 MED NUTRITION INDIV SUBSEQ: CPT | Performed by: DIETITIAN, REGISTERED

## 2021-01-22 RX ORDER — FENOFIBRATE 54 MG/1
54 TABLET ORAL DAILY
Qty: 90 TABLET | Refills: 3 | Status: SHIPPED | OUTPATIENT
Start: 2021-01-22 | End: 2021-09-21

## 2021-01-22 ASSESSMENT — MIFFLIN-ST. JEOR
SCORE: 1998.85
SCORE: 1998.85

## 2021-01-22 ASSESSMENT — PAIN SCALES - GENERAL
PAINLEVEL: NO PAIN (0)
PAINLEVEL: NO PAIN (0)

## 2021-01-22 NOTE — LETTER
"    1/22/2021        RE: Atul Lam  [address]  [place] [address]        Diabetes Self-Management Education & Support    Presents for: Individual review    SUBJECTIVE/OBJECTIVE:  Presents for: Individual review  Accompanied by: Self(Wife is Leah)  Diabetes education in the past 24mo: Yes  Focus of Visit: Reducing Risks, Taking Medication  Diabetes type: Type 2  Date of diagnosis: 2008  Disease course: Improving  How confident are you filling out medical forms by yourself:: Quite a bit  Diabetes management related comments/concerns: A1c has trended up.  Transportation concerns: No  Difficulty affording diabetes medication?: Sometimes(Pt is concered about cost of Trulicity next year.  This year his deductible will be met with a surgery.  He does have a savings card.)  Difficulty affording diabetes testing supplies?: No  Other concerns:: None  Cultural Influences/Ethnic Background:  American    Diabetes Symptoms & Complications:  Fatigue: Yes  Neuropathy: No  Polydipsia: No  Polyphagia: No  Polyuria: No  Visual change: No  Slow healing wounds: No  Symptom course: Stable  Weight trend: Decreasing(Weight is up 8# since visit last month but down 17# since 2/2019.)  Complications assessed today?: No    Patient Problem List and Family Medical History reviewed for relevant medical history, current medical status, and diabetes risk factors.    Vitals:  /84   Pulse 84   Resp 16   Ht 1.88 m (6' 2\")   Wt 108.4 kg (239 lb)   SpO2 98%   BMI 30.69 kg/m    Estimated body mass index is 30.69 kg/m  as calculated from the following:    Height as of this encounter: 1.88 m (6' 2\").    Weight as of this encounter: 108.4 kg (239 lb).   Last 3 BP:   BP Readings from Last 3 Encounters:   01/22/21 122/70   01/22/21 127/84   01/06/21 118/70       History   Smoking Status     Former Smoker     Years: 30.00     Types: Dip, chew, snus or snuff     Quit date: 9/12/2017   Smokeless Tobacco     Former User     Types: Chew     " Quit date: 2/14/2020       Labs:  Lab Results   Component Value Date    A1C 11.9 11/20/2020     Lab Results   Component Value Date     01/22/2021     Lab Results   Component Value Date    LDL  01/22/2021     Cannot estimate LDL when triglyceride exceeds 400 mg/dL     HDL Cholesterol   Date Value Ref Range Status   01/22/2021 39 (L) >39 mg/dL Final   ]  GFR Estimate   Date Value Ref Range Status   01/22/2021 >90 >60 mL/min/[1.73_m2] Final     Comment:     Non  GFR Calc  Starting 12/18/2018, serum creatinine based estimated GFR (eGFR) will be   calculated using the Chronic Kidney Disease Epidemiology Collaboration   (CKD-EPI) equation.       GFR Estimate If Black   Date Value Ref Range Status   01/22/2021 >90 >60 mL/min/[1.73_m2] Final     Comment:      GFR Calc  Starting 12/18/2018, serum creatinine based estimated GFR (eGFR) will be   calculated using the Chronic Kidney Disease Epidemiology Collaboration   (CKD-EPI) equation.       Lab Results   Component Value Date    CR 0.86 01/22/2021     No results found for: MICROALBUMIN    Healthy Eating:  Healthy Eating Assessed Today: No  Cultural/Anglican diet restrictions?: No  Meal planning/habits: Other(Pt recently started reducing carbohydrate intake.)  Meals include: Breakfast, Lunch, Dinner  Breakfast: 2 eggs or 2 waffles or 2 pancakes - sugar free syrup - coffee with sugar free cream  Lunch: Packs the following and grazes at work - sandwich, salad with ranch dressing, sugar free jello, sugar free cookies  Dinner: Eats 12 am-2 am when gets home - steamer packet with veggies/pasta/sauce with few frozen meatballs  Other: Pt works afternoon shift - 12 pm - 12 am.  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 : No  Blood Glucose Meter: ContourNext  Times checking blood sugar at home (number): 1  Times checking blood sugar at home (per): Day  Blood glucose trend: Decreasing per pt report  Pt states he either tests fasting or 2 hours after eating and levels have been 100-113.     Taking Medications:  Diabetes Medication(s)     Biguanides       metFORMIN (GLUCOPHAGE-XR) 500 MG 24 hr tablet    TAKE 2 TABLETS BY MOUTH TWICE DAILY WITH MEALS    Incretin Mimetic Agents (GLP-1 Receptor Agonists)       dulaglutide (TRULICITY) 0.75 MG/0.5ML pen    Inject 0.75 mg Subcutaneous every 7 days          Taking Medication Assessed Today: Yes  Current Treatments: Diet, Oral Medication (taken by mouth), Non-insulin Injectables(Metformin ER 1000 mg bid, Trulicity 0.75 mg weekly.)  Problems taking diabetes medications regularly?: No  Diabetes medication side effects?: Yes(Pt had low's when on Glimepiride in past.)    Problem Solving:  Problem Solving Assessed Today: No    Reducing Risks:  Reducing Risks Assessed Today: No  Has dilated eye exam at least once a year?: No(Scheduled)  Sees dentist every 6 months?: No  Feet checked by healthcare provider in the last year?: Yes    Healthy Coping:  Healthy Coping Assessed Today: Yes  Emotional response to diabetes: Acceptance  Stage of change: ACTION (Actively working towards change)  Patient Activation Measure Survey Score:  GRUPO Score (Last Two) 12/7/2018   GRUPO Raw Score 29   Activation Score 52.9   GRUPO Level 2       Diabetes knowledge and skills assessment:   Patient is knowledgeable in diabetes management concepts related to: Healthy Eating, Being Active and Monitoring    Patient needs further education on the following diabetes management concepts: Taking Medication    Based on learning assessment above, most appropriate setting for further diabetes education would be: Individual  setting.      INTERVENTIONS:    Education provided today on:  AADE Self-Care Behaviors:  Taking Medication: Pt has some concerns about whether he wants to be on Metformin or not. He feels it does not work.  Explained that it is working but just was not enough by itself without the Trulicity.  He agrees to continue on it until next A1c check.     Opportunities for ongoing education and support in diabetes-self management were discussed.    Pt verbalized understanding of concepts discussed and recommendations provided today.       Education Materials Provided:  No new materials today.      ASSESSMENT:  Glucose levels have trended down much per pt's report.  He is tolerating Trulicity without side effects.  Pt is concerned about upcoming colon resection on Monday which is understandable.      Goals Addressed as of 1/22/2021                 Today       Patient Stated      Reducing Risks (pt-stated)   On track    Added 2/8/19 by Chica Guzman RD     My Goal: I will lower my A1c to target of less than 7.0%.     What I need to meet my goal: limit carbohydrates in diet, continue to use exercise bike, take medications as prescribed.     I plan to meet my goal by this date: next A1c check.             Patient's most recent   Lab Results   Component Value Date    A1C 11.9 11/20/2020    is not meeting goal of <7.0    PLAN  Continue current dose of Trulicity and Metformin.   Bring meter to next visit.   See Patient Instructions for co-developed, patient-stated behavior change goals.  AVS printed and provided to patient today.   Follow up in one month for A1c check.     Time Spent: 20 minutes  Encounter Type: Individual    Any diabetes medication dose changes were made via the CDE Protocol and Collaborative Practice Agreement with the patient's referring provider. A copy of this encounter was shared with the provider.          Sincerely,        Chica Guzman RD

## 2021-01-22 NOTE — NURSING NOTE
"Chief Complaint   Patient presents with     Pre-Op Exam       Initial /70 (BP Location: Right arm, Patient Position: Chair, Cuff Size: Adult Large)   Pulse 87   Temp 96.4  F (35.8  C) (Tympanic)   Ht 1.88 m (6' 2\")   Wt 108.4 kg (239 lb)   SpO2 98%   BMI 30.69 kg/m   Estimated body mass index is 30.69 kg/m  as calculated from the following:    Height as of this encounter: 1.88 m (6' 2\").    Weight as of this encounter: 108.4 kg (239 lb).  Medication Reconciliation: complete  Kelvin Aguilar LPN  "

## 2021-01-22 NOTE — PROGRESS NOTES
"Diabetes Self-Management Education & Support    Presents for: Individual review    SUBJECTIVE/OBJECTIVE:  Presents for: Individual review  Accompanied by: Self(Wife is Leah)  Diabetes education in the past 24mo: Yes  Focus of Visit: Reducing Risks, Taking Medication  Diabetes type: Type 2  Date of diagnosis: 2008  Disease course: Improving  How confident are you filling out medical forms by yourself:: Quite a bit  Diabetes management related comments/concerns: A1c has trended up.  Transportation concerns: No  Difficulty affording diabetes medication?: Sometimes(Pt is concered about cost of Trulicity next year.  This year his deductible will be met with a surgery.  He does have a savings card.)  Difficulty affording diabetes testing supplies?: No  Other concerns:: None  Cultural Influences/Ethnic Background:  American    Diabetes Symptoms & Complications:  Fatigue: Yes  Neuropathy: No  Polydipsia: No  Polyphagia: No  Polyuria: No  Visual change: No  Slow healing wounds: No  Symptom course: Stable  Weight trend: Decreasing(Weight is up 8# since visit last month but down 17# since 2/2019.)  Complications assessed today?: No    Patient Problem List and Family Medical History reviewed for relevant medical history, current medical status, and diabetes risk factors.    Vitals:  /84   Pulse 84   Resp 16   Ht 1.88 m (6' 2\")   Wt 108.4 kg (239 lb)   SpO2 98%   BMI 30.69 kg/m    Estimated body mass index is 30.69 kg/m  as calculated from the following:    Height as of this encounter: 1.88 m (6' 2\").    Weight as of this encounter: 108.4 kg (239 lb).   Last 3 BP:   BP Readings from Last 3 Encounters:   01/22/21 122/70   01/22/21 127/84   01/06/21 118/70       History   Smoking Status     Former Smoker     Years: 30.00     Types: Dip, chew, snus or snuff     Quit date: 9/12/2017   Smokeless Tobacco     Former User     Types: Chew     Quit date: 2/14/2020       Labs:  Lab Results   Component Value Date    A1C 11.9 " 11/20/2020     Lab Results   Component Value Date     01/22/2021     Lab Results   Component Value Date    LDL  01/22/2021     Cannot estimate LDL when triglyceride exceeds 400 mg/dL     HDL Cholesterol   Date Value Ref Range Status   01/22/2021 39 (L) >39 mg/dL Final   ]  GFR Estimate   Date Value Ref Range Status   01/22/2021 >90 >60 mL/min/[1.73_m2] Final     Comment:     Non  GFR Calc  Starting 12/18/2018, serum creatinine based estimated GFR (eGFR) will be   calculated using the Chronic Kidney Disease Epidemiology Collaboration   (CKD-EPI) equation.       GFR Estimate If Black   Date Value Ref Range Status   01/22/2021 >90 >60 mL/min/[1.73_m2] Final     Comment:      GFR Calc  Starting 12/18/2018, serum creatinine based estimated GFR (eGFR) will be   calculated using the Chronic Kidney Disease Epidemiology Collaboration   (CKD-EPI) equation.       Lab Results   Component Value Date    CR 0.86 01/22/2021     No results found for: MICROALBUMIN    Healthy Eating:  Healthy Eating Assessed Today: No  Cultural/Buddhism diet restrictions?: No  Meal planning/habits: Other(Pt recently started reducing carbohydrate intake.)  Meals include: Breakfast, Lunch, Dinner  Breakfast: 2 eggs or 2 waffles or 2 pancakes - sugar free syrup - coffee with sugar free cream  Lunch: Packs the following and grazes at work - sandwich, salad with ranch dressing, sugar free jello, sugar free cookies  Dinner: Eats 12 am-2 am when gets home - steamer packet with veggies/pasta/sauce with few frozen meatballs  Other: Pt works afternoon shift - 12 pm - 12 am.  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 :  No  Blood Glucose Meter: ContourNext  Times checking blood sugar at home (number): 1  Times checking blood sugar at home (per): Day  Blood glucose trend: Decreasing per pt report  Pt states he either tests fasting or 2 hours after eating and levels have been 100-113.     Taking Medications:  Diabetes Medication(s)     Biguanides       metFORMIN (GLUCOPHAGE-XR) 500 MG 24 hr tablet    TAKE 2 TABLETS BY MOUTH TWICE DAILY WITH MEALS    Incretin Mimetic Agents (GLP-1 Receptor Agonists)       dulaglutide (TRULICITY) 0.75 MG/0.5ML pen    Inject 0.75 mg Subcutaneous every 7 days          Taking Medication Assessed Today: Yes  Current Treatments: Diet, Oral Medication (taken by mouth), Non-insulin Injectables(Metformin ER 1000 mg bid, Trulicity 0.75 mg weekly.)  Problems taking diabetes medications regularly?: No  Diabetes medication side effects?: Yes(Pt had low's when on Glimepiride in past.)    Problem Solving:  Problem Solving Assessed Today: No    Reducing Risks:  Reducing Risks Assessed Today: No  Has dilated eye exam at least once a year?: No(Scheduled)  Sees dentist every 6 months?: No  Feet checked by healthcare provider in the last year?: Yes    Healthy Coping:  Healthy Coping Assessed Today: Yes  Emotional response to diabetes: Acceptance  Stage of change: ACTION (Actively working towards change)  Patient Activation Measure Survey Score:  GRUPO Score (Last Two) 12/7/2018   GRUPO Raw Score 29   Activation Score 52.9   GRUPO Level 2       Diabetes knowledge and skills assessment:   Patient is knowledgeable in diabetes management concepts related to: Healthy Eating, Being Active and Monitoring    Patient needs further education on the following diabetes management concepts: Taking Medication    Based on learning assessment above, most appropriate setting for further diabetes education would be: Individual setting.      INTERVENTIONS:    Education provided today on:  AADE Self-Care Behaviors:  Taking Medication: Pt has some  concerns about whether he wants to be on Metformin or not. He feels it does not work.  Explained that it is working but just was not enough by itself without the Trulicity.  He agrees to continue on it until next A1c check.     Opportunities for ongoing education and support in diabetes-self management were discussed.    Pt verbalized understanding of concepts discussed and recommendations provided today.       Education Materials Provided:  No new materials today.      ASSESSMENT:  Glucose levels have trended down much per pt's report.  He is tolerating Trulicity without side effects.  Pt is concerned about upcoming colon resection on Monday which is understandable.      Goals Addressed as of 1/22/2021                 Today       Patient Stated      Reducing Risks (pt-stated)   On track    Added 2/8/19 by Chica Guzman RD     My Goal: I will lower my A1c to target of less than 7.0%.     What I need to meet my goal: limit carbohydrates in diet, continue to use exercise bike, take medications as prescribed.     I plan to meet my goal by this date: next A1c check.             Patient's most recent   Lab Results   Component Value Date    A1C 11.9 11/20/2020    is not meeting goal of <7.0    PLAN  Continue current dose of Trulicity and Metformin.   Bring meter to next visit.   See Patient Instructions for co-developed, patient-stated behavior change goals.  AVS printed and provided to patient today.   Follow up in one month for A1c check.     Time Spent: 20 minutes  Encounter Type: Individual    Any diabetes medication dose changes were made via the CDE Protocol and Collaborative Practice Agreement with the patient's referring provider. A copy of this encounter was shared with the provider.

## 2021-01-23 LAB — LDLC SERPL DIRECT ASSAY-MCNC: 126 MG/DL

## 2021-01-25 ENCOUNTER — ANESTHESIA (OUTPATIENT)
Dept: SURGERY | Facility: HOSPITAL | Age: 56
DRG: 331 | End: 2021-01-25
Payer: COMMERCIAL

## 2021-01-25 ENCOUNTER — HOSPITAL ENCOUNTER (INPATIENT)
Facility: HOSPITAL | Age: 56
LOS: 2 days | Discharge: HOME | DRG: 331 | End: 2021-01-27
Attending: SURGERY | Admitting: SURGERY
Payer: COMMERCIAL

## 2021-01-25 DIAGNOSIS — K63.5 SIGMOID POLYP: ICD-10-CM

## 2021-01-25 LAB — GLUCOSE BLDC GLUCOMTR-MCNC: 115 MG/DL (ref 70–99)

## 2021-01-25 PROCEDURE — 258N000003 HC RX IP 258 OP 636: Performed by: SURGERY

## 2021-01-25 PROCEDURE — 64488 TAP BLOCK BI INJECTION: CPT | Mod: XU | Performed by: NURSE ANESTHETIST, CERTIFIED REGISTERED

## 2021-01-25 PROCEDURE — 999N001017 HC STATISTIC GLUCOSE BY METER IP

## 2021-01-25 PROCEDURE — 710N000010 HC RECOVERY PHASE 1, LEVEL 2, PER MIN: Performed by: SURGERY

## 2021-01-25 PROCEDURE — 120N000001 HC R&B MED SURG/OB

## 2021-01-25 PROCEDURE — 999N000138 HC STATISTIC PRE-PROCEDURE ASSESSMENT I: Performed by: SURGERY

## 2021-01-25 PROCEDURE — 0DTN0ZZ RESECTION OF SIGMOID COLON, OPEN APPROACH: ICD-10-PCS | Performed by: SURGERY

## 2021-01-25 PROCEDURE — 250N000013 HC RX MED GY IP 250 OP 250 PS 637: Performed by: SURGERY

## 2021-01-25 PROCEDURE — 88307 TISSUE EXAM BY PATHOLOGIST: CPT | Mod: TC | Performed by: SURGERY

## 2021-01-25 PROCEDURE — 250N000011 HC RX IP 250 OP 636: Performed by: SURGERY

## 2021-01-25 PROCEDURE — 360N000076 HC SURGERY LEVEL 3, PER MIN: Performed by: SURGERY

## 2021-01-25 PROCEDURE — 250N000009 HC RX 250: Performed by: SURGERY

## 2021-01-25 PROCEDURE — 370N000017 HC ANESTHESIA TECHNICAL FEE, PER MIN: Performed by: SURGERY

## 2021-01-25 PROCEDURE — 272N000001 HC OR GENERAL SUPPLY STERILE: Performed by: SURGERY

## 2021-01-25 PROCEDURE — 250N000009 HC RX 250: Performed by: NURSE ANESTHETIST, CERTIFIED REGISTERED

## 2021-01-25 PROCEDURE — 250N000025 HC SEVOFLURANE, PER MIN

## 2021-01-25 PROCEDURE — 88331 PATH CONSLTJ SURG 1 BLK 1SPC: CPT | Mod: TC | Performed by: SURGERY

## 2021-01-25 PROCEDURE — 44204 LAPARO PARTIAL COLECTOMY: CPT | Performed by: SURGERY

## 2021-01-25 PROCEDURE — 44204 LAPARO PARTIAL COLECTOMY: CPT | Performed by: NURSE ANESTHETIST, CERTIFIED REGISTERED

## 2021-01-25 PROCEDURE — 44204 LAPARO PARTIAL COLECTOMY: CPT | Mod: AS | Performed by: NURSE PRACTITIONER

## 2021-01-25 PROCEDURE — 258N000003 HC RX IP 258 OP 636: Performed by: NURSE ANESTHETIST, CERTIFIED REGISTERED

## 2021-01-25 PROCEDURE — 250N000011 HC RX IP 250 OP 636: Performed by: NURSE ANESTHETIST, CERTIFIED REGISTERED

## 2021-01-25 RX ORDER — FENTANYL CITRATE 50 UG/ML
INJECTION, SOLUTION INTRAMUSCULAR; INTRAVENOUS PRN
Status: DISCONTINUED | OUTPATIENT
Start: 2021-01-25 | End: 2021-01-25

## 2021-01-25 RX ORDER — ONDANSETRON 2 MG/ML
4 INJECTION INTRAMUSCULAR; INTRAVENOUS EVERY 6 HOURS PRN
Status: DISCONTINUED | OUTPATIENT
Start: 2021-01-25 | End: 2021-01-27 | Stop reason: HOSPADM

## 2021-01-25 RX ORDER — MEPERIDINE HYDROCHLORIDE 25 MG/ML
12.5 INJECTION INTRAMUSCULAR; INTRAVENOUS; SUBCUTANEOUS
Status: DISCONTINUED | OUTPATIENT
Start: 2021-01-25 | End: 2021-01-25 | Stop reason: HOSPADM

## 2021-01-25 RX ORDER — HYDROMORPHONE HYDROCHLORIDE 1 MG/ML
.3-.5 INJECTION, SOLUTION INTRAMUSCULAR; INTRAVENOUS; SUBCUTANEOUS
Status: DISCONTINUED | OUTPATIENT
Start: 2021-01-25 | End: 2021-01-27 | Stop reason: HOSPADM

## 2021-01-25 RX ORDER — ONDANSETRON 2 MG/ML
INJECTION INTRAMUSCULAR; INTRAVENOUS PRN
Status: DISCONTINUED | OUTPATIENT
Start: 2021-01-25 | End: 2021-01-25

## 2021-01-25 RX ORDER — FENTANYL CITRATE-0.9 % NACL/PF 10 MCG/ML
PLASTIC BAG, INJECTION (ML) INTRAVENOUS PRN
Status: DISCONTINUED | OUTPATIENT
Start: 2021-01-25 | End: 2021-01-25

## 2021-01-25 RX ORDER — NALOXONE HYDROCHLORIDE 0.4 MG/ML
0.4 INJECTION, SOLUTION INTRAMUSCULAR; INTRAVENOUS; SUBCUTANEOUS
Status: DISCONTINUED | OUTPATIENT
Start: 2021-01-25 | End: 2021-01-27

## 2021-01-25 RX ORDER — DEXAMETHASONE SODIUM PHOSPHATE 10 MG/ML
INJECTION, SOLUTION INTRAMUSCULAR; INTRAVENOUS PRN
Status: DISCONTINUED | OUTPATIENT
Start: 2021-01-25 | End: 2021-01-25

## 2021-01-25 RX ORDER — ONDANSETRON 4 MG/1
4 TABLET, ORALLY DISINTEGRATING ORAL EVERY 30 MIN PRN
Status: DISCONTINUED | OUTPATIENT
Start: 2021-01-25 | End: 2021-01-25 | Stop reason: HOSPADM

## 2021-01-25 RX ORDER — SODIUM CHLORIDE, SODIUM LACTATE, POTASSIUM CHLORIDE, CALCIUM CHLORIDE 600; 310; 30; 20 MG/100ML; MG/100ML; MG/100ML; MG/100ML
INJECTION, SOLUTION INTRAVENOUS CONTINUOUS
Status: DISCONTINUED | OUTPATIENT
Start: 2021-01-25 | End: 2021-01-26

## 2021-01-25 RX ORDER — FLUTICASONE PROPIONATE 50 MCG
2 SPRAY, SUSPENSION (ML) NASAL 2 TIMES DAILY
Status: DISCONTINUED | OUTPATIENT
Start: 2021-01-25 | End: 2021-01-27 | Stop reason: HOSPADM

## 2021-01-25 RX ORDER — NALOXONE HYDROCHLORIDE 0.4 MG/ML
0.2 INJECTION, SOLUTION INTRAMUSCULAR; INTRAVENOUS; SUBCUTANEOUS
Status: DISCONTINUED | OUTPATIENT
Start: 2021-01-25 | End: 2021-01-27 | Stop reason: HOSPADM

## 2021-01-25 RX ORDER — LIDOCAINE HYDROCHLORIDE 20 MG/ML
INJECTION, SOLUTION INFILTRATION; PERINEURAL PRN
Status: DISCONTINUED | OUTPATIENT
Start: 2021-01-25 | End: 2021-01-25

## 2021-01-25 RX ORDER — KETAMINE HYDROCHLORIDE 10 MG/ML
INJECTION INTRAMUSCULAR; INTRAVENOUS PRN
Status: DISCONTINUED | OUTPATIENT
Start: 2021-01-25 | End: 2021-01-25

## 2021-01-25 RX ORDER — NALOXONE HYDROCHLORIDE 0.4 MG/ML
0.2 INJECTION, SOLUTION INTRAMUSCULAR; INTRAVENOUS; SUBCUTANEOUS
Status: DISCONTINUED | OUTPATIENT
Start: 2021-01-25 | End: 2021-01-27

## 2021-01-25 RX ORDER — LIDOCAINE 40 MG/G
CREAM TOPICAL
Status: DISCONTINUED | OUTPATIENT
Start: 2021-01-25 | End: 2021-01-27 | Stop reason: HOSPADM

## 2021-01-25 RX ORDER — SODIUM CHLORIDE, SODIUM LACTATE, POTASSIUM CHLORIDE, CALCIUM CHLORIDE 600; 310; 30; 20 MG/100ML; MG/100ML; MG/100ML; MG/100ML
INJECTION, SOLUTION INTRAVENOUS CONTINUOUS
Status: DISCONTINUED | OUTPATIENT
Start: 2021-01-25 | End: 2021-01-25 | Stop reason: HOSPADM

## 2021-01-25 RX ORDER — NALOXONE HYDROCHLORIDE 0.4 MG/ML
0.4 INJECTION, SOLUTION INTRAMUSCULAR; INTRAVENOUS; SUBCUTANEOUS
Status: DISCONTINUED | OUTPATIENT
Start: 2021-01-25 | End: 2021-01-25 | Stop reason: HOSPADM

## 2021-01-25 RX ORDER — DIPHENHYDRAMINE HYDROCHLORIDE 50 MG/ML
25 INJECTION INTRAMUSCULAR; INTRAVENOUS EVERY 6 HOURS PRN
Status: DISCONTINUED | OUTPATIENT
Start: 2021-01-25 | End: 2021-01-27 | Stop reason: HOSPADM

## 2021-01-25 RX ORDER — FENTANYL CITRATE 50 UG/ML
25-50 INJECTION, SOLUTION INTRAMUSCULAR; INTRAVENOUS EVERY 5 MIN PRN
Status: DISCONTINUED | OUTPATIENT
Start: 2021-01-25 | End: 2021-01-25 | Stop reason: HOSPADM

## 2021-01-25 RX ORDER — NEOSTIGMINE METHYLSULFATE 1 MG/ML
VIAL (ML) INJECTION PRN
Status: DISCONTINUED | OUTPATIENT
Start: 2021-01-25 | End: 2021-01-25

## 2021-01-25 RX ORDER — ALBUTEROL SULFATE 0.83 MG/ML
2.5 SOLUTION RESPIRATORY (INHALATION) EVERY 4 HOURS PRN
Status: DISCONTINUED | OUTPATIENT
Start: 2021-01-25 | End: 2021-01-25 | Stop reason: HOSPADM

## 2021-01-25 RX ORDER — SODIUM CHLORIDE, SODIUM LACTATE, POTASSIUM CHLORIDE, CALCIUM CHLORIDE 600; 310; 30; 20 MG/100ML; MG/100ML; MG/100ML; MG/100ML
INJECTION, SOLUTION INTRAVENOUS CONTINUOUS
Status: DISCONTINUED | OUTPATIENT
Start: 2021-01-25 | End: 2021-01-25

## 2021-01-25 RX ORDER — FAMOTIDINE 20 MG/1
20 TABLET, FILM COATED ORAL 2 TIMES DAILY
Status: DISCONTINUED | OUTPATIENT
Start: 2021-01-25 | End: 2021-01-27 | Stop reason: HOSPADM

## 2021-01-25 RX ORDER — PROPOFOL 10 MG/ML
INJECTION, EMULSION INTRAVENOUS PRN
Status: DISCONTINUED | OUTPATIENT
Start: 2021-01-25 | End: 2021-01-25

## 2021-01-25 RX ORDER — ONDANSETRON 2 MG/ML
4 INJECTION INTRAMUSCULAR; INTRAVENOUS EVERY 30 MIN PRN
Status: DISCONTINUED | OUTPATIENT
Start: 2021-01-25 | End: 2021-01-25 | Stop reason: HOSPADM

## 2021-01-25 RX ORDER — ONDANSETRON 4 MG/1
4 TABLET, ORALLY DISINTEGRATING ORAL EVERY 6 HOURS PRN
Status: DISCONTINUED | OUTPATIENT
Start: 2021-01-25 | End: 2021-01-27 | Stop reason: HOSPADM

## 2021-01-25 RX ORDER — KETOROLAC TROMETHAMINE 30 MG/ML
30 INJECTION, SOLUTION INTRAMUSCULAR; INTRAVENOUS EVERY 6 HOURS
Status: COMPLETED | OUTPATIENT
Start: 2021-01-25 | End: 2021-01-26

## 2021-01-25 RX ORDER — ROPIVACAINE HYDROCHLORIDE 5 MG/ML
INJECTION, SOLUTION EPIDURAL; INFILTRATION; PERINEURAL PRN
Status: DISCONTINUED | OUTPATIENT
Start: 2021-01-25 | End: 2021-01-25

## 2021-01-25 RX ORDER — NALOXONE HYDROCHLORIDE 0.4 MG/ML
0.2 INJECTION, SOLUTION INTRAMUSCULAR; INTRAVENOUS; SUBCUTANEOUS
Status: DISCONTINUED | OUTPATIENT
Start: 2021-01-25 | End: 2021-01-25 | Stop reason: HOSPADM

## 2021-01-25 RX ORDER — DIAZEPAM 5 MG
5 TABLET ORAL EVERY 6 HOURS PRN
Status: DISCONTINUED | OUTPATIENT
Start: 2021-01-25 | End: 2021-01-27 | Stop reason: HOSPADM

## 2021-01-25 RX ORDER — BUPIVACAINE HYDROCHLORIDE 5 MG/ML
INJECTION, SOLUTION EPIDURAL; INTRACAUDAL
Status: DISCONTINUED
Start: 2021-01-25 | End: 2021-01-25 | Stop reason: HOSPADM

## 2021-01-25 RX ORDER — GLYCOPYRROLATE 0.2 MG/ML
INJECTION, SOLUTION INTRAMUSCULAR; INTRAVENOUS PRN
Status: DISCONTINUED | OUTPATIENT
Start: 2021-01-25 | End: 2021-01-25

## 2021-01-25 RX ORDER — DIPHENHYDRAMINE HCL 25 MG
25 CAPSULE ORAL EVERY 6 HOURS PRN
Status: DISCONTINUED | OUTPATIENT
Start: 2021-01-25 | End: 2021-01-27 | Stop reason: HOSPADM

## 2021-01-25 RX ORDER — PROCHLORPERAZINE MALEATE 5 MG
10 TABLET ORAL EVERY 6 HOURS PRN
Status: DISCONTINUED | OUTPATIENT
Start: 2021-01-25 | End: 2021-01-27 | Stop reason: HOSPADM

## 2021-01-25 RX ORDER — NALOXONE HYDROCHLORIDE 0.4 MG/ML
0.4 INJECTION, SOLUTION INTRAMUSCULAR; INTRAVENOUS; SUBCUTANEOUS
Status: DISCONTINUED | OUTPATIENT
Start: 2021-01-25 | End: 2021-01-27 | Stop reason: HOSPADM

## 2021-01-25 RX ORDER — HYDROMORPHONE HYDROCHLORIDE 1 MG/ML
.3-.5 INJECTION, SOLUTION INTRAMUSCULAR; INTRAVENOUS; SUBCUTANEOUS EVERY 10 MIN PRN
Status: DISCONTINUED | OUTPATIENT
Start: 2021-01-25 | End: 2021-01-25 | Stop reason: HOSPADM

## 2021-01-25 RX ORDER — LABETALOL 20 MG/4 ML (5 MG/ML) INTRAVENOUS SYRINGE
10
Status: DISCONTINUED | OUTPATIENT
Start: 2021-01-25 | End: 2021-01-25 | Stop reason: HOSPADM

## 2021-01-25 RX ADMIN — FENTANYL CITRATE 100 MCG: 50 INJECTION, SOLUTION INTRAMUSCULAR; INTRAVENOUS at 08:20

## 2021-01-25 RX ADMIN — KETOROLAC TROMETHAMINE 30 MG: 30 INJECTION, SOLUTION INTRAMUSCULAR at 20:38

## 2021-01-25 RX ADMIN — ROCURONIUM BROMIDE 10 MG: 10 INJECTION INTRAVENOUS at 10:29

## 2021-01-25 RX ADMIN — Medication 100 MCG: at 10:30

## 2021-01-25 RX ADMIN — MIDAZOLAM 2 MG: 1 INJECTION INTRAMUSCULAR; INTRAVENOUS at 08:10

## 2021-01-25 RX ADMIN — KETAMINE HYDROCHLORIDE 30 MG: 10 INJECTION, SOLUTION INTRAMUSCULAR; INTRAVENOUS at 08:29

## 2021-01-25 RX ADMIN — ROCURONIUM BROMIDE 10 MG: 10 INJECTION INTRAVENOUS at 08:20

## 2021-01-25 RX ADMIN — FAMOTIDINE 20 MG: 20 INJECTION, SOLUTION INTRAVENOUS at 14:40

## 2021-01-25 RX ADMIN — GLYCOPYRROLATE 0.6 MG: 0.2 INJECTION, SOLUTION INTRAMUSCULAR; INTRAVENOUS at 11:00

## 2021-01-25 RX ADMIN — Medication 50 MCG: at 10:12

## 2021-01-25 RX ADMIN — FENTANYL CITRATE 25 MCG: 50 INJECTION, SOLUTION INTRAMUSCULAR; INTRAVENOUS at 09:46

## 2021-01-25 RX ADMIN — PROPOFOL 30 MG: 10 INJECTION, EMULSION INTRAVENOUS at 09:46

## 2021-01-25 RX ADMIN — GLYCOPYRROLATE 0.2 MG: 0.2 INJECTION, SOLUTION INTRAMUSCULAR; INTRAVENOUS at 10:31

## 2021-01-25 RX ADMIN — DEXAMETHASONE SODIUM PHOSPHATE 5 MG: 10 INJECTION, SOLUTION INTRAMUSCULAR; INTRAVENOUS at 11:13

## 2021-01-25 RX ADMIN — ROCURONIUM BROMIDE 20 MG: 10 INJECTION INTRAVENOUS at 09:04

## 2021-01-25 RX ADMIN — FENTANYL CITRATE 50 MCG: 50 INJECTION, SOLUTION INTRAMUSCULAR; INTRAVENOUS at 08:48

## 2021-01-25 RX ADMIN — KETOROLAC TROMETHAMINE 30 MG: 30 INJECTION, SOLUTION INTRAMUSCULAR at 14:40

## 2021-01-25 RX ADMIN — ONDANSETRON 4 MG: 2 INJECTION INTRAMUSCULAR; INTRAVENOUS at 10:43

## 2021-01-25 RX ADMIN — FENTANYL CITRATE 25 MCG: 50 INJECTION, SOLUTION INTRAMUSCULAR; INTRAVENOUS at 09:42

## 2021-01-25 RX ADMIN — FAMOTIDINE 20 MG: 20 TABLET, FILM COATED ORAL at 20:45

## 2021-01-25 RX ADMIN — Medication 160 MG: at 08:20

## 2021-01-25 RX ADMIN — FENTANYL CITRATE 50 MCG: 50 INJECTION, SOLUTION INTRAMUSCULAR; INTRAVENOUS at 11:00

## 2021-01-25 RX ADMIN — ROCURONIUM BROMIDE 20 MG: 10 INJECTION INTRAVENOUS at 09:47

## 2021-01-25 RX ADMIN — PROPOFOL 300 MG: 10 INJECTION, EMULSION INTRAVENOUS at 08:20

## 2021-01-25 RX ADMIN — CEFOTETAN DISODIUM 2 G: 2 INJECTION, POWDER, FOR SOLUTION INTRAMUSCULAR; INTRAVENOUS at 08:10

## 2021-01-25 RX ADMIN — SODIUM CHLORIDE, POTASSIUM CHLORIDE, SODIUM LACTATE AND CALCIUM CHLORIDE: 600; 310; 30; 20 INJECTION, SOLUTION INTRAVENOUS at 14:45

## 2021-01-25 RX ADMIN — DEXAMETHASONE SODIUM PHOSPHATE 5 MG: 10 INJECTION, SOLUTION INTRAMUSCULAR; INTRAVENOUS at 11:16

## 2021-01-25 RX ADMIN — ROCURONIUM BROMIDE 40 MG: 10 INJECTION INTRAVENOUS at 08:26

## 2021-01-25 RX ADMIN — DEXAMETHASONE SODIUM PHOSPHATE 4 MG: 10 INJECTION, SOLUTION INTRAMUSCULAR; INTRAVENOUS at 08:26

## 2021-01-25 RX ADMIN — Medication 4 MG: at 11:00

## 2021-01-25 RX ADMIN — LIDOCAINE HYDROCHLORIDE 40 MG: 20 INJECTION, SOLUTION INFILTRATION; PERINEURAL at 08:20

## 2021-01-25 RX ADMIN — SODIUM CHLORIDE, POTASSIUM CHLORIDE, SODIUM LACTATE AND CALCIUM CHLORIDE: 600; 310; 30; 20 INJECTION, SOLUTION INTRAVENOUS at 09:41

## 2021-01-25 RX ADMIN — ROPIVACAINE HYDROCHLORIDE 20 ML: 5 INJECTION, SOLUTION EPIDURAL; INFILTRATION; PERINEURAL at 11:16

## 2021-01-25 RX ADMIN — SODIUM CHLORIDE, POTASSIUM CHLORIDE, SODIUM LACTATE AND CALCIUM CHLORIDE: 600; 310; 30; 20 INJECTION, SOLUTION INTRAVENOUS at 22:52

## 2021-01-25 RX ADMIN — ROPIVACAINE HYDROCHLORIDE 20 ML: 5 INJECTION, SOLUTION EPIDURAL; INFILTRATION; PERINEURAL at 11:13

## 2021-01-25 RX ADMIN — SODIUM CHLORIDE, POTASSIUM CHLORIDE, SODIUM LACTATE AND CALCIUM CHLORIDE: 600; 310; 30; 20 INJECTION, SOLUTION INTRAVENOUS at 07:30

## 2021-01-25 ASSESSMENT — MIFFLIN-ST. JEOR: SCORE: 1958.02

## 2021-01-25 ASSESSMENT — ACTIVITIES OF DAILY LIVING (ADL)
ADLS_ACUITY_SCORE: 15
ADLS_ACUITY_SCORE: 15

## 2021-01-25 NOTE — ANESTHESIA PROCEDURE NOTES
Pre-Procedure   Staff -   CRNA: Irma Hernandez APRN CRNA  Performed By: CRNA  Location: OR  Procedure Start/Stop Times: 1/25/2021 11:12 AM and 1/25/2021 11:16 AM  Pre-Anesthestic Checklist: patient identified, IV checked, site marked, risks and benefits discussed, informed consent, monitors and equipment checked, pre-op evaluation, at physician/surgeon's request and post-op pain management  Timeout:  Correct Patient: Yes   Correct Procedure: Yes   Correct Site: Yes   Correct Position: Yes   Correct Laterality: Yes   Site Marked: Yes    Procedure Documentation  Procedure: TAP  Laterality: bilateral  Patient Position:supine  Patient Prep/Sterile Barriers: Chloraprep  Needle type: insulated and short bevel  Needle Gauge: 20.   Needle Length (Inches) 4   Ultrasound guided, Ultrasound used to identify targeted nerve, plexus, vascular marker, or fascial plane and place a needle adjacent to it in real-time, Ultrasound was used to visualize the spread of anesthetic in close proximity to the above referenced structure    Assessment/Narrative      The placement was negative for: blood aspirated, painful injection and site bleeding  Paresthesias: No.  Bolus given via needle..   Secured via.   Insertion/Infusion Method: Single Shot  Complications: none  Injection made incrementally with aspirations every 5 mL.

## 2021-01-25 NOTE — OP NOTE
REPORT OF OPERATION  DATE OF PROCEDURE: 1/25/2021    PATIENT: Atul Lam    SURGERY PREFORMED: Hand Assisted Laparoscopic Sigmoid Colectomy    PREOPERATIVE DIAGNOSIS: Colon Mass at 40 cm    POSTOPERATIVE DIAGNOSIS: Same   Frozen pathology showed 2 cm adenomatous polyp with no evidence of in situ or invasion.    SURGEON: Lionel Roblero MD    ASSISTANTS: Tanvi Obrien, her expert assistance was required because of the technical aspect of the case    ANESTHESIA: General Endotracheal Anesthesia    COMPLICATIONS: None apparent    TRANSFUSIONS: None    TISSUE TO PATHOLOGY: Left colon to pathology for pathological diagnosis     FINDINGS: Mass was located in the left colon.  Fresh frozen pathology showed a 2 cm adenomatous polyp with no evidence of in situ or invasion.    INDICATIONS: This is a 55 year old male with a mass in the left colon.  Is located at 40 cm.  Initial pathology showed adenomatous polyp..  Patient will be taken to the operating room for a hand-assisted laparoscopic sigmoid colectomy, possible open sigmoid colectomy.    DESCRIPTIONS OF PROCEDURE IN DETAIL: After consent was obtained the patient was taken to the operative suite and saúl in the supine position.  The patient was identified and the correct patient was confirmed.  General Endotracheal Anesthesia was administered by anesthesia.  The patient was sterilely prepped and draped in the usual fashion.  A time out was preformed verifying the correct patient and the correct procedure.  The entire operative team was in agreement.  All necessary equipment and supplies were in the room.     Through a periumbilical incision, the skin was sharply entered, dissection was taken down to isolation of the fascia.  The fascia was opened and entrance into the abdomen was accomplished.  The hand port was inserted into the abdomen and pneumoperitoneum was obtained.  A 10 mm trocar was placed in the right lower quadrant.  Additional 5 mm trocars were  placed as necessary.  The descending colon was mobilized along the left white line of Toldt using the LigaSure freeing up the left colon.  Mobilization of the sigmoid colon was then accomplished.  Mobilization was continued until the colon from the rectum to the splenic flexure was fully mobilized.  The splenic flexure did not require mobilization.  A window was made around the distal sigmoid and the distal sigmoid was divided using a Endo BRANDON 45 stapler.  The mesentery of the sigmoid colon was then divided using the LigaSure.  This was continued up the ascending colon to next resection point.  This allowed for extracorporealization of the colon. A window was made around the colon at the proximal resection point, and the proximal colon was divided with a BRANDON 45 stapler.  The colon was opened to verify that the lesion or area of interest was contained within specimen.  The colon was passed off and sent to pathology for pathological diagnosis.  Fresh frozen pathology show an adenomatous polyp with no evidence of in situ or invasion.  It was decided to do a isoperistaltic side-to-side anastomosis.  The proximal portion of the colon was then brought to the distal portion of the colon and secured using 3-0 silk sutures.  Colotomies were then created and a side to side anastomosis was created using the Endo 45 BRANDON stapler.  The common colotomy was then closed in a 2 layer fashion with the first layer being 3-0 Vicryl in a Cannel type fashion.  The suture line was then imbricated with Lembert stitches of 3-0 silk suture.  Care was made that the colon was not under tension and not twisted.  The anastomotic donuts were checked and found to be intact.  The anastomotic donuts were then sent to pathology for pathological diagnosis.  Hemostasis was assured.  The abdomen was irrigated and the irrigant was removed and again hemostasis was assured.  All instrumentation was removed from the abdomen.  All instrument, needle, and  sponge counts were correct x2.  The midline incisions' fascia was closed with looped #1 PDS and all skin incisions were closed using staples.  Sterile dressings were applied.  The patient was awakened in the operating room, extubated without difficulty, and taken to the recovery room in stable condition, tolerating the procedure well.

## 2021-01-25 NOTE — ANESTHESIA POSTPROCEDURE EVALUATION
Patient: Atul Lam    Procedure(s):  COLECTOMY, LEFT, LAPAROSCOPIC, Hand assisted    Diagnosis:Sigmoid polyp [K63.5]  Diagnosis Additional Information: No value filed.    Anesthesia Type:  PNB    Note:  Disposition: Inpatient   Postop Pain Control: Uneventful            Sign Out: Well controlled pain (Pt states he has cramping, rates 2 out of 10, states it is well controlled)   PONV: No   Neuro/Psych: Uneventful            Sign Out: Acceptable/Baseline neuro status   Airway/Respiratory: Uneventful            Sign Out: Acceptable/Baseline resp. status   CV/Hemodynamics: Uneventful            Sign Out: Acceptable CV status   Other NRE: NONE   DID A NON-ROUTINE EVENT OCCUR? No         Last vitals:  Vitals:    01/25/21 1210 01/25/21 1234 01/25/21 1245   BP: 110/61  120/71   Pulse: 72  84   Resp: 14  16   Temp: 97.6  F (36.4  C)  98.1  F (36.7  C)   SpO2: 100% 95%        Electronically Signed By: MARYLIN Barrera CRNA  January 25, 2021  2:17 PM

## 2021-01-25 NOTE — ANESTHESIA PROCEDURE NOTES
Airway   Date/Time: 1/25/2021 8:20 AM   Patient location during procedure: OR  Staff -   Performed By: CRNA    Consent for Airway   Urgency: elective    Indications and Patient Condition  Indications for airway management: jing-procedural      Final Airway Details  Final airway type: endotracheal airway  Successful airway:ETT - single  Endotracheal Airway Details   ETT size (mm): 7.5  Successful intubation technique: direct laryngoscopy  Grade View of Cords: 1  Measured from: lips  Secured at (cm): 24  Bite block used: None    Post intubation assessment   Placement verified by: capnometry, equal breath sounds and chest rise   Number of attempts at approach: 1  Ease of procedure: easy  Dentition: Intact

## 2021-01-25 NOTE — ANESTHESIA CARE TRANSFER NOTE
Patient: Atul Lam    Procedure(s):  COLECTOMY, LEFT, LAPAROSCOPIC, Hand assisted    Diagnosis: Sigmoid polyp [K63.5]  Diagnosis Additional Information: No value filed.    Anesthesia Type:   PNB     Note:      Level of Consciousness: drowsy  Oxygen Supplementation: nasal cannula    Independent Airway: airway patency satisfactory and stable  Dentition: dentition unchanged  Vital Signs Stable: post-procedure vital signs reviewed and stable  Report to RN Given: handoff report given  Patient transferred to: PACU    Handoff Report: Identifed the Patient, Identified the Reponsible Provider, Reviewed the pertinent medical history, Discussed the surgical course, Reviewed Intra-OP anesthesia mangement and issues during anesthesia, Set expectations for post-procedure period and Allowed opportunity for questions and acknowledgement of understanding      Vitals: (Last set prior to Anesthesia Care Transfer)  CRNA VITALS  1/25/2021 1055 - 1/25/2021 1130      1/25/2021             Resp Rate (set):  8        Electronically Signed By: MARYLIN Chang CRNA  January 25, 2021  11:30 AM

## 2021-01-25 NOTE — PLAN OF CARE
[facility] Inpatient Admission Note:    Patient admitted to 3112/3112-1 at approximately 1230 via cart accompanied by nurse from surgery . Report received from Stephanie VAZQUEZ in SBAR format at 1230 via face to face in room. Patient transferred to bed via slide board.. Patient is alert and oriented X 3, denies pain; rates at 0 on 0-10 scale.  Patient oriented to room, unit, hourly rounding, and plan of care. Explained admission packet and patient handbook with patient bill of rights brochure. Will continue to monitor and document as needed.     Inpatient Nursing criteria listed below was met:    Health care directives status obtained and documented: No    Care Everywhere authorization obtained  n/a    MRSA swab completed for patient 65 years and older: N/A    Patient identifies a surrogate decision maker: Yes If yes, who:Leah Contact Information:see face sheet     If initial lactic acid >2.0, repeat lactic acid drawn within one hour of arrival to unit: NA. If no, state reason: n/a    Vaccination assessment and education completed: Yes   Vaccinations received prior to admission: Pneumovax no  Influenza(seasonal)  NO   Vaccination(s) ordered: patient declines    Clergy visit ordered if patient requests: N/A    Skin issues/needs documented: Yes    Isolation Patient: no Education given, correct sign in place and documentation row added to PCS:   n/a    Fall Prevention Yes: Care plan updated, education given and documented, sticker and magnet in place: Yes    Care Plan initiated: Yes    Education Documented (including assessment): Yes    Patient has discharge needs : No If yes, please explain:n/a      Continues to deny pain.  Mepilex dressings intact to abdomen.  Navarro with clear yellow urine.  O2 at 1.5 LPM.  Up to commode with assist.  Bowel sounds hypo x4 quads.      Face to face report given with opportunity to observe patient.    Report given to Elsie and Deb RNs    Comfort Mays RN   1/25/2021  3:24 PM

## 2021-01-25 NOTE — PLAN OF CARE
Prior to Admission Medication Reconciliation:     Medications added:   [x] None  [] As listed below:    Medications deleted:   [x] None  [] As listed below:    Changes made to existing medications:   [x] None  [] As listed below:    Last times/dates taken verified with patient:  [] Yes- completed myself  [x] Nurse completed no changes made  [] Unable to review with patient:  [] Nurse completed/changes made:     Allergy modifications:    [x]Did not review  []Patient verified NKA  []Patient verified current existing allergies: no changes made  []New allergies: listed below    Medication reconciliation sources:   []Patient  []Patient family member/emergency contact: **  [][facility] Report Review  []Epic Chart Review  []Care Everywhere review  []Pharmacy med list: **  []Pharmacy phone call  [x]Outside meds dispense report: see below  []Nursing home or Assisted Living MAR:  []Other: **    Pharmacy desired at discharge: [facility]    Is patient on coumadin? no    Requests for consultation by provider or pharmacist:   [] Patient understands why all of their meds were prescribed and how to take them. No questions.   [x] Fill dates coincide with compliancy for all maintenance meds.   [] Fill dates do not coincide with compliancy with maintenance meds. See notes in PTA medlist about how patient is taking.   [] Patient has questions about the following:    Comments: LPN reviewed medications with patient. Fill dates align with compliancy aside from flonase and budesonide, which pt may be using PRN. I called and spoke with LPN to double check pt hasn't started his fenofibrate PTA and she stated he reported that he had not and was supposed to wait to start it after surgery. No other concerns with his medlist. Pt very drowsy so I will not be reviewing with him unless otherwise requested.       Tasia Loya on 1/25/2021 at 1:24 PM       Discrepancies: [x] No []Not Applicable []Yes: listed below    Time spent on medication  reconciliation:   [x]5-20 mins  []20-40 mins  []> 40 mins    Issues completing PTA medication reconciliation:  [] On hold for a long time  [] Waited for a call back  [] Fax didn't come through  [] Fax took a long time  [] Other:    Notifying appropriate party of changes/additions/discrepancies:  [x]No pertinent changes made, notification not necessary.   [] Notified attending provider via text page  [] Notified attending provider in person  [] Notified pharmacy  [] Notified nurse  [] Attending provider not available, left detailed notes  [] Changes/additions made don't need provider notification because provider has not seen patient or input any orders  [] Changes/additions made don't need provider notification because changes made are to medications not ordered  Medications Prior to Admission   Medication Sig Dispense Refill Last Dose     blood glucose (BELL CONTOUR NEXT) test strip Use to test blood sugar 2 times daily. 100 each 11 1/25/2021 at Unknown time     blood glucose monitoring (BELL MICROLET) lancets Use to test blood sugar 2 times daily. 100 each 11 1/25/2021 at Unknown time     budesonide (PULMICORT) 0.5 MG/2ML neb solution Squirt entire vial into previously made preet med saline bottle, mix, and irrigate each nostril until entire bottle empty.  Do this twice daily. 3 Box 11 1/24/2021 at Unknown time     dulaglutide (TRULICITY) 0.75 MG/0.5ML pen Inject 0.75 mg Subcutaneous every 7 days 2 mL 1 Past Week at Unknown time     erythromycin (E-MYCIN) 250 MG tablet Take 4 tabs po at 1400, 1500 and 2200 12 tablet 0 1/24/2021 at Unknown time     fluticasone (FLONASE) 50 MCG/ACT nasal spray Spray 2 sprays into both nostrils 2 times daily 16 g 12 1/24/2021 at Unknown time     metFORMIN (GLUCOPHAGE-XR) 500 MG 24 hr tablet TAKE 2 TABLETS BY MOUTH TWICE DAILY WITH MEALS 360 tablet 3 Past Week at Unknown time     neomycin (MYCIFRADIN) 500 MG tablet Take 2 tabs po at 1400, 1500 and 2200 6 tablet 0 1/24/2021 at Unknown  time     simvastatin (ZOCOR) 40 MG tablet TAKE 1 TABLET BY MOUTH ONCE DAILY AT BEDTIME 90 tablet 3 Past Week at Unknown time     fenofibrate (LOFIBRA) 54 MG tablet Take 1 tablet (54 mg) by mouth daily 90 tablet 3          Medication Dispense History (from 1/26/2020 to 1/22/2021)  Expand All  Collapse All  Amoxicillin   Dispensed Days Supply Quantity Provider Pharmacy   AMOXICILLIN 875MG   TAB 02/14/2020 7 14 Units KAREN ART [id number] ...         Budesonide   Dispensed Days Supply Quantity Provider Pharmacy   BUDESONIDE 0.5 MG/2 ML SUSP 02/17/2020 45 180 mL ALAYNANORMA MOE [facility] [id number]         Dexamethasone   Dispensed Days Supply Quantity Provider Pharmacy   DEXAMETHASON 4MG    TAB 02/14/2020 2 4 Units KAREN ART [facility] ...         Dulaglutide   Dispensed Days Supply Quantity Provider Pharmacy   TRULICITY 0.75 MG/0.5 ML PEN 12/18/2020 28 2 mL [facility] [id number]   TRULICITY 0.75/0.5  INJ 12/18/2020 28 4 mL PEBBLES [facility]37 ...         Erythromycin Base   Dispensed Days Supply Quantity Provider Pharmacy   ERYTHROMYCIN 250MG EC CAP 01/06/2021 1 12 Units ERICA MEJÍA [facility] ...         Fluticasone Propionate   Dispensed Days Supply Quantity Provider Pharmacy   FLUTICASONE PROP 50 MCG SPRAY 02/17/2020 30 16 g NORMA CATALAN [facility] [id number]         Lancets   Dispensed Days Supply Quantity Provider Pharmacy   MICROLET LANCETS 12/04/2020 50 100 each [place] [facility] [id number]         Neomycin Sulfate   Dispensed Days Supply Quantity Provider Pharmacy   NEOMYCIN 500MG      TAB 01/06/2021 1 6 Units ERICA MEJÍA [facility] ...         PEG 3350-KCl-Na Bicarb-NaCl   Dispensed Days Supply Quantity Provider Pharmacy   PEG-3350/KCL/SODIUM SOL 01/06/2021 1 4,000 mL ERICA MEJÍA [facility] ...         Simvastatin   Dispensed Days Supply Quantity Provider Pharmacy    SIMVASTATIN 40MG    TAB 11/20/2020 90 90 Units [facility] ...   SIMVASTATIN 40MG    TAB 03/23/2020 90 90 [facility] ...         Other   Dispensed Days Supply Quantity Provider Pharmacy   METFORMIN ER 500MG  TAB 04/06/2020 90 360 Units [facility] ...         metFORMIN HCl   Dispensed Days Supply Quantity Provider Pharmacy   METFORMIN ER 500MG  TAB 11/20/2020 90 360 Units [facility] ...   METFORMIN ER 500MG  TAB 10/05/2020 30 120 Units NIHARIKA,CHIDI [facility] ...

## 2021-01-26 PROBLEM — Z90.49 S/P LEFT COLECTOMY: Status: ACTIVE | Noted: 2021-01-26

## 2021-01-26 PROBLEM — G47.33 OSA (OBSTRUCTIVE SLEEP APNEA): Status: ACTIVE | Noted: 2020-12-28

## 2021-01-26 LAB
ANION GAP SERPL CALCULATED.3IONS-SCNC: 6 MMOL/L (ref 3–14)
BUN SERPL-MCNC: 22 MG/DL (ref 7–30)
CALCIUM SERPL-MCNC: 8.7 MG/DL (ref 8.5–10.1)
CHLORIDE SERPL-SCNC: 107 MMOL/L (ref 94–109)
CO2 SERPL-SCNC: 26 MMOL/L (ref 20–32)
CREAT SERPL-MCNC: 0.91 MG/DL (ref 0.66–1.25)
ERYTHROCYTE [DISTWIDTH] IN BLOOD BY AUTOMATED COUNT: 13.3 % (ref 10–15)
GFR SERPL CREATININE-BSD FRML MDRD: >90 ML/MIN/{1.73_M2}
GLUCOSE BLDC GLUCOMTR-MCNC: 180 MG/DL (ref 70–99)
GLUCOSE BLDC GLUCOMTR-MCNC: 197 MG/DL (ref 70–99)
GLUCOSE BLDC GLUCOMTR-MCNC: 215 MG/DL (ref 70–99)
GLUCOSE BLDC GLUCOMTR-MCNC: 236 MG/DL (ref 70–99)
GLUCOSE BLDC GLUCOMTR-MCNC: 258 MG/DL (ref 70–99)
GLUCOSE SERPL-MCNC: 193 MG/DL (ref 70–99)
HCT VFR BLD AUTO: 40.6 % (ref 40–53)
HGB BLD-MCNC: 13.5 G/DL (ref 13.3–17.7)
MAGNESIUM SERPL-MCNC: 1.8 MG/DL (ref 1.6–2.3)
MCH RBC QN AUTO: 28.2 PG (ref 26.5–33)
MCHC RBC AUTO-ENTMCNC: 33.3 G/DL (ref 31.5–36.5)
MCV RBC AUTO: 85 FL (ref 78–100)
PLATELET # BLD AUTO: 258 10E9/L (ref 150–450)
POTASSIUM SERPL-SCNC: 4.2 MMOL/L (ref 3.4–5.3)
RBC # BLD AUTO: 4.78 10E12/L (ref 4.4–5.9)
SODIUM SERPL-SCNC: 139 MMOL/L (ref 133–144)
WBC # BLD AUTO: 11.6 10E9/L (ref 4–11)

## 2021-01-26 PROCEDURE — 80048 BASIC METABOLIC PNL TOTAL CA: CPT | Performed by: SURGERY

## 2021-01-26 PROCEDURE — 250N000013 HC RX MED GY IP 250 OP 250 PS 637: Performed by: NURSE PRACTITIONER

## 2021-01-26 PROCEDURE — 83735 ASSAY OF MAGNESIUM: CPT | Performed by: SURGERY

## 2021-01-26 PROCEDURE — 250N000012 HC RX MED GY IP 250 OP 636 PS 637: Performed by: NURSE PRACTITIONER

## 2021-01-26 PROCEDURE — 250N000011 HC RX IP 250 OP 636: Performed by: NURSE PRACTITIONER

## 2021-01-26 PROCEDURE — 99221 1ST HOSP IP/OBS SF/LOW 40: CPT | Performed by: NURSE PRACTITIONER

## 2021-01-26 PROCEDURE — 999N001017 HC STATISTIC GLUCOSE BY METER IP

## 2021-01-26 PROCEDURE — 250N000011 HC RX IP 250 OP 636: Performed by: SURGERY

## 2021-01-26 PROCEDURE — 120N000001 HC R&B MED SURG/OB

## 2021-01-26 PROCEDURE — 250N000013 HC RX MED GY IP 250 OP 250 PS 637: Performed by: SURGERY

## 2021-01-26 PROCEDURE — 999N000157 HC STATISTIC RCP TIME EA 10 MIN

## 2021-01-26 PROCEDURE — 85027 COMPLETE CBC AUTOMATED: CPT | Performed by: SURGERY

## 2021-01-26 PROCEDURE — 36415 COLL VENOUS BLD VENIPUNCTURE: CPT | Performed by: SURGERY

## 2021-01-26 RX ORDER — DEXTROSE MONOHYDRATE 25 G/50ML
25-50 INJECTION, SOLUTION INTRAVENOUS
Status: DISCONTINUED | OUTPATIENT
Start: 2021-01-26 | End: 2021-01-27 | Stop reason: HOSPADM

## 2021-01-26 RX ORDER — METFORMIN HCL 500 MG
1000 TABLET, EXTENDED RELEASE 24 HR ORAL 2 TIMES DAILY WITH MEALS
Status: DISCONTINUED | OUTPATIENT
Start: 2021-01-26 | End: 2021-01-27 | Stop reason: HOSPADM

## 2021-01-26 RX ORDER — NICOTINE POLACRILEX 4 MG
15-30 LOZENGE BUCCAL
Status: DISCONTINUED | OUTPATIENT
Start: 2021-01-26 | End: 2021-01-27 | Stop reason: HOSPADM

## 2021-01-26 RX ADMIN — FLUTICASONE PROPIONATE 2 SPRAY: 50 SPRAY, METERED NASAL at 08:35

## 2021-01-26 RX ADMIN — FAMOTIDINE 20 MG: 20 TABLET, FILM COATED ORAL at 21:10

## 2021-01-26 RX ADMIN — FAMOTIDINE 20 MG: 20 TABLET, FILM COATED ORAL at 08:35

## 2021-01-26 RX ADMIN — INSULIN ASPART 1 UNITS: 100 INJECTION, SOLUTION INTRAVENOUS; SUBCUTANEOUS at 11:40

## 2021-01-26 RX ADMIN — KETOROLAC TROMETHAMINE 30 MG: 30 INJECTION, SOLUTION INTRAMUSCULAR at 08:35

## 2021-01-26 RX ADMIN — INSULIN ASPART 2 UNITS: 100 INJECTION, SOLUTION INTRAVENOUS; SUBCUTANEOUS at 17:25

## 2021-01-26 RX ADMIN — METFORMIN ER 500 MG 1000 MG: 500 TABLET ORAL at 17:24

## 2021-01-26 RX ADMIN — INSULIN ASPART 2 UNITS: 100 INJECTION, SOLUTION INTRAVENOUS; SUBCUTANEOUS at 14:13

## 2021-01-26 RX ADMIN — ENOXAPARIN SODIUM 40 MG: 40 INJECTION SUBCUTANEOUS at 09:19

## 2021-01-26 RX ADMIN — KETOROLAC TROMETHAMINE 30 MG: 30 INJECTION, SOLUTION INTRAMUSCULAR at 01:43

## 2021-01-26 ASSESSMENT — ACTIVITIES OF DAILY LIVING (ADL)
ADLS_ACUITY_SCORE: 19
ADLS_ACUITY_SCORE: 17
ADLS_ACUITY_SCORE: 19
ADLS_ACUITY_SCORE: 19
ADLS_ACUITY_SCORE: 15
ADLS_ACUITY_SCORE: 17

## 2021-01-26 NOTE — PLAN OF CARE
Face to face report given with opportunity to observe patient.    Report given to TAYLOR Fuller RN   1/25/2021  11:25 PM

## 2021-01-26 NOTE — PLAN OF CARE
"Living situation PTA: home   Most recent vitals: /83 (BP Location: Right arm)   Pulse 94   Temp 97.5  F (36.4  C) (Tympanic)   Resp 18   Ht 1.88 m (6' 2\")   Wt 104.3 kg (230 lb)   SpO2 96%   BMI 29.53 kg/m      Pt admitted for sigmoid polyp, dowell was removed by night shift and pt was DTV by noon today. Pt was able to void, is able to pass flatus, and had a BM this shift. Pt is up and independent in room, he uses call button, and calls appropriately. UTV incision dt to dressing. Pt is A&Ox4 and  denies pain at this time. IV saline locked.       Discharge care conference held.   Attendees: Nursing, Physical Therapy, Occupational Therapy, Respiratory Therapy, and Physician        Face to face report given with opportunity to observe patient.    Report given to TAYLOR Boyd RN   1/26/2021  3:33 PM       1/26/2021  3:29 PM  Ana Rosa Blake RN   "

## 2021-01-26 NOTE — PROGRESS NOTES
"CLINICAL NUTRITION SERVICES  -  ASSESSMENT NOTE    Atul Lam : Malnutrition Screening Tool with a score of 2 or greater. Score, unsure of weight loss.    55 yom admitted for left colectomy. Pt has a hx of type 2 diabetes, hiatal hernia and GERD. Pt POD#1 for laparoscopic left colectomy for colon mass. Most recent A1C was 7.7 on 1/22/21 which has come down from 11.9 in 11/20/20.  Pt has been following with DM education for a few years, most recent follow up was last week. He has been working on limiting carbohydrate intake and increasing physical activity to lower his A1C. Home DM medications include Metformin 1000mg BID and .75mg Trulicity weekly. Noted about 20lb weight loss over the past several months.    Diet Order: Moderate Consistent Carb (4-6 CHO choices per meal) advanced this afternoon from full liquid  Intake: 3 meals with 100% intake.    Height: 6' 2\"  Weight: 230 lbs 0 oz  Body mass index is 29.53 kg/m .  Weight Status:  Overweight BMI 25-29.9  IBW: 82.2 kg (181 lb 3.5 oz)  Weight History: 7% weight loss in about 10 months- does not meet criteria  Wt Readings from Last 10 Encounters:   01/25/21 104.3 kg (230 lb)- suspect estimated weight   01/22/21 108.4 kg (239 lb)   01/22/21 108.4 kg (239 lb)   01/06/21 104.3 kg (230 lb)   12/31/20 102.5 kg (226 lb)   12/18/20 105.1 kg (231 lb 12.8 oz)   12/04/20 105.8 kg (233 lb 4.8 oz)   11/20/20 104.3 kg (230 lb)   02/24/20 112.9 kg (249 lb)   02/17/20 109.3 kg (241 lb)     Estimated Energy Needs: [id number] kcals ([place] St Jeor- stress factor 1-1.2) 108.4kg  Estimated Protein Needs: 105-130 grams protein (1.2-1.5 g pro/Kg) max ibw 88kg    Malnutrition Diagnosis: Patient does not meet two of the criteria necessary for diagnosing malnutrition    NUTRITION DIAGNOSIS:  Altered nutrition-related lab value related to endocrine dysfunction as evidenced by A1C 7.7    NUTRITION RECOMMENDATIONS  - Encourage intake as needed    MONITORING AND EVALUATION:  RD will " monitor diet order, intake, weight, labs

## 2021-01-26 NOTE — CONSULTS
[facility]    Hospitalist Consult Note        Date of Service (when I saw the patient): 01/26/2021     Consulted by surgery for diabetes management.     Assessment & Plan       S/P left colectomy: Per surgery      Type 2 diabetes mellitus with hyperglycemia, with long-term current use of insulin (H): Hyperglycemic post-op-likely due to perioperative decadron. Start sliding scale and QID checks until eating, then once eating solids will add back metformin to reduce risk of diarrhea.       Disposition: Per surgery    Emma Gaines CNP    Subjective:  Mild abdominal pain well controlled by current pain regimen. Denies nausea. Tolerating clear liquids, feeling hungry. Denies chest pain, abdominal pain.  No flatus or BM yet.     -Data reviewed today: I reviewed all new labs and imaging results over the last 24 hours.    Physical Exam   Temp: 98.2  F (36.8  C) Temp src: Tympanic BP: 125/69 Pulse: 82   Resp: 14 SpO2: 98 % O2 Device: None (Room air) Oxygen Delivery: 1 LPM  Vitals:    01/25/21 0720   Weight: 104.3 kg (230 lb)     Vital Signs with Ranges  Temp:  [96.9  F (36.1  C)-98.6  F (37  C)] 98.2  F (36.8  C)  Pulse:  [75-94] 82  Resp:  [14-20] 14  BP: (113-132)/(68-83) 125/69  SpO2:  [86 %-98 %] 98 %  I/O last 3 completed shifts:  In: 2099 [P.O.:480; I.V.:1619]  Out: 2200 [Urine:2200]    Peripheral IV 01/25/21 Left Hand (Active)   Site Assessment WDL 01/26/21 0900   Line Status Saline locked 01/26/21 0900   Dressing Intervention New dressing  01/26/21 0900   Phlebitis Scale 0-->no symptoms 01/26/21 0900   Infiltration Scale 0 01/26/21 0900   Infiltration Site Treatment Method  None 01/26/21 0900   Number of days: 1       Incision/Surgical Site 01/25/21 Abdomen (Active)   Incision Assessment UTV 01/26/21 0756   Rebeka-Incision Assessment UTV 01/26/21 0756   Closure WLINER 01/26/21 0756   Incision Drainage Amount Small 01/26/21 0030   Drainage Description Serous 01/26/21 0030   Dressing Intervention Dressing  marked 01/26/21 0756   Number of days: 1     Line/device assessment(s) completed for medical necessity    Constitutional: Awake,alert, no acute distress  Respiratory: Clear bilaterally, no wheezes,crackles or rhonchi.  Cardiovascular: HRR, no murmurs, rubs,thrills.   GI: Soft,nontender, bowel sounds positive.   Skin/Integumen: Incision dressing in place with marked shadowing. No unusual bruising, rashes or open areas.        Medications       cefoTEtan  2 g Intravenous Pre-Op/Pre-procedure x 1 dose     cefoTEtan  2 g Intravenous See Admin Instructions     enoxaparin ANTICOAGULANT  40 mg Subcutaneous Q24H     famotidine  20 mg Oral BID    Or     famotidine  20 mg Intravenous BID     fluticasone  2 spray Both Nostrils BID     insulin aspart  1-7 Units Subcutaneous TID AC     insulin aspart  1-5 Units Subcutaneous At Bedtime     metFORMIN  1,000 mg Oral BID w/meals     sodium chloride (PF)  3 mL Intracatheter Q8H     sodium chloride (PF)  3 mL Intracatheter Q8H       Data   Recent Labs   Lab 01/26/21  0514 01/22/21  0931   WBC 11.6* 5.8   HGB 13.5 14.8   MCV 85 87    257    139   POTASSIUM 4.2 4.3   CHLORIDE 107 107   CO2 26 27   BUN 22 13   CR 0.91 0.86   ANIONGAP 6 5   NEFTALI 8.7 9.1   * 201*   ALBUMIN  --  3.9   PROTTOTAL  --  8.0   BILITOTAL  --  0.3   ALKPHOS  --  71   ALT  --  51   AST  --  27       No results found for this or any previous visit (from the past 24 hour(s)).

## 2021-01-26 NOTE — PLAN OF CARE
"Pt is A&O, SBA, full liquid diet.  VSS, afebrile. O2 1LPM while asleep, does not want to use his home CPAP.  Assessment as charted.  Dressing to abdomen marked w/ small amt of shadowing.  No flatus.  Pt denying any real pain, states he has some \"aches.\"  No additional PRN meds given.  Navarro patent t/o night, removed this morning. DTV by 1200 today.  No N/V. Slept well this shift. IV is SL.  Brakes locked, call light within reach, makes needs known.  Frequent rounding done, free from falls.      Face to face report given with opportunity to observe patient.    Report given to Candice Kuhn, RNs    Alina Hickman RN   1/26/2021  5:42 AM    "

## 2021-01-26 NOTE — PLAN OF CARE
"/74 (BP Location: Right arm)   Pulse 75   Temp 98.6  F (37  C) (Tympanic)   Resp 18   Ht 1.88 m (6' 2\")   Wt 104.3 kg (230 lb)   SpO2 94%   BMI 29.53 kg/m       VSS. A&O, makes needs known. Denies pain. Slight abd cramping. 1 LPM via NC holding sats 92-94%. Lung sounds clear. Bowel sounds hypoactive. Surgical incision sites remain clean, dry, and intact. Advanced to full liquid diet. No other significant events.  "

## 2021-01-26 NOTE — PLAN OF CARE
Face to face report given with opportunity to observe patient.    Report given to TAYLOR Kuhn SN  1/26/2021  11:28 AM

## 2021-01-26 NOTE — PROGRESS NOTES
Medical record and Aaron Score reviewed. Participated in interdisciplinary rounds.  No apparent needs noted at this time. Care Transitions will remain available if needs arise.

## 2021-01-27 VITALS
DIASTOLIC BLOOD PRESSURE: 73 MMHG | RESPIRATION RATE: 16 BRPM | SYSTOLIC BLOOD PRESSURE: 123 MMHG | HEIGHT: 74 IN | WEIGHT: 230 LBS | OXYGEN SATURATION: 99 % | TEMPERATURE: 97.4 F | BODY MASS INDEX: 29.52 KG/M2 | HEART RATE: 67 BPM

## 2021-01-27 LAB
COPATH REPORT: NORMAL
GLUCOSE BLDC GLUCOMTR-MCNC: 125 MG/DL (ref 70–99)
GLUCOSE BLDC GLUCOMTR-MCNC: 127 MG/DL (ref 70–99)
GLUCOSE BLDC GLUCOMTR-MCNC: 129 MG/DL (ref 70–99)

## 2021-01-27 PROCEDURE — 99231 SBSQ HOSP IP/OBS SF/LOW 25: CPT | Performed by: NURSE PRACTITIONER

## 2021-01-27 PROCEDURE — 250N000013 HC RX MED GY IP 250 OP 250 PS 637: Performed by: NURSE PRACTITIONER

## 2021-01-27 PROCEDURE — 999N001017 HC STATISTIC GLUCOSE BY METER IP

## 2021-01-27 PROCEDURE — 250N000011 HC RX IP 250 OP 636: Performed by: NURSE PRACTITIONER

## 2021-01-27 PROCEDURE — 250N000013 HC RX MED GY IP 250 OP 250 PS 637: Performed by: SURGERY

## 2021-01-27 RX ORDER — IBUPROFEN 800 MG/1
800 TABLET, FILM COATED ORAL 4 TIMES DAILY PRN
Status: DISCONTINUED | OUTPATIENT
Start: 2021-01-27 | End: 2021-01-27 | Stop reason: HOSPADM

## 2021-01-27 RX ORDER — IBUPROFEN 800 MG/1
800 TABLET, FILM COATED ORAL EVERY 8 HOURS PRN
COMMUNITY
Start: 2021-01-27 | End: 2021-08-13

## 2021-01-27 RX ADMIN — ENOXAPARIN SODIUM 40 MG: 40 INJECTION SUBCUTANEOUS at 10:18

## 2021-01-27 RX ADMIN — FAMOTIDINE 20 MG: 20 TABLET, FILM COATED ORAL at 08:07

## 2021-01-27 RX ADMIN — FLUTICASONE PROPIONATE 2 SPRAY: 50 SPRAY, METERED NASAL at 08:07

## 2021-01-27 RX ADMIN — METFORMIN ER 500 MG 1000 MG: 500 TABLET ORAL at 08:07

## 2021-01-27 RX ADMIN — IBUPROFEN 800 MG: 800 TABLET ORAL at 10:27

## 2021-01-27 ASSESSMENT — ACTIVITIES OF DAILY LIVING (ADL)
ADLS_ACUITY_SCORE: 19

## 2021-01-27 NOTE — PROGRESS NOTES
Assessment & Plan     S/P partial colectomy  Doing well post op.  Had his check with surgeon.  Follow up 1 year for colonoscopy.    Type 2 diabetes mellitus with hyperglycemia, with long-term current use of insulin (H)  Encouraged him to start checking his sugars again - fasting and 2 hours post prandial.  Bring readings/meter to upcoming [facility] appointment.  a1c due 4/2021.    Need for vaccination  Updated Shingrix and Hep B.          Zoraida Bahena MD  [facility] - MIRA Pollard is a 55 year old who presents to clinic today for the following health issues       Hospital Follow-up Visit:    Hospital/Nursing Home/IP Rehab Facility: [facility]  Date of Admission: 1/25/21  Date of Discharge: 1/27/21  Reason(s) for Admission: colectomy - sigmoid polyp - juvenile polyp       Was your hospitalization related to COVID-19? No   Problems taking medications regularly:  None  Medication changes since discharge: None  Problems adhering to non-medication therapy:  None    Summary of hospitalization:  [facility] discharge summary reviewed  Diagnostic Tests/Treatments reviewed.  Follow up needed: colonoscopy 1 year out  Other Healthcare Providers Involved in Patient s Care:         Surgical follow-up appointment - 2 weeks had today  Update since discharge: improved. Pain controlled.  Bowels moving normally.  Eating well.  No fevers.  Denies concerns.    Post Discharge Medication Reconciliation: discharge medications reconciled, continue medications without change.  Plan of care communicated with patient          Diabetes Follow-up      How often are you checking your blood sugar? Not at all recently    What concerns do you have today about your diabetes? None    Has follow up at [facility] end of this month          BP Readings from Last 2 Encounters:   02/10/21 130/76   02/10/21 121/80     Hemoglobin A1C (%)   Date Value   01/22/2021 7.7 (H)   11/20/2020 11.9 (H)     LDL  Cholesterol Calculated (mg/dL)   Date Value   01/22/2021     Cannot estimate LDL when triglyceride exceeds 400 mg/dL   11/20/2020     Cannot estimate LDL when triglyceride exceeds 400 mg/dL     LDL Cholesterol Direct (mg/dL)   Date Value   01/22/2021 126 (H)           Review of Systems   Constitutional, HEENT, cardiovascular, pulmonary, gi and gu systems are negative, except as otherwise noted.      Objective    /76 (BP Location: Left arm, Patient Position: Chair, Cuff Size: Adult Large)   Pulse 94   Temp 97.5  F (36.4  C) (Tympanic)   Wt 104.3 kg (230 lb)   SpO2 98%   BMI 29.53 kg/m    Body mass index is 29.53 kg/m .  Physical Exam   GENERAL: healthy, alert and no distress  NECK: no adenopathy, no asymmetry, masses, or scars and thyroid normal to palpation  RESP: lungs clear to auscultation - no rales, rhonchi or wheezes  CV: regular rate and rhythm, normal S1 S2, no S3 or S4, no murmur, click or rub, no peripheral edema and peripheral pulses strong  ABDOMEN: no organomegaly or masses, bowel sounds normal and soft; well healed incision  MS: no gross musculoskeletal defects noted, no edema  PSYCH: mentation appears normal, affect normal/bright

## 2021-01-27 NOTE — PLAN OF CARE
"/73 (BP Location: Right arm)   Pulse 67   Temp 97.2  F (36.2  C) (Tympanic)   Resp 16   Ht 1.88 m (6' 2\")   Wt 104.3 kg (230 lb)   SpO2 97%   BMI 29.53 kg/m      Vitals as charted. Afebrile. Pain this AM a 4/10. Cares clustered. Saline locked. BSs hypoactive, however pt is passing gas and had loose stool x1 this AM. No blood noted. Denies nausea. Dressings to ABD CDI. Independent in room. BGs of 129 & 125. Bed in lowest position. Call light in reach. ID band in place. Makes needs known.    Face to face report given with opportunity to observe patient.    Report given to Lilly Wiggins RN   1/27/2021  7:15 AM      "

## 2021-01-27 NOTE — PLAN OF CARE
Patient discharged at 1:15 PM via ambulation accompanied by staff. Prescriptions sent to patients preferred pharmacy. All belongings sent with patient.     Discharge instructions reviewed with Atul. Listed belongings gathered and returned to patient. yes    Patient discharged to Home.   Report called to NA    Core Measures and Vaccines  Core Measures applicable during stay: No. If yes, state diagnosis: NA  Pneumonia and Influenza given prior to discharge, if indicated: No    Surgical Patient   Surgical Procedures during stay: yes  Did patient receive discharge instruction on wound care and recognition of infection symptoms? Yes    MISC  Follow up appointment made:  Yes  Home and hospital aquired medications returned to patient: N/A  Patient reports pain was well managed at discharge: Yes

## 2021-01-27 NOTE — PROGRESS NOTES
Face to face report given with opportunity to observe patient.    Report given to  Jaiden Pepper,  1/26/2021  7:25 PM

## 2021-01-27 NOTE — CONSULTS
[facility]    Hospitalist Consult Note        Date of Service (when I saw the patient): 01/27/2021     Consulted by surgery for diabetes management.     Assessment & Plan       S/P left colectomy: Per surgery      Type 2 diabetes mellitus with hyperglycemia, with long-term current use of insulin (H): Hyperglycemic post-op-likely due to perioperative decadron. Start sliding scale and QID checks until eating, then once eating solids will add back metformin to reduce risk of diarrhea.   -1/27: Sugars down nicely, has not needed sliding scale. Continue home dose metformin, no changes needed for discharge     Disposition: Per surgery    Emma Gaines, CNP    Subjective:  Mild abdominal pain well controlled by current pain regimen.Denies nausea, tolerating diet well. Had bowel movement. Anticipating discharge today.     -Data reviewed today: I reviewed all new labs and imaging results over the last 24 hours.    Physical Exam   Temp: 97.4  F (36.3  C) Temp src: Tympanic BP: 123/73 Pulse: 67   Resp: 16 SpO2: 99 % O2 Device: None (Room air)    Vitals:    01/25/21 0720   Weight: 104.3 kg (230 lb)     Vital Signs with Ranges  Temp:  [97  F (36.1  C)-98.2  F (36.8  C)] 97.4  F (36.3  C)  Pulse:  [67-94] 67  Resp:  [14-18] 16  BP: (116-134)/(69-83) 123/73  SpO2:  [96 %-100 %] 99 %  I/O last 3 completed shifts:  In: 360 [P.O.:360]  Out: 2445 [Urine:2445]    Peripheral IV 01/25/21 Left Hand (Active)   Site Assessment WDL 01/27/21 0939   Line Status Saline locked 01/27/21 0939   Dressing Intervention New dressing  01/26/21 0900   Phlebitis Scale 0-->no symptoms 01/27/21 0939   Infiltration Scale 0 01/27/21 0939   Infiltration Site Treatment Method  None 01/26/21 0900   Number of days: 2       Incision/Surgical Site 01/25/21 Abdomen (Active)   Incision Assessment UTV 01/27/21 0227   Rebeka-Incision Assessment UTV 01/27/21 0227   Closure WILNER 01/27/21 0227   Incision Drainage Amount UTV 01/27/21 0800   Drainage  Description UTV 01/27/21 0800   Dressing Intervention Clean, dry, intact 01/27/21 0227   Number of days: 2     Line/device assessment(s) completed for medical necessity    Constitutional: Awake,alert, no acute distress  Respiratory: Clear bilaterally, no wheezes,crackles or rhonchi.  Cardiovascular: HRR, no murmurs, rubs,thrills.   GI: Soft,nontender, bowel sounds positive.   Skin/Integumen: Incision dressing in place with marked shadowing. No unusual bruising, rashes or open areas.        Medications       cefoTEtan  2 g Intravenous Pre-Op/Pre-procedure x 1 dose     cefoTEtan  2 g Intravenous See Admin Instructions     enoxaparin ANTICOAGULANT  40 mg Subcutaneous Q24H     famotidine  20 mg Oral BID    Or     famotidine  20 mg Intravenous BID     fluticasone  2 spray Both Nostrils BID     insulin aspart  1-7 Units Subcutaneous TID AC     insulin aspart  1-5 Units Subcutaneous At Bedtime     metFORMIN  1,000 mg Oral BID w/meals     sodium chloride (PF)  3 mL Intracatheter Q8H     sodium chloride (PF)  3 mL Intracatheter Q8H       Data   Recent Labs   Lab 01/26/21  0514 01/22/21  0931   WBC 11.6* 5.8   HGB 13.5 14.8   MCV 85 87    257    139   POTASSIUM 4.2 4.3   CHLORIDE 107 107   CO2 26 27   BUN 22 13   CR 0.91 0.86   ANIONGAP 6 5   NEFTALI 8.7 9.1   * 201*   ALBUMIN  --  3.9   PROTTOTAL  --  8.0   BILITOTAL  --  0.3   ALKPHOS  --  71   ALT  --  51   AST  --  27       No results found for this or any previous visit (from the past 24 hour(s)).

## 2021-01-27 NOTE — PLAN OF CARE
Face to face report given with opportunity to observe patient.    Report given to TAYLOR Kuhn SN  1/27/2021  11:32 AM

## 2021-01-27 NOTE — PROGRESS NOTES
INPATIENT ROUNDING NOTE  1/27/2021    Patient: Atul Lam    Physician of Record: Lionel Roblero MD    Admitting diagnosis: Sigmoid polyp [K63.5]    Procedure(s):  COLECTOMY, LEFT, LAPAROSCOPIC, Hand assisted     POD: 2 Days Post-Op    Current Diet: Consistent Carb    CURRENT MEDICATIONS:  Continuous Medications:  Current Facility-Administered Medications   Medication Last Rate       Scheduled Medications:  Current Facility-Administered Medications   Medication Dose Route Frequency     cefoTEtan  2 g Intravenous Pre-Op/Pre-procedure x 1 dose     cefoTEtan  2 g Intravenous See Admin Instructions     enoxaparin ANTICOAGULANT  40 mg Subcutaneous Q24H     famotidine  20 mg Oral BID    Or     famotidine  20 mg Intravenous BID     fluticasone  2 spray Both Nostrils BID     insulin aspart  1-7 Units Subcutaneous TID AC     insulin aspart  1-5 Units Subcutaneous At Bedtime     metFORMIN  1,000 mg Oral BID w/meals     sodium chloride (PF)  3 mL Intracatheter Q8H     sodium chloride (PF)  3 mL Intracatheter Q8H       PRN Medications:  Current Facility-Administered Medications   Medication Dose Route Frequency     glucose  15-30 g Oral Q15 Min PRN    Or     dextrose  25-50 mL Intravenous Q15 Min PRN    Or     glucagon  1 mg Subcutaneous Q15 Min PRN     diazepam  5 mg Oral Q6H PRN     diphenhydrAMINE  25 mg Oral Q6H PRN    Or     diphenhydrAMINE  25 mg Intravenous Q6H PRN     HYDROmorphone  0.3-0.5 mg Intravenous Q2H PRN     ibuprofen  800 mg Oral 4x Daily PRN     lidocaine 4%   Topical Q1H PRN     lidocaine 4%   Topical Q1H PRN     lidocaine (buffered or not buffered)  0.1-1 mL Other Q1H PRN     lidocaine (buffered or not buffered)  0.1-1 mL Other Q1H PRN     naloxone  0.2 mg Intravenous Q2 Min PRN    Or     naloxone  0.4 mg Intravenous Q2 Min PRN     ondansetron  4 mg Oral Q6H PRN    Or     ondansetron  4 mg Intravenous Q6H PRN     prochlorperazine  10 mg Intravenous Q6H PRN    Or     prochlorperazine  10 mg Oral Q6H  "PRN     sodium chloride (PF)  3 mL Intracatheter q1 min prn     sodium chloride (PF)  3 mL Intracatheter q1 min prn       SUBJECTIVE:   Nausea: No. Vomiting: No. Fever: No. Chills: No. Excessive burping: No. Flatus: Yes. BM: Yes. Pain is 2/10. Pain control: good. Tolerating current diet: Yes.      PHYSICAL EXAM:   Vital signs: /73   Pulse 67   Temp 97.4  F (36.3  C) (Tympanic)   Resp 16   Ht 1.88 m (6' 2\")   Wt 104.3 kg (230 lb)   SpO2 99%   BMI 29.53 kg/m     BMI: Body mass index is 29.53 kg/m .   General: Normal, healthy, cooperative, in no acute distress, alert   Lungs: respirations are non-labored   Abdominal: non-distended   Wound: dressings are intact   Extremities: No cyanosis, clubbing or edema noted bilaterally in Upper and Lower Extremities   Neurological: without deficit    INPUT/OUTPUT:      Intake/Output Summary (Last 24 hours) at 1/27/2021 1018  Last data filed at 1/27/2021 0900  Gross per 24 hour   Intake 590 ml   Output 1895 ml   Net -1305 ml       I/O last 3 completed shifts:  In: 360 [P.O.:360]  Out: 2445 [Urine:2445]    LABS:    Last CBC Rrsults:   Recent Labs   Lab Test 01/26/21  0514 01/22/21  0931 11/20/20  1118   WBC 11.6* 5.8 6.4   RBC 4.78 5.19 5.06   HGB 13.5 14.8 14.4   HCT 40.6 45.0 42.3   MCV 85 87 84   MCH 28.2 28.5 28.5   MCHC 33.3 32.9 34.0   RDW 13.3 13.6 13.0    257 238       Last Comprehensive Metabolic panel:  Recent Labs   Lab Test 01/26/21  0514 01/22/21  0931 11/20/20  1118 01/06/20  1509    139 136 137   POTASSIUM 4.2 4.3 3.9 4.1   CHLORIDE 107 107 105 101   CO2 26 27 27 31   ANIONGAP 6 5 4 5   * 201* 277* 364*   BUN 22 13 15 20   CR 0.91 0.86 0.77 0.91   GFRESTIMATED >90 >90 >90 >90   NEFTALI 8.7 9.1 8.5 9.3   BILITOTAL  --  0.3 0.3 0.2   ALKPHOS  --  71 89 71   ALT  --  51 35 39   AST  --  27 17 9       Recent Labs   Lab Test 01/26/21  0514 01/22/21  0931 11/20/20  1118 01/06/20  1509   MAG 1.8  --   --   --    ALBUMIN  --  3.9 3.7 3.8 "     ASSESSMENT:    2 Days Post-Op from Procedure(s):  COLECTOMY, LEFT, LAPAROSCOPIC, Hand assisted.        PLAN:   Discharge home today

## 2021-01-27 NOTE — DISCHARGE INSTRUCTIONS
Leave the surgical dressings on for one week, you may shower, but try to keep the dressings dry.

## 2021-01-27 NOTE — DISCHARGE SUMMARY
INPATIENT DISCHARGE SUMMARY  1/27/2021    Patient'S Name: Atul Lam    Admitting Physician of Record: Lionel Roblero MD    Discharging Physician: Lionel Roblero MD    Date of admission: 1/25/2021     Date of discharge: 1/27/2021    Admitting diagnosis: sigmoid polyp    Discharge diagnosis: Same    Procedures: Procedure(s):  COLECTOMY, LEFT, LAPAROSCOPIC, Hand assisted    Consultants: Medicine    Hospital course: The patient was admitted to the hospital and taken to the operating room and underwent an Procedure(s):  COLECTOMY, LEFT, LAPAROSCOPIC, Hand assisted.  The patient tolerated the procedure(s) well and was transferred to the powers.  His postoperative course has been completely unremarkable.  At the time of discharge he is eating a consistent carb diet, is having good pain control on oral medications, and is passing gas and is having bowel movements.  The patient will be discharged home in good condition.    Discharge instructions include:    Patient will be discharged to Home   Diet:   Active Diet and Nourishment Order   Procedures     Consistent Carbohydrate Diet 5110-1494 Calories: Moderate Consistent CHO (4-6 CHO units/meal)      Activity : no liting over 10 pounds for 6 weeks   Follow-up:     The patient will follow up with his primary care provider in 5 days.      The patient will follow up with me in 2 weeks.   Medications include:    All prior medications    Ibuprofen for pain control.

## 2021-01-27 NOTE — PLAN OF CARE
POD #1-Lap colectomy .Free from falls injuries this shift. Glucose: 236 and 215. Coverage per MAR. Denies need for pain meds. SL remains intact. Voiding without diff. Denies nausea. Diet advanced to Con Carb.     Face to face report given with opportunity to observe patient.    Report given to Kelvin Bailey RN   1/26/2021  11:21 PM

## 2021-02-01 ENCOUNTER — TELEPHONE (OUTPATIENT)
Dept: CASE MANAGEMENT | Facility: HOSPITAL | Age: 56
End: 2021-02-01

## 2021-02-03 NOTE — PROGRESS NOTES
Atul Lam was set up with PAP equipment by [facility] and is enrolled in [facility].  See previous documentation by [facility] for equipment and supply details.

## 2021-02-04 ENCOUNTER — DOCUMENTATION ONLY (OUTPATIENT)
Dept: SLEEP MEDICINE | Facility: CLINIC | Age: 56
End: 2021-02-04

## 2021-02-04 NOTE — PROGRESS NOTES
30 DAY [facility] VISIT    Diagnostic AHI:   20.1  PSG    Subjective measures:   Patient reports that he has not been using therapy due to mask discomfort and he feels he needs a different mask.  He will reach out to [facility]  Regarding mask exchange.     Assessment: Pt not meeting objective benchmarks for compliance  Patient failing following subjective benchmarks: mask discomfort   Action plan: follow up per provider request Device type: Auto-CPAP  PAP settings: CPAP min 5.0 cm  H20     CPAP max 15.0 cm  H20          RESMED EPR level Setting: TWO    RESMED Soft response setting:  OFF  Mask type:  Nasal Mask  Objective measures: 14 day rolling measures      Compliance  0 %           Average number of minutes 73      Objective measure goal  Compliance   Goal >70%  Leak   Goal < 24 lpm  AHI  Goal < 5  Usage  Goal >240        Total time spent on accessing and interpreting remote patient PAP therapy data  10 minutes    Total time spent counseling, coaching  and reviewing PAP therapy data with patient  3minutes     [id number] this call  [id number] no  at 3 or 14 day [facility]

## 2021-02-10 ENCOUNTER — OFFICE VISIT (OUTPATIENT)
Dept: FAMILY MEDICINE | Facility: OTHER | Age: 56
End: 2021-02-10
Attending: NURSE PRACTITIONER
Payer: COMMERCIAL

## 2021-02-10 ENCOUNTER — OFFICE VISIT (OUTPATIENT)
Dept: SURGERY | Facility: OTHER | Age: 56
End: 2021-02-10
Attending: NURSE PRACTITIONER
Payer: COMMERCIAL

## 2021-02-10 VITALS
SYSTOLIC BLOOD PRESSURE: 121 MMHG | TEMPERATURE: 97.1 F | BODY MASS INDEX: 29.52 KG/M2 | OXYGEN SATURATION: 97 % | HEART RATE: 82 BPM | DIASTOLIC BLOOD PRESSURE: 80 MMHG | HEIGHT: 74 IN | WEIGHT: 230 LBS

## 2021-02-10 VITALS
DIASTOLIC BLOOD PRESSURE: 76 MMHG | TEMPERATURE: 97.5 F | OXYGEN SATURATION: 98 % | HEART RATE: 94 BPM | WEIGHT: 230 LBS | BODY MASS INDEX: 29.53 KG/M2 | SYSTOLIC BLOOD PRESSURE: 130 MMHG

## 2021-02-10 DIAGNOSIS — Z90.49 S/P PARTIAL COLECTOMY: Primary | ICD-10-CM

## 2021-02-10 DIAGNOSIS — E11.65 TYPE 2 DIABETES MELLITUS WITH HYPERGLYCEMIA, WITH LONG-TERM CURRENT USE OF INSULIN (H): ICD-10-CM

## 2021-02-10 DIAGNOSIS — Z79.4 TYPE 2 DIABETES MELLITUS WITH HYPERGLYCEMIA, WITH LONG-TERM CURRENT USE OF INSULIN (H): ICD-10-CM

## 2021-02-10 DIAGNOSIS — Z90.49 STATUS POST COLECTOMY: Primary | ICD-10-CM

## 2021-02-10 DIAGNOSIS — Z23 NEED FOR VACCINATION: ICD-10-CM

## 2021-02-10 PROCEDURE — 90471 IMMUNIZATION ADMIN: CPT | Performed by: FAMILY MEDICINE

## 2021-02-10 PROCEDURE — 99213 OFFICE O/P EST LOW 20 MIN: CPT | Mod: 25 | Performed by: FAMILY MEDICINE

## 2021-02-10 PROCEDURE — 90746 HEPB VACCINE 3 DOSE ADULT IM: CPT | Performed by: FAMILY MEDICINE

## 2021-02-10 PROCEDURE — 99024 POSTOP FOLLOW-UP VISIT: CPT | Performed by: NURSE PRACTITIONER

## 2021-02-10 PROCEDURE — 90750 HZV VACC RECOMBINANT IM: CPT | Performed by: FAMILY MEDICINE

## 2021-02-10 PROCEDURE — 90472 IMMUNIZATION ADMIN EACH ADD: CPT | Performed by: FAMILY MEDICINE

## 2021-02-10 ASSESSMENT — MIFFLIN-ST. JEOR: SCORE: 1958.02

## 2021-02-10 ASSESSMENT — PAIN SCALES - GENERAL
PAINLEVEL: NO PAIN (0)
PAINLEVEL: NO PAIN (0)

## 2021-02-10 NOTE — PROGRESS NOTES
"CLINIC NOTE - POST-OP SURGERY  2/10/2021    Patient:Atul Lam    Procedure: hand assisted laparoscopic sigmoid colectomy    This is a 55 year old male who is 2 weeks s/p hand assisted laparoscopic sigmoid colectomy .  The patient has no complaints today. He states he is eating, voiding, and stooling without problems.     Current Medications:  Current Outpatient Medications   Medication Sig Dispense Refill     blood glucose (BELL CONTOUR NEXT) test strip Use to test blood sugar 2 times daily. 100 each 11     blood glucose monitoring (BELL MICROLET) lancets Use to test blood sugar 2 times daily. 100 each 11     dulaglutide (TRULICITY) 0.75 MG/0.5ML pen Inject 0.75 mg Subcutaneous every 7 days 2 mL 1     fenofibrate (LOFIBRA) 54 MG tablet Take 1 tablet (54 mg) by mouth daily 90 tablet 3     fluticasone (FLONASE) 50 MCG/ACT nasal spray Spray 2 sprays into both nostrils 2 times daily 16 g 12     ibuprofen (ADVIL/MOTRIN) 800 MG tablet Take 1 tablet (800 mg) by mouth every 8 hours as needed for moderate pain       metFORMIN (GLUCOPHAGE-XR) 500 MG 24 hr tablet TAKE 2 TABLETS BY MOUTH TWICE DAILY WITH MEALS 360 tablet 3     simvastatin (ZOCOR) 40 MG tablet TAKE 1 TABLET BY MOUTH ONCE DAILY AT BEDTIME 90 tablet 3     budesonide (PULMICORT) 0.5 MG/2ML neb solution Squirt entire vial into previously made preet med saline bottle, mix, and irrigate each nostril until entire bottle empty.  Do this twice daily. 3 Box 11       Allergies:  Allergies   Allergen Reactions     Animal Dander      Nasal congestion     Aspirin Nausea and Vomiting     Dust Mites      Nasal congestion     Lisinopril Hives     Losartan Hives     Morphine Nausea and Vomiting       PHYSICAL EXAM:   Vital signs: /80 (BP Location: Right arm, Cuff Size: Adult Regular)   Pulse 82   Temp 97.1  F (36.2  C) (Tympanic)   Ht 1.88 m (6' 2\")   Wt 104.3 kg (230 lb)   SpO2 97%   BMI 29.53 kg/m     BMI: Body mass index is 29.53 kg/m .   General: Normal, " healthy, cooperative, in no acute distress, alert   Lungs: respirations are non-labored   Abdominal: non-distended   Wounds:  Well healed surgical scars consistent with his operation.       PATHOLOGY:  Copath Report   Date Value Ref Range Status   01/25/2021   Final    Patient Name: GILMER RODRIGUEZ  MR#: [id number]  Specimen #:   Collected: 1/25/2021  Received: 1/26/2021  Reported: 1/27/2021 12:09  Ordering Phy(s): ERICA MEJÍA  Additional Phy(s): PEBBLES MCDONALD    For improved result formatting, select 'View Enhanced Report Format' under   Linked Documents section.    SPECIMEN(S):  Colon, left    FINAL DIAGNOSIS:  Colon, segmental resection  - Juvenile polyp, completely excised    I have personally reviewed all specimens and/or slides, including the   listed special stains, and used them  with my medical judgement to determine or confirm the final diagnosis.    Electronically signed out by:    Mack Grace M.D.    CLINICAL HISTORY:  Sigmoid polyp [K63.5]; colectomy, left, laparoscopic, hand assisted.    GROSS:  There is a piece of bowel and mesentery.  The bowel has been opened.  The   bowel itself is 9 x 6 cm.  1 cm from  the margin identified with a suture is a pedunculated polyp.  The stalk is   4 cm long and 1 cm in diameter and  the polyp is 2 x 2 cm with a cerebriform appearance.  The entire polyp is   sectioned and submitted for frozen  section.  Tissue is then fixed in formalin after the mesentery is removed.    The mesentery is approximately 17  x 8 x 7 cm.  Sections through the stalk of the polyp are taken.  A section   through the margin identified with  the sutures placed in cassette 2 and through the opposite margin cassette   3.  The mucosa has a distinct black  appearance consistent with tattooing.  A section through the center of the   bowel is also taken and submitted  in cassette 2.  The mesentery is grossly unremarkable adipose tissue.  A   representative section is  submitted  in cassette 4. (16 in 11 blocks). (Dictated by: Mack Grace MD   1/26/2021 12:46 PM)    INTRAOPERATIVE CONSULTATION:  Left colon, segmental resection, frozen section diagnosis- nine tubular   adenoma, no evidence of in situ or  invasive carcinoma    MICROSCOPIC:  Previously frozen tissue through the polyp is examined.  There is some   minor discrepancy between the frozen  section diagnosis and the permanent section diagnosis.  The lesion is a   juvenile polyp with broad bands of  muscularis and mature glands which vary in size and shape.  The nuclei are   basally placed.  The stalk is  unremarkable.  Sections through other portions of the bowel and mesentery   are unremarkable.  There is a small  incidental unremarkable lymph node in the mesentery.    CPT Codes  A: 74788-TL2, 96708-OW    COLLECTION SITE:  Client: [facility]  Location: [facility] ()    The technical component of this testing was completed at the [facility], with the  professional component performed at the [facility], [address] [place] [address]  ([phone])           ASSESSMENT:    55 year old male who is 2 weeks s/p hand assisted laparoscopic sigmoid colectomy.  Doing well.     PLAN:   Staples were removed from the incisional site without problems and steri strips were placed were the staples were.  Lifting restrictions until 6 weeks post-op were discussed with the patient.  The patient is to have a colonoscopy in 1 year.     Follow-up as needed

## 2021-02-10 NOTE — PATIENT INSTRUCTIONS
Thank you for allowing Tanvi Obrien CNP and our surgical team to participate in your care. Please call our health unit coordinator at [phone] with scheduling questions or the nurse at [phone] with any other questions or concerns.

## 2021-02-10 NOTE — NURSING NOTE
"Chief Complaint   Patient presents with     Hospital F/U       Initial /76 (BP Location: Left arm, Patient Position: Chair, Cuff Size: Adult Large)   Pulse 94   Temp 97.5  F (36.4  C) (Tympanic)   Wt 104.3 kg (230 lb)   SpO2 98%   BMI 29.53 kg/m   Estimated body mass index is 29.53 kg/m  as calculated from the following:    Height as of an earlier encounter on 2/10/21: 1.88 m (6' 2\").    Weight as of this encounter: 104.3 kg (230 lb).  Medication Reconciliation: complete  Kelvin Aguilar LPN  "

## 2021-02-10 NOTE — NURSING NOTE
"Chief Complaint   Patient presents with     Surgical Followup     Hand Assisted Laparoscopic Sigmoid Colectomy 1/25/2021       Initial /80 (BP Location: Right arm, Cuff Size: Adult Regular)   Pulse 82   Temp 97.1  F (36.2  C) (Tympanic)   Ht 1.88 m (6' 2\")   Wt 104.3 kg (230 lb)   SpO2 97%   BMI 29.53 kg/m   Estimated body mass index is 29.53 kg/m  as calculated from the following:    Height as of this encounter: 1.88 m (6' 2\").    Weight as of this encounter: 104.3 kg (230 lb).  Medication Reconciliation: complete  Lilibeth Izquierdo LPN  "

## 2021-03-29 DIAGNOSIS — E11.65 TYPE 2 DIABETES MELLITUS WITH HYPERGLYCEMIA, WITHOUT LONG-TERM CURRENT USE OF INSULIN (H): ICD-10-CM

## 2021-03-29 RX ORDER — DULAGLUTIDE 0.75 MG/.5ML
INJECTION, SOLUTION SUBCUTANEOUS
Qty: 4 ML | Refills: 0 | Status: SHIPPED | OUTPATIENT
Start: 2021-03-29 | End: 2021-04-26

## 2021-04-09 ENCOUNTER — IMMUNIZATION (OUTPATIENT)
Dept: FAMILY MEDICINE | Facility: OTHER | Age: 56
End: 2021-04-09
Attending: FAMILY MEDICINE
Payer: COMMERCIAL

## 2021-04-09 PROCEDURE — 0031A PR COVID VAC JANSSEN AD26 0.5ML: CPT

## 2021-04-09 PROCEDURE — 91303 PR COVID VAC JANSSEN AD26 0.5ML: CPT

## 2021-04-26 DIAGNOSIS — E11.65 TYPE 2 DIABETES MELLITUS WITH HYPERGLYCEMIA, WITHOUT LONG-TERM CURRENT USE OF INSULIN (H): ICD-10-CM

## 2021-04-26 RX ORDER — DULAGLUTIDE 0.75 MG/.5ML
INJECTION, SOLUTION SUBCUTANEOUS
Qty: 4 ML | Refills: 0 | Status: SHIPPED | OUTPATIENT
Start: 2021-04-26 | End: 2021-08-13

## 2021-04-26 NOTE — TELEPHONE ENCOUNTER
trulicity      Last Written Prescription Date:  3/29/21  Last Fill Quantity: 4 ml,   # refills: 0  Last Office Visit: 2/2/21  Future Office visit:

## 2021-05-23 ENCOUNTER — HEALTH MAINTENANCE LETTER (OUTPATIENT)
Age: 56
End: 2021-05-23

## 2021-07-06 DIAGNOSIS — G47.33 OSA (OBSTRUCTIVE SLEEP APNEA): Primary | ICD-10-CM

## 2021-07-20 NOTE — PROGRESS NOTES
{PROVIDER CHARTING PREFERENCE:338225}    Veena Pollard is a 55 year old who presents for the following health issues {ACCOMPANIED BY STATEMENT (Optional):377482}    HPI     Hypertension Follow-up      Do you check your blood pressure regularly outside of the clinic? { :770550}     Are you following a low salt diet? { :794314}    Are your blood pressures ever more than 140 on the top number (systolic) OR more   than 90 on the bottom number (diastolic), for example 140/90? { :330741}      How many servings of fruits and vegetables do you eat daily?  { :242619}    On average, how many sweetened beverages do you drink each day (Examples: soda, juice, sweet tea, etc.  Do NOT count diet or artificially sweetened beverages)?   { 1-11:133493}    How many days per week do you exercise enough to make your heart beat faster? { :790710}    How many minutes a day do you exercise enough to make your heart beat faster? { :857644}    How many days per week do you miss taking your medication? {0-7 :142980}    {additonal problems for provider to add (Optional):980419}    Review of Systems   {ROS COMP (Optional):935102}      Objective    There were no vitals taken for this visit.  There is no height or weight on file to calculate BMI.  Physical Exam   {Exam List (Optional):302848}    {Diagnostic Test Results (Optional):224120}    {AMBULATORY ATTESTATION (Optional):484881}

## 2021-07-22 ENCOUNTER — OFFICE VISIT (OUTPATIENT)
Dept: FAMILY MEDICINE | Facility: OTHER | Age: 56
End: 2021-07-22
Attending: FAMILY MEDICINE
Payer: COMMERCIAL

## 2021-07-22 ENCOUNTER — HOSPITAL ENCOUNTER (EMERGENCY)
Facility: HOSPITAL | Age: 56
End: 2021-07-22
Payer: COMMERCIAL

## 2021-07-22 ENCOUNTER — HOSPITAL ENCOUNTER (OUTPATIENT)
Dept: GENERAL RADIOLOGY | Facility: HOSPITAL | Age: 56
End: 2021-07-22
Attending: FAMILY MEDICINE
Payer: COMMERCIAL

## 2021-07-22 VITALS
TEMPERATURE: 97 F | OXYGEN SATURATION: 98 % | SYSTOLIC BLOOD PRESSURE: 126 MMHG | BODY MASS INDEX: 31.57 KG/M2 | DIASTOLIC BLOOD PRESSURE: 72 MMHG | HEART RATE: 84 BPM | HEIGHT: 74 IN | WEIGHT: 246 LBS

## 2021-07-22 DIAGNOSIS — K43.9 VENTRAL HERNIA WITHOUT OBSTRUCTION OR GANGRENE: ICD-10-CM

## 2021-07-22 DIAGNOSIS — E11.65 TYPE 2 DIABETES MELLITUS WITH HYPERGLYCEMIA, WITH LONG-TERM CURRENT USE OF INSULIN (H): Primary | ICD-10-CM

## 2021-07-22 DIAGNOSIS — R06.00 DYSPNEA, UNSPECIFIED TYPE: ICD-10-CM

## 2021-07-22 DIAGNOSIS — Z79.4 TYPE 2 DIABETES MELLITUS WITH HYPERGLYCEMIA, WITH LONG-TERM CURRENT USE OF INSULIN (H): Primary | ICD-10-CM

## 2021-07-22 LAB
ALBUMIN SERPL-MCNC: 4 G/DL (ref 3.4–5)
ALP SERPL-CCNC: 82 U/L (ref 40–150)
ALT SERPL W P-5'-P-CCNC: 61 U/L (ref 0–70)
ANION GAP SERPL CALCULATED.3IONS-SCNC: 8 MMOL/L (ref 3–14)
AST SERPL W P-5'-P-CCNC: 17 U/L (ref 0–45)
BASOPHILS # BLD AUTO: 0.1 10E3/UL (ref 0–0.2)
BASOPHILS NFR BLD AUTO: 1 %
BILIRUB SERPL-MCNC: 0.4 MG/DL (ref 0.2–1.3)
BUN SERPL-MCNC: 15 MG/DL (ref 7–30)
CALCIUM SERPL-MCNC: 9 MG/DL (ref 8.5–10.1)
CHLORIDE BLD-SCNC: 101 MMOL/L (ref 94–109)
CO2 SERPL-SCNC: 26 MMOL/L (ref 20–32)
CREAT SERPL-MCNC: 0.88 MG/DL (ref 0.66–1.25)
EOSINOPHIL # BLD AUTO: 0.3 10E3/UL (ref 0–0.7)
EOSINOPHIL NFR BLD AUTO: 4 %
ERYTHROCYTE [DISTWIDTH] IN BLOOD BY AUTOMATED COUNT: 12.6 % (ref 10–15)
EST. AVERAGE GLUCOSE BLD GHB EST-MCNC: 240 MG/DL
GFR SERPL CREATININE-BSD FRML MDRD: >90 ML/MIN/1.73M2
GLUCOSE BLD-MCNC: 329 MG/DL (ref 70–99)
HBA1C MFR BLD: 10 % (ref 0–5.6)
HCT VFR BLD AUTO: 40.9 % (ref 40–53)
HGB BLD-MCNC: 14.4 G/DL (ref 13.3–17.7)
IMM GRANULOCYTES # BLD: 0.1 10E3/UL
IMM GRANULOCYTES NFR BLD: 1 %
LYMPHOCYTES # BLD AUTO: 2.2 10E3/UL (ref 0.8–5.3)
LYMPHOCYTES NFR BLD AUTO: 36 %
MCH RBC QN AUTO: 29.1 PG (ref 26.5–33)
MCHC RBC AUTO-ENTMCNC: 35.2 G/DL (ref 31.5–36.5)
MCV RBC AUTO: 83 FL (ref 78–100)
MONOCYTES # BLD AUTO: 0.5 10E3/UL (ref 0–1.3)
MONOCYTES NFR BLD AUTO: 9 %
NEUTROPHILS # BLD AUTO: 3 10E3/UL (ref 1.6–8.3)
NEUTROPHILS NFR BLD AUTO: 49 %
NRBC # BLD AUTO: 0 10E3/UL
NRBC BLD AUTO-RTO: 0 /100
PLATELET # BLD AUTO: 261 10E3/UL (ref 150–450)
POTASSIUM BLD-SCNC: 3.8 MMOL/L (ref 3.4–5.3)
PROT SERPL-MCNC: 8.1 G/DL (ref 6.8–8.8)
RBC # BLD AUTO: 4.95 10E6/UL (ref 4.4–5.9)
SODIUM SERPL-SCNC: 135 MMOL/L (ref 133–144)
WBC # BLD AUTO: 6.1 10E3/UL (ref 4–11)

## 2021-07-22 PROCEDURE — 83036 HEMOGLOBIN GLYCOSYLATED A1C: CPT | Performed by: FAMILY MEDICINE

## 2021-07-22 PROCEDURE — 36415 COLL VENOUS BLD VENIPUNCTURE: CPT | Performed by: FAMILY MEDICINE

## 2021-07-22 PROCEDURE — 99214 OFFICE O/P EST MOD 30 MIN: CPT | Performed by: FAMILY MEDICINE

## 2021-07-22 PROCEDURE — 71046 X-RAY EXAM CHEST 2 VIEWS: CPT

## 2021-07-22 PROCEDURE — 80053 COMPREHEN METABOLIC PANEL: CPT | Performed by: FAMILY MEDICINE

## 2021-07-22 PROCEDURE — 85025 COMPLETE CBC W/AUTO DIFF WBC: CPT | Performed by: FAMILY MEDICINE

## 2021-07-22 ASSESSMENT — PAIN SCALES - GENERAL: PAINLEVEL: NO PAIN (1)

## 2021-07-22 ASSESSMENT — MIFFLIN-ST. JEOR: SCORE: 2030.6

## 2021-07-22 NOTE — Clinical Note
Hi ladies.  See today's note.  Off Trulicity x month - rash at injection site, cost, etc.  Oral alternative covered? Rybelsus?   I did put new referral in too, but appreciate the info.  Labs pending.  Thanks!

## 2021-07-22 NOTE — PATIENT INSTRUCTIONS
Lab and xray today - will call with results.  Trial of Combivent inhaler.  Referral for pulmonary function testing to be scheduled to evaluate for COPD.    Referral back to Dr Roblero for hernia evaluation.  ER if acute pain/incarceration as discussed.    Note to DEBBY about alternative for Trulicity.  Reminder to schedule eye exam.

## 2021-07-22 NOTE — NURSING NOTE
"Chief Complaint   Patient presents with     Recheck Medication     Hypertension     Respiratory Problems       Initial /72 (BP Location: Right arm, Patient Position: Sitting, Cuff Size: Adult Regular)   Pulse 84   Temp 97  F (36.1  C) (Tympanic)   Ht 1.88 m (6' 2\")   Wt 111.6 kg (246 lb)   SpO2 98%   BMI 31.58 kg/m   Estimated body mass index is 31.58 kg/m  as calculated from the following:    Height as of this encounter: 1.88 m (6' 2\").    Weight as of this encounter: 111.6 kg (246 lb).  Medication Reconciliation: complete  Angelita Arevalo LPN  "

## 2021-07-22 NOTE — PROGRESS NOTES
"    Assessment & Plan     Type 2 diabetes mellitus with hyperglycemia, with long-term current use of insulin (H)  Out of Trulicity x month - ran out of refills, cost, rash.  Wants to consider alternative.   Will check with DRANILA - oral alternative - Rybelsus?  Cost/coverage?  - Hemoglobin A1c; Future  - Adult Eye Referral; Future  - Hemoglobin A1c    Dyspnea, unspecified type  Suspect COPD - former smoker.  Exam normal.   Trial of Combivent.  Lab, xray and PFTs ordered.  - Comprehensive metabolic panel (BMP + Alb, Alk Phos, ALT, AST, Total. Bili, TP); Future  - CBC with platelets and differential; Future  - XR Chest 2 Views; Future  - ipratropium-albuterol (COMBIVENT RESPIMAT)  MCG/ACT inhaler; Inhale 1 puff into the lungs 4 times daily  - General PFT Lab (Please always keep checked); Future  - Pulmonary Function Test; Future  - General PFT Lab (Please always keep checked)  - CBC with platelets and differential  - Comprehensive metabolic panel (BMP + Alb, Alk Phos, ALT, AST, Total. Bili, TP)    Ventral hernia without obstruction or gangrene  Discussed possibility of incarceration.  Referral back to surgeon.  Recent weight gain as well.  Did get E bikes for he and wife - enjoys riding  - Adult General Surg Referral    BMI 31.0-31.9,adult  - as above        BMI:   Estimated body mass index is 31.58 kg/m  as calculated from the following:    Height as of this encounter: 1.88 m (6' 2\").    Weight as of this encounter: 111.6 kg (246 lb).   Weight management plan: Discussed healthy diet and exercise guidelines    Patient Instructions   Lab and xray today - will call with results.  Trial of Combivent inhaler.  Referral for pulmonary function testing to be scheduled to evaluate for COPD.    Referral back to Dr Roblero for hernia evaluation.  ER if acute pain/incarceration as discussed.    Note to DEBBY about alternative for Trulicity.  Reminder to schedule eye exam.          Return in about 3 months (around 10/22/2021) for " diabetes.    Zoraida Bahena MD  [facility] - [place]IRA Pollard is a 55 year old who presents for the following health issues     HPI     Hypertension Follow-up    Do you check your blood pressure regularly outside of the clinic? Yes     Are you following a low salt diet? No-tries to    Are your blood pressures ever more than 140 on the top number (systolic) OR more   than 90 on the bottom number (diastolic), for example 140/90? Yes      How many servings of fruits and vegetables do you eat daily?  3-4    On average, how many sweetened beverages do you drink each day (Examples: soda, juice, sweet tea, etc.  Do NOT count diet or artificially sweetened beverages)?   0    How many days per week do you exercise enough to make your heart beat faster? 4    How many minutes a day do you exercise enough to make your heart beat faster? 12-15 miles    How many days per week do you miss taking your medication? 1-2 days weekly      Breathing Issue      Duration: 4-5 weeks ago; had acute illness x a couple days, possible pneumonia    Description (location/character/radiation): lungs feeling constricted on inhale    Intensity:  mild, moderate    Accompanying signs and symptoms: none    History (similar episodes/previous evaluation): prior pneumonia     Precipitating or alleviating factors: humidity/ weather has been aggravating lungs; air quality; fires/smoke from [place]    Therapies tried and outcome: none    Pain with deep breath in air conditioning; sharp    Former smoker - quit 2017    No xray    No PFTs      Diabetes Follow-up    How often are you checking your blood sugar? On occasion; 120s AM; 2 hours after 100; no lows; none over 200    What concerns do you have today about your diabetes? Other: see below     Do you have any of these symptoms? (Select all that apply)  No numbness or tingling in feet.  No redness, sores or blisters on feet.  No complaints of excessive thirst.  No reports of  "blurry vision.  No significant changes to weight.    Have you had a diabetic eye exam in the last 12 months? No     Allergies to ACEI and ARBs    On Trulicity () injection (gets rash - lasts 1/2 day) and Metformin    Cost of Trulicity is also significant $700 per month if deductible is not met - out of it x 1 month    Due for a1c    Due for eye exam - [facility]- needs to reschedule    Due for hep B vaccine    Weight up 15 pounds - limited activity after surgery    BP Readings from Last 2 Encounters:   21 126/72   02/10/21 130/76     Hemoglobin A1C (%)   Date Value   2021 7.7 (H)   2020 11.9 (H)     LDL Cholesterol Calculated (mg/dL)   Date Value   2021     Cannot estimate LDL when triglyceride exceeds 400 mg/dL   2020     Cannot estimate LDL when triglyceride exceeds 400 mg/dL     LDL Cholesterol Direct (mg/dL)   Date Value   2021 126 (H)             S/p laparscopic assisted colecotmy- 21.  Can feel 2 areas of defect.  Hiatal hernia history.  No pain.  Strange shape with sit up.    Review of Systems   Constitutional, HEENT, cardiovascular, pulmonary, gi and gu systems are negative, except as otherwise noted.      Objective    /72 (BP Location: Right arm, Patient Position: Sitting, Cuff Size: Adult Regular)   Pulse 84   Temp 97  F (36.1  C) (Tympanic)   Ht 1.88 m (6' 2\")   Wt 111.6 kg (246 lb)   SpO2 98%   BMI 31.58 kg/m    Body mass index is 31.58 kg/m .  Physical Exam   GENERAL: alert, no distress and over weight  EYES: Eyes grossly normal to inspection, PERRL and conjunctivae and sclerae normal  HENT: ear canals and TM's normal, nose and mouth without ulcers or lesions  NECK: no adenopathy, no asymmetry, masses, or scars and thyroid normal to palpation  RESP: lungs clear to auscultation - no rales, rhonchi or wheezes  CV: regular rate and rhythm, normal S1 S2, no S3 or S4, no murmur, click or rub, no peripheral edema and peripheral pulses strong  ABDOMEN: no " organomegaly or masses, bowel sounds normal and hernia umbilical and ventral/incisional - reducible; non-tender  MS: no gross musculoskeletal defects noted, no edema  SKIN: no suspicious lesions or rashes  PSYCH: mentation appears normal, affect normal/bright    Labs/xray pending.

## 2021-07-23 RX ORDER — ORAL SEMAGLUTIDE 3 MG/1
3 TABLET ORAL DAILY
Qty: 30 TABLET | Refills: 0 | Status: SHIPPED | OUTPATIENT
Start: 2021-07-23 | End: 2021-08-20 | Stop reason: DRUGHIGH

## 2021-08-01 ENCOUNTER — TELEPHONE (OUTPATIENT)
Dept: FAMILY MEDICINE | Facility: OTHER | Age: 56
End: 2021-08-01

## 2021-08-02 NOTE — TELEPHONE ENCOUNTER
Notify patient that the [facility] did mail him a cost savings card for the Rybelsus.   Reminder as well:  Starting dose is 3 mg daily in am - must be taken with 4 oz water or less and cannot eat or drink for at least 30 minutes after taking it.  Stay on that dose x 30 days and then can titrate to 7 mg then 14 mg as needed.

## 2021-08-02 NOTE — TELEPHONE ENCOUNTER
Pt notified.  Pt states that he is going to talk to Diabetic Ed when he sees them next.  Is very concerned about the cost of the medication.

## 2021-08-13 ENCOUNTER — HOSPITAL ENCOUNTER (OUTPATIENT)
Dept: EDUCATION SERVICES | Facility: HOSPITAL | Age: 56
Discharge: HOME | End: 2021-08-13
Attending: FAMILY MEDICINE | Admitting: FAMILY MEDICINE
Payer: COMMERCIAL

## 2021-08-13 VITALS
RESPIRATION RATE: 16 BRPM | DIASTOLIC BLOOD PRESSURE: 81 MMHG | HEART RATE: 85 BPM | SYSTOLIC BLOOD PRESSURE: 118 MMHG | HEIGHT: 75 IN | OXYGEN SATURATION: 98 % | BODY MASS INDEX: 29.88 KG/M2 | WEIGHT: 240.3 LBS

## 2021-08-13 DIAGNOSIS — E11.65 TYPE 2 DIABETES MELLITUS WITH HYPERGLYCEMIA, WITHOUT LONG-TERM CURRENT USE OF INSULIN (H): Primary | ICD-10-CM

## 2021-08-13 PROCEDURE — G0108 DIAB MANAGE TRN  PER INDIV: HCPCS | Performed by: DIETITIAN, REGISTERED

## 2021-08-13 ASSESSMENT — MIFFLIN-ST. JEOR: SCORE: 2016.65

## 2021-08-13 ASSESSMENT — PAIN SCALES - GENERAL: PAINLEVEL: NO PAIN (0)

## 2021-08-13 NOTE — PATIENT INSTRUCTIONS
-Keep trying to limit foods high in carbohydrates.   -Continue biking for exercise.   -Test glucose 1-2x/day.  Good times are fasting, before supper or 2 hours after a meal.  -Target levels are fasting or before supper , 2 hours after a meal <180.  -Last A1c was 10.0%.  Goal is < 7.0%.    -Continue current dose of Metformin.  -Continue your 3 mg Rybelsus until you run out and then begin 7 mg.  Take the same way you have been.   -Follow up Sept 17th at 9:30 am.    -Call with any concerns - HUMA Marino, -[phone].

## 2021-08-13 NOTE — LETTER
"    8/13/2021        RE: Atul Lam  [address]  [place] [address]        Diabetes Self-Management Education & Support    Presents for: Individual review    SUBJECTIVE/OBJECTIVE:  Presents for: Individual review  Accompanied by: Self  Diabetes education in the past 24mo: Yes  Focus of Visit: Reducing Risks, Taking Medication  Diabetes type: Type 2  Date of diagnosis: 2008  Disease course: Worsening  How confident are you filling out medical forms by yourself:: Quite a bit  Diabetes management related comments/concerns: A1c has trended up.  Transportation concerns: No  Difficulty affording diabetes medication?: Yes (Pt has $ 5000.00 dedctible.)  Difficulty affording diabetes testing supplies?: No  Other concerns:: None  Cultural Influences/Ethnic Background:  American    Diabetes Symptoms & Complications:  Fatigue: Yes  Neuropathy: No  Polydipsia: No  Polyphagia: No  Polyuria: No  Visual change: No  Slow healing wounds: No  Symptom course: Stable  Weight trend: Stable  Complications assessed today?: Yes  CVA: No  Heart disease: No  Nephropathy: No    Patient Problem List and Family Medical History reviewed for relevant medical history, current medical status, and diabetes risk factors.    Vitals:  /81   Pulse 85   Resp 16   Ht 1.899 m (6' 2.75\")   Wt 109 kg (240 lb 4.8 oz)   SpO2 98%   BMI 30.24 kg/m    Estimated body mass index is 30.24 kg/m  as calculated from the following:    Height as of this encounter: 1.899 m (6' 2.75\").    Weight as of this encounter: 109 kg (240 lb 4.8 oz).   Last 3 BP:   BP Readings from Last 3 Encounters:   08/13/21 118/81   07/22/21 126/72   02/10/21 130/76       History   Smoking Status     Former Smoker     Years: 30.00     Types: Dip, chew, snus or snuff     Quit date: 9/12/2017   Smokeless Tobacco     Former User     Types: Chew     Quit date: 2/14/2020       Labs:  Lab Results   Component Value Date    A1C 10.0 07/22/2021    A1C 7.7 01/22/2021     Lab Results "   Component Value Date     07/22/2021     01/26/2021     Lab Results   Component Value Date    LDL  01/22/2021     Cannot estimate LDL when triglyceride exceeds 400 mg/dL     01/22/2021     HDL Cholesterol   Date Value Ref Range Status   01/22/2021 39 (L) >39 mg/dL Final   ]  GFR Estimate   Date Value Ref Range Status   07/22/2021 >90 >60 mL/min/1.73m2 Final     Comment:     As of July 11, 2021, eGFR is calculated by the CKD-EPI creatinine equation, without race adjustment. eGFR can be influenced by muscle mass, exercise, and diet. The reported eGFR is an estimation only and is only applicable if the renal function is stable.   01/26/2021 >90 >60 mL/min/[1.73_m2] Final     Comment:     Non  GFR Calc  Starting 12/18/2018, serum creatinine based estimated GFR (eGFR) will be   calculated using the Chronic Kidney Disease Epidemiology Collaboration   (CKD-EPI) equation.       GFR Estimate If Black   Date Value Ref Range Status   01/26/2021 >90 >60 mL/min/[1.73_m2] Final     Comment:      GFR Calc  Starting 12/18/2018, serum creatinine based estimated GFR (eGFR) will be   calculated using the Chronic Kidney Disease Epidemiology Collaboration   (CKD-EPI) equation.       Lab Results   Component Value Date    CR 0.88 07/22/2021    CR 0.91 01/26/2021     No results found for: MICROALBUMIN    Healthy Eating:  Healthy Eating Assessed Today: Yes  Cultural/Orthodoxy diet restrictions?: No  Meal planning/habits: Low carb  How many times a week on average do you eat food made away from home (restaurant/take-out)?: 1  Meals include: Breakfast, Lunch, Dinner  Breakfast: 2-3 scrambled eggs - sometimes breakfast meat - 1% milk, coffee with sugar free creamer  Lunch: Packs the following and grazes at work - one sandwich, cukes, fruit, sugar free jello, small sugar free chocolate  Dinner: Eats 12 am-2 am when gets home - steamer package mixed veggies, chicken breast  Other: Pt works  afternoon shift - 12 pm - 12 am.  Beverages: Water, Diet soda, Coffee  Has patient met with a dietitian in the past?: Yes    Being Active:  Being Active Assessed Today: Yes  Exercise:: Yes (Pt's job is active and he has been biking.)  Days per week of moderate to strenuous exercise (like a brisk walk): 3  On average, minutes per day of exercise at this level: 60  How intense was your typical exercise? : Moderate (like brisk walking)  Exercise Minutes per Week: 180  Barrier to exercise: None    Monitoring:  Monitoring Assessed Today: Yes  Did patient bring glucose meter to appointment? : No  Blood Glucose Meter: [employer]  Times checking blood sugar at home (number): Never    Taking Medications:  Diabetes Medication(s)     Biguanides       metFORMIN (GLUCOPHAGE-XR) 500 MG 24 hr tablet    TAKE 2 TABLETS BY MOUTH TWICE DAILY WITH MEALS    Incretin Mimetic Agents (GLP-1 Receptor Agonists)       Semaglutide (RYBELSUS) 3 MG TABS    Take 3 mg by mouth daily     TRULICITY 0.75 MG/0.5ML pen    INJECT 0.75 MG SUBCUTANEOUSLY EVERY 7 DAYS     Patient not taking: Reported on 8/13/2021        Taking Medication Assessed Today: Yes  Current Treatments: Diet, Oral Medication (taken by mouth) (Metformin ER 1000 mg bid, Rybelsus 3 mg daily)  Problems taking diabetes medications regularly?: No  Diabetes medication side effects?: Yes (Pt had low's when on Glimepiride in past.  Trulicity gave him a rash on day of injection.)    Problem Solving:  Problem Solving Assessed Today: No    Reducing Risks:  Reducing Risks Assessed Today: No  Has dilated eye exam at least once a year?: No  Sees dentist every 6 months?: No  Feet checked by healthcare provider in the last year?: Yes    Healthy Coping:  Healthy Coping Assessed Today: Yes  Emotional response to diabetes: Acceptance  Informal Support system:: Spouse (Leah)  Stage of change: ACTION (Actively working towards change)  Patient Activation Measure Survey Score:  GRUPO Score (Last Two)  12/7/2018   GRUPO Raw Score 29   Activation Score 52.9   GRUPO Level 2     Diabetes knowledge and skills assessment:   Patient is knowledgeable in diabetes management concepts related to: Healthy Eating, Being Active and Taking Medication    Patient needs further education on the following diabetes management concepts: Monitoring, Taking Medication and Reducing Risks    Based on learning assessment above, most appropriate setting for further diabetes education would be: Individual setting.      INTERVENTIONS:    Education provided today on:  AADE Self-Care Behaviors:  Monitoring: individual blood glucose targets, frequency of monitoring.  Discussed importance of checking glucose levels so we can determine if Rybelsus is effective in lowering glucose levels.   Taking Medication: Discussed dose adjustment Rybelsus.   Reducing Risks: A1C - goals, relating to blood glucose levels, how often to check.  Need to lower A1c if he wants hernia repaired.     Opportunities for ongoing education and support in diabetes-self management were discussed.    Pt verbalized understanding of concepts discussed and recommendations provided today.       Education Materials Provided:  No new materials today.     ASSESSMENT:  Last noted A1c was 10% 7/22/21.  Provider placed pt on Rybelsus at that time.  He was on Trulicity previously but reported rash on day of injection so it was discontinued.  Pt is tolerating Rybelsus but is concerned about what the cost will be after the first of the year.  He does have savings card.  He did not bring his meter today and has not been checking glucose levels.      Patient's most recent   Lab Results   Component Value Date    A1C 10.0 07/22/2021    A1C 7.7 01/22/2021    is not meeting goal of <7.0    PLAN  Continue efforts to limit foods high in carbohydrates.   Continue biking for exercise.   Begin testing glucose 2x/day- alternate times.  Keep taking current dose Metformin ER.  When current pack of Rybelsus  is complete increase to 7 mg daily.    See Patient Instructions for co-developed, patient-stated behavior change goals.  AVS printed and provided to patient today.   Follow up Sept 17th at 9:30 am.     Time Spent: 40 minutes  Encounter Type: Individual    Any diabetes medication dose changes were made via the CDE Protocol and Collaborative Practice Agreement with the patient's referring provider. A copy of this encounter was shared with the provider.          Sincerely,        Chica Guzman RD

## 2021-08-13 NOTE — PROGRESS NOTES
"Diabetes Self-Management Education & Support    Presents for: Individual review    SUBJECTIVE/OBJECTIVE:  Presents for: Individual review  Accompanied by: Self  Diabetes education in the past 24mo: Yes  Focus of Visit: Reducing Risks, Taking Medication  Diabetes type: Type 2  Date of diagnosis: 2008  Disease course: Worsening  How confident are you filling out medical forms by yourself:: Quite a bit  Diabetes management related comments/concerns: A1c has trended up.  Transportation concerns: No  Difficulty affording diabetes medication?: Yes (Pt has $ 5000.00 dedctible.)  Difficulty affording diabetes testing supplies?: No  Other concerns:: None  Cultural Influences/Ethnic Background:  American    Diabetes Symptoms & Complications:  Fatigue: Yes  Neuropathy: No  Polydipsia: No  Polyphagia: No  Polyuria: No  Visual change: No  Slow healing wounds: No  Symptom course: Stable  Weight trend: Stable  Complications assessed today?: Yes  CVA: No  Heart disease: No  Nephropathy: No    Patient Problem List and Family Medical History reviewed for relevant medical history, current medical status, and diabetes risk factors.    Vitals:  /81   Pulse 85   Resp 16   Ht 1.899 m (6' 2.75\")   Wt 109 kg (240 lb 4.8 oz)   SpO2 98%   BMI 30.24 kg/m    Estimated body mass index is 30.24 kg/m  as calculated from the following:    Height as of this encounter: 1.899 m (6' 2.75\").    Weight as of this encounter: 109 kg (240 lb 4.8 oz).   Last 3 BP:   BP Readings from Last 3 Encounters:   08/13/21 118/81   07/22/21 126/72   02/10/21 130/76       History   Smoking Status     Former Smoker     Years: 30.00     Types: Dip, chew, snus or snuff     Quit date: 9/12/2017   Smokeless Tobacco     Former User     Types: Chew     Quit date: 2/14/2020       Labs:  Lab Results   Component Value Date    A1C 10.0 07/22/2021    A1C 7.7 01/22/2021     Lab Results   Component Value Date     07/22/2021     01/26/2021     Lab Results "   Component Value Date    LDL  01/22/2021     Cannot estimate LDL when triglyceride exceeds 400 mg/dL     01/22/2021     HDL Cholesterol   Date Value Ref Range Status   01/22/2021 39 (L) >39 mg/dL Final   ]  GFR Estimate   Date Value Ref Range Status   07/22/2021 >90 >60 mL/min/1.73m2 Final     Comment:     As of July 11, 2021, eGFR is calculated by the CKD-EPI creatinine equation, without race adjustment. eGFR can be influenced by muscle mass, exercise, and diet. The reported eGFR is an estimation only and is only applicable if the renal function is stable.   01/26/2021 >90 >60 mL/min/[1.73_m2] Final     Comment:     Non  GFR Calc  Starting 12/18/2018, serum creatinine based estimated GFR (eGFR) will be   calculated using the Chronic Kidney Disease Epidemiology Collaboration   (CKD-EPI) equation.       GFR Estimate If Black   Date Value Ref Range Status   01/26/2021 >90 >60 mL/min/[1.73_m2] Final     Comment:      GFR Calc  Starting 12/18/2018, serum creatinine based estimated GFR (eGFR) will be   calculated using the Chronic Kidney Disease Epidemiology Collaboration   (CKD-EPI) equation.       Lab Results   Component Value Date    CR 0.88 07/22/2021    CR 0.91 01/26/2021     No results found for: MICROALBUMIN    Healthy Eating:  Healthy Eating Assessed Today: Yes  Cultural/Orthodoxy diet restrictions?: No  Meal planning/habits: Low carb  How many times a week on average do you eat food made away from home (restaurant/take-out)?: 1  Meals include: Breakfast, Lunch, Dinner  Breakfast: 2-3 scrambled eggs - sometimes breakfast meat - 1% milk, coffee with sugar free creamer  Lunch: Packs the following and grazes at work - one sandwich, cukes, fruit, sugar free jello, small sugar free chocolate  Dinner: Eats 12 am-2 am when gets home - steamer package mixed veggies, chicken breast  Other: Pt works afternoon shift - 12 pm - 12 am.  Beverages: Water, Diet soda, Coffee  Has patient  met with a dietitian in the past?: Yes    Being Active:  Being Active Assessed Today: Yes  Exercise:: Yes (Pt's job is active and he has been biking.)  Days per week of moderate to strenuous exercise (like a brisk walk): 3  On average, minutes per day of exercise at this level: 60  How intense was your typical exercise? : Moderate (like brisk walking)  Exercise Minutes per Week: 180  Barrier to exercise: None    Monitoring:  Monitoring Assessed Today: Yes  Did patient bring glucose meter to appointment? : No  Blood Glucose Meter: [employer]  Times checking blood sugar at home (number): Never    Taking Medications:  Diabetes Medication(s)     Biguanides       metFORMIN (GLUCOPHAGE-XR) 500 MG 24 hr tablet    TAKE 2 TABLETS BY MOUTH TWICE DAILY WITH MEALS    Incretin Mimetic Agents (GLP-1 Receptor Agonists)       Semaglutide (RYBELSUS) 3 MG TABS    Take 3 mg by mouth daily     TRULICITY 0.75 MG/0.5ML pen    INJECT 0.75 MG SUBCUTANEOUSLY EVERY 7 DAYS     Patient not taking: Reported on 8/13/2021        Taking Medication Assessed Today: Yes  Current Treatments: Diet, Oral Medication (taken by mouth) (Metformin ER 1000 mg bid, Rybelsus 3 mg daily)  Problems taking diabetes medications regularly?: No  Diabetes medication side effects?: Yes (Pt had low's when on Glimepiride in past.  Trulicity gave him a rash on day of injection.)    Problem Solving:  Problem Solving Assessed Today: No    Reducing Risks:  Reducing Risks Assessed Today: No  Has dilated eye exam at least once a year?: No  Sees dentist every 6 months?: No  Feet checked by healthcare provider in the last year?: Yes    Healthy Coping:  Healthy Coping Assessed Today: Yes  Emotional response to diabetes: Acceptance  Informal Support system:: Spouse (Leah)  Stage of change: ACTION (Actively working towards change)  Patient Activation Measure Survey Score:  GRUPO Score (Last Two) 12/7/2018   GRUPO Raw Score 29   Activation Score 52.9   GRUPO Level 2     Diabetes  knowledge and skills assessment:   Patient is knowledgeable in diabetes management concepts related to: Healthy Eating, Being Active and Taking Medication    Patient needs further education on the following diabetes management concepts: Monitoring, Taking Medication and Reducing Risks    Based on learning assessment above, most appropriate setting for further diabetes education would be: Individual setting.      INTERVENTIONS:    Education provided today on:  AADE Self-Care Behaviors:  Monitoring: individual blood glucose targets, frequency of monitoring.  Discussed importance of checking glucose levels so we can determine if Rybelsus is effective in lowering glucose levels.   Taking Medication: Discussed dose adjustment Rybelsus.   Reducing Risks: A1C - goals, relating to blood glucose levels, how often to check.  Need to lower A1c if he wants hernia repaired.     Opportunities for ongoing education and support in diabetes-self management were discussed.    Pt verbalized understanding of concepts discussed and recommendations provided today.       Education Materials Provided:  No new materials today.     ASSESSMENT:  Last noted A1c was 10% 7/22/21.  Provider placed pt on Rybelsus at that time.  He was on Trulicity previously but reported rash on day of injection so it was discontinued.  Pt is tolerating Rybelsus but is concerned about what the cost will be after the first of the year.  He does have savings card.  He did not bring his meter today and has not been checking glucose levels.      Patient's most recent   Lab Results   Component Value Date    A1C 10.0 07/22/2021    A1C 7.7 01/22/2021    is not meeting goal of <7.0    PLAN  Continue efforts to limit foods high in carbohydrates.   Continue biking for exercise.   Begin testing glucose 2x/day- alternate times.  Keep taking current dose Metformin ER.  When current pack of Rybelsus is complete increase to 7 mg daily.    See Patient Instructions for  co-developed, patient-stated behavior change goals.  AVS printed and provided to patient today.   Follow up Sept 17th at 9:30 am.     Time Spent: 40 minutes  Encounter Type: Individual    Any diabetes medication dose changes were made via the CDE Protocol and Collaborative Practice Agreement with the patient's referring provider. A copy of this encounter was shared with the provider.

## 2021-08-20 RX ORDER — ORAL SEMAGLUTIDE 7 MG/1
7 TABLET ORAL DAILY
Qty: 30 TABLET | Refills: 1 | Status: SHIPPED | OUTPATIENT
Start: 2021-08-20 | End: 2021-10-15 | Stop reason: DRUGHIGH

## 2021-09-07 ENCOUNTER — HOSPITAL ENCOUNTER (OUTPATIENT)
Dept: RESPIRATORY THERAPY | Facility: HOSPITAL | Age: 56
Discharge: HOME | End: 2021-09-07
Attending: FAMILY MEDICINE | Admitting: INTERNAL MEDICINE
Payer: COMMERCIAL

## 2021-09-07 DIAGNOSIS — R06.00 DYSPNEA, UNSPECIFIED TYPE: ICD-10-CM

## 2021-09-07 LAB — HGB BLD-MCNC: 14.4 G/DL (ref 13.3–17.7)

## 2021-09-07 PROCEDURE — 94010 BREATHING CAPACITY TEST: CPT | Mod: 26 | Performed by: INTERNAL MEDICINE

## 2021-09-07 PROCEDURE — 85018 HEMOGLOBIN: CPT | Performed by: FAMILY MEDICINE

## 2021-09-07 PROCEDURE — 94729 DIFFUSING CAPACITY: CPT

## 2021-09-07 PROCEDURE — 94729 DIFFUSING CAPACITY: CPT | Mod: 26 | Performed by: INTERNAL MEDICINE

## 2021-09-07 PROCEDURE — 36415 COLL VENOUS BLD VENIPUNCTURE: CPT | Performed by: FAMILY MEDICINE

## 2021-09-07 PROCEDURE — 94010 BREATHING CAPACITY TEST: CPT

## 2021-09-07 PROCEDURE — 94726 PLETHYSMOGRAPHY LUNG VOLUMES: CPT

## 2021-09-07 PROCEDURE — 94726 PLETHYSMOGRAPHY LUNG VOLUMES: CPT | Mod: 26 | Performed by: INTERNAL MEDICINE

## 2021-09-12 ENCOUNTER — HEALTH MAINTENANCE LETTER (OUTPATIENT)
Age: 56
End: 2021-09-12

## 2021-09-13 ENCOUNTER — TELEPHONE (OUTPATIENT)
Dept: FAMILY MEDICINE | Facility: OTHER | Age: 56
End: 2021-09-13

## 2021-09-17 ENCOUNTER — HOSPITAL ENCOUNTER (OUTPATIENT)
Dept: EDUCATION SERVICES | Facility: HOSPITAL | Age: 56
Discharge: HOME | End: 2021-09-17
Attending: NURSE PRACTITIONER | Admitting: NURSE PRACTITIONER
Payer: COMMERCIAL

## 2021-09-17 VITALS
BODY MASS INDEX: 29.62 KG/M2 | RESPIRATION RATE: 16 BRPM | HEART RATE: 81 BPM | WEIGHT: 238.2 LBS | SYSTOLIC BLOOD PRESSURE: 122 MMHG | DIASTOLIC BLOOD PRESSURE: 83 MMHG | HEIGHT: 75 IN | OXYGEN SATURATION: 98 %

## 2021-09-17 DIAGNOSIS — E11.65 TYPE 2 DIABETES MELLITUS WITH HYPERGLYCEMIA, WITHOUT LONG-TERM CURRENT USE OF INSULIN (H): Primary | ICD-10-CM

## 2021-09-17 PROCEDURE — 97803 MED NUTRITION INDIV SUBSEQ: CPT | Performed by: DIETITIAN, REGISTERED

## 2021-09-17 ASSESSMENT — MIFFLIN-ST. JEOR: SCORE: 2007.13

## 2021-09-17 ASSESSMENT — PAIN SCALES - GENERAL: PAINLEVEL: NO PAIN (0)

## 2021-09-17 NOTE — LETTER
"    9/17/2021        RE: Atul Lam  [address]  [place] [address]        Diabetes Self-Management Education & Support    Presents for: Individual review    SUBJECTIVE/OBJECTIVE:  Presents for: Individual review  Accompanied by: Self  Diabetes education in the past 24mo: Yes  Focus of Visit: Reducing Risks, Taking Medication  Diabetes type: Type 2  Date of diagnosis: 2008  Disease course: Improving  How confident are you filling out medical forms by yourself:: Quite a bit  Diabetes management related comments/concerns: No concerns.  Transportation concerns: No  Difficulty affording diabetes medication?: Yes (Pt has $ 5000.00 dedctible.)  Difficulty affording diabetes testing supplies?: No  Other concerns:: None  Cultural Influences/Ethnic Background:  American    Diabetes Symptoms & Complications:  Fatigue: Yes  Neuropathy: No  Polydipsia: No  Polyphagia: No  Polyuria: No  Visual change: No  Slow healing wounds: No  Symptom course: Stable  Weight trend: Stable  Complications assessed today?: No  CVA: No  Heart disease: No  Nephropathy: No    Patient Problem List and Family Medical History reviewed for relevant medical history, current medical status, and diabetes risk factors.    Vitals:  /83   Pulse 81   Resp 16   Ht 1.899 m (6' 2.75\")   Wt 108 kg (238 lb 3.2 oz)   SpO2 98%   BMI 29.97 kg/m    Estimated body mass index is 29.97 kg/m  as calculated from the following:    Height as of this encounter: 1.899 m (6' 2.75\").    Weight as of this encounter: 108 kg (238 lb 3.2 oz).   Last 3 BP:   BP Readings from Last 3 Encounters:   09/17/21 122/83   08/13/21 118/81   07/22/21 126/72       History   Smoking Status     Former Smoker     Years: 30.00     Types: Dip, chew, snus or snuff     Quit date: 9/12/2017   Smokeless Tobacco     Former User     Types: Chew     Quit date: 2/14/2020       Labs:  Lab Results   Component Value Date    A1C 10.0 07/22/2021    A1C 7.7 01/22/2021     Lab Results   Component " Value Date     07/22/2021     01/26/2021     Lab Results   Component Value Date    LDL  01/22/2021     Cannot estimate LDL when triglyceride exceeds 400 mg/dL     01/22/2021     HDL Cholesterol   Date Value Ref Range Status   01/22/2021 39 (L) >39 mg/dL Final   ]  GFR Estimate   Date Value Ref Range Status   07/22/2021 >90 >60 mL/min/1.73m2 Final     Comment:     As of July 11, 2021, eGFR is calculated by the CKD-EPI creatinine equation, without race adjustment. eGFR can be influenced by muscle mass, exercise, and diet. The reported eGFR is an estimation only and is only applicable if the renal function is stable.   01/26/2021 >90 >60 mL/min/[1.73_m2] Final     Comment:     Non  GFR Calc  Starting 12/18/2018, serum creatinine based estimated GFR (eGFR) will be   calculated using the Chronic Kidney Disease Epidemiology Collaboration   (CKD-EPI) equation.       GFR Estimate If Black   Date Value Ref Range Status   01/26/2021 >90 >60 mL/min/[1.73_m2] Final     Comment:      GFR Calc  Starting 12/18/2018, serum creatinine based estimated GFR (eGFR) will be   calculated using the Chronic Kidney Disease Epidemiology Collaboration   (CKD-EPI) equation.       Lab Results   Component Value Date    CR 0.88 07/22/2021    CR 0.91 01/26/2021     No results found for: MICROALBUMIN    Healthy Eating:  Healthy Eating Assessed Today: Yes  Cultural/Scientologist diet restrictions?: No  Meal planning/habits: Low carb  How many times a week on average do you eat food made away from home (restaurant/take-out)?: 1  Meals include: Breakfast, Lunch, Dinner  Breakfast: cheerios with milk or scrambled eggs with meat/cheese - milk  Lunch: Packs the following and grazes at work - one sandwich or wrap sandwich , cukes, fruit, sugar free jello, small sugar free chocolate  Dinner: Eats 12 am-2 am when gets home - steamer package mixed veggies, chicken breast  Other: Pt works afternoon shift - 12 pm  - 12 am.  Beverages: Water, Diet soda, Coffee, Milk  Has patient met with a dietitian in the past?: Yes    Being Active:  Being Active Assessed Today: Yes  Exercise:: Yes (Pt's job is active and he has been biking.)  Days per week of moderate to strenuous exercise (like a brisk walk): 3  On average, minutes per day of exercise at this level: 60  How intense was your typical exercise? : Moderate (like brisk walking)  Exercise Minutes per Week: 180  Barrier to exercise: None    Monitoring:  Monitoring Assessed Today: Yes  Did patient bring glucose meter to appointment? : No  Blood Glucose Meter: [employer]  Times checking blood sugar at home (number): Other (Pt does not test consistently.)  Pt states fasting levels have been around 100.  No testing later in the day.      Taking Medications:  Diabetes Medication(s)     Biguanides       metFORMIN (GLUCOPHAGE-XR) 500 MG 24 hr tablet    TAKE 2 TABLETS BY MOUTH TWICE DAILY WITH MEALS    Incretin Mimetic Agents (GLP-1 Receptor Agonists)       Semaglutide (RYBELSUS) 7 MG TABS    Take 7 mg by mouth daily        Taking Medication Assessed Today: Yes  Current Treatments: Diet, Oral Medication (taken by mouth) (Metformin ER 1000 mg bid, Rybelsus 7 mg daily)  Problems taking diabetes medications regularly?: No  Diabetes medication side effects?: Yes (Pt had low's when on Glimepiride in past.  Trulicity gave him a rash on day of injection.)    Problem Solving:  Problem Solving Assessed Today: Yes  Is the patient at risk for hypoglycemia?: No    Reducing Risks:  Reducing Risks Assessed Today: No  Has dilated eye exam at least once a year?: No  Sees dentist every 6 months?: No  Feet checked by healthcare provider in the last year?: Yes    Healthy Coping:  Healthy Coping Assessed Today: Yes  Emotional response to diabetes: Acceptance  Informal Support system:: Spouse (Leah)  Stage of change: MAINTENANCE (Working to maintain change, with risk of relapse)  Patient Activation Measure  Survey Score:  GRUPO Score (Last Two) 12/7/2018   GRUPO Raw Score 29   Activation Score 52.9   GRUPO Level 2     Diabetes knowledge and skills assessment:   Patient is knowledgeable in diabetes management concepts related to: Taking Medication    Patient needs further education on the following diabetes management concepts: Being Active, Monitoring and Reducing Risks    Based on learning assessment above, most appropriate setting for further diabetes education would be: Individual setting.      INTERVENTIONS:    Education provided today on:  AADE Self-Care Behaviors:  Being Active: relationship to blood glucose  Monitoring: individual blood glucose targets, frequency of monitoring.  Encouraged some testing later in the day - pre or post meal.   Taking Medication: Continue current dose of Rybelsus for now.  Pt has savings card for use after the 1st of the year - cost should be 10.00.  Reducing Risks: annual eye exam and A1C - goals, relating to blood glucose levels, how often to check    Opportunities for ongoing education and support in diabetes-self management were discussed.    Pt verbalized understanding of concepts discussed and recommendations provided today.       Education Materials Provided:  No new materials today.     ASSESSMENT:  Pt did not bring meter so unable to assess glucose levels with use of Rybelsus.  He states he thinks it is helping.  Reports fasting levels around 100.  No tests later in the day.  A1c not due yet.  No significant weight loss.  No side effects reported.     Patient's most recent   Lab Results   Component Value Date    A1C 10.0 07/22/2021    A1C 7.7 01/22/2021    is not meeting goal of <7.0    PLAN  Try to do some glucose testing later in the day - pre/post meal.  Continue current doses of Metformin and Rybelsus.   See Patient Instructions for co-developed, patient-stated behavior change goals.  AVS printed and provided to patient today.   Follow up on October 29th for A1c check.      Time Spent: 20 minutes  Encounter Type: Individual    Any diabetes medication dose changes were made via the CDE Protocol and Collaborative Practice Agreement with the patient's referring provider. A copy of this encounter was shared with the provider.          Sincerely,        Chica Guzman RD

## 2021-09-17 NOTE — PROGRESS NOTES
"Diabetes Self-Management Education & Support    Presents for: Individual review    SUBJECTIVE/OBJECTIVE:  Presents for: Individual review  Accompanied by: Self  Diabetes education in the past 24mo: Yes  Focus of Visit: Reducing Risks, Taking Medication  Diabetes type: Type 2  Date of diagnosis: 2008  Disease course: Improving  How confident are you filling out medical forms by yourself:: Quite a bit  Diabetes management related comments/concerns: No concerns.  Transportation concerns: No  Difficulty affording diabetes medication?: Yes (Pt has $ 5000.00 dedctible.)  Difficulty affording diabetes testing supplies?: No  Other concerns:: None  Cultural Influences/Ethnic Background:  American    Diabetes Symptoms & Complications:  Fatigue: Yes  Neuropathy: No  Polydipsia: No  Polyphagia: No  Polyuria: No  Visual change: No  Slow healing wounds: No  Symptom course: Stable  Weight trend: Stable  Complications assessed today?: No  CVA: No  Heart disease: No  Nephropathy: No    Patient Problem List and Family Medical History reviewed for relevant medical history, current medical status, and diabetes risk factors.    Vitals:  /83   Pulse 81   Resp 16   Ht 1.899 m (6' 2.75\")   Wt 108 kg (238 lb 3.2 oz)   SpO2 98%   BMI 29.97 kg/m    Estimated body mass index is 29.97 kg/m  as calculated from the following:    Height as of this encounter: 1.899 m (6' 2.75\").    Weight as of this encounter: 108 kg (238 lb 3.2 oz).   Last 3 BP:   BP Readings from Last 3 Encounters:   09/17/21 122/83   08/13/21 118/81   07/22/21 126/72       History   Smoking Status     Former Smoker     Years: 30.00     Types: Dip, chew, snus or snuff     Quit date: 9/12/2017   Smokeless Tobacco     Former User     Types: Chew     Quit date: 2/14/2020       Labs:  Lab Results   Component Value Date    A1C 10.0 07/22/2021    A1C 7.7 01/22/2021     Lab Results   Component Value Date     07/22/2021     01/26/2021     Lab Results   Component " Value Date    LDL  01/22/2021     Cannot estimate LDL when triglyceride exceeds 400 mg/dL     01/22/2021     HDL Cholesterol   Date Value Ref Range Status   01/22/2021 39 (L) >39 mg/dL Final   ]  GFR Estimate   Date Value Ref Range Status   07/22/2021 >90 >60 mL/min/1.73m2 Final     Comment:     As of July 11, 2021, eGFR is calculated by the CKD-EPI creatinine equation, without race adjustment. eGFR can be influenced by muscle mass, exercise, and diet. The reported eGFR is an estimation only and is only applicable if the renal function is stable.   01/26/2021 >90 >60 mL/min/[1.73_m2] Final     Comment:     Non  GFR Calc  Starting 12/18/2018, serum creatinine based estimated GFR (eGFR) will be   calculated using the Chronic Kidney Disease Epidemiology Collaboration   (CKD-EPI) equation.       GFR Estimate If Black   Date Value Ref Range Status   01/26/2021 >90 >60 mL/min/[1.73_m2] Final     Comment:      GFR Calc  Starting 12/18/2018, serum creatinine based estimated GFR (eGFR) will be   calculated using the Chronic Kidney Disease Epidemiology Collaboration   (CKD-EPI) equation.       Lab Results   Component Value Date    CR 0.88 07/22/2021    CR 0.91 01/26/2021     No results found for: MICROALBUMIN    Healthy Eating:  Healthy Eating Assessed Today: Yes  Cultural/Anabaptist diet restrictions?: No  Meal planning/habits: Low carb  How many times a week on average do you eat food made away from home (restaurant/take-out)?: 1  Meals include: Breakfast, Lunch, Dinner  Breakfast: cheerios with milk or scrambled eggs with meat/cheese - milk  Lunch: Packs the following and grazes at work - one sandwich or wrap sandwich , cukes, fruit, sugar free jello, small sugar free chocolate  Dinner: Eats 12 am-2 am when gets home - steamer package mixed veggies, chicken breast  Other: Pt works afternoon shift - 12 pm - 12 am.  Beverages: Water, Diet soda, Coffee, Milk  Has patient met with a  dietitian in the past?: Yes    Being Active:  Being Active Assessed Today: Yes  Exercise:: Yes (Pt's job is active and he has been biking.)  Days per week of moderate to strenuous exercise (like a brisk walk): 3  On average, minutes per day of exercise at this level: 60  How intense was your typical exercise? : Moderate (like brisk walking)  Exercise Minutes per Week: 180  Barrier to exercise: None    Monitoring:  Monitoring Assessed Today: Yes  Did patient bring glucose meter to appointment? : No  Blood Glucose Meter: [employer]  Times checking blood sugar at home (number): Other (Pt does not test consistently.)  Pt states fasting levels have been around 100.  No testing later in the day.      Taking Medications:  Diabetes Medication(s)     Biguanides       metFORMIN (GLUCOPHAGE-XR) 500 MG 24 hr tablet    TAKE 2 TABLETS BY MOUTH TWICE DAILY WITH MEALS    Incretin Mimetic Agents (GLP-1 Receptor Agonists)       Semaglutide (RYBELSUS) 7 MG TABS    Take 7 mg by mouth daily        Taking Medication Assessed Today: Yes  Current Treatments: Diet, Oral Medication (taken by mouth) (Metformin ER 1000 mg bid, Rybelsus 7 mg daily)  Problems taking diabetes medications regularly?: No  Diabetes medication side effects?: Yes (Pt had low's when on Glimepiride in past.  Trulicity gave him a rash on day of injection.)    Problem Solving:  Problem Solving Assessed Today: Yes  Is the patient at risk for hypoglycemia?: No    Reducing Risks:  Reducing Risks Assessed Today: No  Has dilated eye exam at least once a year?: No  Sees dentist every 6 months?: No  Feet checked by healthcare provider in the last year?: Yes    Healthy Coping:  Healthy Coping Assessed Today: Yes  Emotional response to diabetes: Acceptance  Informal Support system:: Spouse (Leah)  Stage of change: MAINTENANCE (Working to maintain change, with risk of relapse)  Patient Activation Measure Survey Score:  GRUPO Score (Last Two) 12/7/2018   GRUPO Raw Score 29   Activation  Score 52.9   GRUPO Level 2     Diabetes knowledge and skills assessment:   Patient is knowledgeable in diabetes management concepts related to: Taking Medication    Patient needs further education on the following diabetes management concepts: Being Active, Monitoring and Reducing Risks    Based on learning assessment above, most appropriate setting for further diabetes education would be: Individual setting.      INTERVENTIONS:    Education provided today on:  AADE Self-Care Behaviors:  Being Active: relationship to blood glucose  Monitoring: individual blood glucose targets, frequency of monitoring.  Encouraged some testing later in the day - pre or post meal.   Taking Medication: Continue current dose of Rybelsus for now.  Pt has savings card for use after the 1st of the year - cost should be 10.00.  Reducing Risks: annual eye exam and A1C - goals, relating to blood glucose levels, how often to check    Opportunities for ongoing education and support in diabetes-self management were discussed.    Pt verbalized understanding of concepts discussed and recommendations provided today.       Education Materials Provided:  No new materials today.     ASSESSMENT:  Pt did not bring meter so unable to assess glucose levels with use of Rybelsus.  He states he thinks it is helping.  Reports fasting levels around 100.  No tests later in the day.  A1c not due yet.  No significant weight loss.  No side effects reported.     Patient's most recent   Lab Results   Component Value Date    A1C 10.0 07/22/2021    A1C 7.7 01/22/2021    is not meeting goal of <7.0    PLAN  Try to do some glucose testing later in the day - pre/post meal.  Continue current doses of Metformin and Rybelsus.   See Patient Instructions for co-developed, patient-stated behavior change goals.  AVS printed and provided to patient today.   Follow up on October 29th for A1c check.     Time Spent: 20 minutes  Encounter Type: Individual    Any diabetes medication  dose changes were made via the CDE Protocol and Collaborative Practice Agreement with the patient's referring provider. A copy of this encounter was shared with the provider.

## 2021-09-17 NOTE — PATIENT INSTRUCTIONS
-Continue to try to limit foods high in carbohydrates.   -Try to get some exercise most days of the week.  -Try to do some testing later in the day.   Before your supper meal late at night is okay .  -Keep taking your current doses or Metformin and Rybelus.  -Follow up October 29th at 9:30 am for A1c check.  You do not have to be fasting.  -Call with any concerns - HUMA Marino, -[phone].

## 2021-09-21 ENCOUNTER — HOSPITAL ENCOUNTER (OUTPATIENT)
Facility: HOSPITAL | Age: 56
End: 2021-09-21
Attending: SURGERY | Admitting: SURGERY
Payer: COMMERCIAL

## 2021-09-21 ENCOUNTER — PREP FOR PROCEDURE (OUTPATIENT)
Dept: SURGERY | Facility: OTHER | Age: 56
End: 2021-09-21

## 2021-09-21 ENCOUNTER — OFFICE VISIT (OUTPATIENT)
Dept: SURGERY | Facility: OTHER | Age: 56
End: 2021-09-21
Attending: FAMILY MEDICINE
Payer: COMMERCIAL

## 2021-09-21 VITALS
SYSTOLIC BLOOD PRESSURE: 126 MMHG | HEIGHT: 75 IN | DIASTOLIC BLOOD PRESSURE: 68 MMHG | OXYGEN SATURATION: 97 % | BODY MASS INDEX: 29.97 KG/M2 | TEMPERATURE: 96.1 F | HEART RATE: 83 BPM

## 2021-09-21 DIAGNOSIS — Z01.818 PREOP TESTING: Primary | ICD-10-CM

## 2021-09-21 DIAGNOSIS — K43.9 VENTRAL HERNIA WITHOUT OBSTRUCTION OR GANGRENE: Primary | ICD-10-CM

## 2021-09-21 DIAGNOSIS — K43.9 VENTRAL HERNIA WITHOUT OBSTRUCTION OR GANGRENE: ICD-10-CM

## 2021-09-21 PROCEDURE — 99214 OFFICE O/P EST MOD 30 MIN: CPT | Performed by: SURGERY

## 2021-09-21 ASSESSMENT — PAIN SCALES - GENERAL: PAINLEVEL: NO PAIN (0)

## 2021-09-21 NOTE — PATIENT INSTRUCTIONS
Thank you for allowing our surgical team to participate in your care. Please review the instructions below.   If you have questions, you may contact us at the any of the following numbers:     [facility] Health Unit Coordinator: [phone]  Clinic Surgery Nurse ([place]): [phone]  Hospital Surgery Education Nurse: [phone]    You are scheduled for: Combined Open and laparoscopic ventral hernia repair with Dr. Roblero on 10/4/21.  Admit Time: Hospital Surgery will call you the day before your procedure by 5pm with your arrival time.   If your surgery is on Monday, expect a call on Friday.   If you are not contacted before 5PM, please call admitting at [phone].   After hours or on weekends, please call [phone] to postpone.   Call the clinic surgery nurse if you become ill within 2 weeks of your procedure to reschedule.   This includes fever, chills, sore throat, cough, chest congestion, runny nose, or any other symptom of any other illness.     Preop appointment needed within the 30 days prior to your procedure.   COVID-19 test is needed 4 days before procedure. This testing is done at the upper level of the [facility] (weekdays and weekends-East Entrance) or at the [facility] (weekday mornings only).  Follow the signage for parking and bring your mobile phone (if you have one) to call the phone number ([phone]) on the sign outside the testing site for check-in. Stay in your vehicle until you are directed to enter. If you do not have a cell phone, please call the nurse for instructions on checking in. This has been scheduled for 9/30/21 at 1:00 at the [facility] site.      Please call the Hospital Surgery Education Nurse at [phone] 1 week prior to your procedure and have a medication list ready.   Do not take Aspirin or other NSAIDS (Ibuprofen, Motrin, Aleve, Celebrex, Naproxen, etc), vitamins/supplements 7 days before your surgery unless you have been otherwise  directed.   If you are prescribed a daily 81mg Aspirin, you may continue this.   If you are prescribed blood thinners or insulin, please contact your primary care provider for instructions.    Shower the night before and the morning of your procedure with Hibiclens soap.  On The Night Before Your Surgery:  1.  Remove your jewelry and leave off until after surgery. Wash your hair and body with your regular soap/shampoo. Use a freshly washed washcloth. Include cleaning inside your belly button. Rinse off soap/shampoo.  2. Wash your body from neck to feet using 1/2 of the 1st Hibiclens (Chlorhexidine) bottle with your bare hands. Do not use Hibiclens on your face, hair or genital area. Leave the soap on your body for one minute and rinse. If you are under age 18, you should purchase and use Dial soap instead and use enough to generously cover your body.    3. Repeat step 2 with the 2nd half of the bottle (or additional Dial soap if under age 18).  4. Rinse off all soap and dry with a freshly washed towel. Do not use lotions or hair products.  5. Sleep in freshly washed pajamas and freshly washed bedding.  On The Morning of Your Surgery:  1.Wash your hair and body with your regular soap/shampoo. Use another freshly washed washcloth. Rinse off.   2. Wash your body from neck to feet using 1/2 of the 2nd Hibiclens (Chlorhexidine) bottle (or Dial soap if you are under age 18) with your bare hands. Leave the soap on your body for one minute and rinse.  3. Repeat step 2 with the 2nd half of the bottle (or additional Dial soap if under age 18).  4. Rinse off all the soap and dry with another freshly washed towel. Do not use lotions or hair products.  5. Wear freshly washed clothing to the hospital.    Do not have anything to eat or drink after midnight the night before your surgery (or 8 hours prior to surgery), except clear liquids (water, clear juice, clear broth, plain coffee or tea without cream or milk) up until 2 hours  prior to arrival time, and then nothing by mouth.   Do not eat, drink, chew gum, suck on hard candy, or smoke after 2 hours prior to arrival. You can brush your teeth.   If you are directed to take any medications, take them with a tiny sip of water.   If you use an inhaler, bring it with you.  Arrive in clean, comfortable clothing.   Do not wear any jewelry, make-up, nail polish, lotions, hair products, or perfumes.   [place] in hospital admitting through the [place] entrance.  A responsible adult must be available to drive you home and stay with you for 24 hours at home. If you need to take a taxi or the bus, you must have another responsible adult to ride with you. Your procedure will be cancelled if you do not have a responsible adult .     Return to clinic for a postop appointment 2 week(s) from your surgery date.

## 2021-09-21 NOTE — NURSING NOTE
"Chief Complaint   Patient presents with     Consult     ventral hernia x2 on both sides of incision from laparoscopic colectomy in January. Referred by Dr. Pham.       Initial /68   Pulse 83   Temp (!) 96.1  F (35.6  C)   Ht 1.899 m (6' 2.75\")   SpO2 97%   BMI 29.97 kg/m   Estimated body mass index is 29.97 kg/m  as calculated from the following:    Height as of this encounter: 1.899 m (6' 2.75\").    Weight as of 9/17/21: 108 kg (238 lb 3.2 oz).  Medication Reconciliation: complete  KALEY SIMS LPN    "

## 2021-09-21 NOTE — PROGRESS NOTES
CLINIC NOTE - CONSULT  9/21/2021    Patient: Atul Lam  Referring Physician: Zoraida Pham    Reason for Referral: Ventral Hernia    This is a 55 year old male with a vental hernia.   Previous abdominal surgery: YES   Previous repair of this hernia:NO   Reducible : YES   Symptomatic : NO   Pain at the hernia site : NO   Signs of obstruction    Nausea/VomitingNO    Diarrhea/ConstipationNot Applicable   Has Noticed it for several weeks      Past Medical History:  Past Medical History:   Diagnosis Date     Congenital hiatus hernia      Diabetes (H)      Gastroesophageal reflux disease      Hiatal hernia      Neck mass      Warthin tumor        Past Surgical History:  Past Surgical History:   Procedure Laterality Date     COLONOSCOPY N/A 12/31/2020    Procedure: COLONOSCOPY WITH BIOPSIES, POLYPECTOMY AND TATTOOING;  Surgeon: Lionel Roblero MD;  Location: [facility]     ENDOSCOPIC SINUS SURGERY N/A 01/08/2020    Procedure: Bilateral Endoscopic Sinus Surgery with Polypectomy, Frozens;  Surgeon: Cherelle Chacon MD;  Location: [facility]     ENT SURGERY      T and A     LAPAROSCOPIC ASSISTED COLECTOMY N/A 01/25/2021    Procedure: COLECTOMY, LEFT, LAPAROSCOPIC, Hand assisted;  Surgeon: Lionel Roblero MD;  Location: [facility]     PAROTIDECTOMY Right 04/10/2018    Procedure: PAROTIDECTOMY;  Right Superficial Parotidectomy;  Surgeon: Alexandra Holbrook MD;  Location: [facility]     PFT GENERAL LAB TESTING  09/07/2021    minimal diffusion defect - pulmonary vascular     SEPTOPLASTY, TURBINOPLASTY, COMBINED N/A 01/08/2020    Procedure: TURBINATE REDUCTION, PROPEL IMPLANT TIMES 4;  Surgeon: Cherelle Chacon MD;  Location: [facility]       Family History History:  Family History   Problem Relation Age of Onset     Diabetes Father      Cerebrovascular Disease Father        History of Tobacco Use:  History   Smoking Status     Former Smoker     Years: 30.00     Types: Dip, chew, snus or snuff     Quit date: 9/12/2017  "  Smokeless Tobacco     Former User     Types: Chew     Quit date: 2/14/2020       Current Medications:  Current Outpatient Medications   Medication Sig Dispense Refill     blood glucose (BELL CONTOUR NEXT) test strip Use to test blood sugar 2 times daily. 100 each 11     blood glucose monitoring (BELL MICROLET) lancets Use to test blood sugar 2 times daily. 100 each 11     budesonide (PULMICORT) 0.5 MG/2ML neb solution Squirt entire vial into previously made preet med saline bottle, mix, and irrigate each nostril until entire bottle empty.  Do this twice daily. 3 Box 11     fluticasone (FLONASE) 50 MCG/ACT nasal spray Spray 2 sprays into both nostrils 2 times daily 16 g 12     ipratropium-albuterol (COMBIVENT RESPIMAT)  MCG/ACT inhaler Inhale 1 puff into the lungs 4 times daily 4 g 1     metFORMIN (GLUCOPHAGE-XR) 500 MG 24 hr tablet TAKE 2 TABLETS BY MOUTH TWICE DAILY WITH MEALS 360 tablet 3     Semaglutide (RYBELSUS) 7 MG TABS Take 7 mg by mouth daily 30 tablet 1     simvastatin (ZOCOR) 40 MG tablet TAKE 1 TABLET BY MOUTH ONCE DAILY AT BEDTIME 90 tablet 3       Allergies:  Allergies   Allergen Reactions     Animal Dander      Nasal congestion     Aspirin Nausea and Vomiting     Dust Mites      Nasal congestion     Lisinopril Hives     Losartan Hives     Morphine Nausea and Vomiting       ROS:  Pertinent items are noted in HPI.  All other systems are negative.    PHYSICAL EXAM:     Vital signs: /68   Pulse 83   Temp (!) 96.1  F (35.6  C)   Ht 1.899 m (6' 2.75\")   SpO2 97%   BMI 29.97 kg/m     Weight: [unfilled]   BMI: Body mass index is 29.97 kg/m .   General: Normal, healthy, cooperative, in no acute distress, alert   Skin: no jaundice   HEENT: PERRLA and EOMI   Neck: supple   Lungs: clear to auscultation   CV: Regular rate and rhythm without murmer   Abdominal: negative    Extremities: No cyanosis, clubbing or edema noted bilaterally in Upper and Lower Extremities   Neurological: without " deficit      Hernia    Location: upper midline     Reducible: YES    Tender: NO    Erythematous: NO      ASSESSMENT: 55 year old male with a primary ventral hernia located at the upper midline.  The hernia is not symptomatic.  The hernia is not incarcerated.  The hernia is not tender.    PLAN:  Discussed the options with the patient.  After discussion patients elects for surgical intervention and repair.  Will plan on a Combined open and laparoscopic ventral hernia repair.  The procedure was explained with its risk, benefits and alternatives.  Risks include but are not limited to recurrence, infections, damage to internal organs, need for additional procedures, reactions to anesthesia.  All of the patients questions were answered.  The patient consents to proceed with the plan above.  The procedure will be scheduled.    Pre-operative physical : YES

## 2021-09-27 ENCOUNTER — ANESTHESIA EVENT (OUTPATIENT)
Dept: SURGERY | Facility: HOSPITAL | Age: 56
End: 2021-09-27

## 2021-09-27 RX ORDER — FENTANYL CITRATE 50 UG/ML
50 INJECTION, SOLUTION INTRAMUSCULAR; INTRAVENOUS
Status: CANCELLED | OUTPATIENT
Start: 2021-09-27

## 2021-09-27 RX ORDER — SODIUM CHLORIDE, SODIUM LACTATE, POTASSIUM CHLORIDE, CALCIUM CHLORIDE 600; 310; 30; 20 MG/100ML; MG/100ML; MG/100ML; MG/100ML
INJECTION, SOLUTION INTRAVENOUS CONTINUOUS
Status: CANCELLED | OUTPATIENT
Start: 2021-09-27

## 2021-09-27 RX ORDER — LIDOCAINE 40 MG/G
CREAM TOPICAL
Status: CANCELLED | OUTPATIENT
Start: 2021-09-27

## 2021-09-27 RX ORDER — ONDANSETRON 4 MG/1
4 TABLET, ORALLY DISINTEGRATING ORAL EVERY 30 MIN PRN
Status: CANCELLED | OUTPATIENT
Start: 2021-09-27

## 2021-09-27 RX ORDER — ONDANSETRON 2 MG/ML
4 INJECTION INTRAMUSCULAR; INTRAVENOUS EVERY 30 MIN PRN
Status: CANCELLED | OUTPATIENT
Start: 2021-09-27

## 2021-09-27 RX ORDER — OXYCODONE HYDROCHLORIDE 5 MG/1
5 TABLET ORAL EVERY 4 HOURS PRN
Status: CANCELLED | OUTPATIENT
Start: 2021-09-27

## 2021-09-27 RX ORDER — FENTANYL CITRATE 50 UG/ML
50 INJECTION, SOLUTION INTRAMUSCULAR; INTRAVENOUS EVERY 5 MIN PRN
Status: CANCELLED | OUTPATIENT
Start: 2021-09-27

## 2021-09-27 ASSESSMENT — LIFESTYLE VARIABLES: TOBACCO_USE: 1

## 2021-09-27 NOTE — ANESTHESIA PREPROCEDURE EVALUATION
Anesthesia Pre-Procedure Evaluation    Patient: Atlu Lam   MRN: [id number] : 1965        Preoperative Diagnosis: Ventral hernia without obstruction or gangrene [K43.9]   Procedure : Procedure(s):  Combined open and laparoscopic ventral hernia repair     Past Medical History:   Diagnosis Date     Congenital hiatus hernia      Diabetes (H)      Gastroesophageal reflux disease      Hiatal hernia      Neck mass      Warthin tumor       Past Surgical History:   Procedure Laterality Date     COLONOSCOPY N/A 2020    Procedure: COLONOSCOPY WITH BIOPSIES, POLYPECTOMY AND TATTOOING;  Surgeon: Lionel Roblero MD;  [facility]     ENDOSCOPIC SINUS SURGERY N/A 2020    Procedure: Bilateral Endoscopic Sinus Surgery with Polypectomy, Frozens;  Surgeon: Cherelle Chacon MD;  Location: [facility]     ENT SURGERY      T and A     LAPAROSCOPIC ASSISTED COLECTOMY N/A 2021    Procedure: COLECTOMY, LEFT, LAPAROSCOPIC, Hand assisted;  Surgeon: Lionel Roblero MD;  [facility]     PAROTIDECTOMY Right 04/10/2018    Procedure: PAROTIDECTOMY;  Right Superficial Parotidectomy;  Surgeon: Alexandra Holbrook MD;  Location: [facility]     PFT GENERAL LAB TESTING  2021    minimal diffusion defect - pulmonary vascular     SEPTOPLASTY, TURBINOPLASTY, COMBINED N/A 2020    Procedure: TURBINATE REDUCTION, PROPEL IMPLANT TIMES 4;  Surgeon: Cherelle Chacon MD;  Location: [facility]      Allergies   Allergen Reactions     Animal Dander      Nasal congestion     Aspirin Nausea and Vomiting     Dust Mites      Nasal congestion     Lisinopril Hives     Losartan Hives     Morphine Nausea and Vomiting      Social History     Tobacco Use     Smoking status: Former Smoker     Years: 30.00     Types: Dip, chew, snus or snuff     Quit date: 2017     Years since quittin.0     Smokeless tobacco: Former User     Types: Chew     Quit date: 2020   Substance Use Topics     Alcohol use: Yes      "Comment: 1 per mo      Wt Readings from Last 1 Encounters:   09/17/21 108 kg (238 lb 3.2 oz)        Anesthesia Evaluation   Pt has had prior anesthetic. Type: General and MAC.        ROS/MED HX  ENT/Pulmonary: Comment: \"Disorder of respiratory system exacerbated by ASA\"  Chew tobacco  Warthin tumor  S/p parotidectomy  Chronic pansinusitis  Nasal polyp  Nasal turbinate hypertrophy    (+) sleep apnea, tobacco use, Past use,     Neurologic:       Cardiovascular: Comment: Metabolic syndrome X    (+) Dyslipidemia -----    METS/Exercise Tolerance:     Hematologic:       Musculoskeletal:       GI/Hepatic: Comment: Sigmoid polyp  S/p left colectomy    (+) GERD, hiatal hernia,     Renal/Genitourinary:       Endo: Comment: BG on 07/22/21 329    (+) type II DM, Last HgA1c: 10.0, date: 07/22/21, Using insulin,     Psychiatric/Substance Use:       Infectious Disease:       Malignancy: Comment: S/p left colectomy  (+) Malignancy, History of Other and GI.GI CA status post Surgery.  Other CA Warthin tumor status post Surgery.    Other:               OUTSIDE LABS:  CBC:   Lab Results   Component Value Date    WBC 6.1 07/22/2021    WBC 11.6 (H) 01/26/2021    HGB 14.4 09/07/2021    HGB 14.4 07/22/2021    HCT 40.9 07/22/2021    HCT 40.6 01/26/2021     07/22/2021     01/26/2021     BMP:   Lab Results   Component Value Date     07/22/2021     01/26/2021    POTASSIUM 3.8 07/22/2021    POTASSIUM 4.2 01/26/2021    CHLORIDE 101 07/22/2021    CHLORIDE 107 01/26/2021    CO2 26 07/22/2021    CO2 26 01/26/2021    BUN 15 07/22/2021    BUN 22 01/26/2021    CR 0.88 07/22/2021    CR 0.91 01/26/2021     (H) 07/22/2021     (H) 01/26/2021     COAGS: No results found for: PTT, INR, FIBR  POC:   Lab Results   Component Value Date     (H) 01/27/2021     HEPATIC:   Lab Results   Component Value Date    ALBUMIN 4.0 07/22/2021    PROTTOTAL 8.1 07/22/2021    ALT 61 07/22/2021    AST 17 07/22/2021    ALKPHOS 82 " 07/22/2021    BILITOTAL 0.4 07/22/2021     OTHER:   Lab Results   Component Value Date    A1C 10.0 (H) 07/22/2021    NEFTALI 9.0 07/22/2021    MAG 1.8 01/26/2021    TSH 1.23 01/22/2021       Anesthesia Plan    ASA Status:  3      Anesthesia Type: General.     - Airway: ETT   Induction: Propofol.   Maintenance: Balanced.        Consents            Postoperative Care    Pain management: IV analgesics, Oral pain medications, Multi-modal analgesia.   PONV prophylaxis: Ondansetron (or other 5HT-3)     Comments:                MARYLIN Galvez CRNA

## 2021-09-29 ENCOUNTER — TELEPHONE (OUTPATIENT)
Dept: SURGERY | Facility: OTHER | Age: 56
End: 2021-09-29

## 2021-09-29 NOTE — PROGRESS NOTES
Assessment & Plan     Type 2 diabetes mellitus with hyperglycemia, without long-term current use of insulin (H)  Uncontrolled.  Repeat a1c today 2 months and 1 week from last.    In fact it is worse - 11.3%.    Fasting numbers he reports are at goal, but zero post prandial readings.  Encouraged him to start monitoring 2-3 times per day.  Note to [facility] - Dexco or CGM to get better data and then adjust medications.  With a1c over 11 - should consider adding insulin.  - Adult Eye Referral; Future  - Hemoglobin A1c; Future  - Hemoglobin A1c    Dyspnea, unspecified type  Stable.  Improved with inhaler.  Consider CT scan lungs.  PFTs minimal findings.  Tobacco avoidance.    Zoraida Bahena MD  [facility] - MIRA Pollard is a 55 year old who presents for the following health issues     HPI     Due for -  Flu shot   Hep B   Shingles   Pt will get all 3 as long as they are cleared to get them    Diabetes Follow-up - surgery with Dr Roblero postponed due to a1c.  Was scheduled 10/4/21 - work request already in and time off; unable to change - and work truck will be down -     How often are you checking your blood sugar? Not at all    What concerns do you have today about your diabetes? None     Do you have any of these symptoms? (Select all that apply)  No numbness or tingling in feet.  No redness, sores or blisters on feet.  No complaints of excessive thirst.  No reports of blurry vision.  No significant changes to weight.    Have you had a diabetic eye exam in the last 12 months? No     Foot exam due soon    Metformin max    Rybelsus 7 mg - that was increased after 8/13/21 [facility] visit - weeks ago    Morning sugars 100-110    Doesn't check post prandial - working    BP Readings from Last 2 Encounters:   09/30/21 118/80   09/21/21 126/68     Hemoglobin A1C (%)   Date Value   07/22/2021 10.0 (H)   01/22/2021 7.7 (H)   11/20/2020 11.9 (H)     LDL Cholesterol Calculated (mg/dL)   Date Value    01/22/2021     Cannot estimate LDL when triglyceride exceeds 400 mg/dL   11/20/2020     Cannot estimate LDL when triglyceride exceeds 400 mg/dL     LDL Cholesterol Direct (mg/dL)   Date Value   01/22/2021 126 (H)     Hyperlipidemia Follow-Up    Are you regularly taking any medication or supplement to lower your cholesterol?   Yes- simvastatin    Are you having muscle aches or other side effects that you think could be caused by your cholesterol lowering medication?  No      How many servings of fruits and vegetables do you eat daily?  4 or more    On average, how many sweetened beverages do you drink each day (Examples: soda, juice, sweet tea, etc.  Do NOT count diet or artificially sweetened beverages)?   0    How many days per week do you exercise enough to make your heart beat faster? 3 or less    How many minutes a day do you exercise enough to make your heart beat faster? 10 - 19    How many days per week do you miss taking your medication? 0    Concern - Dyspnea - follow up PFTs  Onset: 3-4 months  Description: Feels like he has a tightness in his lungs when it is damp outside or when the air conditioner in the truck is blowing on him.   Intensity: mild  Progression of Symptoms:  improving and intermittent  Accompanying Signs & Symptoms: none  Previous history of similar problem: none  Precipitating factors:        Worsened by: damp air and air conditioning   Alleviating factors:        Improved by: none  Therapies tried and outcome: inhaler-helpful      Review of Systems   Constitutional, HEENT, cardiovascular, pulmonary, gi and gu systems are negative, except as otherwise noted.      Objective    /80 (BP Location: Left arm, Patient Position: Sitting, Cuff Size: Adult Large)   Pulse 87   Temp 98.3  F (36.8  C) (Tympanic)   Resp 18   Wt 110.2 kg (243 lb)   SpO2 98%   BMI 30.58 kg/m    Body mass index is 30.58 kg/m .  Physical Exam   GENERAL: healthy, alert and no distress  NECK: no adenopathy, no  asymmetry, masses, or scars and thyroid normal to palpation  RESP: lungs clear to auscultation - no rales, rhonchi or wheezes  CV: regular rate and rhythm, normal S1 S2, no S3 or S4, no murmur, click or rub, no peripheral edema and peripheral pulses strong  ABDOMEN: soft, nontender, no hepatosplenomegaly, no masses and bowel sounds normal  MS: no gross musculoskeletal defects noted, no edema  PSYCH: mentation appears normal, affect normal/bright    No results found for this or any previous visit (from the past 24 hour(s)).

## 2021-09-29 NOTE — TELEPHONE ENCOUNTER
Per  patient will need to have procedure rescheduled. Patients A1C levels are increased from his last check. I left a voicemail for patient to make sure that he is aware of this. Gave him the phone number to our department for him to call with any questions. Patients preop appointment was converted to a long appointment with  so we can get things under control prior to procedure. Patient will be called back to reschedule once we have the all clear from . Shelly in OR will be notified of this cancellation. Mlaka Owen LPN

## 2021-09-30 ENCOUNTER — TELEPHONE (OUTPATIENT)
Dept: SURGERY | Facility: OTHER | Age: 56
End: 2021-09-30

## 2021-09-30 ENCOUNTER — OFFICE VISIT (OUTPATIENT)
Dept: FAMILY MEDICINE | Facility: OTHER | Age: 56
End: 2021-09-30
Attending: FAMILY MEDICINE
Payer: COMMERCIAL

## 2021-09-30 VITALS
DIASTOLIC BLOOD PRESSURE: 80 MMHG | HEART RATE: 87 BPM | OXYGEN SATURATION: 98 % | WEIGHT: 243 LBS | BODY MASS INDEX: 30.58 KG/M2 | RESPIRATION RATE: 18 BRPM | TEMPERATURE: 98.3 F | SYSTOLIC BLOOD PRESSURE: 118 MMHG

## 2021-09-30 DIAGNOSIS — R06.00 DYSPNEA, UNSPECIFIED TYPE: ICD-10-CM

## 2021-09-30 DIAGNOSIS — E11.65 TYPE 2 DIABETES MELLITUS WITH HYPERGLYCEMIA, WITHOUT LONG-TERM CURRENT USE OF INSULIN (H): Primary | ICD-10-CM

## 2021-09-30 LAB
EST. AVERAGE GLUCOSE BLD GHB EST-MCNC: 278 MG/DL
HBA1C MFR BLD: 11.3 % (ref 0–5.6)

## 2021-09-30 PROCEDURE — 90746 HEPB VACCINE 3 DOSE ADULT IM: CPT | Performed by: FAMILY MEDICINE

## 2021-09-30 PROCEDURE — 83036 HEMOGLOBIN GLYCOSYLATED A1C: CPT | Performed by: FAMILY MEDICINE

## 2021-09-30 PROCEDURE — 90471 IMMUNIZATION ADMIN: CPT | Performed by: FAMILY MEDICINE

## 2021-09-30 PROCEDURE — 90682 RIV4 VACC RECOMBINANT DNA IM: CPT | Performed by: FAMILY MEDICINE

## 2021-09-30 PROCEDURE — 36415 COLL VENOUS BLD VENIPUNCTURE: CPT | Performed by: FAMILY MEDICINE

## 2021-09-30 PROCEDURE — 90472 IMMUNIZATION ADMIN EACH ADD: CPT | Performed by: FAMILY MEDICINE

## 2021-09-30 PROCEDURE — 99214 OFFICE O/P EST MOD 30 MIN: CPT | Mod: 25 | Performed by: FAMILY MEDICINE

## 2021-09-30 PROCEDURE — 90750 HZV VACC RECOMBINANT IM: CPT | Performed by: FAMILY MEDICINE

## 2021-09-30 RX ORDER — PROCHLORPERAZINE 25 MG/1
1 SUPPOSITORY RECTAL
Qty: 3 EACH | Refills: 5 | Status: SHIPPED | OUTPATIENT
Start: 2021-09-30 | End: 2021-11-12

## 2021-09-30 RX ORDER — PROCHLORPERAZINE 25 MG/1
1 SUPPOSITORY RECTAL ONCE
Qty: 1 EACH | Refills: 0 | Status: SHIPPED | OUTPATIENT
Start: 2021-09-30 | End: 2021-09-30

## 2021-09-30 RX ORDER — PROCHLORPERAZINE 25 MG/1
1 SUPPOSITORY RECTAL
Qty: 1 EACH | Refills: 1 | Status: SHIPPED | OUTPATIENT
Start: 2021-09-30

## 2021-09-30 ASSESSMENT — PAIN SCALES - GENERAL: PAINLEVEL: NO PAIN (0)

## 2021-09-30 NOTE — TELEPHONE ENCOUNTER
Patient left voicemail on [phone] on 9/29/21, time stamped at 4:03pm. Returned call to patient. Call back was sent to voicemail. Left message for patient to return call. Malka Owen LPN

## 2021-09-30 NOTE — NURSING NOTE
"Chief Complaint   Patient presents with     Lipids     Diabetes       Initial /80 (BP Location: Left arm, Patient Position: Sitting, Cuff Size: Adult Large)   Pulse 87   Temp 98.3  F (36.8  C) (Tympanic)   Resp 18   Wt 110.2 kg (243 lb)   SpO2 98%   BMI 30.58 kg/m   Estimated body mass index is 30.58 kg/m  as calculated from the following:    Height as of 9/21/21: 1.899 m (6' 2.75\").    Weight as of this encounter: 110.2 kg (243 lb).  Medication Reconciliation: complete  Eunice Beauchamp LPN  "

## 2021-09-30 NOTE — Clinical Note
Can he get CGM/dexcom?  Need date.  A1c 11.3%.  surgery cancelled.  On max metformin.  Rybesus 7 mg.  Just saw Chica couple weeks ago.  See my note.  Thanks.

## 2021-09-30 NOTE — PROGRESS NOTES
Called Atul.     Order sent to [facility] for the Dexcom G6  Questions answered.     Carolyn De Leon APRN FNP-BC  Diabetes and Wound Care

## 2021-10-04 ENCOUNTER — ANESTHESIA (OUTPATIENT)
Dept: SURGERY | Facility: HOSPITAL | Age: 56
End: 2021-10-04

## 2021-10-08 ENCOUNTER — TRANSFERRED RECORDS (OUTPATIENT)
Dept: HEALTH INFORMATION MANAGEMENT | Facility: HOSPITAL | Age: 56
End: 2021-10-08

## 2021-10-08 LAB — RETINOPATHY: NEGATIVE

## 2021-10-14 ENCOUNTER — TELEPHONE (OUTPATIENT)
Dept: NUTRITION | Facility: HOSPITAL | Age: 56
End: 2021-10-14

## 2021-10-14 NOTE — TELEPHONE ENCOUNTER
3:43 PM    Reason for Call: OVERBOOK    Patient dexcom brittany real time testing is coming back high looking to adjust medication for his A1c can get down faster. Please call pt     The patient is requesting an appointment for 10-15 with Chica Guzman.    Was an appointment offered for this call? No  If yes : Appointment type              Date    Preferred method for responding to this message: Telephone Call  What is your phone number ? [phone]    If we cannot reach you directly, may we leave a detailed response at the number you provided? Yes    Can this message wait until your PCP/provider returns, if unavailable today? No,     Lisa Morgan

## 2021-10-15 ENCOUNTER — HOSPITAL ENCOUNTER (OUTPATIENT)
Dept: EDUCATION SERVICES | Facility: HOSPITAL | Age: 56
Discharge: HOME | End: 2021-10-15
Attending: DIETITIAN, REGISTERED | Admitting: FAMILY MEDICINE
Payer: COMMERCIAL

## 2021-10-15 VITALS
BODY MASS INDEX: 29.91 KG/M2 | RESPIRATION RATE: 16 BRPM | HEIGHT: 75 IN | OXYGEN SATURATION: 98 % | DIASTOLIC BLOOD PRESSURE: 74 MMHG | HEART RATE: 81 BPM | WEIGHT: 240.6 LBS | SYSTOLIC BLOOD PRESSURE: 104 MMHG

## 2021-10-15 DIAGNOSIS — E11.65 TYPE 2 DIABETES MELLITUS WITH HYPERGLYCEMIA, WITH LONG-TERM CURRENT USE OF INSULIN (H): Primary | ICD-10-CM

## 2021-10-15 DIAGNOSIS — Z79.4 TYPE 2 DIABETES MELLITUS WITH HYPERGLYCEMIA, WITH LONG-TERM CURRENT USE OF INSULIN (H): Primary | ICD-10-CM

## 2021-10-15 PROCEDURE — G0108 DIAB MANAGE TRN  PER INDIV: HCPCS | Performed by: DIETITIAN, REGISTERED

## 2021-10-15 RX ORDER — ORAL SEMAGLUTIDE 14 MG/1
1 TABLET ORAL
Qty: 30 TABLET | Refills: 2 | Status: SHIPPED | OUTPATIENT
Start: 2021-10-15 | End: 2021-11-22

## 2021-10-15 RX ORDER — PROCHLORPERAZINE 25 MG/1
SUPPOSITORY RECTAL
COMMUNITY
Start: 2021-10-04

## 2021-10-15 ASSESSMENT — PAIN SCALES - GENERAL: PAINLEVEL: NO PAIN (0)

## 2021-10-15 ASSESSMENT — MIFFLIN-ST. JEOR: SCORE: 2018.01

## 2021-10-15 NOTE — PROGRESS NOTES
"Diabetes Self-Management Education & Support    Presents for: Individual review    SUBJECTIVE/OBJECTIVE:  Presents for: Individual review  Accompanied by: Self  Diabetes education in the past 24mo: Yes  Focus of Visit: Reducing Risks, Taking Medication  Diabetes type: Type 2  Date of diagnosis: 2008  Disease course: Improving  How confident are you filling out medical forms by yourself:: Quite a bit  Diabetes management related comments/concerns: Pt feels like he cannot eat any carbohydrates or glucose levels trend up.  Transportation concerns: No  Difficulty affording diabetes medication?: Yes (Pt has $ 5000.00 dedctible.)  Difficulty affording diabetes testing supplies?: No (Pt now has a Dexcom - fears he will not be have coverage for sensors after the first of the year as deductible will need to be met at that time.)  Other concerns:: None  Cultural Influences/Ethnic Background:  American    Diabetes Symptoms & Complications:  Fatigue: Yes  Neuropathy: No  Polydipsia: No  Polyphagia: No  Polyuria: No  Visual change: No  Slow healing wounds: No  Symptom course: Stable  Weight trend: Stable  Complications assessed today?: No  CVA: No  Heart disease: No  Nephropathy: No    Patient Problem List and Family Medical History reviewed for relevant medical history, current medical status, and diabetes risk factors.    Vitals:  /74   Pulse 81   Resp 16   Ht 1.899 m (6' 2.75\")   Wt 109.1 kg (240 lb 9.6 oz)   SpO2 98%   BMI 30.27 kg/m    Estimated body mass index is 30.27 kg/m  as calculated from the following:    Height as of this encounter: 1.899 m (6' 2.75\").    Weight as of this encounter: 109.1 kg (240 lb 9.6 oz).   Last 3 BP:   BP Readings from Last 3 Encounters:   10/15/21 104/74   09/30/21 118/80   09/21/21 126/68       History   Smoking Status     Former Smoker     Years: 30.00     Types: Dip, chew, snus or snuff     Quit date: 9/12/2017   Smokeless Tobacco     Former User     Types: Chew     Quit date: " 2/14/2020       Labs:  Lab Results   Component Value Date    A1C 11.3 09/30/2021    A1C 7.7 01/22/2021     Lab Results   Component Value Date     07/22/2021     01/26/2021     Lab Results   Component Value Date    LDL  01/22/2021     Cannot estimate LDL when triglyceride exceeds 400 mg/dL     01/22/2021     HDL Cholesterol   Date Value Ref Range Status   01/22/2021 39 (L) >39 mg/dL Final   ]  GFR Estimate   Date Value Ref Range Status   07/22/2021 >90 >60 mL/min/1.73m2 Final     Comment:     As of July 11, 2021, eGFR is calculated by the CKD-EPI creatinine equation, without race adjustment. eGFR can be influenced by muscle mass, exercise, and diet. The reported eGFR is an estimation only and is only applicable if the renal function is stable.   01/26/2021 >90 >60 mL/min/[1.73_m2] Final     Comment:     Non  GFR Calc  Starting 12/18/2018, serum creatinine based estimated GFR (eGFR) will be   calculated using the Chronic Kidney Disease Epidemiology Collaboration   (CKD-EPI) equation.       GFR Estimate If Black   Date Value Ref Range Status   01/26/2021 >90 >60 mL/min/[1.73_m2] Final     Comment:      GFR Calc  Starting 12/18/2018, serum creatinine based estimated GFR (eGFR) will be   calculated using the Chronic Kidney Disease Epidemiology Collaboration   (CKD-EPI) equation.       Lab Results   Component Value Date    CR 0.88 07/22/2021    CR 0.91 01/26/2021     No results found for: MICROALBUMIN    Healthy Eating:  Healthy Eating Assessed Today: Yes  Cultural/Denominational diet restrictions?: No  Meal planning/habits: Low carb  How many times a week on average do you eat food made away from home (restaurant/take-out)?: 1  Meals include: Breakfast, Lunch, Dinner  Breakfast: 2/3 cup dry oatmeal with cinnamon, plant based butter, splenda - coffee with tumeric and coconut oil  Lunch: Packs the following and grazes at work - sliced turkey lunch meat, salad with teo  dressing, 2 hard boiled eggs, sugar free jello, sugar free pudding  Dinner: soups with mainly veggies and meat  Snacks: HS - sometimes Halo low carbohydrate ice cream.  Other: Pt is now working day shift - 5 am - 5 pm.  Beverages: Water, Diet soda, Coffee  Has patient met with a dietitian in the past?: Yes    Being Active:  Being Active Assessed Today: Yes  Exercise:: Yes (Pt's job is active - has not been biking.)  Barrier to exercise: None    Monitoring:  Monitoring Assessed Today: Yes  Did patient bring glucose meter to appointment? : Yes  Blood Glucose Meter: CGM (Dexcom)  Blood glucose trend: Decreasing    Dexcom AGP Report  10/8/2021 to 10/15/2021    Average Glucose  149    Glucose Management Indicator  (GMI)    N/A    Glucose Variabilty  14.4%    Time in Ranges:  >250 mg/dL   0%  181-250 mg/dL  10%   mg/dL   90%  54-69 mg/dL   0%  <54 mg/dL   0%    Taking Medications:  Diabetes Medication(s)     Biguanides       metFORMIN (GLUCOPHAGE-XR) 500 MG 24 hr tablet    TAKE 2 TABLETS BY MOUTH TWICE DAILY WITH MEALS    Incretin Mimetic Agents (GLP-1 Receptor Agonists)       Semaglutide (RYBELSUS) 14 MG TABS    Take 1 each by mouth every morning (before breakfast)        Taking Medication Assessed Today: Yes  Current Treatments: Diet, Oral Medication (taken by mouth) (Metformin ER 1000 mg bid, Rybelsus 7 mg daily)  Problems taking diabetes medications regularly?: No  Diabetes medication side effects?: Yes (Pt had low's when on Glimepiride in past.  Trulicity gave him a rash on day of injection.)    Problem Solving:  Problem Solving Assessed Today: Yes  Is the patient at risk for hypoglycemia?: No    Reducing Risks:  Reducing Risks Assessed Today: No  Has dilated eye exam at least once a year?: Yes  Sees dentist every 6 months?: No  Feet checked by healthcare provider in the last year?: Yes    Healthy Coping:  Healthy Coping Assessed Today: Yes  Emotional response to diabetes: Acceptance  Informal Support system::  Spouse (Leah)  Stage of change: ACTION (Actively working towards change)  Patient Activation Measure Survey Score:  GRUPO Score (Last Two) 12/7/2018   GRUPO Raw Score 29   Activation Score 52.9   GRUPO Level 2     Diabetes knowledge and skills assessment:   Patient is knowledgeable in diabetes management concepts related to: Healthy Eating, Being Active and Taking Medication    Patient needs further education on the following diabetes management concepts: Monitoring and Reducing Risks    Based on learning assessment above, most appropriate setting for further diabetes education would be: Individual setting.      INTERVENTIONS:    Education provided today on:  AADE Self-Care Behaviors:  Monitoring: Reviewed CGM report.   Reducing Risks: A1c and rationale to have in target for proper healing after surgery.     Opportunities for ongoing education and support in diabetes-self management were discussed.    Pt verbalized understanding of concepts discussed and recommendations provided today.       Education Materials Provided:  No new materials today.     ASSESSMENT:  Pt's A1c was 11.3% recently.  Hernia surgery cancelled.  He would like to get surgery asap.  Pt was able to obtain a Dexcom and download today notes glucose levels have trended to target.  He states he is eating very minimal carbohydrates because if he does levels trend up.  He is concerned that he will not be able to afford Dexcom supplies after the first of the year r/t his deductible.  He agrees today to increase Rybelsus to see if he then would be able to eat a bit more carbohydrates without having levels trend up.   He is now also working day shift vs afternoon shift which may have positive effect on his levels.     Goals Addressed as of 10/15/2021                    Today    1/22/21       Patient Stated       Reducing Risks (pt-stated)   On track  On track    Added 2/8/19 by Chica Guzman RD      My Goal: I will lower my A1c to target of less than 7.0%.      What I need to meet my goal: limit carbohydrates in diet, continue to use exercise bike, take medications as prescribed.     I plan to meet my goal by this date: next A1c check.           Patient's most recent   Lab Results   Component Value Date    A1C 11.3 09/30/2021    A1C 7.7 01/22/2021    is not meeting goal of <7.0    PLAN  Continue efforts to eat a healthy, low carbohydrate diet.  Try to get some daily exercise as able.  Continue to use Dexcom for glucose monitoring.   Increase Rybelsus to 14 mg daily.   See Patient Instructions for co-developed, patient-stated behavior change goals.  AVS printed and provided to patient today.   Follow up is scheduled on October 29th.     Time Spent: 30 minutes  Encounter Type: Individual    Any diabetes medication dose changes were made via the CDE Protocol and Collaborative Practice Agreement with the patient's referring provider. A copy of this encounter was shared with the provider.

## 2021-10-15 NOTE — LETTER
"    10/15/2021        RE: Atul Lam  [address]  [place] [address]        Diabetes Self-Management Education & Support    Presents for: Individual review    SUBJECTIVE/OBJECTIVE:  Presents for: Individual review  Accompanied by: Self  Diabetes education in the past 24mo: Yes  Focus of Visit: Reducing Risks, Taking Medication  Diabetes type: Type 2  Date of diagnosis: 2008  Disease course: Improving  How confident are you filling out medical forms by yourself:: Quite a bit  Diabetes management related comments/concerns: Pt feels like he cannot eat any carbohydrates or glucose levels trend up.  Transportation concerns: No  Difficulty affording diabetes medication?: Yes (Pt has $ 5000.00 dedctible.)  Difficulty affording diabetes testing supplies?: No (Pt now has a Dexcom - fears he will not be have coverage for sensors after the first of the year as deductible will need to be met at that time.)  Other concerns:: None  Cultural Influences/Ethnic Background:  American    Diabetes Symptoms & Complications:  Fatigue: Yes  Neuropathy: No  Polydipsia: No  Polyphagia: No  Polyuria: No  Visual change: No  Slow healing wounds: No  Symptom course: Stable  Weight trend: Stable  Complications assessed today?: No  CVA: No  Heart disease: No  Nephropathy: No    Patient Problem List and Family Medical History reviewed for relevant medical history, current medical status, and diabetes risk factors.    Vitals:  /74   Pulse 81   Resp 16   Ht 1.899 m (6' 2.75\")   Wt 109.1 kg (240 lb 9.6 oz)   SpO2 98%   BMI 30.27 kg/m    Estimated body mass index is 30.27 kg/m  as calculated from the following:    Height as of this encounter: 1.899 m (6' 2.75\").    Weight as of this encounter: 109.1 kg (240 lb 9.6 oz).   Last 3 BP:   BP Readings from Last 3 Encounters:   10/15/21 104/74   09/30/21 118/80   09/21/21 126/68       History   Smoking Status     Former Smoker     Years: 30.00     Types: Dip, chew, snus or snuff     Quit " date: 9/12/2017   Smokeless Tobacco     Former User     Types: Chew     Quit date: 2/14/2020       Labs:  Lab Results   Component Value Date    A1C 11.3 09/30/2021    A1C 7.7 01/22/2021     Lab Results   Component Value Date     07/22/2021     01/26/2021     Lab Results   Component Value Date    LDL  01/22/2021     Cannot estimate LDL when triglyceride exceeds 400 mg/dL     01/22/2021     HDL Cholesterol   Date Value Ref Range Status   01/22/2021 39 (L) >39 mg/dL Final   ]  GFR Estimate   Date Value Ref Range Status   07/22/2021 >90 >60 mL/min/1.73m2 Final     Comment:     As of July 11, 2021, eGFR is calculated by the CKD-EPI creatinine equation, without race adjustment. eGFR can be influenced by muscle mass, exercise, and diet. The reported eGFR is an estimation only and is only applicable if the renal function is stable.   01/26/2021 >90 >60 mL/min/[1.73_m2] Final     Comment:     Non  GFR Calc  Starting 12/18/2018, serum creatinine based estimated GFR (eGFR) will be   calculated using the Chronic Kidney Disease Epidemiology Collaboration   (CKD-EPI) equation.       GFR Estimate If Black   Date Value Ref Range Status   01/26/2021 >90 >60 mL/min/[1.73_m2] Final     Comment:      GFR Calc  Starting 12/18/2018, serum creatinine based estimated GFR (eGFR) will be   calculated using the Chronic Kidney Disease Epidemiology Collaboration   (CKD-EPI) equation.       Lab Results   Component Value Date    CR 0.88 07/22/2021    CR 0.91 01/26/2021     No results found for: MICROALBUMIN    Healthy Eating:  Healthy Eating Assessed Today: Yes  Cultural/Pentecostal diet restrictions?: No  Meal planning/habits: Low carb  How many times a week on average do you eat food made away from home (restaurant/take-out)?: 1  Meals include: Breakfast, Lunch, Dinner  Breakfast: 2/3 cup dry oatmeal with cinnamon, plant based butter, splenda - coffee with tumeric and coconut oil  Lunch: Packs  the following and grazes at work - sliced turkey lunch meat, salad with teo dressing, 2 hard boiled eggs, sugar free jello, sugar free pudding  Dinner: soups with mainly veggies and meat  Snacks: HS - sometimes Halo low carbohydrate ice cream.  Other: Pt is now working day shift - 5 am - 5 pm.  Beverages: Water, Diet soda, Coffee  Has patient met with a dietitian in the past?: Yes    Being Active:  Being Active Assessed Today: Yes  Exercise:: Yes (Pt's job is active - has not been biking.)  Barrier to exercise: None    Monitoring:  Monitoring Assessed Today: Yes  Did patient bring glucose meter to appointment? : Yes  Blood Glucose Meter: CGM (Dexcom)  Blood glucose trend: Decreasing    Dexcom AGP Report  10/8/2021 to 10/15/2021    Average Glucose  149    Glucose Management Indicator  (GMI)    N/A    Glucose Variabilty  14.4%    Time in Ranges:  >250 mg/dL   0%  181-250 mg/dL  10%   mg/dL   90%  54-69 mg/dL   0%  <54 mg/dL   0%    Taking Medications:  Diabetes Medication(s)     Biguanides       metFORMIN (GLUCOPHAGE-XR) 500 MG 24 hr tablet    TAKE 2 TABLETS BY MOUTH TWICE DAILY WITH MEALS    Incretin Mimetic Agents (GLP-1 Receptor Agonists)       Semaglutide (RYBELSUS) 14 MG TABS    Take 1 each by mouth every morning (before breakfast)        Taking Medication Assessed Today: Yes  Current Treatments: Diet, Oral Medication (taken by mouth) (Metformin ER 1000 mg bid, Rybelsus 7 mg daily)  Problems taking diabetes medications regularly?: No  Diabetes medication side effects?: Yes (Pt had low's when on Glimepiride in past.  Trulicity gave him a rash on day of injection.)    Problem Solving:  Problem Solving Assessed Today: Yes  Is the patient at risk for hypoglycemia?: No    Reducing Risks:  Reducing Risks Assessed Today: No  Has dilated eye exam at least once a year?: Yes  Sees dentist every 6 months?: No  Feet checked by healthcare provider in the last year?: Yes    Healthy Coping:  Healthy Coping Assessed  Today: Yes  Emotional response to diabetes: Acceptance  Informal Support system:: Spouse (Leah)  Stage of change: ACTION (Actively working towards change)  Patient Activation Measure Survey Score:  GRUPO Score (Last Two) 12/7/2018   GRUPO Raw Score 29   Activation Score 52.9   GRUPO Level 2     Diabetes knowledge and skills assessment:   Patient is knowledgeable in diabetes management concepts related to: Healthy Eating, Being Active and Taking Medication    Patient needs further education on the following diabetes management concepts: Monitoring and Reducing Risks    Based on learning assessment above, most appropriate setting for further diabetes education would be: Individual setting.      INTERVENTIONS:    Education provided today on:  AADE Self-Care Behaviors:  Monitoring: Reviewed CGM report.   Reducing Risks: A1c and rationale to have in target for proper healing after surgery.     Opportunities for ongoing education and support in diabetes-self management were discussed.    Pt verbalized understanding of concepts discussed and recommendations provided today.       Education Materials Provided:  No new materials today.     ASSESSMENT:  Pt's A1c was 11.3% recently.  Hernia surgery cancelled.  He would like to get surgery asap.  Pt was able to obtain a Dexcom and download today notes glucose levels have trended to target.  He states he is eating very minimal carbohydrates because if he does levels trend up.  He is concerned that he will not be able to afford Dexcom supplies after the first of the year r/t his deductible.  He agrees today to increase Rybelsus to see if he then would be able to eat a bit more carbohydrates without having levels trend up.   He is now also working day shift vs afternoon shift which may have positive effect on his levels.     Goals Addressed as of 10/15/2021                    Today    1/22/21       Patient Stated       Reducing Risks (pt-stated)   On track  On track    Added 2/8/19 by  Chica Guzman RD      My Goal: I will lower my A1c to target of less than 7.0%.     What I need to meet my goal: limit carbohydrates in diet, continue to use exercise bike, take medications as prescribed.     I plan to meet my goal by this date: next A1c check.           Patient's most recent   Lab Results   Component Value Date    A1C 11.3 09/30/2021    A1C 7.7 01/22/2021    is not meeting goal of <7.0    PLAN  Continue efforts to eat a healthy, low carbohydrate diet.  Try to get some daily exercise as able.  Continue to use Dexcom for glucose monitoring.   Increase Rybelsus to 14 mg daily.   See Patient Instructions for co-developed, patient-stated behavior change goals.  AVS printed and provided to patient today.   Follow up is scheduled on October 29th.     Time Spent: 30 minutes  Encounter Type: Individual    Any diabetes medication dose changes were made via the CDE Protocol and Collaborative Practice Agreement with the patient's referring provider. A copy of this encounter was shared with the provider.          Sincerely,        Chica Guzman RD

## 2021-11-12 ENCOUNTER — HOSPITAL ENCOUNTER (OUTPATIENT)
Dept: EDUCATION SERVICES | Facility: HOSPITAL | Age: 56
Discharge: HOME | End: 2021-11-12
Attending: DIETITIAN, REGISTERED | Admitting: FAMILY MEDICINE
Payer: COMMERCIAL

## 2021-11-12 VITALS
BODY MASS INDEX: 29.54 KG/M2 | RESPIRATION RATE: 16 BRPM | SYSTOLIC BLOOD PRESSURE: 129 MMHG | HEIGHT: 75 IN | OXYGEN SATURATION: 100 % | DIASTOLIC BLOOD PRESSURE: 82 MMHG | HEART RATE: 80 BPM | WEIGHT: 237.6 LBS

## 2021-11-12 DIAGNOSIS — E11.9 TYPE 2 DIABETES MELLITUS WITHOUT COMPLICATION, WITHOUT LONG-TERM CURRENT USE OF INSULIN (H): Primary | ICD-10-CM

## 2021-11-12 DIAGNOSIS — E11.65 TYPE 2 DIABETES MELLITUS WITH HYPERGLYCEMIA, WITHOUT LONG-TERM CURRENT USE OF INSULIN (H): ICD-10-CM

## 2021-11-12 PROCEDURE — G0108 DIAB MANAGE TRN  PER INDIV: HCPCS | Performed by: DIETITIAN, REGISTERED

## 2021-11-12 RX ORDER — PROCHLORPERAZINE 25 MG/1
1 SUPPOSITORY RECTAL
Qty: 9 EACH | Refills: 1 | Status: SHIPPED | OUTPATIENT
Start: 2021-11-12

## 2021-11-12 ASSESSMENT — PAIN SCALES - GENERAL: PAINLEVEL: NO PAIN (0)

## 2021-11-12 ASSESSMENT — MIFFLIN-ST. JEOR: SCORE: 1999.41

## 2021-11-12 NOTE — PROGRESS NOTES
"Diabetes Self-Management Education & Support    Presents for: Individual review    SUBJECTIVE/OBJECTIVE:  Presents for: Individual review  Accompanied by: Self  Diabetes education in the past 24mo: Yes  Focus of Visit: Reducing Risks,Taking Medication  Diabetes type: Type 2  Date of diagnosis: 2008  Disease course: Improving  How confident are you filling out medical forms by yourself:: Quite a bit  Diabetes management related comments/concerns: No concerns.  Transportation concerns: No  Difficulty affording diabetes medication?: Yes (Pt has $ 5000.00 dedctible.)  Difficulty affording diabetes testing supplies?: No (Pt now has a Dexcom - fears he will not be have coverage for sensors after the first of the year as deductible will need to be met at that time.)  Other concerns:: None  Cultural Influences/Ethnic Background:  American    Diabetes Symptoms & Complications:  Fatigue: Yes  Neuropathy: No  Polydipsia: No  Polyphagia: No  Polyuria: No  Visual change: No  Slow healing wounds: No  Symptom course: Stable  Weight trend: Stable  Complications assessed today?: No  CVA: No  Heart disease: No  Nephropathy: No    Patient Problem List and Family Medical History reviewed for relevant medical history, current medical status, and diabetes risk factors.    Vitals:  /82   Pulse 80   Resp 16   Ht 1.899 m (6' 2.75\")   Wt 107.8 kg (237 lb 9.6 oz)   SpO2 100%   BMI 29.90 kg/m    Estimated body mass index is 29.9 kg/m  as calculated from the following:    Height as of this encounter: 1.899 m (6' 2.75\").    Weight as of this encounter: 107.8 kg (237 lb 9.6 oz).   Last 3 BP:   BP Readings from Last 3 Encounters:   11/12/21 129/82   10/15/21 104/74   09/30/21 118/80       History   Smoking Status     Former Smoker     Years: 30.00     Types: Dip, chew, snus or snuff     Quit date: 9/12/2017   Smokeless Tobacco     Former User     Types: Chew     Quit date: 2/14/2020       Labs:  Lab Results   Component Value Date    " A1C 11.3 09/30/2021    A1C 7.7 01/22/2021     Lab Results   Component Value Date     07/22/2021     01/26/2021     Lab Results   Component Value Date    LDL  01/22/2021     Cannot estimate LDL when triglyceride exceeds 400 mg/dL     01/22/2021     HDL Cholesterol   Date Value Ref Range Status   01/22/2021 39 (L) >39 mg/dL Final   ]  GFR Estimate   Date Value Ref Range Status   07/22/2021 >90 >60 mL/min/1.73m2 Final     Comment:     As of July 11, 2021, eGFR is calculated by the CKD-EPI creatinine equation, without race adjustment. eGFR can be influenced by muscle mass, exercise, and diet. The reported eGFR is an estimation only and is only applicable if the renal function is stable.   01/26/2021 >90 >60 mL/min/[1.73_m2] Final     Comment:     Non  GFR Calc  Starting 12/18/2018, serum creatinine based estimated GFR (eGFR) will be   calculated using the Chronic Kidney Disease Epidemiology Collaboration   (CKD-EPI) equation.       GFR Estimate If Black   Date Value Ref Range Status   01/26/2021 >90 >60 mL/min/[1.73_m2] Final     Comment:      GFR Calc  Starting 12/18/2018, serum creatinine based estimated GFR (eGFR) will be   calculated using the Chronic Kidney Disease Epidemiology Collaboration   (CKD-EPI) equation.       Lab Results   Component Value Date    CR 0.88 07/22/2021    CR 0.91 01/26/2021     No results found for: MICROALBUMIN    Healthy Eating:  Healthy Eating Assessed Today: Yes  Cultural/Orthodox diet restrictions?: No  Meal planning/habits: Low carb  How many times a week on average do you eat food made away from home (restaurant/take-out)?: 1  Meals include: Breakfast,Lunch,Dinner  Breakfast: Egg beaters, breakfast meat - coffee with tumeric, coconut oil and cinnamon  Lunch: veggie steamer with turkey added  Dinner: meat, veggie  Snacks: multiple snacks throughout the day (1.5-2 hours apart) - fruit, 2 hardboiled eggs with two pieces summer sausage,  sugar free pudding and sugar free jello, small salad with veggies and [place], Rebel ice cream at hs  Other: Pt is now working day shift - 5 am - 5 pm.  Beverages: Water,Diet soda,Coffee  Has patient met with a dietitian in the past?: Yes    Being Active:  Being Active Assessed Today: No  Exercise:: Yes (Pt's job is active - has not been biking.)  Barrier to exercise: None    Monitoring:  Monitoring Assessed Today: Yes  Did patient bring glucose meter to appointment? : Yes  Blood Glucose Meter: CGM (Dexcom)  Blood glucose trend: Decreasing    Dexcom AGP Report  10/30/2021 to 11/11/2021    % Time CGM is Active 99%    Average Glucose  112    Glucose Management Indicator  (GMI)    6.0%    Glucose Variabilty  18%    Time in Ranges:  >250 mg/dL   0%  181-250 mg/dL  <1%   mg/dL   99%  54-69 mg/dL   <1%  <54 mg/dL   <1%    Taking Medications:  Diabetes Medication(s)     Biguanides       metFORMIN (GLUCOPHAGE-XR) 500 MG 24 hr tablet    TAKE 2 TABLETS BY MOUTH TWICE DAILY WITH MEALS    Incretin Mimetic Agents (GLP-1 Receptor Agonists)       Semaglutide (RYBELSUS) 14 MG TABS    Take 1 each by mouth every morning (before breakfast)        Taking Medication Assessed Today: Yes  Current Treatments: Diet,Oral Medication (taken by mouth) (Metformin ER 1000 mg bid, Rybelsus 14 mg daily)  Problems taking diabetes medications regularly?: No  Diabetes medication side effects?: Yes (Pt had low's when on Glimepiride in past.  Trulicity gave him a rash on day of injection.)    Problem Solving:  Problem Solving Assessed Today: Yes  Is the patient at risk for hypoglycemia?: No    Reducing Risks:  Reducing Risks Assessed Today: No  Has dilated eye exam at least once a year?: Yes  Sees dentist every 6 months?: No  Feet checked by healthcare provider in the last year?: Yes    Healthy Coping:  Healthy Coping Assessed Today: Yes  Emotional response to diabetes: Acceptance  Informal Support system:: Spouse (Leah)  Stage of change: ACTION  (Actively working towards change)  Patient Activation Measure Survey Score:  GRUPO Score (Last Two) 12/7/2018   GRUPO Raw Score 29   Activation Score 52.9   GRUPO Level 2       Diabetes knowledge and skills assessment:   Patient is knowledgeable in diabetes management concepts related to: Healthy Eating, Being Active, Monitoring, Taking Medication, Problem Solving, Reducing Risks and Healthy Coping    Patient needs further education on the following diabetes management concepts: No concerns.  Pt is doing well.     Based on learning assessment above, most appropriate setting for further diabetes education would be: Individual setting.      INTERVENTIONS:    Education provided today on:  AADE Self-Care Behaviors:  Monitoring: Reviewed Dexcom report with patient.   Taking Medication: Current medications effective with no side effects.   Reducing Risks: A1C - goals, relating to blood glucose levels, how often to check    Opportunities for ongoing education and support in diabetes-self management were discussed.    Pt verbalized understanding of concepts discussed and recommendations provided today.       Education Materials Provided:  No new materials today.     ASSESSMENT:  Dexcom download notes glucose levels in target 99% of the time.  Pt is tolerating Rybelsus without side effects.  He would like to have hernia surgery right after the first of the year so deductible will be met for 2022 and diabetes meds and supplies will all be covered then.  Glucose control should no longer be an issue.      Goals Addressed as of 11/12/2021                    Today    10/15/21       Patient Stated       Reducing Risks (pt-stated)   On track  On track    Added 2/8/19 by Chica Guzman RD      My Goal: I will lower my A1c to target of less than 7.0%.     What I need to meet my goal: limit carbohydrates in diet, continue to use exercise bike, take medications as prescribed.     I plan to meet my goal by this date: next A1c check.              Patient's most recent   Lab Results   Component Value Date    A1C 11.3 09/30/2021    A1C 7.7 01/22/2021    is not meeting goal of <7.0    PLAN  Continue to follow healthy, low carbohydrate meal plan.  Keep using Dexcom for monitoring.   Continue current diabetes medications.    See Patient Instructions for co-developed, patient-stated behavior change goals.  AVS printed and provided to patient today.   Follow up in three months - after surgery.     Time Spent: 30 minutes  Encounter Type: Individual    Any diabetes medication dose changes were made via the CDE Protocol and Collaborative Practice Agreement with the patient's referring provider. A copy of this encounter was shared with the provider.

## 2021-11-12 NOTE — LETTER
"    11/12/2021        RE: Atul Lam  [address]  [place] [address]        Diabetes Self-Management Education & Support    Presents for: Individual review    SUBJECTIVE/OBJECTIVE:  Presents for: Individual review  Accompanied by: Self  Diabetes education in the past 24mo: Yes  Focus of Visit: Reducing Risks,Taking Medication  Diabetes type: Type 2  Date of diagnosis: 2008  Disease course: Improving  How confident are you filling out medical forms by yourself:: Quite a bit  Diabetes management related comments/concerns: No concerns.  Transportation concerns: No  Difficulty affording diabetes medication?: Yes (Pt has $ 5000.00 dedctible.)  Difficulty affording diabetes testing supplies?: No (Pt now has a Dexcom - fears he will not be have coverage for sensors after the first of the year as deductible will need to be met at that time.)  Other concerns:: None  Cultural Influences/Ethnic Background:  American    Diabetes Symptoms & Complications:  Fatigue: Yes  Neuropathy: No  Polydipsia: No  Polyphagia: No  Polyuria: No  Visual change: No  Slow healing wounds: No  Symptom course: Stable  Weight trend: Stable  Complications assessed today?: No  CVA: No  Heart disease: No  Nephropathy: No    Patient Problem List and Family Medical History reviewed for relevant medical history, current medical status, and diabetes risk factors.    Vitals:  /82   Pulse 80   Resp 16   Ht 1.899 m (6' 2.75\")   Wt 107.8 kg (237 lb 9.6 oz)   SpO2 100%   BMI 29.90 kg/m    Estimated body mass index is 29.9 kg/m  as calculated from the following:    Height as of this encounter: 1.899 m (6' 2.75\").    Weight as of this encounter: 107.8 kg (237 lb 9.6 oz).   Last 3 BP:   BP Readings from Last 3 Encounters:   11/12/21 129/82   10/15/21 104/74   09/30/21 118/80       History   Smoking Status     Former Smoker     Years: 30.00     Types: Dip, chew, snus or snuff     Quit date: 9/12/2017   Smokeless Tobacco     Former User     Types: " Chew     Quit date: 2/14/2020       Labs:  Lab Results   Component Value Date    A1C 11.3 09/30/2021    A1C 7.7 01/22/2021     Lab Results   Component Value Date     07/22/2021     01/26/2021     Lab Results   Component Value Date    LDL  01/22/2021     Cannot estimate LDL when triglyceride exceeds 400 mg/dL     01/22/2021     HDL Cholesterol   Date Value Ref Range Status   01/22/2021 39 (L) >39 mg/dL Final   ]  GFR Estimate   Date Value Ref Range Status   07/22/2021 >90 >60 mL/min/1.73m2 Final     Comment:     As of July 11, 2021, eGFR is calculated by the CKD-EPI creatinine equation, without race adjustment. eGFR can be influenced by muscle mass, exercise, and diet. The reported eGFR is an estimation only and is only applicable if the renal function is stable.   01/26/2021 >90 >60 mL/min/[1.73_m2] Final     Comment:     Non  GFR Calc  Starting 12/18/2018, serum creatinine based estimated GFR (eGFR) will be   calculated using the Chronic Kidney Disease Epidemiology Collaboration   (CKD-EPI) equation.       GFR Estimate If Black   Date Value Ref Range Status   01/26/2021 >90 >60 mL/min/[1.73_m2] Final     Comment:      GFR Calc  Starting 12/18/2018, serum creatinine based estimated GFR (eGFR) will be   calculated using the Chronic Kidney Disease Epidemiology Collaboration   (CKD-EPI) equation.       Lab Results   Component Value Date    CR 0.88 07/22/2021    CR 0.91 01/26/2021     No results found for: MICROALBUMIN    Healthy Eating:  Healthy Eating Assessed Today: Yes  Cultural/Pentecostal diet restrictions?: No  Meal planning/habits: Low carb  How many times a week on average do you eat food made away from home (restaurant/take-out)?: 1  Meals include: Breakfast,Lunch,Dinner  Breakfast: Egg beaters, breakfast meat - coffee with tumeric, coconut oil and cinnamon  Lunch: veggie steamer with turkey added  Dinner: meat, veggie  Snacks: multiple snacks throughout the  day (1.5-2 hours apart) - fruit, 2 hardboiled eggs with two pieces summer sausage, sugar free pudding and sugar free jello, small salad with veggies and [place], [place]bel ice cream at hs  Other: Pt is now working day shift - 5 am - 5 pm.  Beverages: Water,Diet soda,Coffee  Has patient met with a dietitian in the past?: Yes    Being Active:  Being Active Assessed Today: No  Exercise:: Yes (Pt's job is active - has not been biking.)  Barrier to exercise: None    Monitoring:  Monitoring Assessed Today: Yes  Did patient bring glucose meter to appointment? : Yes  Blood Glucose Meter: CGM (Dexcom)  Blood glucose trend: Decreasing    Dexcom AGP Report  10/30/2021 to 11/11/2021    % Time CGM is Active 99%    Average Glucose  112    Glucose Management Indicator  (GMI)    6.0%    Glucose Variabilty  18%    Time in Ranges:  >250 mg/dL   0%  181-250 mg/dL  <1%   mg/dL   99%  54-69 mg/dL   <1%  <54 mg/dL   <1%    Taking Medications:  Diabetes Medication(s)     Biguanides       metFORMIN (GLUCOPHAGE-XR) 500 MG 24 hr tablet    TAKE 2 TABLETS BY MOUTH TWICE DAILY WITH MEALS    Incretin Mimetic Agents (GLP-1 Receptor Agonists)       Semaglutide (RYBELSUS) 14 MG TABS    Take 1 each by mouth every morning (before breakfast)        Taking Medication Assessed Today: Yes  Current Treatments: Diet,Oral Medication (taken by mouth) (Metformin ER 1000 mg bid, Rybelsus 14 mg daily)  Problems taking diabetes medications regularly?: No  Diabetes medication side effects?: Yes (Pt had low's when on Glimepiride in past.  Trulicity gave him a rash on day of injection.)    Problem Solving:  Problem Solving Assessed Today: Yes  Is the patient at risk for hypoglycemia?: No    Reducing Risks:  Reducing Risks Assessed Today: No  Has dilated eye exam at least once a year?: Yes  Sees dentist every 6 months?: No  Feet checked by healthcare provider in the last year?: Yes    Healthy Coping:  Healthy Coping Assessed Today: Yes  Emotional response to  diabetes: Acceptance  Informal Support system:: Spouse (Leah)  Stage of change: ACTION (Actively working towards change)  Patient Activation Measure Survey Score:  GRUPO Score (Last Two) 12/7/2018   GRUPO Raw Score 29   Activation Score 52.9   GRUPO Level 2       Diabetes knowledge and skills assessment:   Patient is knowledgeable in diabetes management concepts related to: Healthy Eating, Being Active, Monitoring, Taking Medication, Problem Solving, Reducing Risks and Healthy Coping    Patient needs further education on the following diabetes management concepts: No concerns.  Pt is doing well.     Based on learning assessment above, most appropriate setting for further diabetes education would be: Individual setting.      INTERVENTIONS:    Education provided today on:  AADE Self-Care Behaviors:  Monitoring: Reviewed Dexcom report with patient.   Taking Medication: Current medications effective with no side effects.   Reducing Risks: A1C - goals, relating to blood glucose levels, how often to check    Opportunities for ongoing education and support in diabetes-self management were discussed.    Pt verbalized understanding of concepts discussed and recommendations provided today.       Education Materials Provided:  No new materials today.     ASSESSMENT:  Dexcom download notes glucose levels in target 99% of the time.  Pt is tolerating Rybelsus without side effects.  He would like to have hernia surgery right after the first of the year so deductible will be met for 2022 and diabetes meds and supplies will all be covered then.  Glucose control should no longer be an issue.      Goals Addressed as of 11/12/2021                    Today    10/15/21       Patient Stated       Reducing Risks (pt-stated)   On track  On track    Added 2/8/19 by Chica Guzman RD      My Goal: I will lower my A1c to target of less than 7.0%.     What I need to meet my goal: limit carbohydrates in diet, continue to use exercise bike, take  medications as prescribed.     I plan to meet my goal by this date: next A1c check.             Patient's most recent   Lab Results   Component Value Date    A1C 11.3 09/30/2021    A1C 7.7 01/22/2021    is not meeting goal of <7.0    PLAN  Continue to follow healthy, low carbohydrate meal plan.  Keep using Dexcom for monitoring.   Continue current diabetes medications.    See Patient Instructions for co-developed, patient-stated behavior change goals.  AVS printed and provided to patient today.   Follow up in three months - after surgery.     Time Spent: 30 minutes  Encounter Type: Individual    Any diabetes medication dose changes were made via the CDE Protocol and Collaborative Practice Agreement with the patient's referring provider. A copy of this encounter was shared with the provider.          Sincerely,        Chica Guzman RD

## 2021-11-22 DIAGNOSIS — Z79.4 TYPE 2 DIABETES MELLITUS WITH HYPERGLYCEMIA, WITH LONG-TERM CURRENT USE OF INSULIN (H): ICD-10-CM

## 2021-11-22 DIAGNOSIS — E11.65 TYPE 2 DIABETES MELLITUS WITH HYPERGLYCEMIA, WITH LONG-TERM CURRENT USE OF INSULIN (H): ICD-10-CM

## 2021-11-22 RX ORDER — ORAL SEMAGLUTIDE 14 MG/1
1 TABLET ORAL
Qty: 90 TABLET | Refills: 1 | Status: SHIPPED | OUTPATIENT
Start: 2021-11-22 | End: 2022-01-14

## 2021-11-23 ENCOUNTER — OFFICE VISIT (OUTPATIENT)
Dept: SURGERY | Facility: OTHER | Age: 56
End: 2021-11-23
Attending: SURGERY
Payer: COMMERCIAL

## 2021-11-23 ENCOUNTER — PREP FOR PROCEDURE (OUTPATIENT)
Dept: SURGERY | Facility: OTHER | Age: 56
End: 2021-11-23

## 2021-11-23 VITALS
SYSTOLIC BLOOD PRESSURE: 108 MMHG | HEART RATE: 81 BPM | WEIGHT: 237.9 LBS | OXYGEN SATURATION: 98 % | HEIGHT: 74 IN | TEMPERATURE: 95.2 F | BODY MASS INDEX: 30.53 KG/M2 | DIASTOLIC BLOOD PRESSURE: 78 MMHG

## 2021-11-23 DIAGNOSIS — K43.9 VENTRAL HERNIA WITHOUT OBSTRUCTION OR GANGRENE: ICD-10-CM

## 2021-11-23 DIAGNOSIS — Z01.818 PREOP TESTING: Primary | ICD-10-CM

## 2021-11-23 DIAGNOSIS — K43.9 VENTRAL HERNIA WITHOUT OBSTRUCTION OR GANGRENE: Primary | ICD-10-CM

## 2021-11-23 PROCEDURE — 99212 OFFICE O/P EST SF 10 MIN: CPT | Performed by: SURGERY

## 2021-11-23 ASSESSMENT — MIFFLIN-ST. JEOR: SCORE: 1988.86

## 2021-11-23 ASSESSMENT — PAIN SCALES - GENERAL: PAINLEVEL: NO PAIN (0)

## 2021-11-23 NOTE — NURSING NOTE
"Chief Complaint   Patient presents with     Consult     ventral hernia, without obstruction, without gangrene       Initial /78   Pulse 81   Temp (!) 95.2  F (35.1  C) (Tympanic)   Ht 1.88 m (6' 2\")   Wt 107.9 kg (237 lb 14.4 oz)   SpO2 98%   BMI 30.54 kg/m   Estimated body mass index is 30.54 kg/m  as calculated from the following:    Height as of this encounter: 1.88 m (6' 2\").    Weight as of this encounter: 107.9 kg (237 lb 14.4 oz).  Medication Reconciliation: complete  Malka Owen LPN    "

## 2021-11-23 NOTE — PROGRESS NOTES
This is a follow-up on a 56-year-old who was scheduled to have a ventral hernia repair.  His surgery was canceled secondary to poor glucose control.  He recently started on an implantable monitor and his glucose control is much better.  Is here for rescheduling of his surgery.    Otherwise has no other issues.  We will go ahead and schedule him for his surgery.  The surgery will be a find open and laparoscopic ventral herniorrhaphy.  He will have a preop evaluation prior to surgery.    The risks, benefits, and alternatives to the planned procedure were fully discussed with the patient and/or the patient's representative(s). The risks of bleeding, infection, death, missing pathology, the need for additional procedures intra-operatively, the possible need for intra-operative consults, the possible need for transfusion therapy, cardiopulmonary compromise, the possible need for additional surgery for a complication were discussed with the patient and/or the patient's representative(s). The patient's and/or patient's representative(s) questions were addressed and answered. Informed consent was obtained from the patient and/or the patient's representative(s). The patient and/or the patient's representative(s) consent to proceed.

## 2021-11-23 NOTE — PATIENT INSTRUCTIONS
Thank you for allowing our surgical team to participate in your care. Please review the instructions below.   If you have questions, you may contact us at the any of the following numbers:     [facility] Coordinator: [phone]  Clinic Surgery Nurse ([place]): [phone]  Hospital Surgery Education Nurse: [phone]    You are scheduled for: Ventral hernia repair, open, and laparoscopic with Dr. Roblero on 1/10/22.  Admit Time: Hospital Surgery will call you the day before your procedure by 5pm with your arrival time.   If your surgery is on Monday, expect a call on Friday.   If you are not contacted before 5PM, please call admitting at [phone].   After hours or on weekends, please call [phone] to postpone.   Call the clinic surgery nurse if you become ill within 2 weeks of your procedure to reschedule.   This includes fever, chills, sore throat, cough, chest congestion, runny nose, or any other symptom of any other illness.     Preop appointment needed within the 30 days prior to your procedure.   COVID-19 test is needed 4-5 days before procedure. This testing is done at the upper level of the [facility] (weekdays and weekends-East Entrance) or at the [facility] (weekday mornings only).  Follow the signage for parking and bring your mobile phone (if you have one) to call the phone number ([phone]) on the sign outside the testing site for check-in. Stay in your vehicle until you are directed to enter. If you do not have a cell phone, please call [phone] just prior to leaving home with a description of the vehicle you will be arriving in. This has been scheduled for 1/6/22 at 4:00 at the [facility] site.      Please call the Hospital Surgery Education Nurse at [phone] 1 week prior to your procedure and have a medication list ready.   Do not take Aspirin or other NSAIDS (Ibuprofen, Motrin, Aleve, Celebrex, Naproxen, etc), vitamins/supplements 7 days before  your surgery unless you have been otherwise directed.   If you are prescribed a daily 81mg Aspirin, you may continue this.   If you are prescribed blood thinners or insulin, please contact your primary care provider for instructions.    Shower the night before and the morning of your procedure with Hibiclens soap.  On The Night Before Your Surgery:  1.  Remove your jewelry and leave off until after surgery. Wash your hair and body with your regular soap/shampoo. Use a freshly washed washcloth. Include cleaning inside your belly button. Rinse off soap/shampoo.  2. Wash your body from neck to feet using 1/2 of the 1st Hibiclens (Chlorhexidine) bottle with your bare hands. Do not use Hibiclens on your face, hair or genital area. Leave the soap on your body for one minute and rinse. If you are under age 18, you should purchase and use Dial soap instead and use enough to generously cover your body.    3. Repeat step 2 with the 2nd half of the bottle (or additional Dial soap if under age 18).  4. Rinse off all soap and dry with a freshly washed towel. Do not use lotions or hair products.  5. Sleep in freshly washed pajamas and freshly washed bedding.  On The Morning of Your Surgery:  1.Wash your hair and body with your regular soap/shampoo. Use another freshly washed washcloth. Rinse off.   2. Wash your body from neck to feet using 1/2 of the 2nd Hibiclens (Chlorhexidine) bottle (or Dial soap if you are under age 18) with your bare hands. Leave the soap on your body for one minute and rinse.  3. Repeat step 2 with the 2nd half of the bottle (or additional Dial soap if under age 18).  4. Rinse off all the soap and dry with another freshly washed towel. Do not use lotions or hair products.  5. Wear freshly washed clothing to the hospital.    Do not have anything to eat or drink after midnight the night before your surgery (or 8 hours prior to surgery), except clear liquids (water, clear juice, clear broth, plain coffee or  tea without cream or milk) up until 2 hours prior to arrival time, and then nothing by mouth.   Do not eat, drink, chew gum, suck on hard candy, or smoke after 2 hours prior to arrival. You can brush your teeth.   If you are directed to take any medications, take them with a tiny sip of water.   If you use an inhaler, bring it with you.  Arrive in clean, comfortable clothing.   Do not wear any jewelry, make-up, nail polish, lotions, hair products, or perfumes.   [place] in hospital admitting through the [place] entrance.  A responsible adult must be available to drive you home and stay with you for 24 hours at home. If you need to take a taxi or the bus, you must have another responsible adult to ride with you. Your procedure will be cancelled if you do not have a responsible adult .     Return to clinic for a postop appointment 2 week(s) from your surgery date.              SURGERY HANDBOOK            Your surgery is scheduled:    Date: 1/10/22  ________________________________    Time: Hospital surgery will call with your arrival time 1 day prior to procedure.  ________________________________    Surgeon's Name: Dr. Roblero  _______________________        Pre-Op Physical Fax Numbers:          Pre-Admissions  [phone]        Your surgery is located at:  [place] [address]         Before Your Surgery  For Patients and Visitors at [place]    Welcome  You have been scheduled for surgery and we would like to give you some information that will assist in helping get the best possible outcome.    As you get ready for surgery, you may have a lot of questions.  This brochure will help you know what to expect before and after surgery.  You and your family are the most important members of your health care team.  You will need to take an active role in your care.    Be sure to ask questions and learn all that you can about your surgery.  If you have any safety  concerns, we urge you to tell a nurse as soon as possible.   This brochure is for information only.  It does not replace the advice of your doctor.  Always follow your doctor's advice.    If you or your  are deaf or hard of hearing, or prefer a language other than English, please let us know.  We have many free services, including interpreters and other aids to help you communicate. You may ask for help  through any member of your care team or by calling Language Services at [phone], option 2.    Before Surgery:   If for any reason you decide not to have the surgery, please contact our office.  We can easily cancel or reschedule the procedure. Please call your surgeon's office, after hours call [phone]      Any pain related to the surgery that occurs before the surgery needs to be reported and managed by your primary care or referring doctor.      Please keep in mind that the time of surgery is subject to change.  Make sure you have nothing to eat or drink after midnight.  If your surgery is later in the afternoon, this recommendation might change, but not until the day before surgery after the actual time of the surgery has been established.      GETTING READY FOR SURGERY  Always follow your surgeon's instructions.  If you don't, your surgery could be canceled.  Please use the following checklist.      Within 30 Days of Surgery:     Have a pre-surgery physical exam with your family doctor or partner.    If you use a [facility], all of your information from the pre-op physical will be in the [employer] computer system.    Ask the doctor to send all of your results to the hospital before the surgery.  The doctor also may ask you to bring the results with you on the day of surgery or you can fax them to [phone].    Tell the doctor if:    You are allergic to latex or rubber  (Latex and rubber gloves are often used in medical care).    You are taking any medicines (including aspirin),  vitamins (Vitamin E, Fish Oil, Omegas) or herbal products.  You will need to stop taking some medicines before surgery.    You have any medical problems (allergies, diabetes or heart disease, for example).    You have a pacemaker or an AICD (automatic implanted cardiac defibrillator).  If you do, please bring the ID card with you on the day of surgery.    You are a smoker.  People who smoke have a higher risk of infection after surgery.  Ask your doctor how you can quit smoking.    Within 7 days of Surgery:  Prior to your surgical procedure, a nurse will be contacting you to obtain a health history. A nurse will call you within a few days of surgery to go over these and other instructions.  If you do not hear from them, please call them at [phone].        Call your insurance company.  Ask if you need pre-approval for your surgery.  If you do not have insurance, please let us know.      Arrange for someone to drive you home after surgery.  If you will have same-day surgery, you may not drive or take public transportation home by yourself.    Arrange for someone to stay with you for 24 hours after you go home.  This person must be a responsible adult (ie- Family member or friend).    The Day Before Surgery:     Call your surgeon if there are any changes in your health.  This includes signs of a cold or flu (such as a sore throat, runny nose, cough, rash or fever).    Do not smoke, drink alcohol or take over-the-counter medicine (unless your surgeon tells you to) for 24 hours before and after surgery.    If you take prescribed drugs:  You may need to stop them until after the surgery.  Follow your doctor's orders.  You may resume Aspirin and/or blood thinners after your surgery as directed by your physician/surgeon.    NO FOOD OR DRINK AFTER MIDNIGHT.  Follow your surgeon's orders for eating and drinking.  You need to have an empty stomach before surgery.  This will make the surgery as safe as possible.  If you  don't follow your doctor's orders, your surgery could be changed to another date.    The Day of Surgery:    Take a shower or bath the night before and the morning of surgery.  Use antiseptic soap or the soap your surgeon gave you.  Gently clean the skin.  Do not shave or scrub your surgery site.    Please remove deodorant, cologne, scented lotion, makeup, nail polish and jewelry (including rings and body piercings).  If you wear artificial nails, please remove at least one nail before coming to the hospital.    Wear clean, loose clothing to the hospital.    Bring these items to the hospital:  1. Your insurance card.  2. A list of all the medicines you take.  Include vitamins, minerals, herbs and over-the-counter drugs.  Note any drug allergies.  3. A copy of your advance health care directive, if you have one.  This tells us what treatment you would want -- and who would make health care decisions -- if you could no longer speak for yourself.  You may request this form in advance or download it from [email or web address].  4. A case for any glasses, contact lenses, hearing aids or dentures.  5. Your inhaler or CPAP machine, if you use these at home.  Leave extra cash, jewelry and other valuables at home.      When You Arrive:  When you get to the hospital, you will:    Check in.  If you are under age 18, you must be with a parent or legal guardian.    Electronically sign consent forms, if you haven't already.  These electronic forms state that you know the risks and benefits of surgery.  When you sign the forms, you give us permission to do the surgery.  Do not sign them unless you understand what will happen during and after your surgery.  If you have any questions about your surgery, ask to speak with your doctor before you sign the forms.  If you don't understand the answers, ask again.    Receive a copy of the Patients Bill of Rights.  If you do not receive a copy, please ask for one.    Change into hospital  "clothes.  Your belongings will be placed in a bag.  We will return them to you after surgery.    Meet with the anesthesia provider.  He or she will tell you what kind of anesthesia (medicine) will be used to keep you comfortable during surgery.  Remember: It's okay to remind doctors and nurses to wash their hands before touching you.   In most cases, your surgeon will use a marker to write his or her initials on the surgery site.  This ensures that the exact site is operated on.  For safety reasons, we will ask you the same questions many times.  For example, we may ask your name and birth date over and over again.  Friends and family can stay with you until it's time for surgery.  While you're in surgery, they will be in the waiting area.  Please note that cell phones are not allowed in some patient care areas.  If you have questions about what will happen in the operating room, talk to your care team.    After Surgery:    We will move you to a recovery room where we will watch you closely.  If you have any pain or discomfort, tell your nurse.  He or she will try to make you comfortable.      If you are staying overnight we will move you to your hospital room after you are awake.    If you are going home we will move you to another room.  Friends and family may be able to join you.  The length of time you spend in recovery depends on the type of medicine you received, your medical condition, and the type of surgery you had.  Dealing with pain:  A nurse will check your comfort level often during your stay.  He or she will work with you to manage your pain.  Remember:    All pain is real.  There are many ways to control pain.  We can help you decide what works best for you.    Ask for pain medicine when you need it.  Don't try to \"tough it out\" -- this can make you feel worse.  Always take your medicine as ordered.    Medicine doesn't work the same for everyone.  If your medicine isn't working tell your nurse.  There " may be other medicines or treatments we can try.    Going Home:  We will let you know when you're ready to leave the hospital.  Before you leave, we will tell you how to care for yourself at home and prevent infections.  If you do not understand something, please say so.  We will answer any questions you have.  We will then help you get ready to leave.  You must have an adult with you for the first 24 hours after you leave the hospital. Take it easy when you get home.  You will need some time to recover -- you may be more tired than you realize at first.  Rest and relax for at least the first 24 hours at home.  You'll feel better and heal faster if you take good care of yourself.                      Pre-Operative Surgery Scrub    Purpose:   The skin harbors a large variety of bacteria, both infectious and noninfectious.  Showering with an antiseptic soap prior to an invasive procedure will decrease the number of transient and resident (good and bad) bacteria that is normally found on the skin.    Procedure:      Shower with 1 bottle of antiseptic soap (enclosed) the evening before and 1 bottle the morning of surgery (bathing the day of the procedure is most important).       Apply the soap at full strength (use the entire bottle).  Gently clean the skin, rinse, and dry with a clean towel that is freshly laundered (out of the dryer) and then put on clean clothing that is freshly laundered.        We have given you information regarding pre-op showering.  We recommend that patients wash with an antiseptic soap prior to surgery.  This cleanser will be given to you at the clinic or mailed to your home.  It is advised that you liberally wash the specific area surgery area the night before, and again in the morning before the surgery.  Do not apply lotion afterward.  We would like to keep the skin as clean as possible.    Thank you for following these important instructions.          After Surgery:  When you are  discharged from the recovery room, the nurses will review instructions with you and your caregiver.      Please wash your hands every time you touch the wound or change bandages or dressings.      Do not submerge the wound in water.  You may not use a bathtub or hot tub until the wound is closed.  The wait time frame is generally 2-3 weeks but any open area can be a source of incoming bacteria, so it is better to be on the safe side and avoid the tub until your wound is fully healed.      You may take a shower 24 hours after surgery.  Double check with your surgeon if it is ok for water to run over a wound, whether it has been sutured, stapled, glued or is open.  You may gently wash the wound using the antiseptic soap provided for your pre-surgery showering (do not use a washcloth).  Any mild soap will work as well.      Many surgical wounds will have small white strips of tape on them called Steri Strips.  Do not remove these.  The edges will curl and fall off within 7-10 days with normal showering.      If you are going home with sutures (stitches) or staples, you must return to the clinic to have them taken out, usually within 1-2 weeks.      Signs and symptoms of infection include:  1. Fever, temperature over 101.5 ' F  2. Redness  3. Swelling  4. Increasing pain  5. Green or yellow drainage which may or may not have a foul odor.    Symptoms of infection need to be reported to your surgery office. Please call your Surgeon.

## 2021-12-28 ENCOUNTER — TELEPHONE (OUTPATIENT)
Dept: NUTRITION | Facility: HOSPITAL | Age: 56
End: 2021-12-28
Payer: COMMERCIAL

## 2021-12-28 NOTE — TELEPHONE ENCOUNTER
11:00 AM    Reason for Call: Phone Call    Description: Pt would like to speak with Chica Guzman regarding medication.Call back asap.    Was an appointment offered for this call? No  If yes : Appointment type              Date    Preferred method for responding to this message: Telephone Call  What is your phone number ? [phone]    If we cannot reach you directly, may we leave a detailed response at the number you provided? Yes    Can this message wait until your PCP/provider returns, if available today? YES    ISSAC LUJAN

## 2022-01-02 ENCOUNTER — HEALTH MAINTENANCE LETTER (OUTPATIENT)
Age: 57
End: 2022-01-02

## 2022-01-03 ENCOUNTER — TELEPHONE (OUTPATIENT)
Dept: SURGERY | Facility: OTHER | Age: 57
End: 2022-01-03
Payer: COMMERCIAL

## 2022-01-03 NOTE — TELEPHONE ENCOUNTER
Message received from Shelly in OR. Patients procedure will need to be cancelled due to no bed availability. Patients chart will be saved to call and reschedule once a bed becomes available. Malka Owen LPN

## 2022-01-04 NOTE — TELEPHONE ENCOUNTER
Pt unable to obtain Rybelsus prescription prior to the end of the year.  Requests savings card.  Savings card placed at  of [facility] for patient to .

## 2022-01-10 ENCOUNTER — TELEPHONE (OUTPATIENT)
Dept: EDUCATION SERVICES | Facility: HOSPITAL | Age: 57
End: 2022-01-10
Payer: COMMERCIAL

## 2022-01-10 NOTE — TELEPHONE ENCOUNTER
This patient was scheduled to have hernia surgery with you but surgery was cancelled r/t no bed available with plans to reschedule once bed available.   Backstory:  Pt came to see me around August 2021 as he was planning surgery then but A1c was too elevated.  He had already met his 5000.00 deductible for the year r/t another surgery he had earlier in the year so we were able to obtain a Dexcom continuous glucose monitor and added Rybelsus to his Metformin.  Glucose levels improved greatly with this.  He will have an A1c with me on Friday but I am confident by his downloads that it will be in target.  Unfortunately, his plan was to meet his 5000.00 deductible with her hernia surgery so that he could continue with Dexcom and Rybelsus.  He is now unable to afford medication since deductible has not been met.  My fear is that by the time he can get rescheduled his glucose levels will again be poorly controlled r/t his inability to afford medication.    My question to you is do you know any timeframe as to how long it is taking to reschedule the surgeries that need to be cancelled r/t no bed available?  I understand it is COVID related but just wondering if you have any idea on time frame so I can try to make some sort of plan to avoid him having to be cancelled again r/t poor glucose control.    Thanks for any help you can offer!

## 2022-01-14 ENCOUNTER — HOSPITAL ENCOUNTER (OUTPATIENT)
Dept: EDUCATION SERVICES | Facility: HOSPITAL | Age: 57
Discharge: HOME | End: 2022-01-14
Attending: NURSE PRACTITIONER | Admitting: FAMILY MEDICINE
Payer: COMMERCIAL

## 2022-01-14 VITALS
RESPIRATION RATE: 16 BRPM | DIASTOLIC BLOOD PRESSURE: 74 MMHG | BODY MASS INDEX: 29.8 KG/M2 | HEIGHT: 75 IN | WEIGHT: 239.7 LBS | OXYGEN SATURATION: 97 % | SYSTOLIC BLOOD PRESSURE: 122 MMHG | HEART RATE: 78 BPM

## 2022-01-14 DIAGNOSIS — E11.9 TYPE 2 DIABETES MELLITUS WITHOUT COMPLICATION, WITHOUT LONG-TERM CURRENT USE OF INSULIN (H): Primary | ICD-10-CM

## 2022-01-14 LAB — HBA1C MFR BLD: 6 % (ref 0–5.7)

## 2022-01-14 PROCEDURE — G0108 DIAB MANAGE TRN  PER INDIV: HCPCS | Performed by: DIETITIAN, REGISTERED

## 2022-01-14 PROCEDURE — 83036 HEMOGLOBIN GLYCOSYLATED A1C: CPT | Performed by: FAMILY MEDICINE

## 2022-01-14 RX ORDER — GLIMEPIRIDE 2 MG/1
4 TABLET ORAL
Qty: 60 TABLET | Refills: 1 | Status: SHIPPED | OUTPATIENT
Start: 2022-01-14 | End: 2022-02-04

## 2022-01-14 ASSESSMENT — MIFFLIN-ST. JEOR: SCORE: 2008.93

## 2022-01-14 ASSESSMENT — ANXIETY QUESTIONNAIRES
3. WORRYING TOO MUCH ABOUT DIFFERENT THINGS: NOT AT ALL
4. TROUBLE RELAXING: NOT AT ALL
GAD7 TOTAL SCORE: 0
5. BEING SO RESTLESS THAT IT IS HARD TO SIT STILL: NOT AT ALL
IF YOU CHECKED OFF ANY PROBLEMS ON THIS QUESTIONNAIRE, HOW DIFFICULT HAVE THESE PROBLEMS MADE IT FOR YOU TO DO YOUR WORK, TAKE CARE OF THINGS AT HOME, OR GET ALONG WITH OTHER PEOPLE: NOT DIFFICULT AT ALL
6. BECOMING EASILY ANNOYED OR IRRITABLE: NOT AT ALL
2. NOT BEING ABLE TO STOP OR CONTROL WORRYING: NOT AT ALL
7. FEELING AFRAID AS IF SOMETHING AWFUL MIGHT HAPPEN: NOT AT ALL
1. FEELING NERVOUS, ANXIOUS, OR ON EDGE: NOT AT ALL

## 2022-01-14 ASSESSMENT — PAIN SCALES - GENERAL: PAINLEVEL: MILD PAIN (2)

## 2022-01-14 ASSESSMENT — PATIENT HEALTH QUESTIONNAIRE - PHQ9: SUM OF ALL RESPONSES TO PHQ QUESTIONS 1-9: 0

## 2022-01-14 NOTE — TELEPHONE ENCOUNTER
Pt was here today for diabetes visit.  A1c 6.0%.  Current medications:  Metformin ER 1000 mg bid, Rybelsus 14 mg daily.  Pt unable to afford Rybelsus.  Medication options limited with high deductible insurance.  Declines [employer] [facility] insulin as states job will not allow.  Okay to begin Glimepiride 4 mg am and titrate dose as needed?  If so, please sign pended order.   Thanks!

## 2022-01-14 NOTE — PROGRESS NOTES
"Diabetes Self-Management Education & Support    Presents for: Individual review    SUBJECTIVE/OBJECTIVE:  Presents for: Individual review  Accompanied by: Self (Wife is Leah)  Diabetes education in the past 24mo: Yes  Focus of Visit: Reducing Risks,Taking Medication  Diabetes type: Type 2  Date of diagnosis: 2008  Disease course: Improving  How confident are you filling out medical forms by yourself:: Quite a bit  Diabetes management related comments/concerns: Cost of Rybelsus and Dexcom supplies.  Transportation concerns: No  Difficulty affording diabetes medication?: Yes (Pt has $ 5000.00 dedctible.)  Difficulty affording diabetes testing supplies?: Yes (Pt has 5000.00 deductible.)  Other concerns:: None  Cultural Influences/Ethnic Background:  American      Diabetes Symptoms & Complications:  Fatigue: Yes  Neuropathy: No  Polydipsia: No  Polyphagia: No  Polyuria: No  Visual change: No  Slow healing wounds: No  Symptom course: Stable  Weight trend: Stable  Complications assessed today?: No  CVA: No  Heart disease: No  Nephropathy: No    Patient Problem List and Family Medical History reviewed for relevant medical history, current medical status, and diabetes risk factors.    Vitals:  /74   Pulse 78   Resp 16   Ht 1.899 m (6' 2.75\")   Wt 108.7 kg (239 lb 11.2 oz)   SpO2 97%   BMI 30.16 kg/m    Estimated body mass index is 30.16 kg/m  as calculated from the following:    Height as of this encounter: 1.899 m (6' 2.75\").    Weight as of this encounter: 108.7 kg (239 lb 11.2 oz).   Last 3 BP:   BP Readings from Last 3 Encounters:   01/14/22 122/74   11/23/21 108/78   11/12/21 129/82       History   Smoking Status     Former Smoker     Years: 30.00     Types: Dip, chew, snus or snuff     Quit date: 9/12/2017   Smokeless Tobacco     Former User     Types: Chew     Quit date: 2/14/2020       Labs:  Lab Results   Component Value Date    A1C 6.0 01/14/2022     Lab Results   Component Value Date     " 07/22/2021     01/26/2021     Lab Results   Component Value Date    LDL  01/22/2021     Cannot estimate LDL when triglyceride exceeds 400 mg/dL     01/22/2021     HDL Cholesterol   Date Value Ref Range Status   01/22/2021 39 (L) >39 mg/dL Final   ]  GFR Estimate   Date Value Ref Range Status   07/22/2021 >90 >60 mL/min/1.73m2 Final     Comment:     As of July 11, 2021, eGFR is calculated by the CKD-EPI creatinine equation, without race adjustment. eGFR can be influenced by muscle mass, exercise, and diet. The reported eGFR is an estimation only and is only applicable if the renal function is stable.   01/26/2021 >90 >60 mL/min/[1.73_m2] Final     Comment:     Non  GFR Calc  Starting 12/18/2018, serum creatinine based estimated GFR (eGFR) will be   calculated using the Chronic Kidney Disease Epidemiology Collaboration   (CKD-EPI) equation.       GFR Estimate If Black   Date Value Ref Range Status   01/26/2021 >90 >60 mL/min/[1.73_m2] Final     Comment:      GFR Calc  Starting 12/18/2018, serum creatinine based estimated GFR (eGFR) will be   calculated using the Chronic Kidney Disease Epidemiology Collaboration   (CKD-EPI) equation.       Lab Results   Component Value Date    CR 0.88 07/22/2021    CR 0.91 01/26/2021     No results found for: MICROALBUMIN    Healthy Eating:  Healthy Eating Assessed Today: Yes  Cultural/Hindu diet restrictions?: No  Meal planning/habits: Low carb  How many times a week on average do you eat food made away from home (restaurant/take-out)?: 1  Meals include: Breakfast,Lunch,Dinner  Breakfast: scrambled eggs/meat or 2 toast with sugar free jelly - water and coffee with almond milk and sugar free vanilla syrup  Lunch: veggie steamer with meat and 1/2 can cream soup  Dinner: meat, veggie or pizza with no crust - water or almond milk  Snacks: Snacks throughout the day on- 2 hardboiled eggs, mixed nuts, apple, 1/2 orange, Nutty Cristi Bar   HS-sometimes Rebel ice cream (low carb).  Pt drinks water and coffee with almond milk/sugar free vanilla syrup throughout the day  Other: Pt is now working day shift - 5 am - 5 pm.  Beverages: Water,Coffee,Other ([place] milk)  Has patient met with a dietitian in the past?: Yes    Being Active:  Being Active Assessed Today: No  Exercise:: Yes (Pt's job is active - has not been biking.)  Barrier to exercise: None    Monitoring:  Monitoring Assessed Today: Yes  Did patient bring glucose meter to appointment? : Yes  Blood Glucose Meter: CGM (Dexcom)  Blood glucose trend: Decreasing    Dexcom AGP Report  01/01/2021 to 01/14/2021    % Time CGM is Active 77%    Average Glucose  113    Glucose Management Indicator  (GMI)    N/A    Glucose Variabilty  21%    Time in Ranges:  >250 mg/dL   0%  181-250 mg/dL  1%   mg/dL   98%  54-69 mg/dL   <1%  <54 mg/dL   <1%    Taking Medications:  Diabetes Medication(s)     Biguanides       metFORMIN (GLUCOPHAGE-XR) 500 MG 24 hr tablet    TAKE 2 TABLETS BY MOUTH TWICE DAILY WITH MEALS    Incretin Mimetic Agents (GLP-1 Receptor Agonists)       Semaglutide (RYBELSUS) 14 MG TABS    Take 1 each by mouth every morning (before breakfast)          Taking Medication Assessed Today: Yes  Current Treatments: Diet,Oral Medication (taken by mouth) (Metformin ER 1000 mg bid, Rybelsus 14 mg daily)  Problems taking diabetes medications regularly?: No  Diabetes medication side effects?: Yes (Pt had low's when on Glimepiride in past.  Trulicity gave him a rash on day of injection.)    Problem Solving:  Problem Solving Assessed Today: Yes  Is the patient at risk for hypoglycemia?: No    Reducing Risks:  Reducing Risks Assessed Today: No  Has dilated eye exam at least once a year?: Yes  Sees dentist every 6 months?: No  Feet checked by healthcare provider in the last year?: No    Healthy Coping:  Healthy Coping Assessed Today: Yes  Emotional response to diabetes: Acceptance,Concern for health and  well-being  Informal Support system:: Spouse (Leah)  Stage of change: ACTION (Actively working towards change)  Patient Activation Measure Survey Score:  GRUPO Score (Last Two) 12/7/2018   GRUPO Raw Score 29   Activation Score 52.9   GRUPO Level 2     Diabetes knowledge and skills assessment:   Patient is knowledgeable in diabetes management concepts related to: Healthy Eating, Being Active, Monitoring, Taking Medication, Problem Solving, Reducing Risks and Healthy Coping    Patient needs further education on the following diabetes management concepts: Taking Medication    Based on learning assessment above, most appropriate setting for further diabetes education would be: Individual setting.      INTERVENTIONS:    Education provided today on:  AADE Self-Care Behaviors:  Taking Medication: Discussed medication options as pt is unable to afford Rybelsus at this time.  He has 5000.00 deductible so options limited - Sulfonylurea or ReLion NPH insulin.  Pt states he can't take insulin r/t his job. Rybelsus 584.00/30 day supply even with savings card.     Opportunities for ongoing education and support in diabetes-self management were discussed.    Pt verbalized understanding of concepts discussed and recommendations provided today.       Education Materials Provided:  No new materials today.     ASSESSMENT:  A1c today in target at 6.0%.  Unfortunately pt will be unable to continue on Rybelsus as he cannot afford (584.00/30 day supply even with savings card).  Medication options limited with 5000.00 deductible.  Pt wants to try Glimepiride vs ReLion NPH insulin as he states his job will not allow insulin.  He feels with Dexcom to help him and change to day shift vs afternoons he will be able to control glucose levels.  Pt has 90 day supply of Dexcom supplies before he will be unable to obtain - also r/t high cost.  Continues to await hernia surgery which would meet 5000.00 deductible as it was cancelled r/t no bed  status-COVID.      Goals Addressed as of 1/14/2022                    Today    11/12/21       Patient Stated       Reducing Risks (pt-stated)   On track  On track    Added 2/8/19 by Chica Guzman RD      My Goal: I will lower my A1c to target of less than 7.0%.     What I need to meet my goal: limit carbohydrates in diet, continue to use exercise bike, take medications as prescribed.     I plan to meet my goal by this date: next A1c check.             Patient's most recent   Lab Results   Component Value Date    A1C 6.0 01/14/2022    is meeting goal of <7.0    PLAN  Continue to eat a healthy, low carbohydrate diet.   Continue to use Dexcom for glucose monitoring.  Keep taking current dose of Metformin.  Stop Rybelsus when supply runs out (few days).  Message sent to provider to begin Glimepiride 4 mg daily.  Awaiting response.   See Patient Instructions for co-developed, patient-stated behavior change goals.  AVS printed and provided to patient today.   Will download Dexcom next Friday for review of levels and adjust Glimepiride as indicated.     Time Spent: 35 minutes  Encounter Type: Individual    Any diabetes medication dose changes were made via the CDE Protocol and Collaborative Practice Agreement with the patient's referring provider. A copy of this encounter was shared with the provider.

## 2022-01-14 NOTE — LETTER
"    1/14/2022        RE: Atul Lam  [address]  [place] [address]        Diabetes Self-Management Education & Support    Presents for: Individual review    SUBJECTIVE/OBJECTIVE:  Presents for: Individual review  Accompanied by: Self (Wife is Leah)  Diabetes education in the past 24mo: Yes  Focus of Visit: Reducing Risks,Taking Medication  Diabetes type: Type 2  Date of diagnosis: 2008  Disease course: Improving  How confident are you filling out medical forms by yourself:: Quite a bit  Diabetes management related comments/concerns: Cost of Rybelsus and Dexcom supplies.  Transportation concerns: No  Difficulty affording diabetes medication?: Yes (Pt has $ 5000.00 dedctible.)  Difficulty affording diabetes testing supplies?: Yes (Pt has 5000.00 deductible.)  Other concerns:: None  Cultural Influences/Ethnic Background:  American      Diabetes Symptoms & Complications:  Fatigue: Yes  Neuropathy: No  Polydipsia: No  Polyphagia: No  Polyuria: No  Visual change: No  Slow healing wounds: No  Symptom course: Stable  Weight trend: Stable  Complications assessed today?: No  CVA: No  Heart disease: No  Nephropathy: No    Patient Problem List and Family Medical History reviewed for relevant medical history, current medical status, and diabetes risk factors.    Vitals:  /74   Pulse 78   Resp 16   Ht 1.899 m (6' 2.75\")   Wt 108.7 kg (239 lb 11.2 oz)   SpO2 97%   BMI 30.16 kg/m    Estimated body mass index is 30.16 kg/m  as calculated from the following:    Height as of this encounter: 1.899 m (6' 2.75\").    Weight as of this encounter: 108.7 kg (239 lb 11.2 oz).   Last 3 BP:   BP Readings from Last 3 Encounters:   01/14/22 122/74   11/23/21 108/78   11/12/21 129/82       History   Smoking Status     Former Smoker     Years: 30.00     Types: Dip, chew, snus or snuff     Quit date: 9/12/2017   Smokeless Tobacco     Former User     Types: Chew     Quit date: 2/14/2020       Labs:  Lab Results   Component Value " Date    A1C 6.0 01/14/2022     Lab Results   Component Value Date     07/22/2021     01/26/2021     Lab Results   Component Value Date    LDL  01/22/2021     Cannot estimate LDL when triglyceride exceeds 400 mg/dL     01/22/2021     HDL Cholesterol   Date Value Ref Range Status   01/22/2021 39 (L) >39 mg/dL Final   ]  GFR Estimate   Date Value Ref Range Status   07/22/2021 >90 >60 mL/min/1.73m2 Final     Comment:     As of July 11, 2021, eGFR is calculated by the CKD-EPI creatinine equation, without race adjustment. eGFR can be influenced by muscle mass, exercise, and diet. The reported eGFR is an estimation only and is only applicable if the renal function is stable.   01/26/2021 >90 >60 mL/min/[1.73_m2] Final     Comment:     Non  GFR Calc  Starting 12/18/2018, serum creatinine based estimated GFR (eGFR) will be   calculated using the Chronic Kidney Disease Epidemiology Collaboration   (CKD-EPI) equation.       GFR Estimate If Black   Date Value Ref Range Status   01/26/2021 >90 >60 mL/min/[1.73_m2] Final     Comment:      GFR Calc  Starting 12/18/2018, serum creatinine based estimated GFR (eGFR) will be   calculated using the Chronic Kidney Disease Epidemiology Collaboration   (CKD-EPI) equation.       Lab Results   Component Value Date    CR 0.88 07/22/2021    CR 0.91 01/26/2021     No results found for: MICROALBUMIN    Healthy Eating:  Healthy Eating Assessed Today: Yes  Cultural/Mormonism diet restrictions?: No  Meal planning/habits: Low carb  How many times a week on average do you eat food made away from home (restaurant/take-out)?: 1  Meals include: Breakfast,Lunch,Dinner  Breakfast: scrambled eggs/meat or 2 toast with sugar free jelly - water and coffee with almond milk and sugar free vanilla syrup  Lunch: veggie steamer with meat and 1/2 can cream soup  Dinner: meat, veggie or pizza with no crust - water or almond milk  Snacks: Snacks throughout the day  on- 2 hardboiled eggs, mixed nuts, apple, 1/2 orange, Nutty Cristi Bar  HS-sometimes Rebel ice cream (low carb).  Pt drinks water and coffee with almond milk/sugar free vanilla syrup throughout the day  Other: Pt is now working day shift - 5 am - 5 pm.  Beverages: Water,Coffee,Other ([place] milk)  Has patient met with a dietitian in the past?: Yes    Being Active:  Being Active Assessed Today: No  Exercise:: Yes (Pt's job is active - has not been biking.)  Barrier to exercise: None    Monitoring:  Monitoring Assessed Today: Yes  Did patient bring glucose meter to appointment? : Yes  Blood Glucose Meter: CGM (Dexcom)  Blood glucose trend: Decreasing    Dexcom AGP Report  01/01/2021 to 01/14/2021    % Time CGM is Active 77%    Average Glucose  113    Glucose Management Indicator  (GMI)    N/A    Glucose Variabilty  21%    Time in Ranges:  >250 mg/dL   0%  181-250 mg/dL  1%   mg/dL   98%  54-69 mg/dL   <1%  <54 mg/dL   <1%    Taking Medications:  Diabetes Medication(s)     Biguanides       metFORMIN (GLUCOPHAGE-XR) 500 MG 24 hr tablet    TAKE 2 TABLETS BY MOUTH TWICE DAILY WITH MEALS    Incretin Mimetic Agents (GLP-1 Receptor Agonists)       Semaglutide (RYBELSUS) 14 MG TABS    Take 1 each by mouth every morning (before breakfast)          Taking Medication Assessed Today: Yes  Current Treatments: Diet,Oral Medication (taken by mouth) (Metformin ER 1000 mg bid, Rybelsus 14 mg daily)  Problems taking diabetes medications regularly?: No  Diabetes medication side effects?: Yes (Pt had low's when on Glimepiride in past.  Trulicity gave him a rash on day of injection.)    Problem Solving:  Problem Solving Assessed Today: Yes  Is the patient at risk for hypoglycemia?: No    Reducing Risks:  Reducing Risks Assessed Today: No  Has dilated eye exam at least once a year?: Yes  Sees dentist every 6 months?: No  Feet checked by healthcare provider in the last year?: No    Healthy Coping:  Healthy Coping Assessed Today:  Yes  Emotional response to diabetes: Acceptance,Concern for health and well-being  Informal Support system:: Spouse (Leah)  Stage of change: ACTION (Actively working towards change)  Patient Activation Measure Survey Score:  GRUPO Score (Last Two) 12/7/2018   GRUPO Raw Score 29   Activation Score 52.9   GRUPO Level 2     Diabetes knowledge and skills assessment:   Patient is knowledgeable in diabetes management concepts related to: Healthy Eating, Being Active, Monitoring, Taking Medication, Problem Solving, Reducing Risks and Healthy Coping    Patient needs further education on the following diabetes management concepts: Taking Medication    Based on learning assessment above, most appropriate setting for further diabetes education would be: Individual setting.      INTERVENTIONS:    Education provided today on:  AADE Self-Care Behaviors:  Taking Medication: Discussed medication options as pt is unable to afford Rybelsus at this time.  He has 5000.00 deductible so options limited - Sulfonylurea or ReLion NPH insulin.  Pt states he can't take insulin r/t his job. Rybelsus 584.00/30 day supply even with savings card.     Opportunities for ongoing education and support in diabetes-self management were discussed.    Pt verbalized understanding of concepts discussed and recommendations provided today.       Education Materials Provided:  No new materials today.     ASSESSMENT:  A1c today in target at 6.0%.  Unfortunately pt will be unable to continue on Rybelsus as he cannot afford (584.00/30 day supply even with savings card).  Medication options limited with 5000.00 deductible.  Pt wants to try Glimepiride vs ReLion NPH insulin as he states his job will not allow insulin.  He feels with Dexcom to help him and change to day shift vs afternoons he will be able to control glucose levels.  Pt has 90 day supply of Dexcom supplies before he will be unable to obtain - also r/t high cost.  Continues to await hernia surgery which  would meet 5000.00 deductible as it was cancelled r/t no bed status-COVID.      Goals Addressed as of 1/14/2022                    Today    11/12/21       Patient Stated       Reducing Risks (pt-stated)   On track  On track    Added 2/8/19 by Chica Guzman RD      My Goal: I will lower my A1c to target of less than 7.0%.     What I need to meet my goal: limit carbohydrates in diet, continue to use exercise bike, take medications as prescribed.     I plan to meet my goal by this date: next A1c check.             Patient's most recent   Lab Results   Component Value Date    A1C 6.0 01/14/2022    is meeting goal of <7.0    PLAN  Continue to eat a healthy, low carbohydrate diet.   Continue to use Dexcom for glucose monitoring.  Keep taking current dose of Metformin.  Stop Rybelsus when supply runs out (few days).  Message sent to provider to begin Glimepiride 4 mg daily.  Awaiting response.   See Patient Instructions for co-developed, patient-stated behavior change goals.  AVS printed and provided to patient today.   Will download Dexcom next Friday for review of levels and adjust Glimepiride as indicated.     Time Spent: 35 minutes  Encounter Type: Individual    Any diabetes medication dose changes were made via the CDE Protocol and Collaborative Practice Agreement with the patient's referring provider. A copy of this encounter was shared with the provider.          Sincerely,        Chica Guzman RD

## 2022-01-14 NOTE — PATIENT INSTRUCTIONS
-Work on healthy diet - make sure you do not skip meals.    -Keep using Dexcom for glucose monitoring.  -Continue current dose of Metformin.  -Stop Rybelsus.  -Begin Glimepiride 4 mg in the am.  -A1c today was 6.0%.  Great job!  Goal is < 7.0%.   -I will review your levels next Friday and My Chart you.  -Call with any concerns - [facility] Marino, [phone].

## 2022-01-15 ASSESSMENT — ANXIETY QUESTIONNAIRES: GAD7 TOTAL SCORE: 0

## 2022-02-04 ENCOUNTER — OFFICE VISIT (OUTPATIENT)
Dept: FAMILY MEDICINE | Facility: OTHER | Age: 57
End: 2022-02-04
Attending: FAMILY MEDICINE
Payer: COMMERCIAL

## 2022-02-04 VITALS
WEIGHT: 245 LBS | OXYGEN SATURATION: 98 % | BODY MASS INDEX: 31.44 KG/M2 | TEMPERATURE: 97 F | HEART RATE: 76 BPM | DIASTOLIC BLOOD PRESSURE: 74 MMHG | HEIGHT: 74 IN | SYSTOLIC BLOOD PRESSURE: 122 MMHG

## 2022-02-04 DIAGNOSIS — E11.9 TYPE 2 DIABETES MELLITUS WITHOUT COMPLICATION, WITHOUT LONG-TERM CURRENT USE OF INSULIN (H): ICD-10-CM

## 2022-02-04 DIAGNOSIS — E11.65 TYPE 2 DIABETES MELLITUS WITH HYPERGLYCEMIA, WITH LONG-TERM CURRENT USE OF INSULIN (H): Primary | ICD-10-CM

## 2022-02-04 DIAGNOSIS — Z79.4 TYPE 2 DIABETES MELLITUS WITH HYPERGLYCEMIA, WITH LONG-TERM CURRENT USE OF INSULIN (H): Primary | ICD-10-CM

## 2022-02-04 DIAGNOSIS — E78.5 HYPERLIPIDEMIA, UNSPECIFIED HYPERLIPIDEMIA TYPE: ICD-10-CM

## 2022-02-04 LAB
ALBUMIN SERPL-MCNC: 4.1 G/DL (ref 3.4–5)
ALP SERPL-CCNC: 51 U/L (ref 40–150)
ALT SERPL W P-5'-P-CCNC: 57 U/L (ref 0–70)
ANION GAP SERPL CALCULATED.3IONS-SCNC: 3 MMOL/L (ref 3–14)
AST SERPL W P-5'-P-CCNC: 31 U/L (ref 0–45)
BILIRUB SERPL-MCNC: 0.2 MG/DL (ref 0.2–1.3)
BUN SERPL-MCNC: 21 MG/DL (ref 7–30)
CALCIUM SERPL-MCNC: 9.2 MG/DL (ref 8.5–10.1)
CHLORIDE BLD-SCNC: 110 MMOL/L (ref 94–109)
CHOLEST SERPL-MCNC: 147 MG/DL
CO2 SERPL-SCNC: 26 MMOL/L (ref 20–32)
CREAT SERPL-MCNC: 0.88 MG/DL (ref 0.66–1.25)
CREAT UR-MCNC: 118 MG/DL
FASTING STATUS PATIENT QL REPORTED: YES
GFR SERPL CREATININE-BSD FRML MDRD: >90 ML/MIN/1.73M2
GLUCOSE BLD-MCNC: 115 MG/DL (ref 70–99)
HDLC SERPL-MCNC: 38 MG/DL
LDLC SERPL CALC-MCNC: 81 MG/DL
MICROALBUMIN UR-MCNC: 26 MG/L
MICROALBUMIN/CREAT UR: 22.03 MG/G CR (ref 0–17)
NONHDLC SERPL-MCNC: 109 MG/DL
POTASSIUM BLD-SCNC: 4.4 MMOL/L (ref 3.4–5.3)
PROT SERPL-MCNC: 8.1 G/DL (ref 6.8–8.8)
SODIUM SERPL-SCNC: 139 MMOL/L (ref 133–144)
TRIGL SERPL-MCNC: 142 MG/DL

## 2022-02-04 PROCEDURE — 80061 LIPID PANEL: CPT | Performed by: FAMILY MEDICINE

## 2022-02-04 PROCEDURE — 82043 UR ALBUMIN QUANTITATIVE: CPT | Performed by: FAMILY MEDICINE

## 2022-02-04 PROCEDURE — 99214 OFFICE O/P EST MOD 30 MIN: CPT | Performed by: FAMILY MEDICINE

## 2022-02-04 PROCEDURE — 36415 COLL VENOUS BLD VENIPUNCTURE: CPT | Performed by: FAMILY MEDICINE

## 2022-02-04 PROCEDURE — 80053 COMPREHEN METABOLIC PANEL: CPT | Performed by: FAMILY MEDICINE

## 2022-02-04 RX ORDER — SIMVASTATIN 40 MG
TABLET ORAL
Qty: 90 TABLET | Refills: 3 | Status: SHIPPED | OUTPATIENT
Start: 2022-02-04 | End: 2023-03-13

## 2022-02-04 RX ORDER — FENOFIBRATE 54 MG/1
54 TABLET ORAL DAILY
Qty: 90 TABLET | Refills: 3 | Status: SHIPPED | OUTPATIENT
Start: 2022-02-04 | End: 2023-03-13

## 2022-02-04 RX ORDER — GLIMEPIRIDE 2 MG/1
2 TABLET ORAL
Qty: 90 TABLET | Refills: 3 | Status: SHIPPED | OUTPATIENT
Start: 2022-02-04 | End: 2023-03-13

## 2022-02-04 RX ORDER — FENOFIBRATE 54 MG/1
54 TABLET ORAL DAILY
COMMUNITY
End: 2022-02-04

## 2022-02-04 RX ORDER — LOSARTAN POTASSIUM 25 MG/1
12.5 TABLET ORAL DAILY
Qty: 45 TABLET | Refills: 3 | Status: SHIPPED | OUTPATIENT
Start: 2022-02-04 | End: 2023-03-13

## 2022-02-04 RX ORDER — METFORMIN HCL 500 MG
1000 TABLET, EXTENDED RELEASE 24 HR ORAL 2 TIMES DAILY WITH MEALS
Qty: 360 TABLET | Refills: 3 | Status: SHIPPED | OUTPATIENT
Start: 2022-02-04 | End: 2023-03-13

## 2022-02-04 ASSESSMENT — MIFFLIN-ST. JEOR: SCORE: 2021.06

## 2022-02-04 ASSESSMENT — PAIN SCALES - GENERAL: PAINLEVEL: NO PAIN (0)

## 2022-02-04 NOTE — LETTER
My Asthma Action Plan    Name: Atul Lam   YOB: 1965  Date: 2/4/2022   My doctor: Zoraida Bahena MD   [facility]        My Rescue Medicine:   Albuterol inhaler (Proair/Ventolin/Proventil HFA)  2-4 puffs EVERY 4 HOURS as needed. Use a spacer if recommended by your provider.   My Asthma Severity:   Pt states he does not have asthma  Know your asthma triggers: Patient is unaware of triggers             GREEN ZONE   Good Control    I feel good    No cough or wheeze    Can work, sleep and play without asthma symptoms       Take your asthma control medicine every day.     1. If exercise triggers your asthma, take your rescue medication    15 minutes before exercise or sports, and    During exercise if you have asthma symptoms  2. Spacer to use with inhaler: If you have a spacer, make sure to use it with your inhaler             YELLOW ZONE Getting Worse  I have ANY of these:    I do not feel good    Cough or wheeze    Chest feels tight    Wake up at night   1. Keep taking your Green Zone medications  2. Start taking your rescue medicine:    every 20 minutes for up to 1 hour. Then every 4 hours for 24-48 hours.  3. If you stay in the Yellow Zone for more than 12-24 hours, contact your doctor.  4. If you do not return to the Green Zone in 12-24 hours or you get worse, start taking your oral steroid medicine if prescribed by your provider.           RED ZONE Medical Alert - Get Help  I have ANY of these:    I feel awful    Medicine is not helping    Breathing getting harder    Trouble walking or talking    Nose opens wide to breathe       1. Take your rescue medicine NOW  2. If your provider has prescribed an oral steroid medicine, start taking it NOW  3. Call your doctor NOW  4. If you are still in the Red Zone after 20 minutes and you have not reached your doctor:    Take your rescue medicine again and    Call 911 or go to the emergency room right away    See  your regular doctor within 2 weeks of an Emergency Room or Urgent Care visit for follow-up treatment.          Annual Reminders:  Meet with Asthma Educator,  Flu Shot in the Fall, consider Pneumonia Vaccination for patients with asthma (aged 19 and older).    Pharmacy: [facility] [address] [facility] [place] - [address] [address]    Electronically signed by Zoraida Bahena MD   Date: 02/04/22                    Asthma Triggers  How To Control Things That Make Your Asthma Worse    Triggers are things that make your asthma worse.  Look at the list below to help you find your triggers and   what you can do about them. You can help prevent asthma flare-ups by staying away from your triggers.      Trigger                                                          What you can do   Cigarette Smoke  Tobacco smoke can make asthma worse. Do not allow smoking in your home, car or around you.  Be sure no one smokes at a child s day care or school.  If you smoke, ask your health care provider for ways to help you quit.  Ask family members to quit too.  Ask your health care provider for a referral to Quit Plan to help you quit smoking, or call 8-373-188-PLAN.     Colds, Flu, Bronchitis  These are common triggers of asthma. Wash your hands often.  Don t touch your eyes, nose or mouth.  Get a flu shot every year.     Dust Mites  These are tiny bugs that live in cloth or carpet. They are too small to see. Wash sheets and blankets in hot water every week.   Encase pillows and mattress in dust mite proof covers.  Avoid having carpet if you can. If you have carpet, vacuum weekly.   Use a dust mask and HEPA vacuum.   Pollen and Outdoor Mold  Some people are allergic to trees, grass, or weed pollen, or molds. Try to keep your windows closed.  Limit time out doors when pollen count is high.   Ask you health care provider about taking medicine during allergy season.     Animal Dander  Some people are allergic to skin flakes, urine or saliva from  pets with fur or feathers. Keep pets with fur or feathers out of your home.    If you can t keep the pet outdoors, then keep the pet out of your bedroom.  Keep the bedroom door closed.  Keep pets off cloth furniture and away from stuffed toys.     Mice, Rats, and Cockroaches  Some people are allergic to the waste from these pests.   Cover food and garbage.  Clean up spills and food crumbs.  Store grease in the refrigerator.   Keep food out of the bedroom.   Indoor Mold  This can be a trigger if your home has high moisture. Fix leaking faucets, pipes, or other sources of water.   Clean moldy surfaces.  Dehumidify basement if it is damp and smelly.   Smoke, Strong Odors, and Sprays  These can reduce air quality. Stay away from strong odors and sprays, such as perfume, powder, hair spray, paints, smoke incense, paint, cleaning products, candles and new carpet.   Exercise or Sports  Some people with asthma have this trigger. Be active!  Ask your doctor about taking medicine before sports or exercise to prevent symptoms.    Warm up for 5-10 minutes before and after sports or exercise.     Other Triggers of Asthma  Cold air:  Cover your nose and mouth with a scarf.  Sometimes laughing or crying can be a trigger.  Some medicines and food can trigger asthma.

## 2022-02-04 NOTE — NURSING NOTE
"Chief Complaint   Patient presents with     Diabetes       Initial /74   Pulse 76   Temp 97  F (36.1  C)   Ht 1.88 m (6' 2\")   Wt 111.1 kg (245 lb)   SpO2 98%   BMI 31.46 kg/m   Estimated body mass index is 31.46 kg/m  as calculated from the following:    Height as of this encounter: 1.88 m (6' 2\").    Weight as of this encounter: 111.1 kg (245 lb).  Medication Reconciliation: complete  Marly Bautista LPN  "

## 2022-02-04 NOTE — PROGRESS NOTES
Assessment & Plan     Type 2 diabetes mellitus with hyperglycemia, with long-term current use of insulin (H)  Doing great with dietary changes and combination of metformin and amaryl.  CGM very helpful for patient.  Foot exam and labs updated.  Prior Lisinopril - hives.  Losartan tolerated - ran out?  - Lipid Profile (Chol, Trig, HDL, LDL calc); Future  - Albumin Random Urine Quantitative with Creat Ratio; Future  - Comprehensive metabolic panel (BMP + Alb, Alk Phos, ALT, AST, Total. Bili, TP); Future  - fenofibrate (LOFIBRA) 54 MG tablet; Take 1 tablet (54 mg) by mouth daily  - losartan (COZAAR) 25 MG tablet; Take 0.5 tablets (12.5 mg) by mouth daily  - Lipid Profile (Chol, Trig, HDL, LDL calc)  - Albumin Random Urine Quantitative with Creat Ratio  - Comprehensive metabolic panel (BMP + Alb, Alk Phos, ALT, AST, Total. Bili, TP)    Hyperlipidemia, unspecified hyperlipidemia type  With triglycerides over 600 last time.  Refilled Fenofibrate and statin.  Major dietary changes and improved glycemic control. May be able to stop the fenofibrate.  - Albumin Random Urine Quantitative with Creat Ratio; Future  - Comprehensive metabolic panel (BMP + Alb, Alk Phos, ALT, AST, Total. Bili, TP); Future  - fenofibrate (LOFIBRA) 54 MG tablet; Take 1 tablet (54 mg) by mouth daily  - simvastatin (ZOCOR) 40 MG tablet; TAKE 1 TABLET BY MOUTH ONCE DAILY AT BEDTIME  - Albumin Random Urine Quantitative with Creat Ratio  - Comprehensive metabolic panel (BMP + Alb, Alk Phos, ALT, AST, Total. Bili, TP)    Type 2 diabetes mellitus without complication, without long-term current use of insulin (H)  As above.  - simvastatin (ZOCOR) 40 MG tablet; TAKE 1 TABLET BY MOUTH ONCE DAILY AT BEDTIME  - metFORMIN (GLUCOPHAGE-XR) 500 MG 24 hr tablet; Take 2 tablets (1,000 mg) by mouth 2 times daily (with meals) TAKE 2 TABLETS BY MOUTH TWICE DAILY WITH MEALS  - glimepiride (AMARYL) 2 MG tablet; Take 1 tablet (2 mg) by mouth every morning (before  "breakfast)       BMI:   Estimated body mass index is 31.46 kg/m  as calculated from the following:    Height as of this encounter: 1.88 m (6' 2\").    Weight as of this encounter: 111.1 kg (245 lb).   Weight management plan: Discussed healthy diet and exercise guidelines    See Patient Instructions    No follow-ups on file.    Zoraida Bahena MD  [facility] - [facility]IRA Pollard is a 56 year old who presents for the following health issues     HPI     Tobacco - 1.5 ppd x 33 years - 50 pack years. Quit 5 years ago.  Lung CT - not age 57 yet    Diabetes Follow-up  How often are you checking your blood sugar? Continuous glucose monitor  What time of day are you checking your blood sugars (select all that apply)?  Before and after meals  Have you had any blood sugars above 200?  No  Have you had any blood sugars below 70?  Yes when first starting meds     What symptoms do you notice when your blood sugar is low?  None    What concerns do you have today about your diabetes? None     Do you have any of these symptoms? (Select all that apply)  Numbness in feet     Foot exam due    Glimepiride added 3 weeks.  Was started on 4 mg - but backed down to 2mg and no issues    Average 116; 6.1% in pasts 14 days; similar past 30-90 days    Cut out bread, milk, pasta    [place] flour bread    Was on Rybels[employer] - cost/deductible - cost prohibitive    Surgery on hold due to COVID pandemic    Some tingling/sleep sensation balls of feet - notes when sedentary; not painful; not interfering with sleep      BP Readings from Last 2 Encounters:   02/04/22 122/74   01/14/22 122/74     Hemoglobin A1C POCT (%)   Date Value   01/14/2022 6.0 (A)   01/22/2021 7.7 (H)     Hemoglobin A1C (%)   Date Value   09/30/2021 11.3 (H)   07/22/2021 10.0 (H)     LDL Cholesterol Calculated (mg/dL)   Date Value   01/22/2021     Cannot estimate LDL when triglyceride exceeds 400 mg/dL   11/20/2020     Cannot estimate LDL when triglyceride " "exceeds 400 mg/dL     LDL Cholesterol Direct (mg/dL)   Date Value   01/22/2021 126 (H)       Hyperlipidemia Follow-Up    Are you regularly taking any medication or supplement to lower your cholesterol?   Yes- simvastatin    Are you having muscle aches or other side effects that you think could be caused by your cholesterol lowering medication?  No      How many servings of fruits and vegetables do you eat daily?  2-3    On average, how many sweetened beverages do you drink each day (Examples: soda, juice, sweet tea, etc.  Do NOT count diet or artificially sweetened beverages)?   0    How many days per week do you exercise enough to make your heart beat faster? 3 or less    How many minutes a day do you exercise enough to make your heart beat faster? 9 or less    How many days per week do you miss taking your medication? 0        Review of Systems   Constitutional, HEENT, cardiovascular, pulmonary, gi and gu systems are negative, except as otherwise noted.      Objective    /74   Pulse 76   Temp 97  F (36.1  C)   Ht 1.88 m (6' 2\")   Wt 111.1 kg (245 lb)   SpO2 98%   BMI 31.46 kg/m    Body mass index is 31.46 kg/m .  Physical Exam   GENERAL: alert, no distress and over weight  EYES: Eyes grossly normal to inspection, PERRL and conjunctivae and sclerae normal  NECK: no adenopathy, no asymmetry, masses, or scars and thyroid normal to palpation  RESP: lungs clear to auscultation - no rales, rhonchi or wheezes  CV: regular rate and rhythm, normal S1 S2, no S3 or S4, no murmur, click or rub, no peripheral edema and peripheral pulses strong  ABDOMEN: no organomegaly or masses, bowel sounds normal and ventral hernia; reducible  MS: no gross musculoskeletal defects noted, no edema  SKIN: right lower leg with mild dryness, nonspecific dermatitis  PSYCH: mentation appears normal, affect normal/bright  Diabetic foot exam: normal DP and PT pulses left, mildly reduced, but palpable right, no trophic changes or " ulcerative lesions, normal sensory exam and normal monofilament exam; mild callous formation great toes    Pending labs.

## 2022-02-07 ENCOUNTER — TELEPHONE (OUTPATIENT)
Dept: EDUCATION SERVICES | Facility: HOSPITAL | Age: 57
End: 2022-02-07
Payer: COMMERCIAL

## 2022-02-07 NOTE — TELEPHONE ENCOUNTER
lvm for pt to call back to schedule Glucose review/A1C check after April 16th with Chica Guzman-60 min

## 2022-02-10 ENCOUNTER — TELEPHONE (OUTPATIENT)
Dept: FAMILY MEDICINE | Facility: OTHER | Age: 57
End: 2022-02-10
Payer: COMMERCIAL

## 2022-02-10 NOTE — TELEPHONE ENCOUNTER
----- Message from Zoraida Pham MD sent at 2/10/2022 11:00 AM CST -----  Please call patient - results not viewed on my chart

## 2022-02-17 PROBLEM — Z88.6 ASPIRIN-EXACERBATED RESPIRATORY DISEASE (AERD): Status: ACTIVE | Noted: 2019-12-30

## 2022-02-17 PROBLEM — J45.909 ASPIRIN-EXACERBATED RESPIRATORY DISEASE (AERD): Status: ACTIVE | Noted: 2019-12-30

## 2022-02-17 PROBLEM — J33.9 ASPIRIN-EXACERBATED RESPIRATORY DISEASE (AERD): Status: ACTIVE | Noted: 2019-12-30

## 2022-02-21 ENCOUNTER — TELEPHONE (OUTPATIENT)
Dept: SURGERY | Facility: OTHER | Age: 57
End: 2022-02-21
Payer: COMMERCIAL

## 2022-02-21 ENCOUNTER — TELEPHONE (OUTPATIENT)
Dept: FAMILY MEDICINE | Facility: OTHER | Age: 57
End: 2022-02-21
Payer: COMMERCIAL

## 2022-02-21 ENCOUNTER — PREP FOR PROCEDURE (OUTPATIENT)
Dept: SURGERY | Facility: OTHER | Age: 57
End: 2022-02-21
Payer: COMMERCIAL

## 2022-02-21 DIAGNOSIS — K43.9 VENTRAL HERNIA WITHOUT OBSTRUCTION OR GANGRENE: Primary | ICD-10-CM

## 2022-02-21 NOTE — TELEPHONE ENCOUNTER
Returned call to patient. This surgery will require hospital bed. Scheduled combined open and laparoscopic ventral hernia repair for 3/21/2022 with Dr. Lionel Roblero. COVID test scheduled 3/17. Patient states he still has his instructions and Hibiclens and does not need updated instructions.    Transferred call to scheduling to get preop with Dr. Pham. Scheduled 3/14.

## 2022-02-21 NOTE — TELEPHONE ENCOUNTER
10:44 AM    Reason for Call: Phone Call    Description: Patient would like call back to reschedule surgery with Dr. Roblero. Please call back      Preferred method for responding to this message: Telephone Call  What is your phone number ? [phone]    If we cannot reach you directly, may we leave a detailed response at the number you provided? Yes    Can this message wait until your PCP/provider returns, if available today? YES    Lima Baez

## 2022-02-21 NOTE — TELEPHONE ENCOUNTER
3:45 PM    Reason for Call: OVERBOOK    Patient is having the following symptoms: pt needs preop/dos 03/21/22hernia repair/Dr Osbaldo wilhelm  /pt stated he was told by dr watkins to contact nurse for apt  The patient is requesting an appointment for march with dr watkins.    Was an appointment offered for this call? No  If yes : Appointment type              Date    Preferred method for responding to this message: Telephone Call  What is your phone number ?[phone]    If we cannot reach you directly, may we leave a detailed response at the number you provided? Yes    Can this message wait until your PCP/provider returns, if unavailable today? Not applicable    Winnie Bailey

## 2022-02-27 ENCOUNTER — HEALTH MAINTENANCE LETTER (OUTPATIENT)
Age: 57
End: 2022-02-27

## 2022-03-03 ENCOUNTER — TELEPHONE (OUTPATIENT)
Dept: SURGERY | Facility: OTHER | Age: 57
End: 2022-03-03
Payer: COMMERCIAL

## 2022-03-03 NOTE — TELEPHONE ENCOUNTER
Albina from the financial department at [facility] called. Patients insurance would like to know the reasoning for the hybrid surgery. Albina states that the notes about patients surgery don't really give a reason as to why his ventral hernia would be open and laparoscopic. She would like  to call her directly. She said if she is unable to answer the phone, a voicemail can be left. Her phone number is [phone].

## 2022-03-13 NOTE — H&P (VIEW-ONLY)
[facility] [place]  [address]  [facility] [place] [address]  Phone: [phone]  Primary Provider: Pbebles Pham  Pre-op Performing Provider: PEBBLES PHAM      PREOPERATIVE EVALUATION:  Today's date: 3/14/2022    Atul Lam is a 56 year old male who presents for a preoperative evaluation.    Surgical Information:  Surgery/Procedure: combined open and laparoscopic ventral hernia repair  [place] [facility] [place]: Osbaldo  Surgery Date: 3/21/22  Time of Surgery: TBD  Where patient plans to recover: At home with family  Fax number for surgical facility: Note does not need to be faxed, will be available electronically in Epic.    Type of Anesthesia Anticipated: to be determined    Assessment & Plan     The proposed surgical procedure is considered INTERMEDIATE risk.    Preop general physical exam  - CBC with platelets and differential; Future  - Basic metabolic panel; Future  - EKG 12-lead complete w/read - (Clinic Performed)  - CBC with platelets and differential  - Basic metabolic panel    Ventral hernia without obstruction or gangrene    MIRIAN (obstructive sleep apnea)  CPAP    Type 2 diabetes mellitus with hyperglycemia, with long-term current use of insulin (H)  Stable.  a1c well controlled.    Hyperlipidemia, unspecified hyperlipidemia type  Continue statin and Tricor.    Encounter for hepatitis C screening test for low risk patient  - Hepatitis C Screen Reflex to HCV RNA Quant and Genotype; Future  - Hepatitis C Screen Reflex to HCV RNA Quant and Genotype    Encounter for screening for HIV  - HIV Antigen Antibody Combo; Future  - HIV Antigen Antibody Combo         Risks and Recommendations:  The patient has the following additional risks and recommendations for perioperative complications:  Diabetes:  - Patient is not on insulin therapy: regular NPO guidelines can be followed.     Medication Instructions:  Patient is to take all scheduled medications on the day of surgery EXCEPT for  modifications listed below:   - ACE/ARB: HOLD due to exceptional risk of hypotension during surgery.    - Statins: Continue taking on the day of surgery.    - metformin: HOLD day of surgery.   - sulfonylurea (e.g. glyburide, glipizide): HOLD day of surgery    RECOMMENDATION:  APPROVAL GIVEN to proceed with proposed procedure, without further diagnostic evaluation.          Subjective     HPI related to upcoming procedure: Patient with ventral hernia in need of repair.      Preop Questions 3/14/2022   1. Have you ever had a heart attack or stroke? No   2. Have you ever had surgery on your heart or blood vessels, such as a stent placement, a coronary artery bypass, or surgery on an artery in your head, neck, heart, or legs? No   3. Do you have chest pain with activity? No   4. Do you have a history of  heart failure? No   5. Do you currently have a cold, bronchitis or symptoms of other infection? No   6. Do you have a cough, shortness of breath, or wheezing? No   7. Do you or anyone in your family have previous history of blood clots? No   8. Do you or does anyone in your family have a serious bleeding problem such as prolonged bleeding following surgeries or cuts? No   9. Have you ever had problems with anemia or been told to take iron pills? No   10. Have you had any abnormal blood loss such as black, tarry or bloody stools? No   11. Have you ever had a blood transfusion? No   12. Are you willing to have a blood transfusion if it is medically needed before, during, or after your surgery? Yes   13. Have you or any of your relatives ever had problems with anesthesia? No   14. Do you have sleep apnea, excessive snoring or daytime drowsiness? YES - sleep apnea- uses CPAP   14a. Do you have a CPAP machine? Yes   15. Do you have any artifical heart valves or other implanted medical devices like a pacemaker, defibrillator, or continuous glucose monitor? No   16. Do you have artificial joints? No   17. Are you allergic to  latex? No     Health Care Directive:  Patient does not have a Health Care Directive or Living Will: Discussed advance care planning with patient; information given to patient to review.    Preoperative Review of :   reviewed - no record of controlled substances prescribed.    Status of Chronic Conditions:  See problem list for active medical problems.  Problems all longstanding and stable, except as noted/documented.  See ROS for pertinent symptoms related to these conditions.    DIABETES - Patient has a longstanding history of DiabetesType Type II . Patient is being treated with diet, oral agents and exercise and denies significant side effects. Control has been good. Complicating factors include but are not limited to: hyperlipidemia.   Readings - average 6% a1c. 110s-120s.  No lows.  CGM.    HYPERLIPIDEMIA - Patient has a long history of significant Hyperlipidemia requiring medication for treatment with recent good control. Patient reports no problems or side effects with the medication.     SLEEP PROBLEM - Patient has a longstanding history of MIRIAN - uses CPAP.    GERD - no active symptoms.    Review of SystemsConstitutional, neuro, ENT, endocrine, pulmonary, cardiac, gastrointestinal, genitourinary, musculoskeletal, integument and psychiatric systems are negative, except as otherwise noted.Constitutional, neuro, ENT, endocrine, pulmonary, cardiac, gastrointestinal, genitourinary, musculoskeletal, integument and psychiatric systems are negative, except as otherwise noted.    Patient Active Problem List    Diagnosis Date Noted     BMI 31.0-31.9,adult 07/22/2021     Priority: Medium     S/P left colectomy 01/26/2021     Priority: Medium     Sigmoid polyp 01/06/2021     Priority: Medium     Added automatically from request for surgery [id number]       MIRIAN (obstructive sleep apnea) 12/28/2020     Priority: Medium     Moderate obstructive sleep apnea with mild sleep associated hypoxemia    HOME SLEEP STUDY  "INTERPRETATION     Patient: Atul Lam  MRN: [id number]  YOB: 1965  Study Date: 2020  Referring Provider: Zoraida Pham  Ordering Provider: Mack Lofton MD     Indications for Home Study: Atul Lam is a 55 year old male with a history of DM II who presents with symptoms suggestive of obstructive sleep apnea.     Estimated body mass index is 29.95 kg/m  as calculated from the following:    Height as of 20: 1.88 m (6' 2\").    Weight as of 20: 105.8 kg (233 lb 4.8 oz).  Mcintosh Sleepiness Scale:   STOP-BAN/8     Data: A full night home sleep study was performed recording the standard physiologic parameters including body position, movement, sound, nasal pressure, thermal oral airflow, chest and abdominal movements with respiratory inductance plethysmography, and oxygen saturation by pulse oximetry. Pulse rate was estimated by oximetry recording. This study was considered adequate based on > 4 hours of quality oximetry and respiratory recording. As specified by the AASM Manual for the Scoring of Sleep and Associated events, version 2.3, Rule VIII.D 1B, 4% oxygen desaturation scoring for hypopneas is used as a standard of care on all home sleep apnea testing.     Analysis Time:  599.9 minutes     Respiration:   Sleep Associated Hypoxemia: sustained hypoxemia was not present. Baseline oxygen saturation was 95.5%.  Time with saturation less than or equal to 88% was 7 minutes. The lowest oxygen saturation was 79%.   Snoring: Snoring was present, though rare.  Respiratory events: The home study revealed a presence of 14 obstructive apneas and 7 mixed and central apneas. There were 180 hypopneas resulting in a combined apnea/hypopnea index [AHI] of 20.1 events per hour.  AHI was 26.7 per hour supine, - per hour prone, 8.1 per hour on left side, and 3.6 per hour on right side.   Pattern: Excluding events noted above, respiratory rate and pattern was " Normal.     Position: Percent of time spent: supine - 65.2%, prone - 0%, on left - 32.1%, on right - 2.8%.     Heart Rate: By pulse oximetry normal rate was noted.      Assessment:   - Moderate obstructive sleep apnea.  - Strong positional component observed with supine AHI 26.7 and lateral AHI < 10.  - Sleep associated hypoxemia was present, though mild.     Recommendations:  Consider auto-CPAP at 5-15 cmH2O, oral appliance therapy, positional therapy or polysomnography with full night PAP titration.  Suggest optimizing sleep hygiene and avoiding sleep deprivation.  Weight management.         Positive colorectal cancer screening using Cologuard test 12/18/2020     Priority: Medium     Added automatically from request for surgery [id number]       chewing tobacco abuse counseling 12/30/2019     Priority: Medium     Disorder of respiratory system exacerbated by aspirin 12/30/2019     Priority: Medium     Replacing diagnoses that were inactivated after the 10/1/2021 regulatory import.       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     Priority: Medium      Past Medical History:   Diagnosis Date     Congenital hiatus hernia      Diabetes (H)      Gastroesophageal reflux disease      Hiatal hernia      Neck mass      Warthin tumor      Past Surgical History:   Procedure Laterality Date     COLONOSCOPY N/A 12/31/2020    Procedure: COLONOSCOPY WITH BIOPSIES, POLYPECTOMY AND TATTOOING;  Surgeon: Lionel Roblero MD;  Location: [facility] OR     ENDOSCOPIC SINUS SURGERY N/A 01/08/2020    Procedure: Bilateral  Endoscopic Sinus Surgery with Polypectomy, Frozens;  Surgeon: Cherelle Chacon MD;  Location: [facility] OR     ENT SURGERY      T and A     LAPAROSCOPIC ASSISTED COLECTOMY N/A 01/25/2021    Procedure: COLECTOMY, LEFT, LAPAROSCOPIC, Hand assisted;  Surgeon: Lionel Roblero MD;  Location: [facility]     PAROTIDECTOMY Right 04/10/2018    Procedure: PAROTIDECTOMY;  Right Superficial Parotidectomy;  Surgeon: Alexandra Holbrook MD;  Location: [facility] OR     PFT GENERAL LAB TESTING  09/07/2021    minimal diffusion defect - pulmonary vascular     SEPTOPLASTY, TURBINOPLASTY, COMBINED N/A 01/08/2020    Procedure: TURBINATE REDUCTION, PROPEL IMPLANT TIMES 4;  Surgeon: Cherelle Chacon MD;  Location: [facility]     Current Outpatient Medications   Medication Sig Dispense Refill     blood glucose (BELL CONTOUR NEXT) test strip Use to test blood sugar 2 times daily. 100 each 11     blood glucose monitoring (BELL MICROLET) lancets Use to test blood sugar 2 times daily. 100 each 11     budesonide (PULMICORT) 0.5 MG/2ML neb solution Squirt entire vial into previously made preet med saline bottle, mix, and irrigate each nostril until entire bottle empty.  Do this twice daily. 3 Box 11     Continuous Blood Gluc  (DEXCOM G6 ) WILMA        Continuous Blood Gluc Sensor (DEXCOM G6 SENSOR) MISC 1 each every 10 days Change every 10 days. 9 each 1     Continuous Blood Gluc Transmit (DEXCOM G6 TRANSMITTER) MISC 1 each every 3 months Change every 3 months. 1 each 1     fenofibrate (LOFIBRA) 54 MG tablet Take 1 tablet (54 mg) by mouth daily 90 tablet 3     fluticasone (FLONASE) 50 MCG/ACT nasal spray Spray 2 sprays into both nostrils 2 times daily 16 g 12     glimepiride (AMARYL) 2 MG tablet Take 1 tablet (2 mg) by mouth every morning (before breakfast) 90 tablet 3     losartan (COZAAR) 25 MG tablet Take 0.5 tablets (12.5 mg) by mouth daily 45 tablet 3     metFORMIN (GLUCOPHAGE-XR) 500 MG 24 hr tablet Take 2 tablets (1,000 mg) by  "mouth 2 times daily (with meals) TAKE 2 TABLETS BY MOUTH TWICE DAILY WITH MEALS 360 tablet 3     simvastatin (ZOCOR) 40 MG tablet TAKE 1 TABLET BY MOUTH ONCE DAILY AT BEDTIME 90 tablet 3       Allergies   Allergen Reactions     Animal Dander      Nasal congestion     Aspirin Nausea and Vomiting     Dust Mites      Nasal congestion     Lisinopril Hives     Morphine Nausea and Vomiting        Social History     Tobacco Use     Smoking status: Former Smoker     Packs/day: 1.50     Years: 33.00     Pack years: 49.50     Types: Dip, chew, snus or snuff     Start date: 1983     Quit date: 2016     Years since quittin.2     Smokeless tobacco: Former User     Types: Chew     Quit date: 2020   Substance Use Topics     Alcohol use: Yes     Comment: 1 per mo     Family History   Problem Relation Age of Onset     Diabetes Father      Cerebrovascular Disease Father      History   Drug Use No         Objective     /70   Pulse 93   Temp 96.8  F (36  C) (Tympanic)   Resp 20   Ht 1.88 m (6' 2\")   Wt 112.9 kg (249 lb)   SpO2 99%   BMI 31.97 kg/m      Physical Exam    GENERAL APPEARANCE: healthy, alert and no distress     EYES: EOMI,  PERRL     HENT: ear canals and TM's normal and nose and mouth without ulcers or lesions     NECK: no adenopathy, no asymmetry, masses, or scars and thyroid normal to palpation     RESP: lungs clear to auscultation - no rales, rhonchi or wheezes     CV: regular rates and rhythm, normal S1 S2, no S3 or S4 and no murmur, click or rub     ABDOMEN: bowel sounds normal and non-tender; large ventral hernia present, reducible     MS: extremities normal- no gross deformities noted, no evidence of inflammation in joints, FROM in all extremities.     SKIN: no suspicious lesions or rashes     NEURO: Normal strength and tone, sensory exam grossly normal, mentation intact and speech normal     PSYCH: mentation appears normal. and affect normal/bright     LYMPHATICS: No cervical " adenopathy    Recent Labs   Lab Test 02/04/22  1008 01/14/22  0945 09/30/21  1405 09/07/21  1625 07/22/21  1712 07/22/21  1712 01/26/21  0514   HGB  --   --   --  14.4  --  14.4 13.5   PLT  --   --   --   --   --  261 258     --   --   --   --  135 139   POTASSIUM 4.4  --   --   --   --  3.8 4.2   CR 0.88  --   --   --   --  0.88 0.91   A1C  --  6.0* 11.3*  --    < > 10.0*  --     < > = values in this interval not displayed.        Diagnostics:  Recent Results (from the past 24 hour(s))   Basic metabolic panel    Collection Time: 03/14/22  3:43 PM   Result Value Ref Range    Sodium 135 133 - 144 mmol/L    Potassium 3.8 3.4 - 5.3 mmol/L    Chloride 105 94 - 109 mmol/L    Carbon Dioxide (CO2) 27 20 - 32 mmol/L    Anion Gap 3 3 - 14 mmol/L    Urea Nitrogen 25 7 - 30 mg/dL    Creatinine 0.87 0.66 - 1.25 mg/dL    Calcium 8.8 8.5 - 10.1 mg/dL    Glucose 176 (H) 70 - 99 mg/dL    GFR Estimate >90 >60 mL/min/1.73m2   CBC with platelets and differential    Collection Time: 03/14/22  3:43 PM   Result Value Ref Range    WBC Count 6.4 4.0 - 11.0 10e3/uL    RBC Count 4.87 4.40 - 5.90 10e6/uL    Hemoglobin 14.3 13.3 - 17.7 g/dL    Hematocrit 39.4 (L) 40.0 - 53.0 %    MCV 81 78 - 100 fL    MCH 29.4 26.5 - 33.0 pg    MCHC 36.3 31.5 - 36.5 g/dL    RDW 13.3 10.0 - 15.0 %    Platelet Count 242 150 - 450 10e3/uL    % Neutrophils 59 %    % Lymphocytes 28 %    % Monocytes 8 %    % Eosinophils 4 %    % Basophils 1 %    % Immature Granulocytes 0 %    NRBCs per 100 WBC 0 <1 /100    Absolute Neutrophils 3.8 1.6 - 8.3 10e3/uL    Absolute Lymphocytes 1.8 0.8 - 5.3 10e3/uL    Absolute Monocytes 0.5 0.0 - 1.3 10e3/uL    Absolute Eosinophils 0.2 0.0 - 0.7 10e3/uL    Absolute Basophils 0.1 0.0 - 0.2 10e3/uL    Absolute Immature Granulocytes 0.0 <=0.4 10e3/uL    Absolute NRBCs 0.0 10e3/uL      Lab Results   Component Value Date    A1C 6.0 01/14/2022    A1C 11.3 09/30/2021    A1C 10.0 07/22/2021    A1C 7.7 01/22/2021    A1C 11.9 11/20/2020     A1C 7.7 01/06/2020    A1C 6.7 08/08/2019       EKG: appears normal, NSR, normal axis, normal intervals, no acute ST/T changes c/w ischemia, no LVH by voltage criteria, unchanged from previous tracings    Revised Cardiac Risk Index (RCRI):  The patient has the following serious cardiovascular risks for perioperative complications:   - No serious cardiac risks = 0 points     RCRI Interpretation: 0 points: Class I (very low risk - 0.4% complication rate)           Signed Electronically by: Zoraida Bahena MD  Copy of this evaluation report is provided to requesting physician.

## 2022-03-13 NOTE — PROGRESS NOTES
[facility] [place]  [address]  [facility] [place] [address]  Phone: [phone]  Primary Provider: Pebbles Pham  Pre-op Performing Provider: PEBBLES PHAM      PREOPERATIVE EVALUATION:  Today's date: 3/14/2022    Atul Lam is a 56 year old male who presents for a preoperative evaluation.    Surgical Information:  Surgery/Procedure: combined open and laparoscopic ventral hernia repair  Surgery Location: [facility] [place]  Surgeon: Osbaldo  Surgery Date: 3/21/22  Time of Surgery: TBD  Where patient plans to recover: At home with family  Fax number for surgical facility: Note does not need to be faxed, will be available electronically in Epic.    Type of Anesthesia Anticipated: to be determined    Assessment & Plan     The proposed surgical procedure is considered INTERMEDIATE risk.    Preop general physical exam  - CBC with platelets and differential; Future  - Basic metabolic panel; Future  - EKG 12-lead complete w/read - (Clinic Performed)  - CBC with platelets and differential  - Basic metabolic panel    Ventral hernia without obstruction or gangrene    MIRIAN (obstructive sleep apnea)  CPAP    Type 2 diabetes mellitus with hyperglycemia, with long-term current use of insulin (H)  Stable.  a1c well controlled.    Hyperlipidemia, unspecified hyperlipidemia type  Continue statin and Tricor.    Encounter for hepatitis C screening test for low risk patient  - Hepatitis C Screen Reflex to HCV RNA Quant and Genotype; Future  - Hepatitis C Screen Reflex to HCV RNA Quant and Genotype    Encounter for screening for HIV  - HIV Antigen Antibody Combo; Future  - HIV Antigen Antibody Combo         Risks and Recommendations:  The patient has the following additional risks and recommendations for perioperative complications:  Diabetes:  - Patient is not on insulin therapy: regular NPO guidelines can be followed.     Medication Instructions:  Patient is to take all scheduled medications on the day of surgery EXCEPT for  modifications listed below:   - ACE/ARB: HOLD due to exceptional risk of hypotension during surgery.    - Statins: Continue taking on the day of surgery.    - metformin: HOLD day of surgery.   - sulfonylurea (e.g. glyburide, glipizide): HOLD day of surgery    RECOMMENDATION:  APPROVAL GIVEN to proceed with proposed procedure, without further diagnostic evaluation.          Subjective     HPI related to upcoming procedure: Patient with ventral hernia in need of repair.      Preop Questions 3/14/2022   1. Have you ever had a heart attack or stroke? No   2. Have you ever had surgery on your heart or blood vessels, such as a stent placement, a coronary artery bypass, or surgery on an artery in your head, neck, heart, or legs? No   3. Do you have chest pain with activity? No   4. Do you have a history of  heart failure? No   5. Do you currently have a cold, bronchitis or symptoms of other infection? No   6. Do you have a cough, shortness of breath, or wheezing? No   7. Do you or anyone in your family have previous history of blood clots? No   8. Do you or does anyone in your family have a serious bleeding problem such as prolonged bleeding following surgeries or cuts? No   9. Have you ever had problems with anemia or been told to take iron pills? No   10. Have you had any abnormal blood loss such as black, tarry or bloody stools? No   11. Have you ever had a blood transfusion? No   12. Are you willing to have a blood transfusion if it is medically needed before, during, or after your surgery? Yes   13. Have you or any of your relatives ever had problems with anesthesia? No   14. Do you have sleep apnea, excessive snoring or daytime drowsiness? YES - sleep apnea- uses CPAP   14a. Do you have a CPAP machine? Yes   15. Do you have any artifical heart valves or other implanted medical devices like a pacemaker, defibrillator, or continuous glucose monitor? No   16. Do you have artificial joints? No   17. Are you allergic to  latex? No     Health Care Directive:  Patient does not have a Health Care Directive or Living Will: Discussed advance care planning with patient; information given to patient to review.    Preoperative Review of :   reviewed - no record of controlled substances prescribed.    Status of Chronic Conditions:  See problem list for active medical problems.  Problems all longstanding and stable, except as noted/documented.  See ROS for pertinent symptoms related to these conditions.    DIABETES - Patient has a longstanding history of DiabetesType Type II . Patient is being treated with diet, oral agents and exercise and denies significant side effects. Control has been good. Complicating factors include but are not limited to: hyperlipidemia.   Readings - average 6% a1c. 110s-120s.  No lows.  CGM.    HYPERLIPIDEMIA - Patient has a long history of significant Hyperlipidemia requiring medication for treatment with recent good control. Patient reports no problems or side effects with the medication.     SLEEP PROBLEM - Patient has a longstanding history of MIRIAN - uses CPAP.    GERD - no active symptoms.    Review of SystemsConstitutional, neuro, ENT, endocrine, pulmonary, cardiac, gastrointestinal, genitourinary, musculoskeletal, integument and psychiatric systems are negative, except as otherwise noted.Constitutional, neuro, ENT, endocrine, pulmonary, cardiac, gastrointestinal, genitourinary, musculoskeletal, integument and psychiatric systems are negative, except as otherwise noted.    Patient Active Problem List    Diagnosis Date Noted     BMI 31.0-31.9,adult 07/22/2021     Priority: Medium     S/P left colectomy 01/26/2021     Priority: Medium     Sigmoid polyp 01/06/2021     Priority: Medium     Added automatically from request for surgery [id number]       MIRIAN (obstructive sleep apnea) 12/28/2020     Priority: Medium     Moderate obstructive sleep apnea with mild sleep associated hypoxemia    HOME SLEEP STUDY  "INTERPRETATION     Patient: Atul Lam  MRN: [id number]  YOB: 1965  Study Date: 2020  Referring Provider: Zoraida Pham  Ordering Provider: Mack Lofton MD     Indications for Home Study: Atul Lam is a 55 year old male with a history of DM II who presents with symptoms suggestive of obstructive sleep apnea.     Estimated body mass index is 29.95 kg/m  as calculated from the following:    Height as of 20: 1.88 m (6' 2\").    Weight as of 20: 105.8 kg (233 lb 4.8 oz).  [place] Sleepiness Scale:   STOP-BAN/8     Data: A full night home sleep study was performed recording the standard physiologic parameters including body position, movement, sound, nasal pressure, thermal oral airflow, chest and abdominal movements with respiratory inductance plethysmography, and oxygen saturation by pulse oximetry. Pulse rate was estimated by oximetry recording. This study was considered adequate based on > 4 hours of quality oximetry and respiratory recording. As specified by the AASM Manual for the Scoring of Sleep and Associated events, version 2.3, Rule VIII.D 1B, 4% oxygen desaturation scoring for hypopneas is used as a standard of care on all home sleep apnea testing.     Analysis Time:  599.9 minutes     Respiration:   Sleep Associated Hypoxemia: sustained hypoxemia was not present. Baseline oxygen saturation was 95.5%.  Time with saturation less than or equal to 88% was 7 minutes. The lowest oxygen saturation was 79%.   Snoring: Snoring was present, though rare.  Respiratory events: The home study revealed a presence of 14 obstructive apneas and 7 mixed and central apneas. There were 180 hypopneas resulting in a combined apnea/hypopnea index [AHI] of 20.1 events per hour.  AHI was 26.7 per hour supine, - per hour prone, 8.1 per hour on left side, and 3.6 per hour on right side.   Pattern: Excluding events noted above, respiratory rate and pattern was " Normal.     Position: Percent of time spent: supine - 65.2%, prone - 0%, on left - 32.1%, on right - 2.8%.     Heart Rate: By pulse oximetry normal rate was noted.      Assessment:   - Moderate obstructive sleep apnea.  - Strong positional component observed with supine AHI 26.7 and lateral AHI < 10.  - Sleep associated hypoxemia was present, though mild.     Recommendations:  Consider auto-CPAP at 5-15 cmH2O, oral appliance therapy, positional therapy or polysomnography with full night PAP titration.  Suggest optimizing sleep hygiene and avoiding sleep deprivation.  Weight management.         Positive colorectal cancer screening using Cologuard test 12/18/2020     Priority: Medium     Added automatically from request for surgery [id number]       chewing tobacco abuse counseling 12/30/2019     Priority: Medium     Disorder of respiratory system exacerbated by aspirin 12/30/2019     Priority: Medium     Replacing diagnoses that were inactivated after the 10/1/2021 regulatory import.       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     Priority: Medium      Past Medical History:   Diagnosis Date     Congenital hiatus hernia      Diabetes (H)      Gastroesophageal reflux disease      Hiatal hernia      Neck mass      Warthin tumor      Past Surgical History:   Procedure Laterality Date     COLONOSCOPY N/A 12/31/2020    Procedure: COLONOSCOPY WITH BIOPSIES, POLYPECTOMY AND TATTOOING;  Surgeon: Lionel Roblero MD;  Location: [facility]     ENDOSCOPIC SINUS SURGERY N/A 01/08/2020    Procedure: Bilateral  Endoscopic Sinus Surgery with Polypectomy, Frozens;  Surgeon: Cherelle Chacon MD;  Location: [facility]     ENT SURGERY      T and A     LAPAROSCOPIC ASSISTED COLECTOMY N/A 01/25/2021    Procedure: COLECTOMY, LEFT, LAPAROSCOPIC, Hand assisted;  Surgeon: Lionel Roblero MD;  Location: [facility]     PAROTIDECTOMY Right 04/10/2018    Procedure: PAROTIDECTOMY;  Right Superficial Parotidectomy;  Surgeon: Alexandra Holbrook MD;  Location: [facility] OR     PFT GENERAL LAB TESTING  09/07/2021    minimal diffusion defect - pulmonary vascular     SEPTOPLASTY, TURBINOPLASTY, COMBINED N/A 01/08/2020    Procedure: TURBINATE REDUCTION, PROPEL IMPLANT TIMES 4;  Surgeon: Cherelle Chacon MD;  Location: [facility]     Current Outpatient Medications   Medication Sig Dispense Refill     blood glucose (BELL CONTOUR NEXT) test strip Use to test blood sugar 2 times daily. 100 each 11     blood glucose monitoring (BELL MICROLET) lancets Use to test blood sugar 2 times daily. 100 each 11     budesonide (PULMICORT) 0.5 MG/2ML neb solution Squirt entire vial into previously made preet med saline bottle, mix, and irrigate each nostril until entire bottle empty.  Do this twice daily. 3 Box 11     Continuous Blood Gluc  (DEXCOM G6 ) WILMA        Continuous Blood Gluc Sensor (DEXCOM G6 SENSOR) MISC 1 each every 10 days Change every 10 days. 9 each 1     Continuous Blood Gluc Transmit (DEXCOM G6 TRANSMITTER) MISC 1 each every 3 months Change every 3 months. 1 each 1     fenofibrate (LOFIBRA) 54 MG tablet Take 1 tablet (54 mg) by mouth daily 90 tablet 3     fluticasone (FLONASE) 50 MCG/ACT nasal spray Spray 2 sprays into both nostrils 2 times daily 16 g 12     glimepiride (AMARYL) 2 MG tablet Take 1 tablet (2 mg) by mouth every morning (before breakfast) 90 tablet 3     losartan (COZAAR) 25 MG tablet Take 0.5 tablets (12.5 mg) by mouth daily 45 tablet 3     metFORMIN (GLUCOPHAGE-XR) 500 MG 24 hr tablet Take 2 tablets (1,000 mg) by  "mouth 2 times daily (with meals) TAKE 2 TABLETS BY MOUTH TWICE DAILY WITH MEALS 360 tablet 3     simvastatin (ZOCOR) 40 MG tablet TAKE 1 TABLET BY MOUTH ONCE DAILY AT BEDTIME 90 tablet 3       Allergies   Allergen Reactions     Animal Dander      Nasal congestion     Aspirin Nausea and Vomiting     Dust Mites      Nasal congestion     Lisinopril Hives     Morphine Nausea and Vomiting        Social History     Tobacco Use     Smoking status: Former Smoker     Packs/day: 1.50     Years: 33.00     Pack years: 49.50     Types: Dip, chew, snus or snuff     Start date: 1983     Quit date: 2016     Years since quittin.2     Smokeless tobacco: Former User     Types: Chew     Quit date: 2020   Substance Use Topics     Alcohol use: Yes     Comment: 1 per mo     Family History   Problem Relation Age of Onset     Diabetes Father      Cerebrovascular Disease Father      History   Drug Use No         Objective     /70   Pulse 93   Temp 96.8  F (36  C) (Tympanic)   Resp 20   Ht 1.88 m (6' 2\")   Wt 112.9 kg (249 lb)   SpO2 99%   BMI 31.97 kg/m      Physical Exam    GENERAL APPEARANCE: healthy, alert and no distress     EYES: EOMI,  PERRL     HENT: ear canals and TM's normal and nose and mouth without ulcers or lesions     NECK: no adenopathy, no asymmetry, masses, or scars and thyroid normal to palpation     RESP: lungs clear to auscultation - no rales, rhonchi or wheezes     CV: regular rates and rhythm, normal S1 S2, no S3 or S4 and no murmur, click or rub     ABDOMEN: bowel sounds normal and non-tender; large ventral hernia present, reducible     MS: extremities normal- no gross deformities noted, no evidence of inflammation in joints, FROM in all extremities.     SKIN: no suspicious lesions or rashes     NEURO: Normal strength and tone, sensory exam grossly normal, mentation intact and speech normal     PSYCH: mentation appears normal. and affect normal/bright     LYMPHATICS: No cervical " adenopathy    Recent Labs   Lab Test 02/04/22  1008 01/14/22  0945 09/30/21  1405 09/07/21  1625 07/22/21  1712 07/22/21  1712 01/26/21  0514   HGB  --   --   --  14.4  --  14.4 13.5   PLT  --   --   --   --   --  261 258     --   --   --   --  135 139   POTASSIUM 4.4  --   --   --   --  3.8 4.2   CR 0.88  --   --   --   --  0.88 0.91   A1C  --  6.0* 11.3*  --    < > 10.0*  --     < > = values in this interval not displayed.        Diagnostics:  Recent Results (from the past 24 hour(s))   Basic metabolic panel    Collection Time: 03/14/22  3:43 PM   Result Value Ref Range    Sodium 135 133 - 144 mmol/L    Potassium 3.8 3.4 - 5.3 mmol/L    Chloride 105 94 - 109 mmol/L    Carbon Dioxide (CO2) 27 20 - 32 mmol/L    Anion Gap 3 3 - 14 mmol/L    Urea Nitrogen 25 7 - 30 mg/dL    Creatinine 0.87 0.66 - 1.25 mg/dL    Calcium 8.8 8.5 - 10.1 mg/dL    Glucose 176 (H) 70 - 99 mg/dL    GFR Estimate >90 >60 mL/min/1.73m2   CBC with platelets and differential    Collection Time: 03/14/22  3:43 PM   Result Value Ref Range    WBC Count 6.4 4.0 - 11.0 10e3/uL    RBC Count 4.87 4.40 - 5.90 10e6/uL    Hemoglobin 14.3 13.3 - 17.7 g/dL    Hematocrit 39.4 (L) 40.0 - 53.0 %    MCV 81 78 - 100 fL    MCH 29.4 26.5 - 33.0 pg    MCHC 36.3 31.5 - 36.5 g/dL    RDW 13.3 10.0 - 15.0 %    Platelet Count 242 150 - 450 10e3/uL    % Neutrophils 59 %    % Lymphocytes 28 %    % Monocytes 8 %    % Eosinophils 4 %    % Basophils 1 %    % Immature Granulocytes 0 %    NRBCs per 100 WBC 0 <1 /100    Absolute Neutrophils 3.8 1.6 - 8.3 10e3/uL    Absolute Lymphocytes 1.8 0.8 - 5.3 10e3/uL    Absolute Monocytes 0.5 0.0 - 1.3 10e3/uL    Absolute Eosinophils 0.2 0.0 - 0.7 10e3/uL    Absolute Basophils 0.1 0.0 - 0.2 10e3/uL    Absolute Immature Granulocytes 0.0 <=0.4 10e3/uL    Absolute NRBCs 0.0 10e3/uL      Lab Results   Component Value Date    A1C 6.0 01/14/2022    A1C 11.3 09/30/2021    A1C 10.0 07/22/2021    A1C 7.7 01/22/2021    A1C 11.9 11/20/2020     A1C 7.7 01/06/2020    A1C 6.7 08/08/2019       EKG: appears normal, NSR, normal axis, normal intervals, no acute ST/T changes c/w ischemia, no LVH by voltage criteria, unchanged from previous tracings    Revised Cardiac Risk Index (RCRI):  The patient has the following serious cardiovascular risks for perioperative complications:   - No serious cardiac risks = 0 points     RCRI Interpretation: 0 points: Class I (very low risk - 0.4% complication rate)           Signed Electronically by: Zoraida Bahena MD  Copy of this evaluation report is provided to requesting physician.

## 2022-03-14 ENCOUNTER — ANESTHESIA EVENT (OUTPATIENT)
Dept: SURGERY | Facility: HOSPITAL | Age: 57
End: 2022-03-14
Payer: COMMERCIAL

## 2022-03-14 ENCOUNTER — OFFICE VISIT (OUTPATIENT)
Dept: FAMILY MEDICINE | Facility: OTHER | Age: 57
End: 2022-03-14
Attending: FAMILY MEDICINE
Payer: COMMERCIAL

## 2022-03-14 VITALS
RESPIRATION RATE: 20 BRPM | BODY MASS INDEX: 31.95 KG/M2 | HEART RATE: 93 BPM | HEIGHT: 74 IN | DIASTOLIC BLOOD PRESSURE: 70 MMHG | TEMPERATURE: 96.8 F | SYSTOLIC BLOOD PRESSURE: 120 MMHG | WEIGHT: 249 LBS | OXYGEN SATURATION: 99 %

## 2022-03-14 DIAGNOSIS — K43.9 VENTRAL HERNIA WITHOUT OBSTRUCTION OR GANGRENE: ICD-10-CM

## 2022-03-14 DIAGNOSIS — Z79.4 TYPE 2 DIABETES MELLITUS WITH HYPERGLYCEMIA, WITH LONG-TERM CURRENT USE OF INSULIN (H): ICD-10-CM

## 2022-03-14 DIAGNOSIS — Z11.59 ENCOUNTER FOR HEPATITIS C SCREENING TEST FOR LOW RISK PATIENT: ICD-10-CM

## 2022-03-14 DIAGNOSIS — G47.33 OSA (OBSTRUCTIVE SLEEP APNEA): ICD-10-CM

## 2022-03-14 DIAGNOSIS — E78.5 HYPERLIPIDEMIA, UNSPECIFIED HYPERLIPIDEMIA TYPE: ICD-10-CM

## 2022-03-14 DIAGNOSIS — E11.65 TYPE 2 DIABETES MELLITUS WITH HYPERGLYCEMIA, WITH LONG-TERM CURRENT USE OF INSULIN (H): ICD-10-CM

## 2022-03-14 DIAGNOSIS — Z01.818 PREOP GENERAL PHYSICAL EXAM: Primary | ICD-10-CM

## 2022-03-14 DIAGNOSIS — Z11.4 ENCOUNTER FOR SCREENING FOR HIV: ICD-10-CM

## 2022-03-14 LAB
ANION GAP SERPL CALCULATED.3IONS-SCNC: 3 MMOL/L (ref 3–14)
BASOPHILS # BLD AUTO: 0.1 10E3/UL (ref 0–0.2)
BASOPHILS NFR BLD AUTO: 1 %
BUN SERPL-MCNC: 25 MG/DL (ref 7–30)
CALCIUM SERPL-MCNC: 8.8 MG/DL (ref 8.5–10.1)
CHLORIDE BLD-SCNC: 105 MMOL/L (ref 94–109)
CO2 SERPL-SCNC: 27 MMOL/L (ref 20–32)
CREAT SERPL-MCNC: 0.87 MG/DL (ref 0.66–1.25)
EOSINOPHIL # BLD AUTO: 0.2 10E3/UL (ref 0–0.7)
EOSINOPHIL NFR BLD AUTO: 4 %
ERYTHROCYTE [DISTWIDTH] IN BLOOD BY AUTOMATED COUNT: 13.3 % (ref 10–15)
GFR SERPL CREATININE-BSD FRML MDRD: >90 ML/MIN/1.73M2
GLUCOSE BLD-MCNC: 176 MG/DL (ref 70–99)
HCT VFR BLD AUTO: 39.4 % (ref 40–53)
HGB BLD-MCNC: 14.3 G/DL (ref 13.3–17.7)
IMM GRANULOCYTES # BLD: 0 10E3/UL
IMM GRANULOCYTES NFR BLD: 0 %
LYMPHOCYTES # BLD AUTO: 1.8 10E3/UL (ref 0.8–5.3)
LYMPHOCYTES NFR BLD AUTO: 28 %
MCH RBC QN AUTO: 29.4 PG (ref 26.5–33)
MCHC RBC AUTO-ENTMCNC: 36.3 G/DL (ref 31.5–36.5)
MCV RBC AUTO: 81 FL (ref 78–100)
MONOCYTES # BLD AUTO: 0.5 10E3/UL (ref 0–1.3)
MONOCYTES NFR BLD AUTO: 8 %
NEUTROPHILS # BLD AUTO: 3.8 10E3/UL (ref 1.6–8.3)
NEUTROPHILS NFR BLD AUTO: 59 %
NRBC # BLD AUTO: 0 10E3/UL
NRBC BLD AUTO-RTO: 0 /100
PLATELET # BLD AUTO: 242 10E3/UL (ref 150–450)
POTASSIUM BLD-SCNC: 3.8 MMOL/L (ref 3.4–5.3)
RBC # BLD AUTO: 4.87 10E6/UL (ref 4.4–5.9)
SODIUM SERPL-SCNC: 135 MMOL/L (ref 133–144)
WBC # BLD AUTO: 6.4 10E3/UL (ref 4–11)

## 2022-03-14 PROCEDURE — 99214 OFFICE O/P EST MOD 30 MIN: CPT | Mod: 25 | Performed by: FAMILY MEDICINE

## 2022-03-14 PROCEDURE — 86803 HEPATITIS C AB TEST: CPT | Performed by: FAMILY MEDICINE

## 2022-03-14 PROCEDURE — 87389 HIV-1 AG W/HIV-1&-2 AB AG IA: CPT | Performed by: FAMILY MEDICINE

## 2022-03-14 PROCEDURE — 93000 ELECTROCARDIOGRAM COMPLETE: CPT | Mod: 76 | Performed by: INTERNAL MEDICINE

## 2022-03-14 PROCEDURE — 36415 COLL VENOUS BLD VENIPUNCTURE: CPT | Performed by: FAMILY MEDICINE

## 2022-03-14 PROCEDURE — 80048 BASIC METABOLIC PNL TOTAL CA: CPT | Performed by: FAMILY MEDICINE

## 2022-03-14 PROCEDURE — 85025 COMPLETE CBC W/AUTO DIFF WBC: CPT | Performed by: FAMILY MEDICINE

## 2022-03-14 ASSESSMENT — PAIN SCALES - GENERAL: PAINLEVEL: NO PAIN (0)

## 2022-03-14 ASSESSMENT — LIFESTYLE VARIABLES: TOBACCO_USE: 1

## 2022-03-14 NOTE — NURSING NOTE
"Chief Complaint   Patient presents with     Pre-Op Exam       Initial /70   Pulse 93   Temp 96.8  F (36  C) (Tympanic)   Resp 20   Ht 1.88 m (6' 2\")   Wt 112.9 kg (249 lb)   SpO2 99%   BMI 31.97 kg/m   Estimated body mass index is 31.97 kg/m  as calculated from the following:    Height as of this encounter: 1.88 m (6' 2\").    Weight as of this encounter: 112.9 kg (249 lb).  Medication Reconciliation: complete  Shahida Nicole LPN    "

## 2022-03-14 NOTE — ANESTHESIA PREPROCEDURE EVALUATION
Anesthesia Pre-Procedure Evaluation    Patient: Atul Lam   MRN: [id number] : 1965        Procedure : Procedure(s):  combined open and laparoscopic ventral hernia repair          Past Medical History:   Diagnosis Date     Congenital hiatus hernia      Diabetes (H)      Gastroesophageal reflux disease      Hiatal hernia      Neck mass      Warthin tumor       Past Surgical History:   Procedure Laterality Date     COLONOSCOPY N/A 2020    Procedure: COLONOSCOPY WITH BIOPSIES, POLYPECTOMY AND TATTOOING;  Surgeon: Lionel Roblero MD;  Location: [facility]     ENDOSCOPIC SINUS SURGERY N/A 2020    Procedure: Bilateral Endoscopic Sinus Surgery with Polypectomy, Frozens;  Surgeon: Cherelle Chacon MD;  Location: [facility]     ENT SURGERY      T and A     LAPAROSCOPIC ASSISTED COLECTOMY N/A 2021    Procedure: COLECTOMY, LEFT, LAPAROSCOPIC, Hand assisted;  Surgeon: Lionel Roblero MD;  Location: [facility]     PAROTIDECTOMY Right 04/10/2018    Procedure: PAROTIDECTOMY;  Right Superficial Parotidectomy;  Surgeon: Alexandra Holbrook MD;  Location: [facility]     PFT GENERAL LAB TESTING  2021    minimal diffusion defect - pulmonary vascular     SEPTOPLASTY, TURBINOPLASTY, COMBINED N/A 2020    Procedure: TURBINATE REDUCTION, PROPEL IMPLANT TIMES 4;  Surgeon: Cherelle Chacon MD;  Location: [facility]      Allergies   Allergen Reactions     Animal Dander      Nasal congestion     Aspirin Nausea and Vomiting     Dust Mites      Nasal congestion     Lisinopril Hives     Losartan Hives     Morphine Nausea and Vomiting      Social History     Tobacco Use     Smoking status: Former Smoker     Packs/day: 1.50     Years: 33.00     Pack years: 49.50     Types: Dip, chew, snus or snuff     Start date: 1983     Quit date: 2016     Years since quittin.2     Smokeless tobacco: Former User     Types: Chew     Quit date: 2020   Substance Use Topics     Alcohol use: Yes     Comment: 1  "per mo      Wt Readings from Last 1 Encounters:   02/04/22 111.1 kg (245 lb)        Anesthesia Evaluation   Pt has had prior anesthetic. Type: General and MAC.        ROS/MED HX  ENT/Pulmonary: Comment: \"Disorder of respiratory system exacerbated by ASA\"  Chew tobacco  Warthin tumor  S/p parotidectomy  Chronic pansinusitis  Nasal polyp  Nasal turbinate hypertrophy    (+) sleep apnea, uses CPAP, tobacco use, Past use,     Neurologic:  - neg neurologic ROS     Cardiovascular: Comment: Metabolic syndrome X    (+) Dyslipidemia -----Previous cardiac testing   Echo: Date: Results:    Stress Test: Date: Results:    ECG Reviewed: Date: 3/14/22 Results:  Appears normal, NSR, normal axis, normal intervals, no acute ST/T changes c/w ischemia, no LVH by voltage criteria, unchanged from previous tracings  Cath: Date: Results:      METS/Exercise Tolerance:     Hematologic:  - neg hematologic  ROS     Musculoskeletal:  - neg musculoskeletal ROS     GI/Hepatic: Comment: Sigmoid polyp  S/p left colectomy    (+) GERD, hiatal hernia,     Renal/Genitourinary:  - neg Renal ROS     Endo: Comment: BG on 07/22/21 329    (+) type II DM, Last HgA1c: 6, date: 1-14-22, Using insulin, Obesity,     Psychiatric/Substance Use:  - neg psychiatric ROS     Infectious Disease:       Malignancy: Comment: S/p left colectomy  (+) Malignancy, History of Other and GI.GI CA status post Surgery.  Other CA Warthin tumor status post Surgery.    Other:  - neg other ROS          Physical Exam    Airway        Mallampati: I   TM distance: > 3 FB   Neck ROM: full   Mouth opening: > 3 cm    Respiratory Devices and Support         Dental  no notable dental history         Cardiovascular   cardiovascular exam normal       Rhythm and rate: regular and normal     Pulmonary   pulmonary exam normal        breath sounds clear to auscultation           OUTSIDE LABS:  CBC:   Lab Results   Component Value Date    WBC 6.1 07/22/2021    WBC 11.6 (H) 01/26/2021    HGB 14.4 " 09/07/2021    HGB 14.4 07/22/2021    HCT 40.9 07/22/2021    HCT 40.6 01/26/2021     07/22/2021     01/26/2021     BMP:   Lab Results   Component Value Date     02/04/2022     07/22/2021    POTASSIUM 4.4 02/04/2022    POTASSIUM 3.8 07/22/2021    CHLORIDE 110 (H) 02/04/2022    CHLORIDE 101 07/22/2021    CO2 26 02/04/2022    CO2 26 07/22/2021    BUN 21 02/04/2022    BUN 15 07/22/2021    CR 0.88 02/04/2022    CR 0.88 07/22/2021     (H) 02/04/2022     (H) 07/22/2021     COAGS: No results found for: PTT, INR, FIBR  POC:   Lab Results   Component Value Date     (H) 01/27/2021     HEPATIC:   Lab Results   Component Value Date    ALBUMIN 4.1 02/04/2022    PROTTOTAL 8.1 02/04/2022    ALT 57 02/04/2022    AST 31 02/04/2022    ALKPHOS 51 02/04/2022    BILITOTAL 0.2 02/04/2022     OTHER:   Lab Results   Component Value Date    A1C 6.0 (A) 01/14/2022    NEFTALI 9.2 02/04/2022    MAG 1.8 01/26/2021    TSH 1.23 01/22/2021       Anesthesia Plan    ASA Status:  3   NPO Status:  NPO Appropriate    Anesthesia Type: General.     - Airway: ETT   Induction: Intravenous.   Maintenance: Balanced.        Consents    Anesthesia Plan(s) and associated risks, benefits, and realistic alternatives discussed. Questions answered and patient/representative(s) expressed understanding.     - Discussed: Risks, Benefits and Alternatives for BOTH SEDATION and the PROCEDURE were discussed     - Discussed with:  Patient      - Extended Intubation/Ventilatory Support Discussed: No.      - Patient is DNR/DNI Status: No    Use of blood products discussed: No .     Postoperative Care    Pain management: Peripheral nerve block (Single Shot), Multi-modal analgesia, IV analgesics, Oral pain medications.   PONV prophylaxis: Ondansetron (or other 5HT-3)     Comments:                MARYLIN West CRNA

## 2022-03-14 NOTE — PATIENT INSTRUCTIONS
Morning of surgery - hold your Amaryl, Metformin, and Losartan.    Don't start Aspirin or NSAIDs/Ibuprofen.    COVID testing as scheduled.    Will call with notable lab findings.  EKG today.

## 2022-03-16 LAB
HCV AB SERPL QL IA: NONREACTIVE
HIV 1+2 AB+HIV1 P24 AG SERPL QL IA: NONREACTIVE

## 2022-03-17 ENCOUNTER — OFFICE VISIT (OUTPATIENT)
Dept: FAMILY MEDICINE | Facility: OTHER | Age: 57
End: 2022-03-17
Attending: FAMILY MEDICINE
Payer: COMMERCIAL

## 2022-03-17 DIAGNOSIS — Z01.818 PREOP TESTING: ICD-10-CM

## 2022-03-17 PROCEDURE — U0003 INFECTIOUS AGENT DETECTION BY NUCLEIC ACID (DNA OR RNA); SEVERE ACUTE RESPIRATORY SYNDROME CORONAVIRUS 2 (SARS-COV-2) (CORONAVIRUS DISEASE [COVID-19]), AMPLIFIED PROBE TECHNIQUE, MAKING USE OF HIGH THROUGHPUT TECHNOLOGIES AS DESCRIBED BY CMS-2020-01-R: HCPCS

## 2022-03-17 PROCEDURE — U0005 INFEC AGEN DETEC AMPLI PROBE: HCPCS

## 2022-03-18 LAB — SARS-COV-2 RNA RESP QL NAA+PROBE: NEGATIVE

## 2022-03-21 ENCOUNTER — ANESTHESIA (OUTPATIENT)
Dept: SURGERY | Facility: HOSPITAL | Age: 57
End: 2022-03-21
Payer: COMMERCIAL

## 2022-03-21 ENCOUNTER — TELEPHONE (OUTPATIENT)
Dept: SURGERY | Facility: OTHER | Age: 57
End: 2022-03-21
Payer: COMMERCIAL

## 2022-03-21 NOTE — TELEPHONE ENCOUNTER
Patient was notified over the weekend that he will need to have procedure rescheduled since  is not available on 3/21/22. Patient was rescheduled to 3/23/22. OR is not requiring patient to get another covid test. Shelly in OR notified of date change. Malka Owen LPN

## 2022-03-23 ENCOUNTER — HOSPITAL ENCOUNTER (OUTPATIENT)
Facility: HOSPITAL | Age: 57
Discharge: HOME | End: 2022-03-23
Attending: SURGERY | Admitting: SURGERY
Payer: COMMERCIAL

## 2022-03-23 VITALS
HEART RATE: 89 BPM | OXYGEN SATURATION: 94 % | TEMPERATURE: 98 F | HEIGHT: 74 IN | SYSTOLIC BLOOD PRESSURE: 135 MMHG | WEIGHT: 244 LBS | DIASTOLIC BLOOD PRESSURE: 86 MMHG | BODY MASS INDEX: 31.32 KG/M2 | RESPIRATION RATE: 16 BRPM

## 2022-03-23 DIAGNOSIS — K43.9 VENTRAL HERNIA WITHOUT OBSTRUCTION OR GANGRENE: Primary | ICD-10-CM

## 2022-03-23 LAB
GLUCOSE BLDC GLUCOMTR-MCNC: 114 MG/DL (ref 70–99)
GLUCOSE BLDC GLUCOMTR-MCNC: 174 MG/DL (ref 70–99)

## 2022-03-23 PROCEDURE — 250N000011 HC RX IP 250 OP 636: Performed by: SURGERY

## 2022-03-23 PROCEDURE — 64488 TAP BLOCK BI INJECTION: CPT | Mod: XU | Performed by: NURSE ANESTHETIST, CERTIFIED REGISTERED

## 2022-03-23 PROCEDURE — 360N000076 HC SURGERY LEVEL 3, PER MIN: Performed by: SURGERY

## 2022-03-23 PROCEDURE — 250N000011 HC RX IP 250 OP 636: Performed by: NURSE ANESTHETIST, CERTIFIED REGISTERED

## 2022-03-23 PROCEDURE — 258N000003 HC RX IP 258 OP 636: Performed by: NURSE ANESTHETIST, CERTIFIED REGISTERED

## 2022-03-23 PROCEDURE — 710N000012 HC RECOVERY PHASE 2, PER MINUTE: Performed by: SURGERY

## 2022-03-23 PROCEDURE — 999N000141 HC STATISTIC PRE-PROCEDURE NURSING ASSESSMENT: Performed by: SURGERY

## 2022-03-23 PROCEDURE — C1781 MESH (IMPLANTABLE): HCPCS | Performed by: SURGERY

## 2022-03-23 PROCEDURE — 250N000025 HC SEVOFLURANE, PER MIN: Performed by: SURGERY

## 2022-03-23 PROCEDURE — 49561 PR REPAIR INITIAL INCISIONAL HERNIA; INCARCERATED OR STRANGULATED: CPT | Performed by: SURGERY

## 2022-03-23 PROCEDURE — 710N000010 HC RECOVERY PHASE 1, LEVEL 2, PER MIN: Performed by: SURGERY

## 2022-03-23 PROCEDURE — 272N000001 HC OR GENERAL SUPPLY STERILE: Performed by: SURGERY

## 2022-03-23 PROCEDURE — 370N000017 HC ANESTHESIA TECHNICAL FEE, PER MIN: Performed by: SURGERY

## 2022-03-23 PROCEDURE — 250N000009 HC RX 250: Performed by: NURSE ANESTHETIST, CERTIFIED REGISTERED

## 2022-03-23 PROCEDURE — 49568 PR REPAIR HERNIA WITH MESH: CPT | Performed by: SURGERY

## 2022-03-23 PROCEDURE — 49652 AS LAP VENT/ABD HERNIA REPAIR: CPT | Performed by: NURSE ANESTHETIST, CERTIFIED REGISTERED

## 2022-03-23 DEVICE — IMPLANTABLE DEVICE: Type: IMPLANTABLE DEVICE | Site: ABDOMEN | Status: FUNCTIONAL

## 2022-03-23 RX ORDER — FENTANYL CITRATE-0.9 % NACL/PF 10 MCG/ML
PLASTIC BAG, INJECTION (ML) INTRAVENOUS PRN
Status: DISCONTINUED | OUTPATIENT
Start: 2022-03-23 | End: 2022-03-23

## 2022-03-23 RX ORDER — LIDOCAINE HYDROCHLORIDE 20 MG/ML
INJECTION, SOLUTION INFILTRATION; PERINEURAL PRN
Status: DISCONTINUED | OUTPATIENT
Start: 2022-03-23 | End: 2022-03-23

## 2022-03-23 RX ORDER — NALOXONE HYDROCHLORIDE 0.4 MG/ML
0.2 INJECTION, SOLUTION INTRAMUSCULAR; INTRAVENOUS; SUBCUTANEOUS
Status: DISCONTINUED | OUTPATIENT
Start: 2022-03-23 | End: 2022-03-23 | Stop reason: HOSPADM

## 2022-03-23 RX ORDER — FENTANYL CITRATE 50 UG/ML
50 INJECTION, SOLUTION INTRAMUSCULAR; INTRAVENOUS EVERY 5 MIN PRN
Status: DISCONTINUED | OUTPATIENT
Start: 2022-03-23 | End: 2022-03-23 | Stop reason: HOSPADM

## 2022-03-23 RX ORDER — KETOROLAC TROMETHAMINE 30 MG/ML
INJECTION, SOLUTION INTRAMUSCULAR; INTRAVENOUS PRN
Status: DISCONTINUED | OUTPATIENT
Start: 2022-03-23 | End: 2022-03-23

## 2022-03-23 RX ORDER — HALOPERIDOL 5 MG/ML
0.5 INJECTION INTRAMUSCULAR
Status: DISCONTINUED | OUTPATIENT
Start: 2022-03-23 | End: 2022-03-23 | Stop reason: HOSPADM

## 2022-03-23 RX ORDER — ONDANSETRON 4 MG/1
4 TABLET, ORALLY DISINTEGRATING ORAL EVERY 30 MIN PRN
Status: DISCONTINUED | OUTPATIENT
Start: 2022-03-23 | End: 2022-03-23 | Stop reason: HOSPADM

## 2022-03-23 RX ORDER — BUPIVACAINE HYDROCHLORIDE 2.5 MG/ML
INJECTION, SOLUTION INFILTRATION; PERINEURAL PRN
Status: DISCONTINUED | OUTPATIENT
Start: 2022-03-23 | End: 2022-03-23 | Stop reason: HOSPADM

## 2022-03-23 RX ORDER — MEPERIDINE HYDROCHLORIDE 25 MG/ML
12.5 INJECTION INTRAMUSCULAR; INTRAVENOUS; SUBCUTANEOUS
Status: DISCONTINUED | OUTPATIENT
Start: 2022-03-23 | End: 2022-03-23 | Stop reason: HOSPADM

## 2022-03-23 RX ORDER — FENTANYL CITRATE 50 UG/ML
25 INJECTION, SOLUTION INTRAMUSCULAR; INTRAVENOUS
Status: DISCONTINUED | OUTPATIENT
Start: 2022-03-23 | End: 2022-03-23 | Stop reason: HOSPADM

## 2022-03-23 RX ORDER — HYDRALAZINE HYDROCHLORIDE 20 MG/ML
2.5-5 INJECTION INTRAMUSCULAR; INTRAVENOUS EVERY 10 MIN PRN
Status: DISCONTINUED | OUTPATIENT
Start: 2022-03-23 | End: 2022-03-23 | Stop reason: HOSPADM

## 2022-03-23 RX ORDER — HYDROCODONE BITARTRATE AND ACETAMINOPHEN 5; 325 MG/1; MG/1
1 TABLET ORAL EVERY 4 HOURS PRN
Qty: 18 TABLET | Refills: 0 | Status: SHIPPED | OUTPATIENT
Start: 2022-03-23 | End: 2022-03-26

## 2022-03-23 RX ORDER — DEXAMETHASONE SODIUM PHOSPHATE 10 MG/ML
INJECTION, SOLUTION INTRAMUSCULAR; INTRAVENOUS PRN
Status: DISCONTINUED | OUTPATIENT
Start: 2022-03-23 | End: 2022-03-23

## 2022-03-23 RX ORDER — GLYCOPYRROLATE 0.2 MG/ML
INJECTION, SOLUTION INTRAMUSCULAR; INTRAVENOUS PRN
Status: DISCONTINUED | OUTPATIENT
Start: 2022-03-23 | End: 2022-03-23

## 2022-03-23 RX ORDER — PROPOFOL 10 MG/ML
INJECTION, EMULSION INTRAVENOUS PRN
Status: DISCONTINUED | OUTPATIENT
Start: 2022-03-23 | End: 2022-03-23

## 2022-03-23 RX ORDER — BUPIVACAINE HYDROCHLORIDE 2.5 MG/ML
INJECTION, SOLUTION EPIDURAL; INFILTRATION; INTRACAUDAL PRN
Status: DISCONTINUED | OUTPATIENT
Start: 2022-03-23 | End: 2022-03-23

## 2022-03-23 RX ORDER — FENTANYL CITRATE 50 UG/ML
INJECTION, SOLUTION INTRAMUSCULAR; INTRAVENOUS PRN
Status: DISCONTINUED | OUTPATIENT
Start: 2022-03-23 | End: 2022-03-23

## 2022-03-23 RX ORDER — CEFAZOLIN SODIUM 2 G/100ML
2 INJECTION, SOLUTION INTRAVENOUS
Status: DISCONTINUED | OUTPATIENT
Start: 2022-03-23 | End: 2022-03-23 | Stop reason: HOSPADM

## 2022-03-23 RX ORDER — LIDOCAINE 40 MG/G
CREAM TOPICAL
Status: DISCONTINUED | OUTPATIENT
Start: 2022-03-23 | End: 2022-03-23 | Stop reason: HOSPADM

## 2022-03-23 RX ORDER — SODIUM CHLORIDE, SODIUM LACTATE, POTASSIUM CHLORIDE, CALCIUM CHLORIDE 600; 310; 30; 20 MG/100ML; MG/100ML; MG/100ML; MG/100ML
INJECTION, SOLUTION INTRAVENOUS CONTINUOUS
Status: DISCONTINUED | OUTPATIENT
Start: 2022-03-23 | End: 2022-03-23 | Stop reason: HOSPADM

## 2022-03-23 RX ORDER — HYDROMORPHONE HYDROCHLORIDE 1 MG/ML
0.4 INJECTION, SOLUTION INTRAMUSCULAR; INTRAVENOUS; SUBCUTANEOUS EVERY 5 MIN PRN
Status: DISCONTINUED | OUTPATIENT
Start: 2022-03-23 | End: 2022-03-23 | Stop reason: HOSPADM

## 2022-03-23 RX ORDER — KETOROLAC TROMETHAMINE 30 MG/ML
30 INJECTION, SOLUTION INTRAMUSCULAR; INTRAVENOUS ONCE
Status: DISCONTINUED | OUTPATIENT
Start: 2022-03-23 | End: 2022-03-23 | Stop reason: HOSPADM

## 2022-03-23 RX ORDER — ALBUTEROL SULFATE 0.83 MG/ML
2.5 SOLUTION RESPIRATORY (INHALATION) EVERY 4 HOURS PRN
Status: DISCONTINUED | OUTPATIENT
Start: 2022-03-23 | End: 2022-03-23 | Stop reason: HOSPADM

## 2022-03-23 RX ORDER — ONDANSETRON 2 MG/ML
4 INJECTION INTRAMUSCULAR; INTRAVENOUS EVERY 30 MIN PRN
Status: DISCONTINUED | OUTPATIENT
Start: 2022-03-23 | End: 2022-03-23 | Stop reason: HOSPADM

## 2022-03-23 RX ORDER — NALOXONE HYDROCHLORIDE 0.4 MG/ML
0.4 INJECTION, SOLUTION INTRAMUSCULAR; INTRAVENOUS; SUBCUTANEOUS
Status: DISCONTINUED | OUTPATIENT
Start: 2022-03-23 | End: 2022-03-23 | Stop reason: HOSPADM

## 2022-03-23 RX ORDER — CEFAZOLIN SODIUM 2 G/100ML
2 INJECTION, SOLUTION INTRAVENOUS SEE ADMIN INSTRUCTIONS
Status: DISCONTINUED | OUTPATIENT
Start: 2022-03-23 | End: 2022-03-23 | Stop reason: HOSPADM

## 2022-03-23 RX ORDER — KETAMINE HYDROCHLORIDE 10 MG/ML
INJECTION INTRAMUSCULAR; INTRAVENOUS PRN
Status: DISCONTINUED | OUTPATIENT
Start: 2022-03-23 | End: 2022-03-23

## 2022-03-23 RX ORDER — OXYCODONE HYDROCHLORIDE 5 MG/1
5 TABLET ORAL EVERY 4 HOURS PRN
Status: DISCONTINUED | OUTPATIENT
Start: 2022-03-23 | End: 2022-03-23 | Stop reason: HOSPADM

## 2022-03-23 RX ORDER — ONDANSETRON 2 MG/ML
INJECTION INTRAMUSCULAR; INTRAVENOUS PRN
Status: DISCONTINUED | OUTPATIENT
Start: 2022-03-23 | End: 2022-03-23

## 2022-03-23 RX ADMIN — ROCURONIUM BROMIDE 10 MG: 10 INJECTION INTRAVENOUS at 12:11

## 2022-03-23 RX ADMIN — FENTANYL CITRATE 50 MCG: 50 INJECTION INTRAMUSCULAR; INTRAVENOUS at 13:51

## 2022-03-23 RX ADMIN — KETAMINE HYDROCHLORIDE 30 MG: 10 INJECTION, SOLUTION INTRAMUSCULAR; INTRAVENOUS at 12:11

## 2022-03-23 RX ADMIN — KETOROLAC TROMETHAMINE 15 MG: 30 INJECTION, SOLUTION INTRAMUSCULAR at 13:14

## 2022-03-23 RX ADMIN — FENTANYL CITRATE 100 MCG: 50 INJECTION, SOLUTION INTRAMUSCULAR; INTRAVENOUS at 12:07

## 2022-03-23 RX ADMIN — BUPIVACAINE HYDROCHLORIDE 30 ML: 2.5 INJECTION, SOLUTION EPIDURAL; INFILTRATION; INTRACAUDAL at 11:41

## 2022-03-23 RX ADMIN — DEXAMETHASONE SODIUM PHOSPHATE 4 MG: 10 INJECTION, SOLUTION INTRAMUSCULAR; INTRAVENOUS at 12:19

## 2022-03-23 RX ADMIN — SODIUM CHLORIDE, POTASSIUM CHLORIDE, SODIUM LACTATE AND CALCIUM CHLORIDE: 600; 310; 30; 20 INJECTION, SOLUTION INTRAVENOUS at 11:36

## 2022-03-23 RX ADMIN — GLYCOPYRROLATE 0.2 MG: 0.2 INJECTION, SOLUTION INTRAMUSCULAR; INTRAVENOUS at 12:57

## 2022-03-23 RX ADMIN — Medication 100 MG: at 12:11

## 2022-03-23 RX ADMIN — SODIUM CHLORIDE, POTASSIUM CHLORIDE, SODIUM LACTATE AND CALCIUM CHLORIDE: 600; 310; 30; 20 INJECTION, SOLUTION INTRAVENOUS at 13:02

## 2022-03-23 RX ADMIN — Medication 100 MCG: at 13:02

## 2022-03-23 RX ADMIN — ROCURONIUM BROMIDE 20 MG: 10 INJECTION INTRAVENOUS at 12:26

## 2022-03-23 RX ADMIN — CEFAZOLIN SODIUM 2 G: 2 INJECTION, SOLUTION INTRAVENOUS at 12:11

## 2022-03-23 RX ADMIN — ROCURONIUM BROMIDE 30 MG: 10 INJECTION INTRAVENOUS at 12:22

## 2022-03-23 RX ADMIN — MIDAZOLAM 1 MG: 1 INJECTION INTRAMUSCULAR; INTRAVENOUS at 11:36

## 2022-03-23 RX ADMIN — KETAMINE HYDROCHLORIDE 10 MG: 10 INJECTION, SOLUTION INTRAMUSCULAR; INTRAVENOUS at 12:47

## 2022-03-23 RX ADMIN — SUGAMMADEX 200 MG: 100 INJECTION, SOLUTION INTRAVENOUS at 13:17

## 2022-03-23 RX ADMIN — ROCURONIUM BROMIDE 20 MG: 10 INJECTION INTRAVENOUS at 12:45

## 2022-03-23 RX ADMIN — ONDANSETRON 4 MG: 2 INJECTION INTRAMUSCULAR; INTRAVENOUS at 14:23

## 2022-03-23 RX ADMIN — FENTANYL CITRATE 50 MCG: 50 INJECTION, SOLUTION INTRAMUSCULAR; INTRAVENOUS at 11:42

## 2022-03-23 RX ADMIN — PROPOFOL 50 MG: 10 INJECTION, EMULSION INTRAVENOUS at 13:13

## 2022-03-23 RX ADMIN — Medication 100 MCG: at 13:07

## 2022-03-23 RX ADMIN — LIDOCAINE HYDROCHLORIDE 80 MG: 20 INJECTION, SOLUTION INFILTRATION; PERINEURAL at 12:11

## 2022-03-23 RX ADMIN — FENTANYL CITRATE 50 MCG: 50 INJECTION, SOLUTION INTRAMUSCULAR; INTRAVENOUS at 11:36

## 2022-03-23 RX ADMIN — DEXAMETHASONE SODIUM PHOSPHATE 10 MG: 10 INJECTION, SOLUTION INTRAMUSCULAR; INTRAVENOUS at 11:41

## 2022-03-23 RX ADMIN — ONDANSETRON 4 MG: 2 INJECTION INTRAMUSCULAR; INTRAVENOUS at 13:09

## 2022-03-23 RX ADMIN — PROPOFOL 250 MG: 10 INJECTION, EMULSION INTRAVENOUS at 12:11

## 2022-03-23 RX ADMIN — MIDAZOLAM 1 MG: 1 INJECTION INTRAMUSCULAR; INTRAVENOUS at 11:42

## 2022-03-23 ASSESSMENT — ACTIVITIES OF DAILY LIVING (ADL)
ADLS_ACUITY_SCORE: 12

## 2022-03-23 NOTE — DISCHARGE INSTRUCTIONS
Post-Anesthesia Patient Instructions    IMMEDIATELY FOLLOWING SURGERY:  Do not drive or operate machinery for the first twenty four hours after surgery.  Do not make any important decisions for twenty four hours after surgery or while taking narcotic pain medications or sedatives.  If you develop intractable nausea and vomiting or a severe headache please notify your doctor immediately.    FOLLOW-UP:  Please make an appointment with your surgeon as instructed. You do not need to follow up with anesthesia unless specifically instructed to do so.    WOUND CARE INSTRUCTIONS (if applicable):  Keep a dry clean dressing on the anesthesia/puncture wound site if there is drainage.  Once the wound has quit draining you may leave it open to air.  Generally you should leave the bandage intact for twenty four hours unless there is drainage.  If the epidural site drains for more than 36-48 hours please call the anesthesia department.    QUESTIONS?:  Please feel free to call your physician or the hospital  if you have any questions, and they will be happy to assist you.           POST OPERATIVE PATIENT INFORMATION  Hernia Repair      Home care following hernia repair:  You will be discharged when you are able to be driven home.  Anesthesia may reduce judgment, reaction time and coordination for several hours after you seemingly have recovered.  Therefore, do not operate any motorized vehicles or power tools for 24 hours after discharge.  You should also not be alone for {anesthesia type:655558}.    Activity:  After arriving home, it is suggested that you rest or do quiet activities for the rest of the day.  The next day you may be as active as you feel able.  You may find too, that you require more rest than usual the first 3-4 days as your body heals.  Check with your doctor when you may resume normal activities.    Diet:  Eat small amounts frequently after arriving home.  Begin with clear liquids such as ginger ale,  lemon-lime drinks, Jell-O, popsicles and clear soups in small quantities.  Gradually increase your diet to include other juices, creamed soups and solids as tolerated.  Avoid foods the day of surgery which are hard to digest such as heavy, sweet, highly spiced, or fried foods.    Emesis (vomiting) sometimes occurs after general anesthesia.  If emesis persists more than 5-7 times after arriving home, or if you have other difficulties, call your doctor.    Medications:  If you have prescriptions from your doctor, take them as prescribed until they are gone and/or need to be refilled.    Other:  1. Difficulty in urination can occur post operatively.  If you have any problems, call our physician or go to the ER.  2. No lifting over 10 lbs for 6 weeks.   3. No active sports (biking, riding, skating, swimming, etc.).  4. Early on, it's common for the area around your incision to be swollen, bruised, and sore.     Dressing:  Leave your dressing on as directed:     Incision Care  Remember: Follow-up visits allow your healthcare provider to make sure your incision is healing well. Be sure to keep your appointments.     Stitches (sutures), surgical staples, special strips of surgical tape, or surgical skin glue may be used to close incisions. They also help stop bleeding and speed healing. To help your incision heal, follow the tips on this handout.  Home care  Tips for home care include the following:    Always wash your hands before touching your incision.    Keep your incision clean and dry.    Avoid doing things that could cause dirt or sweat to get on your incision.    Don t pick at scabs. They help protect the wound.    Keep your incision out of water.    Take a sponge bath to avoid getting your incision wet, unless your healthcare provider tells you otherwise.    Ask your provider when can you take a shower or bathe.    Ask your provider about the best way to keep your incision dry when bathing or showering.    Pat  stitches dry if they get wet. Don t rub.    Leave the bandage (dressing) in place until you are told to remove it or change it. Change it only as directed, using clean hands.    After the first 12 hours, change your dressing every 24 hours, or as directed by your healthcare provider.    Change your dressing if it gets wet or soiled.  Care for specific closures  Follow these guidelines unless your healthcare provider tells you otherwise:    Stitches or staples. Once you no longer need to keep these dry, clean the wound daily. First remove the bandage using clean hands. Then wash the area gently with soap and warm water. Use a wet cotton swab to loosen and remove any blood or crust that forms. After cleaning, put a thin layer of antibiotic ointment on. Then put on a new bandage.    Skin glue. Don t put liquid, ointment, or cream on your wound while the glue is in place. Avoid activities that cause heavy sweating. Protect the wound from sunlight. Do not scratch, rub, or pick at the glue. Do not put tape directly over the glue. The glue should peel off within 5 to 10 days.    Surgical tape. Keep the area dry. If it gets wet, blot the area dry with a clean towel. Surgical tape usually falls off within 7 to 10 days. If it has not fallen off after 10 days, contact your healthcare provider before taking it off yourself. If you are told to remove the tape, put mineral oil or petroleum jelly on a cotton ball. Gently rub the tape until it is removed.  Changing your dressing  Leave the dressing (bandage) in place until you are told to remove it or change it. Follow the instructions below unless told otherwise by your healthcare provider:    Always wash your hands before changing your dressing.    After the first 48 hours, the incision wound usually will have closed. At this point, leave the incision uncovered and open to the air. If the incision has not closed keep it covered.    Cover your incision only if your clothing is  rubbing it or causing irritation.    Change your dressing if it gets wet or soiled.    For comfort, men may wear scrotal support after inguinal hernia repair.    Drainage:   Bleeding or drainage should be minimal.  1. If bleeding should soak the dressings, cover with another dressing. Do not remove the original dressing.  2. If bleeding continues, apply gentle steady pressure over the incision for 5 minutes.  3. If bleeding persists or there is an increased swelling of the area, call your surgeon or return to the Emergency Room.    When to call your healthcare provider:  Call your healthcare provider if you have any of the following:    A large amount of swelling or bruising (Men: may notice testicular swelling and bruising. This is normal)    Fever of 101.5*F (38*C) or higher, or as directed by your healthcare provider.    Pain, redness, bleeding, or fluid from the incision that gets worse    Drainage with an odor    Trouble urinating    Constipation    Vomiting         QUESTIONS?:  Please feel free to call your healthcare provider or the hospital  if you have any questions, and they will be happy to assist you.         If you have any questions about these instructions, feel free to ask the nurse before discharge.  If you have difficulty after surgery and are unable to contact your physician at his/her clinic, call the hospital Emergency Room for assistance at [phone].

## 2022-03-23 NOTE — ANESTHESIA PROCEDURE NOTES
TAP Procedure Note    Pre-Procedure   Staff -        CRNA: Niya Girard APRN CRNA       Performed By: CRNA       Location: pre-op       Procedure Start/Stop Times: 3/23/2022 11:36 AM and 3/23/2022 11:41 AM       Pre-Anesthestic Checklist: patient identified, IV checked, risks and benefits discussed, informed consent, monitors and equipment checked, pre-op evaluation, at physician/surgeon's request and post-op pain management  Timeout:       Correct Patient: Yes        Correct Procedure: Yes        Correct Site: Yes        Correct Position: Yes        Correct Laterality: Yes        Site Marked: Yes  Procedure Documentation  Procedure: TAP       Diagnosis: HERNIA       Laterality: bilateral       Patient Position: supine       Skin prep: Chloraprep       Needle Type: short bevel and insulated       Needle Gauge: 20.        Needle Length (Inches): 4        Ultrasound guided       1. Ultrasound was used to identify targeted nerve, plexus, vascular marker, or fascial plane and place a needle adjacent to it in real-time.       2. Ultrasound was used to visualize the spread of anesthetic in close proximity to the above referenced structure.       3. A permanent image is entered into the patient's record.       5. There were no apparent abnormal pathologic findings.    Assessment/Narrative         The placement was negative for: blood aspirated, painful injection and site bleeding       Paresthesias: No.     Bolus given via needle..        Secured via.        Insertion/Infusion Method: Single Shot       Complications: none

## 2022-03-23 NOTE — ANESTHESIA CARE TRANSFER NOTE
Patient: Atul Lam    Procedure: Procedure(s):  combined open and laparoscopic ventral hernia repair with insertion of mesh, with lysis of adhesions       Diagnosis: Ventral hernia without obstruction or gangrene [K43.9]  Diagnosis Additional Information: No value filed.    Anesthesia Type:   General     Note:    Oropharynx: oropharynx clear of all foreign objects and spontaneously breathing  Level of Consciousness: drowsy  Oxygen Supplementation: nasal cannula  Level of Supplemental Oxygen (L/min / FiO2): 3  Independent Airway: airway patency satisfactory and stable    Vital Signs Stable: post-procedure vital signs reviewed and stable  Report to RN Given: handoff report given  Patient transferred to: PACU    Handoff Report: Identifed the Patient, Identified the Reponsible Provider, Reviewed the pertinent medical history, Discussed the surgical course, Reviewed Intra-OP anesthesia mangement and issues during anesthesia, Set expectations for post-procedure period and Allowed opportunity for questions and acknowledgement of understanding      Vitals:  Vitals Value Taken Time   BP 96/84 03/23/22 1331   Temp     Pulse 73 03/23/22 1331   Resp     SpO2 96 % 03/23/22 1333   Vitals shown include unvalidated device data.    Electronically Signed By: MARYLIN AUGUSTE CRNA  March 23, 2022  1:34 PM

## 2022-03-23 NOTE — OR NURSING
Patient a difficult IV start x 2. Anesthesia at the bedside for IV start and nerve block. Patient's spouse Leah at the bedside and updated on patient's status.

## 2022-03-23 NOTE — OP NOTE
REPORT OF OPERATION  DATE OF PROCEDURE: 3/23/2022    PATIENT: Atul Lam    SURGERY PERFORMED: Open and laparoscopic ventral Hernia Repair, without incarceration, is not recurrent, with mesh implantation, and lysis of adhesions    PREOPERATIVE DIAGNOSIS: Ventral Hernia, without incarceration, is not recurrent    POSTOPERATIVE DIAGNOSIS: Ventral Hernia, with incarceration, without strangulation , is not recurrent    SURGEON: Lionel Roblero MD    ASSISTANTS: None    ANESTHESIA: General Endotracheal Anesthesia    COMPLICATIONS: None apparent    TRANSFUSIONS: None    TISSUE TO PATHOLOGY:  None    FINDINGS: Ventral Hernia, with incarceration, without strangulation, is not recurrent     INDICATIONS:  This is a 56 year old male with a ventral hemia.  The hernia is not tender, does not show evidence of incarceration and does not show evidence of strangulation.  The hernia is not recurrent.  The patient will be taken to the operating suite for an open ventral herniorrhaphy with possible laparoscopic mesh implantation.    DESCRIPTIONS OF PROCEDURE IN DETAIL: After consent was obtained the patient was taken to the operative suite and saúl in the supine position.  The patient was identified and the correct patient was confirmed. General Endotracheal Anesthesia was induced by anesthesia.  The patient was sterilely prepped and draped in the usual fashion.  A time out was performed verifying the correct patient and the correct procedure.  The entire operative team was in agreement.  All necessary equipment and supplies were in the room.    After identification of the hernia, the skin overlying the hernia was sharply entered and dissection was carried down until isolation of the hernia.  The hernia sac was dissected from surrounding tissues and then opened.  Incarcerated omentum and/or bowel was found within the hernia sac.  Adhesions of the hernia contents to the sac were found and these were carefully taken down freeing  up the hernia sac contents.  All of the hernia's contents did appear to be viable.  The contents were reduced back into the abdomen.  This did not require opening the abdominal wall to allow the reduction.  The defect was then closed by interrupted 0 Ethibond sutures.  Prior to closing of the hernia defect a 5 mm left upper quadrant trocar was placed.  The defect was then closed.    Once the defect was closed, pneumoperitoneum was achieved.  The laparoscope was inserted through the trocar and was decided to reinforce the hernia repair with underlay mesh.  An additional 12 mm trocar was placed in the left lower quadrant and an additional 5 mm trochars were placed as necessary.  A 25 x 12 cm piece of ventral light mesh was then inserted into the abdomen.  It was positioned over the previous hernia repair and tacked to the abdominal wall using the secure strap fixation device.  At the conclusion the hernia repair was well covered with mesh.  Hemostasis was assured.  All instrumentation was removed.    All sponge, needle and instrument counts were correct times two.    Sterile dressings were applied.      The patient was awakened in the operative suite and extubated without difficulty and taken to the recovery room in stable condition tolerating the procedure well.

## 2022-03-23 NOTE — ANESTHESIA POSTPROCEDURE EVALUATION
Patient: Atul Lam    Procedure: Procedure(s):  combined open and laparoscopic ventral hernia repair with insertion of mesh, with lysis of adhesions       Anesthesia Type:  General    Note:  Disposition: Outpatient   Postop Pain Control: Uneventful            Sign Out: Well controlled pain   PONV: No   Neuro/Psych: Uneventful            Sign Out: Acceptable/Baseline neuro status   Airway/Respiratory: Uneventful            Sign Out: Acceptable/Baseline resp. status   CV/Hemodynamics: Uneventful            Sign Out: Acceptable CV status; No obvious hypovolemia; No obvious fluid overload   Other NRE: NONE   DID A NON-ROUTINE EVENT OCCUR? No           Last vitals:  Vitals Value Taken Time   /69 03/23/22 1405   Temp 98  F (36.7  C) 03/23/22 1405   Pulse 85 03/23/22 1409   Resp 35 03/23/22 1409   SpO2 90 % 03/23/22 1409   Vitals shown include unvalidated device data.    Electronically Signed By: MARYLIN Galvez CRNA  March 23, 2022  3:58 PM

## 2022-03-23 NOTE — INTERVAL H&P NOTE
"I have reviewed the surgical (or preoperative) H&P that is linked to this encounter, and examined the patient. There are no significant changes    Clinical Conditions Present on Arrival:  SECTIONPRESENTONADMISSIONBEGIN@                   # Obesity: Estimated body mass index is 31.33 kg/m  as calculated from the following:    Height as of this encounter: 1.88 m (6' 2\").    Weight as of this encounter: 110.7 kg (244 lb).       "

## 2022-03-23 NOTE — OR NURSING
Patient and responsible adult given discharge instructions with no questions regarding instructions. Godwin score 19/20. Pain level 3/10 with coughing only at this time.  Discharged from unit via ambulation. Patient discharged to home with spouse.   Patient given zofran and was able to tolerate po intake after receiving zofran without further nausea or vomiting.

## 2022-04-04 ENCOUNTER — TELEPHONE (OUTPATIENT)
Dept: SURGERY | Facility: OTHER | Age: 57
End: 2022-04-04
Payer: COMMERCIAL

## 2022-04-04 NOTE — TELEPHONE ENCOUNTER
Patients FMLA forms have been completed and signed. Copy placed into scanning bin, and also faxed to [employer] at [phone].     Patient was called to notify that they were faxed per his request. Malka Owen LPN

## 2022-04-13 ENCOUNTER — OFFICE VISIT (OUTPATIENT)
Dept: SURGERY | Facility: OTHER | Age: 57
End: 2022-04-13
Attending: NURSE PRACTITIONER
Payer: COMMERCIAL

## 2022-04-13 VITALS
TEMPERATURE: 97.5 F | HEIGHT: 74 IN | OXYGEN SATURATION: 97 % | SYSTOLIC BLOOD PRESSURE: 124 MMHG | BODY MASS INDEX: 31.83 KG/M2 | DIASTOLIC BLOOD PRESSURE: 64 MMHG | HEART RATE: 78 BPM | WEIGHT: 248 LBS

## 2022-04-13 DIAGNOSIS — Z98.890 STATUS POST HERNIA REPAIR: Primary | ICD-10-CM

## 2022-04-13 DIAGNOSIS — Z87.19 STATUS POST HERNIA REPAIR: Primary | ICD-10-CM

## 2022-04-13 PROCEDURE — 99024 POSTOP FOLLOW-UP VISIT: CPT | Performed by: NURSE PRACTITIONER

## 2022-04-13 ASSESSMENT — PAIN SCALES - GENERAL: PAINLEVEL: NO PAIN (0)

## 2022-04-13 NOTE — NURSING NOTE
"  Chief Complaint   Patient presents with     Surgical Followup     3/23/22 laparoscopic ventral hernia repair       Initial /64 (BP Location: Right arm, Patient Position: Chair, Cuff Size: Adult Large)   Pulse 78   Temp 97.5  F (36.4  C) (Tympanic)   Ht 1.88 m (6' 2\")   Wt 112.5 kg (248 lb)   SpO2 97%   BMI 31.84 kg/m   Estimated body mass index is 31.84 kg/m  as calculated from the following:    Height as of this encounter: 1.88 m (6' 2\").    Weight as of this encounter: 112.5 kg (248 lb).  Medication Reconciliation: complete  VALERIO NETTLES LPN    "

## 2022-04-13 NOTE — PROGRESS NOTES
CLINIC NOTE - POST-OP SURGERY  4/13/2022    Patient:Atul Lam    Procedure: Ventral Hernia Repair (open and laparoscopic)    This is a 56 year old male who is 2 weeks s/p Ventral Hernia Repair (open and laparoscopic).  The patient is eating, voiding, and stooling without problems.     Current Medications:  Current Outpatient Medications   Medication Sig Dispense Refill     blood glucose (BELL CONTOUR NEXT) test strip Use to test blood sugar 2 times daily. 100 each 11     blood glucose monitoring (BELL MICROLET) lancets Use to test blood sugar 2 times daily. 100 each 11     budesonide (PULMICORT) 0.5 MG/2ML neb solution Squirt entire vial into previously made preet med saline bottle, mix, and irrigate each nostril until entire bottle empty.  Do this twice daily. 3 Box 11     Continuous Blood Gluc  (DEXCOM G6 ) WILMA        Continuous Blood Gluc Sensor (DEXCOM G6 SENSOR) MISC 1 each every 10 days Change every 10 days. 9 each 1     Continuous Blood Gluc Transmit (DEXCOM G6 TRANSMITTER) MISC 1 each every 3 months Change every 3 months. 1 each 1     fenofibrate (LOFIBRA) 54 MG tablet Take 1 tablet (54 mg) by mouth daily 90 tablet 3     fluticasone (FLONASE) 50 MCG/ACT nasal spray Spray 2 sprays into both nostrils 2 times daily 16 g 12     glimepiride (AMARYL) 2 MG tablet Take 1 tablet (2 mg) by mouth every morning (before breakfast) 90 tablet 3     losartan (COZAAR) 25 MG tablet Take 0.5 tablets (12.5 mg) by mouth daily 45 tablet 3     metFORMIN (GLUCOPHAGE-XR) 500 MG 24 hr tablet Take 2 tablets (1,000 mg) by mouth 2 times daily (with meals) TAKE 2 TABLETS BY MOUTH TWICE DAILY WITH MEALS 360 tablet 3     simvastatin (ZOCOR) 40 MG tablet TAKE 1 TABLET BY MOUTH ONCE DAILY AT BEDTIME 90 tablet 3       Allergies:  Allergies   Allergen Reactions     Animal Dander      Nasal congestion     Aspirin Nausea and Vomiting     Dust Mites      Nasal congestion     Lisinopril Hives     Morphine Nausea and  "Vomiting       PHYSICAL EXAM:   Vital signs: /64 (BP Location: Right arm, Patient Position: Chair, Cuff Size: Adult Large)   Pulse 78   Temp 97.5  F (36.4  C) (Tympanic)   Ht 1.88 m (6' 2\")   Wt 112.5 kg (248 lb)   SpO2 97%   BMI 31.84 kg/m     BMI: Body mass index is 31.84 kg/m .   General: Normal, healthy, cooperative, in no acute distress, alert   Lungs: respirations are non-labored   Abdominal: non-distended   Wounds:  Well healed surgical scars consistent with his operation.      ASSESSMENT:    56 year old male who is 2 weeks s/p Ventral Hernia Repair (open and laparoscopic).  Doing well.     PLAN:   Staples were removed without problems and steri strips were placed where the staples formerly were.     Follow-up as needed      Restrictions : Will continue lifting restrictions of no more than 10 pounds until 6 weeks after surgery    "

## 2022-04-13 NOTE — PATIENT INSTRUCTIONS
Thank you for allowing Tanvi Obrien N.P. and our surgical team to participate in your care. Please call our office at [phone] with scheduling questions or with any other questions or concerns.

## 2022-04-24 ENCOUNTER — HEALTH MAINTENANCE LETTER (OUTPATIENT)
Age: 57
End: 2022-04-24

## 2022-08-14 ENCOUNTER — HEALTH MAINTENANCE LETTER (OUTPATIENT)
Age: 57
End: 2022-08-14

## 2022-11-19 ENCOUNTER — HEALTH MAINTENANCE LETTER (OUTPATIENT)
Age: 57
End: 2022-11-19

## 2022-12-23 DIAGNOSIS — G47.33 OBSTRUCTIVE SLEEP APNEA (ADULT) (PEDIATRIC): Primary | ICD-10-CM

## 2022-12-23 DIAGNOSIS — E11.69 TYPE 2 DIABETES MELLITUS WITH OTHER SPECIFIED COMPLICATION, UNSPECIFIED WHETHER LONG TERM INSULIN USE (H): ICD-10-CM

## 2023-03-03 ENCOUNTER — TELEPHONE (OUTPATIENT)
Dept: FAMILY MEDICINE | Facility: OTHER | Age: 58
End: 2023-03-03

## 2023-03-03 NOTE — TELEPHONE ENCOUNTER
8:39 AM    Reason for Call: OVERBOOK    Patient is having the following symptoms: persistent phlemy cough, shortness of breath, DM/med review     The patient is requesting an appointment with Dr. Bahena.    Was an appointment offered for this call? yes  If yes : Appointment type              Date 4/14/23    Preferred method for responding to this message: Telephone Call  What is your phone number ? [phone]    If we cannot reach you directly, may we leave a detailed response at the number you provided? Yes    Can this message wait until your PCP/provider returns, if unavailable today? YES     (Patient's wife Leah Lam physical/med review requesting appointments at the same time/together if possible.)    Maylin Giron

## 2023-03-06 ENCOUNTER — ANCILLARY PROCEDURE (OUTPATIENT)
Dept: GENERAL RADIOLOGY | Facility: OTHER | Age: 58
End: 2023-03-06
Attending: FAMILY MEDICINE
Payer: COMMERCIAL

## 2023-03-06 ENCOUNTER — OFFICE VISIT (OUTPATIENT)
Dept: FAMILY MEDICINE | Facility: OTHER | Age: 58
End: 2023-03-06
Attending: FAMILY MEDICINE
Payer: COMMERCIAL

## 2023-03-06 VITALS
HEART RATE: 76 BPM | RESPIRATION RATE: 16 BRPM | DIASTOLIC BLOOD PRESSURE: 77 MMHG | BODY MASS INDEX: 33.55 KG/M2 | TEMPERATURE: 96.2 F | OXYGEN SATURATION: 97 % | HEIGHT: 74 IN | WEIGHT: 261.4 LBS | SYSTOLIC BLOOD PRESSURE: 129 MMHG

## 2023-03-06 DIAGNOSIS — E11.65 TYPE 2 DIABETES MELLITUS WITH HYPERGLYCEMIA, WITH LONG-TERM CURRENT USE OF INSULIN (H): ICD-10-CM

## 2023-03-06 DIAGNOSIS — Z87.891 FORMER SMOKER: ICD-10-CM

## 2023-03-06 DIAGNOSIS — Z79.4 TYPE 2 DIABETES MELLITUS WITH HYPERGLYCEMIA, WITH LONG-TERM CURRENT USE OF INSULIN (H): ICD-10-CM

## 2023-03-06 DIAGNOSIS — E78.5 HYPERLIPIDEMIA, UNSPECIFIED HYPERLIPIDEMIA TYPE: ICD-10-CM

## 2023-03-06 DIAGNOSIS — Z87.891 PERSONAL HISTORY OF TOBACCO USE: ICD-10-CM

## 2023-03-06 DIAGNOSIS — R05.9 COUGH, UNSPECIFIED TYPE: ICD-10-CM

## 2023-03-06 DIAGNOSIS — J44.9 CHRONIC OBSTRUCTIVE PULMONARY DISEASE, UNSPECIFIED COPD TYPE (H): ICD-10-CM

## 2023-03-06 DIAGNOSIS — R05.9 COUGH, UNSPECIFIED TYPE: Primary | ICD-10-CM

## 2023-03-06 DIAGNOSIS — Z12.5 SCREENING FOR PROSTATE CANCER: ICD-10-CM

## 2023-03-06 PROCEDURE — G0296 VISIT TO DETERM LDCT ELIG: HCPCS | Performed by: FAMILY MEDICINE

## 2023-03-06 PROCEDURE — 99214 OFFICE O/P EST MOD 30 MIN: CPT | Performed by: FAMILY MEDICINE

## 2023-03-06 PROCEDURE — 71046 X-RAY EXAM CHEST 2 VIEWS: CPT | Mod: TC | Performed by: RADIOLOGY

## 2023-03-06 RX ORDER — ALBUTEROL SULFATE 90 UG/1
2 AEROSOL, METERED RESPIRATORY (INHALATION) EVERY 6 HOURS PRN
Qty: 18 G | Refills: 1 | Status: SHIPPED | OUTPATIENT
Start: 2023-03-06 | End: 2023-07-28

## 2023-03-06 RX ORDER — PREDNISONE 20 MG/1
40 TABLET ORAL DAILY
Qty: 10 TABLET | Refills: 0 | Status: SHIPPED | OUTPATIENT
Start: 2023-03-06 | End: 2023-03-11

## 2023-03-06 NOTE — TELEPHONE ENCOUNTER
Please see below and advise.   Patient does have an appointment on 3/17 with Dr. Puentes.    RENÉE RODRIGUEZ LPN

## 2023-03-06 NOTE — PROGRESS NOTES
Assessment & Plan     Cough, unspecified type  Xray negative.  Prior PFT overall stable.  Extensive tobacco history - though former smoker.  CT chest screening to be done.  - XR Chest 2 Views; Future    Former smoker    Chronic obstructive pulmonary disease, unspecified COPD type (H)  Trial of LABA/LAMA combination.  Prednisone for acute flare.  Albuterol for rescue.  - umeclidinium-vilanterol (ANORO ELLIPTA) 62.5-25 MCG/ACT oral inhaler; Inhale 1 puff into the lungs daily  - albuterol (PROAIR HFA/PROVENTIL HFA/VENTOLIN HFA) 108 (90 Base) MCG/ACT inhaler; Inhale 2 puffs into the lungs every 6 hours as needed for shortness of breath, wheezing or cough  - predniSONE (DELTASONE) 20 MG tablet; Take 2 tablets (40 mg) by mouth daily for 5 days    Personal history of tobacco use  - Prof fee: Shared Decision Making for Lung Cancer Screening  - CT Chest Lung Cancer Scrn Low Dose wo; Future    Type 2 diabetes mellitus with hyperglycemia, with long-term current use of insulin (H)  Overdue for health care maintenance.  Not see in almost a year.  Patient states I am difficult to get into, but really should be scheduling every 3-6 months for diabetes management.  Ordered fasting labs for later this week.  Then will schedule follow up.  - Lipid Profile (Chol, Trig, HDL, LDL calc); Future  - CBC with platelets and differential; Future  - Comprehensive metabolic panel (BMP + Alb, Alk Phos, ALT, AST, Total. Bili, TP); Future  - TSH with free T4 reflex; Future  - Hemoglobin A1c; Future  - Albumin Random Urine Quantitative with Creat Ratio; Future    Hyperlipidemia, unspecified hyperlipidemia type  Not fasting today.  - Lipid Profile (Chol, Trig, HDL, LDL calc); Future  - Comprehensive metabolic panel (BMP + Alb, Alk Phos, ALT, AST, Total. Bili, TP); Future    Screening for prostate cancer  - PSA, screen; Future           BMI:   Estimated body mass index is 33.56 kg/m  as calculated from the following:    Height as of this encounter:  "1.88 m (6' 2\").    Weight as of this encounter: 118.6 kg (261 lb 6.4 oz).   Weight management plan: Discussed healthy diet and exercise guidelines      See patient instructions.    Return in about 1 month (around 4/6/2023) for diabetes.    Zoraida Bahena MD  [facility] - MIRA Pollard is a 57 year old presenting for the following health issues: Cough and SOB.      HPI     Due for annual exam, health maintenance.    Acute Illness  Acute illness concerns: Cough, phlegm when laying down, SOB, sharp pains when breathing in.    Onset/Duration: 1 month  Symptoms:  Fever: No  Chills/Sweats: No  Headache (location?): No  Sinus Pressure: No  Conjunctivitis:  No  Ear Pain: no  Rhinorrhea: No  Congestion: YES  Sore Throat: No  Cough: YES-productive of clear sputum, with shortness of breath  Wheeze: YES  Decreased Appetite: No  Nausea: No  Vomiting: No  Diarrhea: No  Dysuria/Freq.: No  Dysuria or Hematuria: No  Fatigue/Achiness: YES  Sick/Strep Exposure: No  Therapies tried and outcome: Using rescue inhaler.   No travel.    Feels he had pneumonia.  AM shower - hacking up thick phlegm.  Not bloody.  Clear or white.  Worse cough laying down.  Has CPAP - difficult to wear.  Took OTC Primitine mist last night.  Helpful.  Has used over past few months PRN - helpful. Using regularly past few days    Former smoker - quit 2016. 35, but 50 pack year history.  No active pulmonary medications.  Negative chest xray 2021.  PFT 2021 - minimal pulmonary vascular defect.  No prior echo or stress test.    Last seen a year ago.  Preop for hernia repair.        Review of Systems   Constitutional, HEENT, cardiovascular, pulmonary, gi and gu systems are negative, except as otherwise noted.      Objective    /77 (BP Location: Right arm, Patient Position: Sitting, Cuff Size: Adult Regular)   Pulse 76   Temp (!) 96.2  F (35.7  C) (Temporal)   Resp 16   Ht 1.88 m (6' 2\")   Wt 118.6 kg (261 lb 6.4 oz)   SpO2 " 97%   BMI 33.56 kg/m    Body mass index is 33.56 kg/m .  Physical Exam   GENERAL: healthy, alert and no distress  EYES: Eyes grossly normal to inspection, PERRL and conjunctivae and sclerae normal  HENT: ear canals and TM's normal, nose and mouth without ulcers or lesions  NECK: no adenopathy, no asymmetry, masses, or scars and thyroid normal to palpation  RESP: lungs clear to auscultation - no rales, rhonchi or wheezes  CV: regular rate and rhythm, normal S1 S2, no S3 or S4, no murmur, click or rub, no peripheral edema and peripheral pulses strong  ABDOMEN: soft, nontender, no hepatosplenomegaly, no masses and bowel sounds normal  MS: no gross musculoskeletal defects noted, no edema  PSYCH: mentation appears normal, affect normal/bright    No results found for this or any previous visit (from the past 24 hour(s)).                Lung Cancer Screening Shared Decision Making Visit     Atul Lam, a 57 year old male, is eligible for lung cancer screening    History   Smoking Status     Former     Packs/day: 1.50     Years: 33.00     Types: Dip, chew, snus or snuff, Cigarettes     Start date: 1/1/1983     Quit date: 1/1/2016   Smokeless Tobacco     Former     Types: Chew     Quit date: 2/14/2020       I have discussed with patient the risks and benefits of screening for lung cancer with low-dose CT.     The risks include:    radiation exposure: one low dose chest CT has as much ionizing radiation as about 15 chest x-rays, or 6 months of background radiation living in [place]      false positives: most findings/nodules are NOT cancer, but some might still require additional diagnostic evaluation, including biopsy    over-diagnosis: some slow growing cancers that might never have been clinically significant will be detected and treated unnecessarily     The benefit of early detection of lung cancer is contingent upon adherence to annual screening or more frequent follow up if indicated.     Furthermore, to  benefit from screening, Atul must be willing and able to undergo diagnostic procedures, if indicated. Although no specific guide is available for determining severity of comorbidities, it is reasonable to withhold screening in patients who have greater mortality risk from other diseases.     We did discuss that the best way to prevent lung cancer is to not smoke.    Some patients may value a numeric estimation of lung cancer risk when evaluating if lung cancer screening is right for them, here is one calculator:    ShouldIScreen

## 2023-03-06 NOTE — PATIENT INSTRUCTIONS
Chest xray today - will call with results.  Return Friday morning 8am for fasting labs.  Will call with results.    Complete 5 day course of Prednisone.  Start Anoro Ellipta inhaler - once daily - for maintenance - take regularly- every day.  Start Albuterol only as needed for break through symptoms.    Lung CT screening scan ordered - radiology will call you to schedule.    Please schedule follow up visits regularly - annual physical, as well as diabetic checks every 3-6 months.    Lung Cancer Screening   Frequently Asked Questions  If you are at high-risk for lung cancer, getting screened with low-dose computed tomography (LDCT) every year can help save your life. This handout offers answers to some of the most common questions about lung cancer screening. If you have other questions, please call [phone] ([phone]).     What is it?  Lung cancer screening uses special X-ray technology to create an image of your lung tissue. The exam is quick and easy and takes less than 10 seconds. We don t give you any medicine or use any needles. You can eat before and after the exam. You don t need to change your clothes as long as the clothing on your chest doesn t contain metal. But, you do need to be able to hold your breath for at least 6 seconds during the exam.    What is the goal of lung cancer screening?  The goal of lung cancer screening is to save lives. Many times, lung cancer is not found until a person starts having physical symptoms. Lung cancer screening can help detect lung cancer in the earliest stages when it may be easier to treat.    Who should be screened for lung cancer?  We suggest lung cancer screening for anyone who is at high-risk for lung cancer. You are in the high-risk group if you:     are between the ages of 55 and 79, and   have smoked at least 1 pack of cigarettes a day for 20 or more years, and   still smoke or have quit within the past 15 years.    However, if you have a new  cough or shortness of breath, you should talk to your doctor before being screened.    Why does it matter if I have symptoms?  Certain symptoms can be a sign that you have a condition in your lungs that should be checked and treated by your doctor. These symptoms include fever, chest pain, a new or changing cough, shortness of breath that you have never felt before, coughing up blood or unexplained weight loss. Having any of these symptoms can greatly affect the results of lung cancer screening.       Should all smokers get an LDCT lung cancer screening exam?  It depends. Lung cancer screening is for a very specific group of men and women who have a history of heavy smoking over a long period of time (see  Who should be screened for lung cancer  above).  I am in the high-risk group, but have been diagnosed with cancer in the past. Is LDCT lung cancer screening right for me?  In some cases, you should not have LDCT lung screening, such as when your doctor is already following your cancer with CT scan studies. Your doctor will help you decide if LDCT lung screening is right for you.  Do I need to have a screening exam every year?  Yes. If you are in the high-risk group described earlier, you should get an LDCT lung cancer screening exam every year until you are 79, or are no longer willing or able to undergo screening and possible procedures to diagnose and treat lung cancer.  How effective is LDCT at preventing death from lung cancer?  Studies have shown that LDCT lung cancer screening can lower the risk of death from lung cancer by 20 percent in people who are at high-risk.  What are the risks?  There are some risks and limitations of LDCT lung cancer screening. We want to make sure you understand the risks and benefits, so please let us know if you have any questions. Your doctor may want to talk with you more about these risks.   Radiation exposure: As with any exam that uses radiation, there is a very small  increased risk of cancer. The amount of radiation in LDCT is small--about the same amount a person would get from a mammogram. Your doctor orders the exam when he or she feels the potential benefits outweigh the risks.   False negatives: No test is perfect, including LDCT. It is possible that you may have a medical condition, including lung cancer, that is not found during your exam. This is called a false negative result.   False positives and more testing: LDCT very often finds something in the lung that could be cancer, but in fact is not. This is called a false positive result. False positive tests often cause anxiety. To make sure these findings are not cancer, you may need to have more tests. These tests will be done only if you give us permission. Sometimes patients need a treatment that can have side effects, such as a biopsy. For more information on false positives, see  What can I expect from the results?    Findings not related to lung cancer: Your LDCT exam also takes pictures of areas of your body next to your lungs. In a very small number of cases, the CT scan will show an abnormal finding in one of these areas, such as your kidneys, adrenal glands, liver or thyroid. This finding may not be serious, but you may need more tests. Your doctor can help you decide what other tests you may need, if any.  What can I expect from the results?  About 1 out of 4 LDCT exams will find something that may need more tests. Most of the time, these findings are lung nodules. Lung nodules are very small collections of tissue in the lung. These nodules are very common, and the vast majority--more than 97 percent--are not cancer (benign). Most are normal lymph nodes or small areas of scarring from past infections.  But, if a small lung nodule is found to be cancer, the cancer can be cured more than 90 percent of the time. To know if the nodule is cancer, we may need to get more images before your next yearly screening  exam. If the nodule has suspicious features (for example, it is large, has an odd shape or grows over time), we will refer you to a specialist for further testing.  Will my doctor also get the results?  Yes. Your doctor will get a copy of your results.  Is it okay to keep smoking now that there s a cancer screening exam?  No. Tobacco is one of the strongest cancer-causing agents. It causes not only lung cancer, but other cancers and cardiovascular (heart) diseases as well. The damage caused by smoking builds over time. This means that the longer you smoke, the higher your risk of disease. While it is never too late to quit, the sooner you quit, the better.  Where can I find help to quit smoking?  The best way to prevent lung cancer is to stop smoking. If you have already quit smoking, congratulations and keep it up! For help on quitting smoking, please call [employer] at 6-468-QUITNOW ([phone]) or the American [employer] Society at 1-341.668.1431 to find local resources near you.  One-on-one health coaching:  If you d prefer to work individually with a health care provider on tobacco cessation, we offer:     Medication Therapy Management:  Our specially trained pharmacists work closely with you and your doctor to help you quit smoking.  Call [phone] or [phone] (toll free).

## 2023-03-10 ENCOUNTER — LAB (OUTPATIENT)
Dept: LAB | Facility: OTHER | Age: 58
End: 2023-03-10
Attending: FAMILY MEDICINE
Payer: COMMERCIAL

## 2023-03-10 ENCOUNTER — HOSPITAL ENCOUNTER (OUTPATIENT)
Dept: CT IMAGING | Facility: HOSPITAL | Age: 58
Discharge: HOME | End: 2023-03-10
Attending: FAMILY MEDICINE
Payer: COMMERCIAL

## 2023-03-10 DIAGNOSIS — E11.9 TYPE 2 DIABETES MELLITUS WITHOUT COMPLICATION, WITHOUT LONG-TERM CURRENT USE OF INSULIN (H): ICD-10-CM

## 2023-03-10 DIAGNOSIS — E78.5 HYPERLIPIDEMIA, UNSPECIFIED HYPERLIPIDEMIA TYPE: ICD-10-CM

## 2023-03-10 DIAGNOSIS — Z87.891 PERSONAL HISTORY OF TOBACCO USE: ICD-10-CM

## 2023-03-10 DIAGNOSIS — E11.65 TYPE 2 DIABETES MELLITUS WITH HYPERGLYCEMIA, WITH LONG-TERM CURRENT USE OF INSULIN (H): ICD-10-CM

## 2023-03-10 DIAGNOSIS — Z12.5 SCREENING FOR PROSTATE CANCER: ICD-10-CM

## 2023-03-10 DIAGNOSIS — Z79.4 TYPE 2 DIABETES MELLITUS WITH HYPERGLYCEMIA, WITH LONG-TERM CURRENT USE OF INSULIN (H): ICD-10-CM

## 2023-03-10 LAB
ALBUMIN SERPL BCG-MCNC: 4.2 G/DL (ref 3.5–5.2)
ALP SERPL-CCNC: 72 U/L (ref 40–129)
ALT SERPL W P-5'-P-CCNC: 29 U/L (ref 10–50)
ANION GAP SERPL CALCULATED.3IONS-SCNC: 9 MMOL/L (ref 7–15)
AST SERPL W P-5'-P-CCNC: 17 U/L (ref 10–50)
BASOPHILS # BLD AUTO: 0.1 10E3/UL (ref 0–0.2)
BASOPHILS NFR BLD AUTO: 1 %
BILIRUB SERPL-MCNC: 0.3 MG/DL
BUN SERPL-MCNC: 20.5 MG/DL (ref 6–20)
CALCIUM SERPL-MCNC: 9.2 MG/DL (ref 8.6–10)
CHLORIDE SERPL-SCNC: 103 MMOL/L (ref 98–107)
CHOLEST SERPL-MCNC: 156 MG/DL
CREAT SERPL-MCNC: 0.93 MG/DL (ref 0.67–1.17)
CREAT UR-MCNC: 190.9 MG/DL
DEPRECATED HCO3 PLAS-SCNC: 28 MMOL/L (ref 22–29)
EOSINOPHIL # BLD AUTO: 0.2 10E3/UL (ref 0–0.7)
EOSINOPHIL NFR BLD AUTO: 2 %
ERYTHROCYTE [DISTWIDTH] IN BLOOD BY AUTOMATED COUNT: 13.3 % (ref 10–15)
EST. AVERAGE GLUCOSE BLD GHB EST-MCNC: 160 MG/DL
GFR SERPL CREATININE-BSD FRML MDRD: >90 ML/MIN/1.73M2
GLUCOSE SERPL-MCNC: 153 MG/DL (ref 70–99)
HBA1C MFR BLD: 7.2 %
HCT VFR BLD AUTO: 43.1 % (ref 40–53)
HDLC SERPL-MCNC: 43 MG/DL
HGB BLD-MCNC: 14.4 G/DL (ref 13.3–17.7)
IMM GRANULOCYTES # BLD: 0.1 10E3/UL
IMM GRANULOCYTES NFR BLD: 1 %
LDLC SERPL CALC-MCNC: 68 MG/DL
LYMPHOCYTES # BLD AUTO: 2.3 10E3/UL (ref 0.8–5.3)
LYMPHOCYTES NFR BLD AUTO: 32 %
MCH RBC QN AUTO: 28.7 PG (ref 26.5–33)
MCHC RBC AUTO-ENTMCNC: 33.4 G/DL (ref 31.5–36.5)
MCV RBC AUTO: 86 FL (ref 78–100)
MICROALBUMIN UR-MCNC: 53.8 MG/L
MICROALBUMIN/CREAT UR: 28.18 MG/G CR (ref 0–17)
MONOCYTES # BLD AUTO: 0.6 10E3/UL (ref 0–1.3)
MONOCYTES NFR BLD AUTO: 9 %
NEUTROPHILS # BLD AUTO: 3.9 10E3/UL (ref 1.6–8.3)
NEUTROPHILS NFR BLD AUTO: 55 %
NONHDLC SERPL-MCNC: 113 MG/DL
NRBC # BLD AUTO: 0 10E3/UL
NRBC BLD AUTO-RTO: 0 /100
PLATELET # BLD AUTO: 201 10E3/UL (ref 150–450)
POTASSIUM SERPL-SCNC: 3.8 MMOL/L (ref 3.4–5.3)
PROT SERPL-MCNC: 7.4 G/DL (ref 6.4–8.3)
PSA SERPL-MCNC: 0.57 NG/ML (ref 0–3.5)
RBC # BLD AUTO: 5.01 10E6/UL (ref 4.4–5.9)
SODIUM SERPL-SCNC: 140 MMOL/L (ref 136–145)
TRIGL SERPL-MCNC: 226 MG/DL
TSH SERPL DL<=0.005 MIU/L-ACNC: 1.19 UIU/ML (ref 0.3–4.2)
WBC # BLD AUTO: 7.1 10E3/UL (ref 4–11)

## 2023-03-10 PROCEDURE — 36415 COLL VENOUS BLD VENIPUNCTURE: CPT

## 2023-03-10 PROCEDURE — 80050 GENERAL HEALTH PANEL: CPT

## 2023-03-10 PROCEDURE — 71271 CT THORAX LUNG CANCER SCR C-: CPT

## 2023-03-10 PROCEDURE — G0103 PSA SCREENING: HCPCS

## 2023-03-10 PROCEDURE — 83036 HEMOGLOBIN GLYCOSYLATED A1C: CPT

## 2023-03-10 PROCEDURE — 82043 UR ALBUMIN QUANTITATIVE: CPT

## 2023-03-10 PROCEDURE — 82570 ASSAY OF URINE CREATININE: CPT

## 2023-03-10 PROCEDURE — 80061 LIPID PANEL: CPT

## 2023-03-10 RX ORDER — GLIMEPIRIDE 1 MG/1
1 TABLET ORAL
Qty: 90 TABLET | Refills: 1 | Status: SHIPPED | OUTPATIENT
Start: 2023-03-10 | End: 2023-07-28 | Stop reason: ALTCHOICE

## 2023-03-10 NOTE — TELEPHONE ENCOUNTER
Lofibra    Last Written Prescription Date:  2/4/22  Last Fill Quantity: 90,   # refills: 3  Last Office Visit: 3/6/23  Future Office visit:    Next 5 appointments (look out 90 days)    Mar 17, 2023  2:30 PM  (Arrive by 2:15 PM)  SHORT with Yessenia Puentes MD  [facility] ([facility] ) [address] [place] [address]  [phone]         Zocor 40 mg      Last Written Prescription Date:  2/4/22  Last Fill Quantity: 90,   # refills: 3      Metformin      Last Written Prescription Date:  2/4/22  Last Fill Quantity: 90,   # refills: 3      Amaryl      Last Written Prescription Date:  2/4/22  Last Fill Quantity: 90,   # refills: 3      Cozaar      Last Written Prescription Date:  2/4/22  Last Fill Quantity: 90,   # refills: 3

## 2023-03-13 RX ORDER — LOSARTAN POTASSIUM 25 MG/1
12.5 TABLET ORAL DAILY
Qty: 45 TABLET | Refills: 3 | Status: SHIPPED | OUTPATIENT
Start: 2023-03-13 | End: 2023-07-28

## 2023-03-13 RX ORDER — FENOFIBRATE 54 MG/1
54 TABLET ORAL DAILY
Qty: 90 TABLET | Refills: 3 | Status: SHIPPED | OUTPATIENT
Start: 2023-03-13 | End: 2023-07-28

## 2023-03-13 RX ORDER — GLIMEPIRIDE 2 MG/1
2 TABLET ORAL
Qty: 90 TABLET | Refills: 3 | Status: SHIPPED | OUTPATIENT
Start: 2023-03-13 | End: 2023-07-28 | Stop reason: ALTCHOICE

## 2023-03-13 RX ORDER — METFORMIN HCL 500 MG
1000 TABLET, EXTENDED RELEASE 24 HR ORAL 2 TIMES DAILY WITH MEALS
Qty: 360 TABLET | Refills: 3 | Status: SHIPPED | OUTPATIENT
Start: 2023-03-13 | End: 2023-07-28

## 2023-03-13 RX ORDER — SIMVASTATIN 40 MG
TABLET ORAL
Qty: 90 TABLET | Refills: 3 | Status: SHIPPED | OUTPATIENT
Start: 2023-03-13 | End: 2023-07-28

## 2023-04-09 ENCOUNTER — HEALTH MAINTENANCE LETTER (OUTPATIENT)
Age: 58
End: 2023-04-09

## 2023-05-18 NOTE — NURSING NOTE
"Chief Complaint   Patient presents with     Diabetes     Depression       Initial /78 (BP Location: Right arm, Patient Position: Chair, Cuff Size: Adult Large)   Pulse 102   Temp 96.3  F (35.7  C) (Tympanic)   Wt 111.6 kg (246 lb)   SpO2 98%   BMI 31.58 kg/m   Estimated body mass index is 31.58 kg/m  as calculated from the following:    Height as of 3/29/19: 1.88 m (6' 2\").    Weight as of this encounter: 111.6 kg (246 lb).  Medication Reconciliation: complete     Brenda Hearn LPN      " Partial Purse String (Intermediate) Text: Given the location of the defect and the characteristics of the surrounding skin an intermediate purse string closure was deemed most appropriate.  Undermining was performed circumfirentially around the surgical defect.  A purse string suture was then placed and tightened. Wound tension only allowed a partial closure of the circular defect.

## 2023-06-12 PROBLEM — J44.9 CHRONIC OBSTRUCTIVE PULMONARY DISEASE, UNSPECIFIED COPD TYPE (H): Status: ACTIVE | Noted: 2023-03-06

## 2023-06-16 ENCOUNTER — LAB (OUTPATIENT)
Dept: LAB | Facility: OTHER | Age: 58
End: 2023-06-16
Payer: COMMERCIAL

## 2023-06-16 DIAGNOSIS — E11.9 TYPE 2 DIABETES MELLITUS WITHOUT COMPLICATION, WITHOUT LONG-TERM CURRENT USE OF INSULIN (H): ICD-10-CM

## 2023-06-16 DIAGNOSIS — E78.5 HYPERLIPIDEMIA, UNSPECIFIED HYPERLIPIDEMIA TYPE: ICD-10-CM

## 2023-06-16 LAB
HOLD SPECIMEN: NORMAL
HOLD SPECIMEN: NORMAL

## 2023-06-16 PROCEDURE — 80061 LIPID PANEL: CPT

## 2023-06-16 PROCEDURE — 80048 BASIC METABOLIC PNL TOTAL CA: CPT

## 2023-06-16 PROCEDURE — 83036 HEMOGLOBIN GLYCOSYLATED A1C: CPT

## 2023-06-16 PROCEDURE — 36415 COLL VENOUS BLD VENIPUNCTURE: CPT

## 2023-06-18 ENCOUNTER — TELEPHONE (OUTPATIENT)
Dept: FAMILY MEDICINE | Facility: OTHER | Age: 58
End: 2023-06-18

## 2023-06-18 DIAGNOSIS — Z86.0100 HISTORY OF COLONIC POLYPS: ICD-10-CM

## 2023-06-18 DIAGNOSIS — E78.5 HYPERLIPIDEMIA, UNSPECIFIED HYPERLIPIDEMIA TYPE: ICD-10-CM

## 2023-06-18 DIAGNOSIS — Z90.49 S/P LEFT COLECTOMY: ICD-10-CM

## 2023-06-18 DIAGNOSIS — E11.9 TYPE 2 DIABETES MELLITUS WITHOUT COMPLICATION, WITHOUT LONG-TERM CURRENT USE OF INSULIN (H): Primary | ICD-10-CM

## 2023-06-19 LAB
ANION GAP SERPL CALCULATED.3IONS-SCNC: 13 MMOL/L (ref 7–15)
BUN SERPL-MCNC: 14.8 MG/DL (ref 6–20)
CALCIUM SERPL-MCNC: 9.6 MG/DL (ref 8.6–10)
CHLORIDE SERPL-SCNC: 102 MMOL/L (ref 98–107)
CHOLEST SERPL-MCNC: 162 MG/DL
CREAT SERPL-MCNC: 0.92 MG/DL (ref 0.67–1.17)
DEPRECATED HCO3 PLAS-SCNC: 25 MMOL/L (ref 22–29)
EST. AVERAGE GLUCOSE BLD GHB EST-MCNC: 206 MG/DL
GFR SERPL CREATININE-BSD FRML MDRD: >90 ML/MIN/1.73M2
GLUCOSE SERPL-MCNC: 185 MG/DL (ref 70–99)
HBA1C MFR BLD: 8.8 %
HDLC SERPL-MCNC: 32 MG/DL
LDLC SERPL CALC-MCNC: 83 MG/DL
NONHDLC SERPL-MCNC: 130 MG/DL
POTASSIUM SERPL-SCNC: 4.3 MMOL/L (ref 3.4–5.3)
SODIUM SERPL-SCNC: 140 MMOL/L (ref 136–145)
TRIGL SERPL-MCNC: 235 MG/DL

## 2023-06-19 NOTE — TELEPHONE ENCOUNTER
----- Message from Belen Argueta RN sent at 2023  9:12 AM CDT -----  Regardin Yr Colonoscopy S/P partial sigmoid colectomy was due 3/2022.  HiDr. Pham-     I was reviewing this chart for COPD reasons but then noted this patient was recommended to have a repeat colonoscopy s/p 1 year from his 3/2021 partial sigmoid colectomy, which would have made him due 3/2022. But, during 3/2022, he ended up having a ventral hernia repair instead.     For your awareness, I wanted to notify you that this patient is approx. 1 year 3 months overdue for a repeat colonoscopy.     Thank you,   Belen Saleem

## 2023-06-19 NOTE — TELEPHONE ENCOUNTER
Spoke with patient, okay with referral for colonoscopy. Referral pended.   While I was on the phone he inquired about his lab work from Friday. He went for a a1c and cholesterol check, no orders/request for orders that I can see. Did you receive a request from lab? I check with lab to see if they still have his tubes?

## 2023-06-20 DIAGNOSIS — E11.9 TYPE 2 DIABETES MELLITUS WITHOUT COMPLICATION, WITHOUT LONG-TERM CURRENT USE OF INSULIN (H): Primary | ICD-10-CM

## 2023-06-20 RX ORDER — GLIMEPIRIDE 4 MG/1
4 TABLET ORAL
Qty: 90 TABLET | Refills: 1 | Status: SHIPPED | OUTPATIENT
Start: 2023-06-20 | End: 2023-07-28

## 2023-06-20 NOTE — TELEPHONE ENCOUNTER
Spoke with patient. He only has a few of the 1mg of the Amaryl left and is requesting a new prescription of the Amaryl 4mg be sent. Rx pended.

## 2023-06-20 NOTE — TELEPHONE ENCOUNTER
----- Message from Zoraida Pham MD sent at 6/19/2023  9:07 PM CDT -----  Notify of results.  A1c is up at 8.8%.  Glucose was 185.  Lipids overall stable from last year.  On max metformin and 3 mg Amaryl.  Is due for follow up.  Please schedule.  In the meantime, please have him increase Amaryl to 4 mg daily.  Can take all at once or split 2 mg twice daily with meals.

## 2023-06-21 ENCOUNTER — TELEPHONE (OUTPATIENT)
Dept: SURGERY | Facility: OTHER | Age: 58
End: 2023-06-21

## 2023-06-21 NOTE — TELEPHONE ENCOUNTER
Patient was called to schedule consult with General Surgery Department from referral.      6/21-letter sent to patient to call and schedule    Ana Rosa Lofton

## 2023-06-23 ENCOUNTER — TELEPHONE (OUTPATIENT)
Dept: FAMILY MEDICINE | Facility: OTHER | Age: 58
End: 2023-06-23

## 2023-07-27 NOTE — PROGRESS NOTES
Assessment & Plan     Type 2 diabetes mellitus with hyperglycemia, with long-term current use of insulin (H)  Not checking sugars so difficult to assess control.  Requested he check at least periodically - couple times per week or few days per month - fasting and post prandial.  A1c last month above goal.  Scheduled another lab in 2 months -for 3 month interval.  Likely increase amaryl if above goal still due to cost of other medications.  Continue current medications.  Eye referral placed.  Foot exam completed.  - Adult Eye  Referral; Future  - fenofibrate (LOFIBRA) 54 MG tablet; Take 1 tablet (54 mg) by mouth daily  - losartan (COZAAR) 25 MG tablet; Take 0.5 tablets (12.5 mg) by mouth daily  - Z FOOT EXAM  NO CHARGE  - Hemoglobin A1c; Future  - Comprehensive metabolic panel (BMP + Alb, Alk Phos, ALT, AST, Total. Bili, TP); Future    Hyperlipidemia, unspecified hyperlipidemia type  Some side effects of combination of Lofibra and zocor.  Change zocor to lipitor.  - fenofibrate (LOFIBRA) 54 MG tablet; Take 1 tablet (54 mg) by mouth daily  - rosuvastatin (CRESTOR) 20 MG tablet; Take 1 tablet (20 mg) by mouth daily    Chronic obstructive pulmonary disease, unspecified COPD type (H)  Stable.  Occasional prn use Albuterol.  Former smoker.  - albuterol (PROAIR HFA/PROVENTIL HFA/VENTOLIN HFA) 108 (90 Base) MCG/ACT inhaler; Inhale 2 puffs into the lungs every 6 hours as needed for shortness of breath, wheezing or cough    MIRIAN (obstructive sleep apnea)    Special screening for malignant neoplasms, colon  asymptomatic  - Colonoscopy Screening  Referral; Future    S/P FESS (functional endoscopic sinus surgery)  Good response.    Type 2 diabetes mellitus without complication, without long-term current use of insulin (H)  As above  - glimepiride (AMARYL) 4 MG tablet; Take 1 tablet (4 mg) by mouth every morning (before breakfast)  - metFORMIN (GLUCOPHAGE XR) 500 MG 24 hr tablet; Take 2 tablets (1,000 mg) by  mouth 2 times daily (with meals) TAKE 2 TABLETS BY MOUTH TWICE DAILY WITH MEALS    Phlegm in throat  Sinus drainage?  Discussed hydration, oral cares.  Add mucinex.  Given worsening and duration - try course of Augmentin to treat contributing sinusitis.  - guaiFENesin (MUCINEX) 600 MG 12 hr tablet; Take 2 tablets (1,200 mg) by mouth 2 times daily  - amoxicillin-clavulanate (AUGMENTIN) 875-125 MG tablet; Take 1 tablet by mouth 2 times daily for 7 days    Plantar fasciitis of right foot  Reviewed home cares.  Consider podiatry consult.  See educational handout.       See Patient Instructions    Return in about 2 months (around 9/28/2023) for lab only in 2 months; follow up visit in 5 months.    Zoraida Bahena MD  [facility] - MIRA Pollard is a 57 year old, presenting for the following health issues:  Diabetes, Lipids, and COPD    HPI     Colon screen - agreeable     Has tons of phlegm would like something if possible can help.  In throat.  Worse at night.  Tried gargling.  Compliant with sinus rinse.  No tobacco.  No heart burn.    Right foot pain - plantar - worse first few steps morning or getting out truck.  Cramping at times.  No swelling or masses.    Diabetes Follow-up  How often are you checking your blood sugar? Not at all  What concerns do you have today about your diabetes? None   Do you have any of these symptoms? (Select all that apply)  No numbness or tingling in feet.  No redness, sores or blisters on feet.  No complaints of excessive thirst.  No reports of blurry vision.  No significant changes to weight.  Have you had a diabetic eye exam in the last 12 months? No  Eye exam - Levi  Foot exam - agreeable   Max metformin  Amaryl increased to 4 mg 6/2023  No lows      BP Readings from Last 2 Encounters:   07/28/23 130/80   03/06/23 129/77     Hemoglobin A1C (%)   Date Value   06/16/2023 8.8 (H)   03/10/2023 7.2 (H)   01/14/2022 6.0 (A)   01/22/2021 7.7 (H)     LDL  "Cholesterol Calculated (mg/dL)   Date Value   06/16/2023 83   03/10/2023 68   01/22/2021     Cannot estimate LDL when triglyceride exceeds 400 mg/dL   11/20/2020     Cannot estimate LDL when triglyceride exceeds 400 mg/dL     LDL Cholesterol Direct (mg/dL)   Date Value   01/22/2021 126 (H)       Hyperlipidemia Follow-Up  Are you regularly taking any medication or supplement to lower your cholesterol?   Yes- Simvastatin 40mg  Are you having muscle aches or other side effects that you think could be caused by your cholesterol lowering medication?  Yes- to muscle aches    COPD Follow-Up  Overall, how are your COPD symptoms since your last clinic visit?   Not Diagnosed with COPD  How much fatigue or shortness of breath do you have when you are walking?  Same as usual  How much shortness of breath do you have when you are resting?  None  How often do you cough? All the time  Have you noticed any change in your sputum/phlegm?  No  Have you experienced a recent fever? No  Please describe how far you can walk without stopping to rest:  Greater than 2 miles  How many flights of stairs are you able to walk up without stopping?  2  Have you had any Emergency Room Visits, Urgent Care Visits, or Hospital Admissions because of your COPD since your last office visit?  No  Albutero prn - few times per week.  Prior anoro ellipt too expensiev    History   Smoking Status    Former    Packs/day: 1.50    Years: 33.00    Types: Dip, chew, snus or snuff, Cigarettes    Start date: 1/1/1983    Quit date: 1/1/2016   Smokeless Tobacco    Former    Types: Chew    Quit date: 2/14/2020     No results found for: FEV1, [id number]    Review of Systems   Constitutional, HEENT, cardiovascular, pulmonary, gi and gu systems are negative, except as otherwise noted.      Objective    /80 (BP Location: Right arm, Patient Position: Sitting, Cuff Size: Adult Regular)   Pulse 82   Temp 97.4  F (36.3  C) (Tympanic)   Ht 1.88 m (6' 2\")   Wt 115.5 kg " (254 lb 11.2 oz)   SpO2 95%   BMI 32.70 kg/m    Body mass index is 32.7 kg/m .  Physical Exam   GENERAL: healthy, alert and no distress  EYES: Eyes grossly normal to inspection, PERRL and conjunctivae and sclerae normal  HENT: ear canals and TM's normal, nose and mouth without ulcers or lesions  NECK: no adenopathy, no asymmetry, masses, or scars and thyroid normal to palpation  RESP: lungs clear to auscultation - no rales, rhonchi or wheezes  CV: regular rate and rhythm, normal S1 S2, no S3 or S4, no murmur, click or rub, no peripheral edema and peripheral pulses strong  ABDOMEN: soft, nontender, no hepatosplenomegaly, no masses and bowel sounds normal  MS: no gross musculoskeletal defects noted, no edema  PSYCH: mentation appears normal, affect normal/bright  Diabetic foot exam: normal DP and PT pulses, no trophic changes or ulcerative lesions, normal sensory exam, and normal monofilament exam  Tender to palpation right plantar fascia.    Prior labs reviewed.

## 2023-07-28 ENCOUNTER — OFFICE VISIT (OUTPATIENT)
Dept: FAMILY MEDICINE | Facility: OTHER | Age: 58
End: 2023-07-28
Attending: FAMILY MEDICINE
Payer: COMMERCIAL

## 2023-07-28 VITALS
HEART RATE: 82 BPM | TEMPERATURE: 97.4 F | HEIGHT: 74 IN | SYSTOLIC BLOOD PRESSURE: 130 MMHG | BODY MASS INDEX: 32.69 KG/M2 | OXYGEN SATURATION: 95 % | WEIGHT: 254.7 LBS | DIASTOLIC BLOOD PRESSURE: 80 MMHG

## 2023-07-28 DIAGNOSIS — E11.65 TYPE 2 DIABETES MELLITUS WITH HYPERGLYCEMIA, WITH LONG-TERM CURRENT USE OF INSULIN (H): Primary | ICD-10-CM

## 2023-07-28 DIAGNOSIS — M72.2 PLANTAR FASCIITIS OF RIGHT FOOT: ICD-10-CM

## 2023-07-28 DIAGNOSIS — R09.89 PHLEGM IN THROAT: ICD-10-CM

## 2023-07-28 DIAGNOSIS — Z79.4 TYPE 2 DIABETES MELLITUS WITH HYPERGLYCEMIA, WITH LONG-TERM CURRENT USE OF INSULIN (H): Primary | ICD-10-CM

## 2023-07-28 DIAGNOSIS — G47.33 OSA (OBSTRUCTIVE SLEEP APNEA): ICD-10-CM

## 2023-07-28 DIAGNOSIS — J44.9 CHRONIC OBSTRUCTIVE PULMONARY DISEASE, UNSPECIFIED COPD TYPE (H): ICD-10-CM

## 2023-07-28 DIAGNOSIS — E78.5 HYPERLIPIDEMIA, UNSPECIFIED HYPERLIPIDEMIA TYPE: ICD-10-CM

## 2023-07-28 DIAGNOSIS — Z12.11 SPECIAL SCREENING FOR MALIGNANT NEOPLASMS, COLON: ICD-10-CM

## 2023-07-28 DIAGNOSIS — Z98.890 S/P FESS (FUNCTIONAL ENDOSCOPIC SINUS SURGERY): ICD-10-CM

## 2023-07-28 DIAGNOSIS — E11.9 TYPE 2 DIABETES MELLITUS WITHOUT COMPLICATION, WITHOUT LONG-TERM CURRENT USE OF INSULIN (H): ICD-10-CM

## 2023-07-28 PROCEDURE — 99214 OFFICE O/P EST MOD 30 MIN: CPT | Performed by: FAMILY MEDICINE

## 2023-07-28 RX ORDER — GLIMEPIRIDE 4 MG/1
4 TABLET ORAL
Qty: 90 TABLET | Refills: 1 | Status: SHIPPED | OUTPATIENT
Start: 2023-07-28 | End: 2024-06-28

## 2023-07-28 RX ORDER — GUAIFENESIN 600 MG/1
1200 TABLET, EXTENDED RELEASE ORAL 2 TIMES DAILY
Qty: 60 TABLET | Refills: 3 | Status: SHIPPED | OUTPATIENT
Start: 2023-07-28

## 2023-07-28 RX ORDER — SIMVASTATIN 40 MG
TABLET ORAL
Qty: 90 TABLET | Refills: 3 | Status: CANCELLED | OUTPATIENT
Start: 2023-07-28

## 2023-07-28 RX ORDER — METFORMIN HCL 500 MG
1000 TABLET, EXTENDED RELEASE 24 HR ORAL 2 TIMES DAILY WITH MEALS
Qty: 360 TABLET | Refills: 3 | Status: SHIPPED | OUTPATIENT
Start: 2023-07-28 | End: 2024-07-15

## 2023-07-28 RX ORDER — LOSARTAN POTASSIUM 25 MG/1
12.5 TABLET ORAL DAILY
Qty: 45 TABLET | Refills: 3 | Status: SHIPPED | OUTPATIENT
Start: 2023-07-28 | End: 2024-08-26

## 2023-07-28 RX ORDER — FLUTICASONE PROPIONATE 50 MCG
2 SPRAY, SUSPENSION (ML) NASAL 2 TIMES DAILY
Qty: 16 G | Refills: 12 | Status: CANCELLED | OUTPATIENT
Start: 2023-07-28

## 2023-07-28 RX ORDER — FENOFIBRATE 54 MG/1
54 TABLET ORAL DAILY
Qty: 90 TABLET | Refills: 3 | Status: SHIPPED | OUTPATIENT
Start: 2023-07-28 | End: 2024-08-26

## 2023-07-28 RX ORDER — ALBUTEROL SULFATE 90 UG/1
2 AEROSOL, METERED RESPIRATORY (INHALATION) EVERY 6 HOURS PRN
Qty: 18 G | Refills: 1 | Status: SHIPPED | OUTPATIENT
Start: 2023-07-28

## 2023-07-28 RX ORDER — ROSUVASTATIN CALCIUM 20 MG/1
20 TABLET, COATED ORAL DAILY
Qty: 90 TABLET | Refills: 3 | Status: SHIPPED | OUTPATIENT
Start: 2023-07-28 | End: 2024-07-15

## 2023-07-28 NOTE — PATIENT INSTRUCTIONS
Glucose goals   Fasting   2 hours after meals <180    A1c is due in 2 months.    Medications refilled.  Change Zocor to Crestor for lipids and see if muscle aches improve.    See info on plantar fasciitis.    Colon referral placed.  Eye referral placed.    Antibiotic course - Augmentin.  Add Mucinex.

## 2023-07-31 ENCOUNTER — TELEPHONE (OUTPATIENT)
Dept: FAMILY MEDICINE | Facility: OTHER | Age: 58
End: 2023-07-31

## 2023-08-17 NOTE — PATIENT INSTRUCTIONS
Continue rinses 2 times daily  Will talk with Dr Chacon re: immunotherapy     Thank you for allowing Cynthia SELF and our ENT team to participate in your care.  If your medications are too expensive, please give the nurse a call.  We can possibly change this medication.  If you have a scheduling or an appointment question please contact our Health Unit Coordinator at their direct line [phone].   ALL nursing questions or concerns can be directed to your ENT nurse at: Marielena [phone]   Olanzapine Pregnancy And Lactation Text: This medication is pregnancy category C.   There are no adequate and well controlled trials with olanzapine in pregnant females.  Olanzapine should be used during pregnancy only if the potential benefit justifies the potential risk to the fetus.   In a study in lactating healthy women, olanzapine was excreted in breast milk.  It is recommended that women taking olanzapine should not breast feed.

## 2023-10-10 NOTE — PROGRESS NOTES
Patient was offered choice of vendor and chose [facility].  Patient Atul Lam was set up at virtual set up via telephone visit on December 29, 2020. Patient received a Resmed AirSense 10 Auto. Pressures were set at 5-15 cm H2O.   Patient s ramp is  Off and FLEX/EPR is EPR 2.  Patient received a Resmed Mask name: Airfit N20  Nasal mask size Medium, heated tubing and heated humidifier.  Patient does need to meet compliance. Patient has a follow up on TBD with Dr. Lofton.    Urbano Del Cid    Cimetidine Counseling:  I discussed with the patient the risks of Cimetidine including but not limited to gynecomastia, headache, diarrhea, nausea, drowsiness, arrhythmias, pancreatitis, skin rashes, psychosis, bone marrow suppression and kidney toxicity.

## 2023-11-18 ENCOUNTER — HEALTH MAINTENANCE LETTER (OUTPATIENT)
Age: 58
End: 2023-11-18

## 2023-11-30 NOTE — PROGRESS NOTES
Assessment & Plan     Chronic pansinusitis  - budesonide (PULMICORT) 0.5 MG/2ML neb solution; Squirt entire vial into previously made preet med saline bottle, mix, and irrigate each nostril until entire bottle empty.  Do this twice daily.    Acute sinusitis with symptoms > 10 days  - amoxicillin-clavulanate (AUGMENTIN) 875-125 MG tablet; Take 1 tablet by mouth 2 times daily for 10 days    Nasal polyp  - budesonide (PULMICORT) 0.5 MG/2ML neb solution; Squirt entire vial into previously made preet med saline bottle, mix, and irrigate each nostril until entire bottle empty.  Do this twice daily.    Augmentin.  Restart sinus irrigation.  Follow-up if worsening, not improving as anticipated/discussed, or any new symptoms/concerns.     KHANH FERNÁNDEZ DO  [facility] - MIRA Pollard is a 58 year old, presenting for the following health issues:  Cough        12/1/2023     9:50 AM   Additional Questions   Roomed by Margarita Walls CMA   Accompanied by Spouse         12/1/2023     9:50 AM   Patient Reported Additional Medications   Patient reports taking the following new medications None       HPI       Acute Illness  Acute illness concerns: sinus pressure, drainage and cough  Onset/Duration: month  Symptoms:  Fever: No  Chills/Sweats: No  Headache (location?): No  Sinus Pressure: YES  Conjunctivitis:  No  Ear Pain: no  Rhinorrhea: YES  Congestion: No  Sore Throat: YES - from drainage  Cough: YES-coughing up drainage  Wheeze: No  Decreased Appetite: No  Nausea: No  Vomiting: No  Diarrhea: No  Dysuria/Freq.: No  Dysuria or Hematuria: No  Fatigue/Achiness: No  Sick/Strep Exposure: No  Therapies tried and outcome: None  Has chronic sinusitis/polyps, not using his budesonide rinse.        Review of Systems   Constitutional:  Negative for fever.   Gastrointestinal:  Negative for abdominal pain.            Objective    /80   Pulse 78   Temp 97.1  F (36.2  C) (Tympanic)   Wt 114.8 kg (253 lb)    SpO2 95%   BMI 32.48 kg/m    Body mass index is 32.48 kg/m .  Physical Exam  Constitutional:       General: He is not in acute distress.     Appearance: Normal appearance.   HENT:      Head: Normocephalic and atraumatic.      Right Ear: Tympanic membrane normal.      Left Ear: Tympanic membrane normal.      Nose:      Comments: Swollen boggy nasal mucosa     Mouth/Throat:      Mouth: Mucous membranes are moist.      Pharynx: Oropharynx is clear.   Eyes:      Conjunctiva/sclera: Conjunctivae normal.      Pupils: Pupils are equal, round, and reactive to light.   Cardiovascular:      Rate and Rhythm: Normal rate and regular rhythm.      Heart sounds: No murmur heard.  Pulmonary:      Effort: Pulmonary effort is normal.      Breath sounds: Normal breath sounds. No wheezing, rhonchi or rales.   Lymphadenopathy:      Cervical: No cervical adenopathy.   Neurological:      Mental Status: He is alert and oriented to person, place, and time.

## 2023-12-01 ENCOUNTER — OFFICE VISIT (OUTPATIENT)
Dept: FAMILY MEDICINE | Facility: OTHER | Age: 58
End: 2023-12-01
Attending: FAMILY MEDICINE
Payer: COMMERCIAL

## 2023-12-01 VITALS
HEART RATE: 78 BPM | DIASTOLIC BLOOD PRESSURE: 80 MMHG | WEIGHT: 253 LBS | BODY MASS INDEX: 32.48 KG/M2 | OXYGEN SATURATION: 95 % | TEMPERATURE: 97.1 F | SYSTOLIC BLOOD PRESSURE: 130 MMHG

## 2023-12-01 DIAGNOSIS — J01.90 ACUTE SINUSITIS WITH SYMPTOMS > 10 DAYS: ICD-10-CM

## 2023-12-01 DIAGNOSIS — J33.9 NASAL POLYP: ICD-10-CM

## 2023-12-01 DIAGNOSIS — J32.4 CHRONIC PANSINUSITIS: Primary | ICD-10-CM

## 2023-12-01 PROCEDURE — 99213 OFFICE O/P EST LOW 20 MIN: CPT | Performed by: FAMILY MEDICINE

## 2023-12-01 RX ORDER — BUDESONIDE 0.5 MG/2ML
INHALANT ORAL
Qty: 60 ML | Refills: 0 | Status: SHIPPED | OUTPATIENT
Start: 2023-12-01 | End: 2024-10-04

## 2023-12-01 ASSESSMENT — ENCOUNTER SYMPTOMS
ABDOMINAL PAIN: 0
FEVER: 0

## 2023-12-19 NOTE — PATIENT INSTRUCTIONS
Marcy Sanchez  [address]  [facility] [address]    Your procedure is on 1/17/2024 Anticipated Arrival Time 8:15 AM  Location: [address] [place]  Procedure Name:  Sacroiliac Injection, Bilateral     We understand that you are looking forward to pain relief, yet your safety and comfort is important to us during your procedure.  Your care team will assess you the day of your procedure.     If any of the below occur on the day of the procedure, you may be asked to reschedule:   low or high blood pressure   low or high blood sugar  irregular heartbeat   recent or current infection   not holding certain medications as instructed  not fasting (eating or drinking) for procedures that you are getting sedation for  any other medical reason which your care team feels it is best to delay your procedure    On the day of your procedure we are limited in our ability to manage your chronic pain medications. We are only able to provide a one time refill of existing medications that are not controlled substances and are at stable dosing. If you have questions regarding other medication options, or your medication is not working, we will facilitate a follow up appointment in office. Thank you for your understanding.    The time listed above is an estimated time. Our pre-procedural department will call you 1-2 days prior to your procedure to confirm your arrival time.  Please note- if your procedure is scheduled near the end of the day, you may be asked to move your procedure to an earlier time due to changes in the schedule and/or possibly reschedule to a different date.    If you are receiving sedation (something to help you relax) or having an epidural steroid injection, you must have a responsible adult  to take you home after your procedure. If you do not have a responsible adult , your procedure may be cancelled. If you choose to take the bus, cab, Uber, etc. and do not have  Will call with lab results.  Medications refilled.     a responsible adult to accompany you, your procedure may be cancelled.    Is a  needed?: No    Please notify the office immediately if:  You develop an infection of any kind  You have a fever within 7 days of your procedure  You start any new medications since you were seen in the office by the pain management provider, including antibiotics,   Received any dose of the COVID vaccine in the past two weeks or planning on getting a dosage within the next 2 weeks   No longer have any pain and wish to cancel the procedure    According to [facility]’s cancellation policy, if you need to change a scheduled appointment, you must call the Pain Management Department  at least 72 hours before your appointment to cancel or reschedule.     Marcy is scheduled with local anesthetic. Patient is educated that it is not necessary to withhold from eating or drinking the day of their procedure.    You do not have any medications that need to be stopped for this procedure.    Medication Name and Days to Hold:  Writer reviewed all patient medications. Patient was educated to continue medications due to no required medication hold for scheduled procedure.     Do you have any history of bleeding or clotting disorders? No    If you have a medical implant, please bring your remote control to turn off the device.     Please contact your insurance to verify benefits/coverage. If you have any insurance changes prior to your scheduled procedure, please contact our office.     Please note that an authorization/pre-approval obtained by our team is not a guarantee of payment. Also, pain diaries may be a requirement post-procedure, in order for the next procedure to be done. Any additional financial or billing questions, you can call our Patient Contact Center at [phone].    If you have any questions regarding these instructions, please call [facility]-Pain Management at [phone].

## 2024-01-03 ENCOUNTER — TELEPHONE (OUTPATIENT)
Dept: FAMILY MEDICINE | Facility: OTHER | Age: 59
End: 2024-01-03

## 2024-01-22 NOTE — TELEPHONE ENCOUNTER
Pt has an appt with Dm Ed 02/08/19, please sign the pended referral.  Shobha Salcido     Pt interested in [employer], they already spoke w/ Nadine on Friday.

## 2024-03-18 ENCOUNTER — TELEPHONE (OUTPATIENT)
Dept: FAMILY MEDICINE | Facility: OTHER | Age: 59
End: 2024-03-18

## 2024-04-06 ENCOUNTER — HEALTH MAINTENANCE LETTER (OUTPATIENT)
Age: 59
End: 2024-04-06

## 2024-06-15 ENCOUNTER — HEALTH MAINTENANCE LETTER (OUTPATIENT)
Age: 59
End: 2024-06-15

## 2024-06-28 DIAGNOSIS — E11.9 TYPE 2 DIABETES MELLITUS WITHOUT COMPLICATION, WITHOUT LONG-TERM CURRENT USE OF INSULIN (H): ICD-10-CM

## 2024-06-28 RX ORDER — GLIMEPIRIDE 4 MG/1
4 TABLET ORAL
Qty: 90 TABLET | Refills: 0 | Status: SHIPPED | OUTPATIENT
Start: 2024-06-28 | End: 2024-07-31

## 2024-06-28 NOTE — TELEPHONE ENCOUNTER
Glimepiride 4 mg       Last Written Prescription Date:  7/28/23  Last Fill Quantity: 90,   # refills: 1  Last Office Visit: 12/1/23  Future Office visit:    Next 5 appointments (look out 90 days)      Jul 26, 2024 10:15 AM  (Arrive by 10:00 AM)  Provider Visit with Zoraida Pham MD  [facility] ([facility] ) [address]  [place] [address]  [phone]             Routing refill request to provider for review/approval because:  Sulfonylurea Agents Failed      Patient has documented A1c within the specified period of time.    If HgbA1C is 8 or greater, it needs to be on file within the past 3 months.  If less than 8, must be on file within the past 6 months.          Recent Labs   Lab Test 06/16/23  0754   A1C 8.8*       Has GFR on file in past 12 months and most recent value is normal      Patient has a recent creatinine (normal) within the past 12 mos.          Recent Labs   Lab Test 06/16/23  0754 04/04/18  1034 03/30/18  1042   CR 0.92   < >  --    CREAT  --   --  1.0    < > = values in this interval not displayed.

## 2024-07-13 DIAGNOSIS — E78.5 HYPERLIPIDEMIA, UNSPECIFIED HYPERLIPIDEMIA TYPE: ICD-10-CM

## 2024-07-13 DIAGNOSIS — E11.9 TYPE 2 DIABETES MELLITUS WITHOUT COMPLICATION, WITHOUT LONG-TERM CURRENT USE OF INSULIN (H): ICD-10-CM

## 2024-07-15 RX ORDER — METFORMIN HCL 500 MG
1000 TABLET, EXTENDED RELEASE 24 HR ORAL 2 TIMES DAILY WITH MEALS
Qty: 360 TABLET | Refills: 1 | Status: SHIPPED | OUTPATIENT
Start: 2024-07-15

## 2024-07-15 RX ORDER — ROSUVASTATIN CALCIUM 20 MG/1
20 TABLET, COATED ORAL DAILY
Qty: 90 TABLET | Refills: 1 | Status: SHIPPED | OUTPATIENT
Start: 2024-07-15

## 2024-07-15 NOTE — TELEPHONE ENCOUNTER
Crestor 20 mg       Last Written Prescription Date:  7/28/23  Last Fill Quantity: 90,   # refills: 3  Last Office Visit: 12/1/23  Future Office visit:    Next 5 appointments (look out 90 days)      Jul 26, 2024 10:15 AM  (Arrive by 10:00 AM)  Provider Visit with Zoraida Pham MD  [facility] [address]  [facility] [address]  [phone]             Routing refill request to provider for review/approval because:  Antihyperlipidemic agents Failed      LDL on file in the past 12 months        Metformin 500 mg       Last Written Prescription Date:  7/28/23  Last Fill Quantity: 360,   # refills: 3  Last Office Visit: 12/1/23  Future Office visit:    Next 5 appointments (look out 90 days)      Jul 26, 2024 10:15 AM  (Arrive by 10:00 AM)  Provider Visit with Zoraida Pham MD  [facility] ([facility] ) [address] [facility] [address]  [phone]             Routing refill request to provider for review/approval because:  Biguanide Agents Failed      Patient has documented A1c within the specified period of time.    If HgbA1C is 8 or greater, it needs to be on file within the past 3 months.  If less than 8, must be on file within the past 6 months.          Recent Labs   Lab Test 06/16/23  0754   A1C 8.8*       Has GFR on file in past 12 months and most recent value is normal

## 2024-07-18 NOTE — PROGRESS NOTES
Assessment & Plan     Type 2 diabetes mellitus with hyperglycemia, with long-term current use of insulin (H)  Updating labs.  Foot exam complete.  Eye referral placed.  Continue stat, ARB.  Continue Metformin, Amaryl.  - Adult Eye  Referral; Future  - Hemoglobin A1c; Future  - Comprehensive metabolic panel (BMP + Alb, Alk Phos, ALT, AST, Total. Bili, TP); Future  - Lipid Profile (Chol, Trig, HDL, LDL calc); Future  - Albumin Random Urine Quantitative with Creat Ratio; Future  - CBC with platelets and differential; Future  - TSH with free T4 reflex; Future  - Hemoglobin A1c  - Comprehensive metabolic panel (BMP + Alb, Alk Phos, ALT, AST, Total. Bili, TP)  - Lipid Profile (Chol, Trig, HDL, LDL calc)  - Albumin Random Urine Quantitative with Creat Ratio  - CBC with platelets and differential  - TSH with free T4 reflex  - Adult Eye  Referral; Future    Hyperlipidemia, unspecified hyperlipidemia type  Annual labs.  Continue Crestor 20 mg.  - Comprehensive metabolic panel (BMP + Alb, Alk Phos, ALT, AST, Total. Bili, TP); Future  - Lipid Profile (Chol, Trig, HDL, LDL calc); Future  - Comprehensive metabolic panel (BMP + Alb, Alk Phos, ALT, AST, Total. Bili, TP)  - Lipid Profile (Chol, Trig, HDL, LDL calc)    Chronic obstructive pulmonary disease, unspecified COPD type (H)  Stable.  Prn use inhaler.  - CBC with platelets and differential; Future  - CBC with platelets and differential    MIRIAN (obstructive sleep apnea)    Special screening for malignant neoplasms, colon  Overdue for follow up- referral placed  - Adult Colorectal Surgery  Referral; Future    Ventral hernia without obstruction or gangrene  Recurrent.  Very frustrated with ongoing hernia.    Wants second opinion.  Referral to specialty - colorectal surgeon.  - Adult Colorectal Surgery  Referral; Future    History of colonic polyps  As above.  - Adult Colorectal Surgery  Referral; Future    Screening for prostate  "cancer  Lab pending.  - PSA, screen; Future  - PSA, screen    Chronic sinusitis, unspecified location  Prior sinus surgery almost 5 years ago.  Treat acute flare with Augmentin.  Continue sinus rinses and nasal spray.  Referral back to ENT.  Defer CT until visit as concerns of cost and medical bills.  - amoxicillin-clavulanate (AUGMENTIN) 875-125 MG tablet; Take 1 tablet by mouth 2 times daily for 7 days  - Adult ENT  Referral; Future    Nasal polyposis  As above.  Likely have grown back.  - Adult ENT  Referral; Future    Vaccines updated - hep B and pcv 20.    The longitudinal plan of care for the diagnosis(es)/condition(s) as documented were addressed during this visit. Due to the added complexity in care, I will continue to support Atul in the subsequent management and with ongoing continuity of care.    BMI  Estimated body mass index is 31.48 kg/m  as calculated from the following:    Height as of this encounter: 1.88 m (6' 2\").    Weight as of this encounter: 111.2 kg (245 lb 3.2 oz).   Weight management plan: Discussed healthy diet and exercise guidelines      See Patient Instructions    No follow-ups on file.    Subjective   Atul is a 58 year old, presenting for the following health issues:  Recheck Medication        7/26/2024     9:34 AM   Additional Questions   Roomed by jose zuniga   Accompanied by self     History of Present Illness       Diabetes:   He presents for follow up of diabetes.  He is checking home blood glucose a few times a month.   He checks blood glucose before and after meals.  Blood glucose is sometimes over 200 and never under 70. He is aware of hypoglycemia symptoms including weakness.    He has no concerns regarding his diabetes at this time.   He is not experiencing numbness or burning in feet, excessive thirst, blurry vision, weight changes or redness, sores or blisters on feet. The patient has not had a diabetic eye exam in the last 12 months.          He eats 2-3 " servings of fruits and vegetables daily.He consumes 0 sweetened beverage(s) daily.He exercises with enough effort to increase his heart rate 9 or less minutes per day.  He exercises with enough effort to increase his heart rate 3 or less days per week. He is missing 1 dose(s) of medications per week.  He is not taking prescribed medications regularly due to remembering to take.       Former smoker.    Colon screen - overdue; ventral hernia repair x 2 without success - Dr Roblero - complications    Vaccines - agreeable - Hep B and pneumonia    Sinuses - getting a lot of phlegm; draining at night; can't sleep; months or longer.  No fever.  Sometimes blood tinged.  Augmentin 12/2023 and 7/2023; short term relief.  Sinus surgery 1/2020; polypectomy.  Does sinus rinses intermittently.  With steroid.  Prior allergy testing.      Diabetes Follow-up  How often are you checking your blood sugar? Not at all  What concerns do you have today about your diabetes? None   Do you have any of these symptoms? (Select all that apply)  No numbness or tingling in feet.  No redness, sores or blisters on feet.  No complaints of excessive thirst.  No reports of blurry vision.  No significant changes to weight.  Have you had a diabetic eye exam in the last 12 months? No  Eye exam - due  Foot exam - no symptoms  Weight down  - seasonal  Has meter and supplies  Dexcom too expensive  Metformin max  Amaryl 4 mg AM  Lunch - vegetables, meat, 1/4 can cream soup, fresh fruit  Dinner - variable  Breakfast - 2 toast - sugar free jelly; sometimes PB        BP Readings from Last 2 Encounters:   07/26/24 112/74   12/01/23 130/80     Hemoglobin A1C (%)   Date Value   06/16/2023 8.8 (H)   03/10/2023 7.2 (H)   01/14/2022 6.0 (A)   01/22/2021 7.7 (H)     LDL Cholesterol Calculated (mg/dL)   Date Value   06/16/2023 83   03/10/2023 68   01/22/2021     Cannot estimate LDL when triglyceride exceeds 400 mg/dL   11/20/2020     Cannot estimate LDL when triglyceride  "exceeds 400 mg/dL     LDL Cholesterol Direct (mg/dL)   Date Value   01/22/2021 126 (H)             Hyperlipidemia Follow-Up -   Are you regularly taking any medication or supplement to lower your cholesterol?   Yes- Crestor   Are you having muscle aches or other side effects that you think could be caused by your cholesterol lowering medication?  Yes- muscle aches     COPD Follow-Up  Overall, how are your COPD symptoms since your last clinic visit?  No change  How much fatigue or shortness of breath do you have when you are walking?  Same as usual  How much shortness of breath do you have when you are resting?  None  How often do you cough? Often  Have you noticed any change in your sputum/phlegm?  Yes- a lot of phlegm- more than usual  sometimes when blowing nose he noticed a spot of blood   Have you experienced a recent fever? No  Please describe how far you can walk without stopping to rest:  2-5 blocks  How many flights of stairs are you able to walk up without stopping?  Unknown   Have you had any Emergency Room Visits, Urgent Care Visits, or Hospital Admissions because of your COPD since your last office visit?  No    History   Smoking Status    Former    Packs/day: 1.50    Years: 33.00    Types: Dip, chew, snus or snuff, Cigarettes    Start date: 1/1/1983    Quit date: 1/1/2016   Smokeless Tobacco    Former    Types: Chew    Quit date: 2/14/2020     No results found for: \"FEV1\", \"PES7OIN\"      Review of Systems  Constitutional, HEENT, cardiovascular, pulmonary, gi and gu systems are negative, except as otherwise noted.      Objective    /74 (BP Location: Right arm, Patient Position: Sitting, Cuff Size: Adult Large)   Pulse 84   Temp 97.2  F (36.2  C) (Tympanic)   Ht 1.88 m (6' 2\")   Wt 111.2 kg (245 lb 3.2 oz)   SpO2 93%   BMI 31.48 kg/m    Body mass index is 31.48 kg/m .  Physical Exam   GENERAL: alert and no distress  EYES: Eyes grossly normal to inspection, PERRL and conjunctivae and sclerae " normal  HENT: normal cephalic/atraumatic, ear canals and TM's normal, nasal mucosa edematous , rhinorrhea clear and yellow, oropharynx clear, and oral mucous membranes moist  NECK: no adenopathy, no asymmetry, masses, or scars  RESP: lungs clear to auscultation - no rales, rhonchi or wheezes  CV: regular rate and rhythm, normal S1 S2, no S3 or S4, no murmur, click or rub, no peripheral edema  ABDOMEN: soft, nontender, no hepatosplenomegaly, no masses and bowel sounds normal  MS: no gross musculoskeletal defects noted, no edema  SKIN: no suspicious lesions or rashes  NEURO: Normal strength and tone, mentation intact and speech normal  PSYCH: mentation appears normal, affect normal/bright  Diabetic foot exam: normal DP and PT pulses, no trophic changes or ulcerative lesions, normal sensory exam, normal monofilament exam, and long thick toenails    Labs pending        Signed Electronically by: Zoraida Bahena MD

## 2024-07-26 ENCOUNTER — OFFICE VISIT (OUTPATIENT)
Dept: FAMILY MEDICINE | Facility: OTHER | Age: 59
End: 2024-07-26
Attending: FAMILY MEDICINE
Payer: COMMERCIAL

## 2024-07-26 VITALS
BODY MASS INDEX: 31.47 KG/M2 | OXYGEN SATURATION: 93 % | WEIGHT: 245.2 LBS | SYSTOLIC BLOOD PRESSURE: 112 MMHG | HEART RATE: 84 BPM | HEIGHT: 74 IN | TEMPERATURE: 97.2 F | DIASTOLIC BLOOD PRESSURE: 74 MMHG

## 2024-07-26 DIAGNOSIS — E78.5 HYPERLIPIDEMIA, UNSPECIFIED HYPERLIPIDEMIA TYPE: ICD-10-CM

## 2024-07-26 DIAGNOSIS — J33.9 NASAL POLYPOSIS: ICD-10-CM

## 2024-07-26 DIAGNOSIS — J44.9 CHRONIC OBSTRUCTIVE PULMONARY DISEASE, UNSPECIFIED COPD TYPE (H): ICD-10-CM

## 2024-07-26 DIAGNOSIS — Z79.4 TYPE 2 DIABETES MELLITUS WITH HYPERGLYCEMIA, WITH LONG-TERM CURRENT USE OF INSULIN (H): Primary | ICD-10-CM

## 2024-07-26 DIAGNOSIS — Z12.11 SPECIAL SCREENING FOR MALIGNANT NEOPLASMS, COLON: ICD-10-CM

## 2024-07-26 DIAGNOSIS — Z12.5 SCREENING FOR PROSTATE CANCER: ICD-10-CM

## 2024-07-26 DIAGNOSIS — Z86.0100 HISTORY OF COLONIC POLYPS: ICD-10-CM

## 2024-07-26 DIAGNOSIS — G47.33 OSA (OBSTRUCTIVE SLEEP APNEA): ICD-10-CM

## 2024-07-26 DIAGNOSIS — J32.9 CHRONIC SINUSITIS, UNSPECIFIED LOCATION: ICD-10-CM

## 2024-07-26 DIAGNOSIS — E11.65 TYPE 2 DIABETES MELLITUS WITH HYPERGLYCEMIA, WITH LONG-TERM CURRENT USE OF INSULIN (H): Primary | ICD-10-CM

## 2024-07-26 DIAGNOSIS — K43.9 VENTRAL HERNIA WITHOUT OBSTRUCTION OR GANGRENE: ICD-10-CM

## 2024-07-26 LAB
ALBUMIN SERPL BCG-MCNC: 4.3 G/DL (ref 3.5–5.2)
ALP SERPL-CCNC: 64 U/L (ref 40–150)
ALT SERPL W P-5'-P-CCNC: 40 U/L (ref 0–70)
ANION GAP SERPL CALCULATED.3IONS-SCNC: 10 MMOL/L (ref 7–15)
AST SERPL W P-5'-P-CCNC: 29 U/L (ref 0–45)
BASOPHILS # BLD AUTO: 0.1 10E3/UL (ref 0–0.2)
BASOPHILS NFR BLD AUTO: 1 %
BILIRUB SERPL-MCNC: 0.3 MG/DL
BUN SERPL-MCNC: 17.2 MG/DL (ref 6–20)
CALCIUM SERPL-MCNC: 9.4 MG/DL (ref 8.8–10.4)
CHLORIDE SERPL-SCNC: 103 MMOL/L (ref 98–107)
CHOLEST SERPL-MCNC: 133 MG/DL
CREAT SERPL-MCNC: 0.86 MG/DL (ref 0.67–1.17)
CREAT UR-MCNC: 161 MG/DL
EGFRCR SERPLBLD CKD-EPI 2021: >90 ML/MIN/1.73M2
EOSINOPHIL # BLD AUTO: 0.4 10E3/UL (ref 0–0.7)
EOSINOPHIL NFR BLD AUTO: 8 %
ERYTHROCYTE [DISTWIDTH] IN BLOOD BY AUTOMATED COUNT: 13.5 % (ref 10–15)
EST. AVERAGE GLUCOSE BLD GHB EST-MCNC: 272 MG/DL
FASTING STATUS PATIENT QL REPORTED: YES
FASTING STATUS PATIENT QL REPORTED: YES
GLUCOSE SERPL-MCNC: 277 MG/DL (ref 70–99)
HBA1C MFR BLD: 11.1 %
HCO3 SERPL-SCNC: 26 MMOL/L (ref 22–29)
HCT VFR BLD AUTO: 41.6 % (ref 40–53)
HDLC SERPL-MCNC: 33 MG/DL
HGB BLD-MCNC: 14.3 G/DL (ref 13.3–17.7)
IMM GRANULOCYTES # BLD: 0 10E3/UL
IMM GRANULOCYTES NFR BLD: 1 %
LDLC SERPL CALC-MCNC: 52 MG/DL
LYMPHOCYTES # BLD AUTO: 1.7 10E3/UL (ref 0.8–5.3)
LYMPHOCYTES NFR BLD AUTO: 30 %
MCH RBC QN AUTO: 28.5 PG (ref 26.5–33)
MCHC RBC AUTO-ENTMCNC: 34.4 G/DL (ref 31.5–36.5)
MCV RBC AUTO: 83 FL (ref 78–100)
MICROALBUMIN UR-MCNC: 306.2 MG/L
MICROALBUMIN/CREAT UR: 190.19 MG/G CR (ref 0–17)
MONOCYTES # BLD AUTO: 0.5 10E3/UL (ref 0–1.3)
MONOCYTES NFR BLD AUTO: 8 %
NEUTROPHILS # BLD AUTO: 3 10E3/UL (ref 1.6–8.3)
NEUTROPHILS NFR BLD AUTO: 53 %
NONHDLC SERPL-MCNC: 100 MG/DL
NRBC # BLD AUTO: 0 10E3/UL
NRBC BLD AUTO-RTO: 0 /100
PLATELET # BLD AUTO: 205 10E3/UL (ref 150–450)
POTASSIUM SERPL-SCNC: 4.2 MMOL/L (ref 3.4–5.3)
PROT SERPL-MCNC: 7.8 G/DL (ref 6.4–8.3)
PSA SERPL DL<=0.01 NG/ML-MCNC: 0.45 NG/ML (ref 0–3.5)
RBC # BLD AUTO: 5.02 10E6/UL (ref 4.4–5.9)
SODIUM SERPL-SCNC: 139 MMOL/L (ref 135–145)
TRIGL SERPL-MCNC: 241 MG/DL
TSH SERPL DL<=0.005 MIU/L-ACNC: 1.15 UIU/ML (ref 0.3–4.2)
WBC # BLD AUTO: 5.7 10E3/UL (ref 4–11)

## 2024-07-26 PROCEDURE — 82043 UR ALBUMIN QUANTITATIVE: CPT | Performed by: FAMILY MEDICINE

## 2024-07-26 PROCEDURE — 80050 GENERAL HEALTH PANEL: CPT | Performed by: FAMILY MEDICINE

## 2024-07-26 PROCEDURE — 90471 IMMUNIZATION ADMIN: CPT | Performed by: FAMILY MEDICINE

## 2024-07-26 PROCEDURE — 82570 ASSAY OF URINE CREATININE: CPT | Performed by: FAMILY MEDICINE

## 2024-07-26 PROCEDURE — 83036 HEMOGLOBIN GLYCOSYLATED A1C: CPT | Performed by: FAMILY MEDICINE

## 2024-07-26 PROCEDURE — 90472 IMMUNIZATION ADMIN EACH ADD: CPT | Performed by: FAMILY MEDICINE

## 2024-07-26 PROCEDURE — G0103 PSA SCREENING: HCPCS | Performed by: FAMILY MEDICINE

## 2024-07-26 PROCEDURE — 90677 PCV20 VACCINE IM: CPT | Performed by: FAMILY MEDICINE

## 2024-07-26 PROCEDURE — 80061 LIPID PANEL: CPT | Performed by: FAMILY MEDICINE

## 2024-07-26 PROCEDURE — 36415 COLL VENOUS BLD VENIPUNCTURE: CPT | Performed by: FAMILY MEDICINE

## 2024-07-26 PROCEDURE — 99214 OFFICE O/P EST MOD 30 MIN: CPT | Mod: 25 | Performed by: FAMILY MEDICINE

## 2024-07-26 PROCEDURE — 90746 HEPB VACCINE 3 DOSE ADULT IM: CPT | Performed by: FAMILY MEDICINE

## 2024-07-26 ASSESSMENT — PAIN SCALES - GENERAL: PAINLEVEL: NO PAIN (0)

## 2024-07-31 DIAGNOSIS — E11.9 TYPE 2 DIABETES MELLITUS WITHOUT COMPLICATION, WITHOUT LONG-TERM CURRENT USE OF INSULIN (H): ICD-10-CM

## 2024-07-31 RX ORDER — GLIMEPIRIDE 4 MG/1
4 TABLET ORAL
Qty: 180 TABLET | Refills: 0 | Status: SHIPPED | OUTPATIENT
Start: 2024-07-31

## 2024-07-31 NOTE — TELEPHONE ENCOUNTER
Refill signed, but he will also likely need more than the Metformin and Amaryl.  If not willing to see DRC - is he willing to add injectable GLP1 such as Ozempic?  Can have him call insurance to check for cost/coverage/preferred choice.

## 2024-07-31 NOTE — TELEPHONE ENCOUNTER
----- Message from Zoraida Bahena sent at 7/31/2024  7:42 AM CDT -----  Contact with un viewed result not info

## 2024-07-31 NOTE — TELEPHONE ENCOUNTER
He will not add anything else at this time. States he will not check with insurance on an injectable and he is not interested. Is going to work on his diet and eating habits instead.   He will call if he changes his mind and wants to consider something else.

## 2024-08-26 DIAGNOSIS — Z79.4 TYPE 2 DIABETES MELLITUS WITH HYPERGLYCEMIA, WITH LONG-TERM CURRENT USE OF INSULIN (H): ICD-10-CM

## 2024-08-26 DIAGNOSIS — E11.65 TYPE 2 DIABETES MELLITUS WITH HYPERGLYCEMIA, WITH LONG-TERM CURRENT USE OF INSULIN (H): ICD-10-CM

## 2024-08-26 DIAGNOSIS — E78.5 HYPERLIPIDEMIA, UNSPECIFIED HYPERLIPIDEMIA TYPE: ICD-10-CM

## 2024-08-26 RX ORDER — FENOFIBRATE 54 MG/1
54 TABLET ORAL DAILY
Qty: 90 TABLET | Refills: 1 | Status: SHIPPED | OUTPATIENT
Start: 2024-08-26

## 2024-08-26 RX ORDER — LOSARTAN POTASSIUM 25 MG/1
12.5 TABLET ORAL DAILY
Qty: 45 TABLET | Refills: 2 | Status: SHIPPED | OUTPATIENT
Start: 2024-08-26

## 2024-08-26 NOTE — TELEPHONE ENCOUNTER
Cozaar       Last Written Prescription Date:  7/28/2023  Last Fill Quantity: 45,   # refills: 3  Last Office Visit: 7/26/2024  Future Office visit:         Janet       Last Written Prescription Date:  7/28/2023  Last Fill Quantity: 90,   # refills: 3  Last Office Visit: 7/26/2024  Future Office visit:

## 2024-09-04 ENCOUNTER — OFFICE VISIT (OUTPATIENT)
Dept: OTOLARYNGOLOGY | Facility: OTHER | Age: 59
End: 2024-09-04
Attending: PHYSICIAN ASSISTANT
Payer: COMMERCIAL

## 2024-09-04 VITALS
HEART RATE: 84 BPM | BODY MASS INDEX: 31.46 KG/M2 | OXYGEN SATURATION: 97 % | WEIGHT: 245.15 LBS | RESPIRATION RATE: 16 BRPM | DIASTOLIC BLOOD PRESSURE: 82 MMHG | SYSTOLIC BLOOD PRESSURE: 129 MMHG | TEMPERATURE: 97.5 F | HEIGHT: 74 IN

## 2024-09-04 DIAGNOSIS — J34.3 NASAL TURBINATE HYPERTROPHY: ICD-10-CM

## 2024-09-04 DIAGNOSIS — J32.4 CHRONIC PANSINUSITIS: Primary | ICD-10-CM

## 2024-09-04 DIAGNOSIS — J30.2 SEASONAL ALLERGIC RHINITIS, UNSPECIFIED TRIGGER: ICD-10-CM

## 2024-09-04 DIAGNOSIS — J32.9 CHRONIC SINUSITIS, UNSPECIFIED LOCATION: ICD-10-CM

## 2024-09-04 DIAGNOSIS — J33.9 NASAL POLYPOSIS: ICD-10-CM

## 2024-09-04 PROCEDURE — 31575 DIAGNOSTIC LARYNGOSCOPY: CPT | Performed by: PHYSICIAN ASSISTANT

## 2024-09-04 PROCEDURE — 99214 OFFICE O/P EST MOD 30 MIN: CPT | Mod: 25 | Performed by: PHYSICIAN ASSISTANT

## 2024-09-04 RX ORDER — PREDNISONE 20 MG/1
20 TABLET ORAL DAILY
Qty: 5 TABLET | Refills: 0 | Status: SHIPPED | OUTPATIENT
Start: 2024-09-04 | End: 2024-09-09

## 2024-09-04 RX ORDER — CETIRIZINE HYDROCHLORIDE 10 MG/1
10 TABLET ORAL DAILY
Qty: 90 TABLET | Refills: 4 | Status: SHIPPED | OUTPATIENT
Start: 2024-09-04

## 2024-09-04 RX ORDER — MOMETASONE FUROATE MONOHYDRATE 50 UG/1
SPRAY, METERED NASAL
Qty: 17 G | Refills: 11 | Status: SHIPPED | OUTPATIENT
Start: 2024-09-04

## 2024-09-04 ASSESSMENT — PAIN SCALES - GENERAL: PAINLEVEL: MILD PAIN (2)

## 2024-09-04 NOTE — PROGRESS NOTES
Chief Complaint   Patient presents with    Nasal Polyps    Sinusitis     Chronic Sinusitis, Zoraida Pham MD referring       Patient returns to ENT for follow up of ongoing nasal congestion, drainage, post nasal drainage. He does have a hx of sinus surgery.   Atul is able to breath thru nose w/ nasal blowing. He does feel some days worse than others. He does use Budesonide rinses   No recent use of nasal sprays at home. No recent AH use.     No chronic facial pain, pressure. He does at times have headaches.   He report thick green drainage or milk white drainage. He does have episodes and sinusitis with some relief.          MQT 2/10/2020:  Dilution 6 (high) none  Dilution 5 (moderate) none  Dilution 2 (low) Ragweed, pigweed, thistle, grass, birch, maple     He is not interested in immunotherapy nor is immunotherapy recommended for his minimal positives.    Sometimes blood tinged.  Augmentin 12/2023 and 7/2023; short term relief.  Sinus surgery 1/2020; polypectomy.  Procedures:    1.  Bilateral frontal sinusotomy  2.  Bilateral total ethmoidectomy  3.  Bilateral sphenoidotomy  4.  Bilateral maxillary antrostomy with tissue removal  5.  Bilateral submucosal reduction inferior turbinates     Findings: diffuse inflammatory polyposis bilaterally   maxillary sinus mucosa is flat and healthy post polyposis there is moderate polypoid degeneration of the mucosa throughout the ethmoid and frontal sinuses     SPECIMEN(S):   A: Nasal polyp(s), bilateral   B: Sinus contents, bilateral     FINAL DIAGNOSIS:   A: Nasal polyps, bilateral, polypectomy   -Multiple fragments of benign inflammatory nasal polyps     B: Sinus contents, bilateral, removal   -Chronic sinusitis with fragments of benign inflammatory polyps and bone      100 eos/hpf        Patient denies sore throat or throat pain  Denies chronic otalgia.   Denies fevers, night sweats or weight loss.     Denies dysphagia or dysphonia.   Denies globus sensation.     Water-  adequate  Caffeine- one cup in AM.   ETOH- rare  Tobacco- Hx  1/1/2016.   Reflux- none.     Past Medical History:   Diagnosis Date    Congenital hiatus hernia     Diabetes (H)     Gastroesophageal reflux disease     Hiatal hernia     Neck mass     Warthin tumor         Allergies   Allergen Reactions    Animal Dander      Nasal congestion    Aspirin Nausea and Vomiting    Dust Mites      Nasal congestion    Lisinopril Hives    Morphine Nausea and Vomiting     Current Outpatient Medications   Medication Sig Dispense Refill    albuterol (PROAIR HFA/PROVENTIL HFA/VENTOLIN HFA) 108 (90 Base) MCG/ACT inhaler Inhale 2 puffs into the lungs every 6 hours as needed for shortness of breath, wheezing or cough 18 g 1    blood glucose (BELL CONTOUR NEXT) test strip Use to test blood sugar 2 times daily. 100 each 11    blood glucose monitoring (BELL MICROLET) lancets Use to test blood sugar 2 times daily. 100 each 11    budesonide (PULMICORT) 0.5 MG/2ML neb solution Squirt entire vial into previously made preet med saline bottle, mix, and irrigate each nostril until entire bottle empty.  Do this twice daily. 60 mL 0    Continuous Blood Gluc  (DEXCOM G6 ) WILMA  (Patient not taking: Reported on 12/1/2023)      Continuous Blood Gluc Sensor (DEXCOM G6 SENSOR) MISC 1 each every 10 days Change every 10 days. (Patient not taking: Reported on 12/1/2023) 9 each 1    Continuous Blood Gluc Transmit (DEXCOM G6 TRANSMITTER) MISC 1 each every 3 months Change every 3 months. (Patient not taking: Reported on 12/1/2023) 1 each 1    fenofibrate (LOFIBRA) 54 MG tablet Take 1 tablet by mouth once daily 90 tablet 1    glimepiride (AMARYL) 4 MG tablet Take 1 tablet (4 mg) by mouth 2 times daily (before meals) 180 tablet 0    guaiFENesin (MUCINEX) 600 MG 12 hr tablet Take 2 tablets (1,200 mg) by mouth 2 times daily 60 tablet 3    losartan (COZAAR) 25 MG tablet Take 1/2 (one-half) tablet by mouth once daily 45 tablet 2    metFORMIN  "(GLUCOPHAGE XR) 500 MG 24 hr tablet TAKE 2 TABLETS BY MOUTH TWICE DAILY WITH MEALS 360 tablet 1    rosuvastatin (CRESTOR) 20 MG tablet Take 1 tablet by mouth once daily 90 tablet 1     No current facility-administered medications for this visit.     ROS- SEE HPI    /82 (BP Location: Left arm, Patient Position: Sitting, Cuff Size: Adult Regular)   Pulse 84   Temp 97.5  F (36.4  C) (Tympanic)   Resp 16   Ht 1.88 m (6' 2.02\")   Wt 111.2 kg (245 lb 2.4 oz)   SpO2 97%   BMI 31.46 kg/m      General - The patient is well nourished and well developed, and appears to have good nutritional status.  Alert and oriented to person and place, answers questions and cooperates with examination appropriately.   Head and Face - Normocephalic and atraumatic, with no gross asymmetry noted.  The facial nerve is intact, with strong symmetric movements.  Voice and Breathing - The patient was breathing comfortably without the use of accessory muscles. There was no wheezing, stridor. The patients voice was clear and strong, and had appropriate pitch and quality.  Ears - External ear normal. Canals are patent. Right tympanic membrane is intact without effusion, retraction or mass. Left tympanic membrane is intact without effusion, retraction or mass.  Eyes - Extraocular movements intact, sclera were not icteric or injected, conjunctiva were pink and moist.  Mouth - Examination of the oral cavity showed pink, healthy oral mucosa. Dentition in good condition, upper and lower dentures appear to fit well. No lesions or ulcerations noted. The tongue was mobile and midline.   Throat - The walls of the oropharynx were smooth, pink, moist, symmetric, and had no lesions or ulcerations.  The tonsillar pillars and soft palate were symmetric. The uvula was midline on elevation.    Neck - Normal midline excursion of the laryngotracheal complex during swallowing.  Full range of motion on passive movement.  Palpation of the occipital, " submental, submandibular, internal jugular chain, and supraclavicular nodes did not demonstrate any abnormal lymph nodes or masses.  Palpation of the thyroid was soft and smooth, with no nodules or goiter appreciated.  The trachea was mobile and midline.  Nose - External contour is symmetric, no gross deflection or scars.  Nasal mucosa is pink and moist with no abnormal mucus.  The septum and turbinates were evaluated with nasal speculum, turbinates are lateralized.  No polyps, masses, or purulence noted on examination.       To further evaluate the nasal cavity, I performed rigid nasal endoscopy.  I first applied topical nasal lidocaine and neosynephrine.  I then began on the left side using a 2.7mm, 30 degree rigid nasal endoscope.  The septum intact.  Mucoid secretions noted in the middle meatus with occluding polyps in the middle meatus. #8 rosado suction used to debride secretions.  The nasopharynx was unremarkable, and the eustachian tube opening on this side was unobstructed.    I then turned my attention to the right side. Mucoid secretions noted in the middle meatus with occluding polyps in the middle meatus.  Debridement performed in a similar fashion.   The eustachian tube opening was unobstructed.      Flexible Endoscopy -     The patient was counseled that their symptoms and history require a direct visualization with an endoscopy procedure.  They understood and we proceeded with a fiberoptic examination.  First I sprayed both sides of the nose with a mixture of lidocaine and neosynephrine.  I then passed the scope through the nasal cavity.  The nasal cavity was unremarkable.  The nasopharynx was mucosally covered and symmetric.  The Eustachian tube openings were unobstructed.  Going further down I had a clear view of the base of tongue which had normal appearing lingual tonsillar tissue, moderate hypertrophy.  The base of tongue was free of lesions, and the vallecula was open.  The epiglottis was  smooth and mucosally covered.  The supraglottic larynx was then clearly visualized.  The vocal cords moved smoothly and symmetrically, they were pearly white and no lesions were seen.  The pyriform sinuses were open, and the limited view of the postcricoid region did not show any lesions.         Assessment and Plan:        ICD-10-CM    1. Chronic pansinusitis  J32.4       2. Chronic sinusitis, unspecified location  J32.9 lidocaine 2%-oxymetazoline 0.025% nasal solution 2 spray     Adult ENT  Referral     mometasone (NASONEX) 50 MCG/ACT nasal spray     cetirizine (ZYRTEC) 10 MG tablet     predniSONE (DELTASONE) 20 MG tablet      3. Nasal polyposis  J33.9 lidocaine 2%-oxymetazoline 0.025% nasal solution 2 spray     Adult ENT  Referral     mometasone (NASONEX) 50 MCG/ACT nasal spray     cetirizine (ZYRTEC) 10 MG tablet     predniSONE (DELTASONE) 20 MG tablet      4. Nasal turbinate hypertrophy  J34.3       5. Seasonal allergic rhinitis, unspecified trigger  J30.2           Continue with budesonide rinses. RInse 2 times daily.   Start Nasonex 2 sprays to each nostril twice a day.   Start Zyrtec 10 mg every evening.   Start Prednisone 20 mg daily for 5 days. Caution on glucose/ sugar. Monitor.   Follow up in 4 weeks. If no improvement consider Dupixent.   Briefly discussed options of sinus revision vs. Dupixent.   Consider repeat MQT and SCIT    Budesonide nasal saline irrigation per instructions:  -Obtain Kip Med Sinus rinse over the counter.    -Use warm distilled water and 2 packets of the salt solution that comes with the bottle, dissolve in bottle up to the 240 mL samina.  -Add 1 vial of budesonide.  -Irrigate each side of your nose leaning over the sink, using 1/3 to 1/2 the volume of the bottle in each nostril every irrigation.  Irrigate 2 times daily.  -If additional rinses are needed/recommended, you may use the plan Kip Med Sinus irrigation without the use of added budesonide      Risks of  oral steroid use were discussed and include psychiatric/mood changes, insomnia, stomach ulcers and potential GI bleeding, blood sugar elevation/worsening diabetes, hip/bone necrosis called avascular necrosis, or bone demineralization.      Lolita Castrejon PA-C  ENT  [facility], [place]

## 2024-09-04 NOTE — PATIENT INSTRUCTIONS
Continue with budesonide rinses. RInse 2 times daily.   Start Nasonex 2 sprays to each nostril twice a day.   Start Zyrtec 10 mg every evening.   Start Prednisone 20 mg daily for 5 days. Caution on glucose/ sugar. Monitor.   Follow up in 4 weeks. If no improvement consider Dupixent.       Budesonide nasal saline irrigation per instructions:  -Obtain Kip Med Sinus rinse over the counter.    -Use warm distilled water and 2 packets of the salt solution that comes with the bottle, dissolve in bottle up to the 240 mL samina.  -Add 1 vial of budesonide.  -Irrigate each side of your nose leaning over the sink, using 1/3 to 1/2 the volume of the bottle in each nostril every irrigation.  Irrigate 2 times daily.  -If additional rinses are needed/recommended, you may use the plan Kip Med Sinus irrigation without the use of added budesonide        Thank you for allowing [facility] and our ENT team to participate in your care.  If your medications are too expensive, please give the nurse a call.  We can possibly change this medication.  If you have a scheduling or an appointment question please contact our Health Unit Coordinator at their direct line [phone].   ALL nursing questions or concerns can be directed to your ENT nurse, Chary at: [phone].

## 2024-09-04 NOTE — LETTER
9/4/2024      Atul Lam  [address]  [place] [address]      Dear Colleague,    Thank you for referring your patient, Atul Lam, to the [facility]. Please see a copy of my visit note below.    Chief Complaint   Patient presents with     Nasal Polyps     Sinusitis     Chronic Sinusitis, Zoraida Pham MD referring       Patient returns to ENT for follow up of ongoing nasal congestion, drainage, post nasal drainage. He does have a hx of sinus surgery.   Atul is able to breath thru nose w/ nasal blowing. He does feel some days worse than others. He does use Budesonide rinses   No recent use of nasal sprays at home. No recent AH use.     No chronic facial pain, pressure. He does at times have headaches.   He report thick green drainage or milk white drainage. He does have episodes and sinusitis with some relief.          MQT 2/10/2020:  Dilution 6 (high) none  Dilution 5 (moderate) none  Dilution 2 (low) Ragweed, pigweed, thistle, grass, birch, maple     He is not interested in immunotherapy nor is immunotherapy recommended for his minimal positives.    Sometimes blood tinged.  Augmentin 12/2023 and 7/2023; short term relief.  Sinus surgery 1/2020; polypectomy.  Procedures:    1.  Bilateral frontal sinusotomy  2.  Bilateral total ethmoidectomy  3.  Bilateral sphenoidotomy  4.  Bilateral maxillary antrostomy with tissue removal  5.  Bilateral submucosal reduction inferior turbinates     Findings: diffuse inflammatory polyposis bilaterally   maxillary sinus mucosa is flat and healthy post polyposis there is moderate polypoid degeneration of the mucosa throughout the ethmoid and frontal sinuses     SPECIMEN(S):   A: Nasal polyp(s), bilateral   B: Sinus contents, bilateral     FINAL DIAGNOSIS:   A: Nasal polyps, bilateral, polypectomy   -Multiple fragments of benign inflammatory nasal polyps     B: Sinus contents, bilateral, removal   -Chronic sinusitis with fragments of benign  inflammatory polyps and bone      100 eos/hpf        Patient denies sore throat or throat pain  Denies chronic otalgia.   Denies fevers, night sweats or weight loss.     Denies dysphagia or dysphonia.   Denies globus sensation.     Water- adequate  Caffeine- one cup in AM.   ETOH- rare  Tobacco- Hx  1/1/2016.   Reflux- none.     Past Medical History:   Diagnosis Date     Congenital hiatus hernia      Diabetes (H)      Gastroesophageal reflux disease      Hiatal hernia      Neck mass      Warthin tumor         Allergies   Allergen Reactions     Animal Dander      Nasal congestion     Aspirin Nausea and Vomiting     Dust Mites      Nasal congestion     Lisinopril Hives     Morphine Nausea and Vomiting     Current Outpatient Medications   Medication Sig Dispense Refill     albuterol (PROAIR HFA/PROVENTIL HFA/VENTOLIN HFA) 108 (90 Base) MCG/ACT inhaler Inhale 2 puffs into the lungs every 6 hours as needed for shortness of breath, wheezing or cough 18 g 1     blood glucose (BELL CONTOUR NEXT) test strip Use to test blood sugar 2 times daily. 100 each 11     blood glucose monitoring (BELL MICROLET) lancets Use to test blood sugar 2 times daily. 100 each 11     budesonide (PULMICORT) 0.5 MG/2ML neb solution Squirt entire vial into previously made preet med saline bottle, mix, and irrigate each nostril until entire bottle empty.  Do this twice daily. 60 mL 0     Continuous Blood Gluc  (DEXCOM G6 ) WILMA  (Patient not taking: Reported on 12/1/2023)       Continuous Blood Gluc Sensor (DEXCOM G6 SENSOR) MISC 1 each every 10 days Change every 10 days. (Patient not taking: Reported on 12/1/2023) 9 each 1     Continuous Blood Gluc Transmit (DEXCOM G6 TRANSMITTER) MISC 1 each every 3 months Change every 3 months. (Patient not taking: Reported on 12/1/2023) 1 each 1     fenofibrate (LOFIBRA) 54 MG tablet Take 1 tablet by mouth once daily 90 tablet 1     glimepiride (AMARYL) 4 MG tablet Take 1 tablet (4 mg) by mouth  "2 times daily (before meals) 180 tablet 0     guaiFENesin (MUCINEX) 600 MG 12 hr tablet Take 2 tablets (1,200 mg) by mouth 2 times daily 60 tablet 3     losartan (COZAAR) 25 MG tablet Take 1/2 (one-half) tablet by mouth once daily 45 tablet 2     metFORMIN (GLUCOPHAGE XR) 500 MG 24 hr tablet TAKE 2 TABLETS BY MOUTH TWICE DAILY WITH MEALS 360 tablet 1     rosuvastatin (CRESTOR) 20 MG tablet Take 1 tablet by mouth once daily 90 tablet 1     No current facility-administered medications for this visit.     ROS- SEE HPI    /82 (BP Location: Left arm, Patient Position: Sitting, Cuff Size: Adult Regular)   Pulse 84   Temp 97.5  F (36.4  C) (Tympanic)   Resp 16   Ht 1.88 m (6' 2.02\")   Wt 111.2 kg (245 lb 2.4 oz)   SpO2 97%   BMI 31.46 kg/m      General - The patient is well nourished and well developed, and appears to have good nutritional status.  Alert and oriented to person and place, answers questions and cooperates with examination appropriately.   Head and Face - Normocephalic and atraumatic, with no gross asymmetry noted.  The facial nerve is intact, with strong symmetric movements.  Voice and Breathing - The patient was breathing comfortably without the use of accessory muscles. There was no wheezing, stridor. The patients voice was clear and strong, and had appropriate pitch and quality.  Ears - External ear normal. Canals are patent. Right tympanic membrane is intact without effusion, retraction or mass. Left tympanic membrane is intact without effusion, retraction or mass.  Eyes - Extraocular movements intact, sclera were not icteric or injected, conjunctiva were pink and moist.  Mouth - Examination of the oral cavity showed pink, healthy oral mucosa. Dentition in good condition, upper and lower dentures appear to fit well. No lesions or ulcerations noted. The tongue was mobile and midline.   Throat - The walls of the oropharynx were smooth, pink, moist, symmetric, and had no lesions or ulcerations. "  The tonsillar pillars and soft palate were symmetric. The uvula was midline on elevation.    Neck - Normal midline excursion of the laryngotracheal complex during swallowing.  Full range of motion on passive movement.  Palpation of the occipital, submental, submandibular, internal jugular chain, and supraclavicular nodes did not demonstrate any abnormal lymph nodes or masses.  Palpation of the thyroid was soft and smooth, with no nodules or goiter appreciated.  The trachea was mobile and midline.  Nose - External contour is symmetric, no gross deflection or scars.  Nasal mucosa is pink and moist with no abnormal mucus.  The septum and turbinates were evaluated with nasal speculum, turbinates are lateralized.  No polyps, masses, or purulence noted on examination.       To further evaluate the nasal cavity, I performed rigid nasal endoscopy.  I first applied topical nasal lidocaine and neosynephrine.  I then began on the left side using a 2.7mm, 30 degree rigid nasal endoscope.  The septum intact.  Mucoid secretions noted in the middle meatus with occluding polyps in the middle meatus. #8 rosado suction used to debride secretions.  The nasopharynx was unremarkable, and the eustachian tube opening on this side was unobstructed.    I then turned my attention to the right side. Mucoid secretions noted in the middle meatus with occluding polyps in the middle meatus.  Debridement performed in a similar fashion.   The eustachian tube opening was unobstructed.      Flexible Endoscopy -     The patient was counseled that their symptoms and history require a direct visualization with an endoscopy procedure.  They understood and we proceeded with a fiberoptic examination.  First I sprayed both sides of the nose with a mixture of lidocaine and neosynephrine.  I then passed the scope through the nasal cavity.  The nasal cavity was unremarkable.  The nasopharynx was mucosally covered and symmetric.  The Eustachian tube openings  were unobstructed.  Going further down I had a clear view of the base of tongue which had normal appearing lingual tonsillar tissue, moderate hypertrophy.  The base of tongue was free of lesions, and the vallecula was open.  The epiglottis was smooth and mucosally covered.  The supraglottic larynx was then clearly visualized.  The vocal cords moved smoothly and symmetrically, they were pearly white and no lesions were seen.  The pyriform sinuses were open, and the limited view of the postcricoid region did not show any lesions.         Assessment and Plan:        ICD-10-CM    1. Chronic pansinusitis  J32.4       2. Chronic sinusitis, unspecified location  J32.9 lidocaine 2%-oxymetazoline 0.025% nasal solution 2 spray     Adult ENT  Referral     mometasone (NASONEX) 50 MCG/ACT nasal spray     cetirizine (ZYRTEC) 10 MG tablet     predniSONE (DELTASONE) 20 MG tablet      3. Nasal polyposis  J33.9 lidocaine 2%-oxymetazoline 0.025% nasal solution 2 spray     Adult ENT  Referral     mometasone (NASONEX) 50 MCG/ACT nasal spray     cetirizine (ZYRTEC) 10 MG tablet     predniSONE (DELTASONE) 20 MG tablet      4. Nasal turbinate hypertrophy  J34.3       5. Seasonal allergic rhinitis, unspecified trigger  J30.2           Continue with budesonide rinses. RInse 2 times daily.   Start Nasonex 2 sprays to each nostril twice a day.   Start Zyrtec 10 mg every evening.   Start Prednisone 20 mg daily for 5 days. Caution on glucose/ sugar. Monitor.   Follow up in 4 weeks. If no improvement consider Dupixent.   Briefly discussed options of sinus revision vs. Dupixent.   Consider repeat MQT and SCIT    Budesonide nasal saline irrigation per instructions:  -Obtain Kip Med Sinus rinse over the counter.    -Use warm distilled water and 2 packets of the salt solution that comes with the bottle, dissolve in bottle up to the 240 mL samina.  -Add 1 vial of budesonide.  -Irrigate each side of your nose leaning over the sink,  using 1/3 to 1/2 the volume of the bottle in each nostril every irrigation.  Irrigate 2 times daily.  -If additional rinses are needed/recommended, you may use the plan Kip Med Sinus irrigation without the use of added budesonide      Risks of oral steroid use were discussed and include psychiatric/mood changes, insomnia, stomach ulcers and potential GI bleeding, blood sugar elevation/worsening diabetes, hip/bone necrosis called avascular necrosis, or bone demineralization.      Lolita Castrejon PA-C  ENT  [facility], [place]      Again, thank you for allowing me to participate in the care of your patient.        Sincerely,        Lolita Castrejon PA-C

## 2024-09-04 NOTE — NURSING NOTE
Scope #[id number]  
History of Present Illness		  58 y.o. woman PMHx Htn, DM2, and glaucoma p/w b/l LE and left sided facial swelling x5 days. Six days ago, patient had a mechanical fall and fell on her knees. Patient reports that she tripped on the sidewalk and denies loss of consciousness and head trauma. Patient reports that she started taking a new DM2 medication, invokamet the day after the fall, and then her LE swelling began the day after starting the new medication. She denies rash, tongue swelling, difficulty breathing, and difficulty ambulating. Patient also states that she has glaucoma and uses eye drops for her conditions. She noticed two weeks ago that her left eye started irritating her, so she self stopped the eye drops. One day before presentation, patient reports that her left side of the face was swollen but has now resolved. Patient denies recent travel and son had viral syndrome symptoms. Denies tooth infection and pain and pain with extraocular movement.     In the ER, vitals were T 98.6, , /125, RR 20, O2 sat 100% on RA. Labs were significant for  and UA protein 500, small blood, RBC 5-10, and glucose 300. CXR appears to show clear lungs. Patient received amlodipine 10 mg x1.     Currently, patient denies headache, slurred speech, visual changes, facial swelling, rashes, LE weakness, numbness, and tingling.    Hospital Course    Pts BP remained stable and no swelling was noted on exam. Pts vitals remained normal. She will need further workup as an outpt for her chronic medical conditions. She was only continued on Metformin as her other DM2 drugs were thought to contribute to her facial swelling. Pt will see her PMD within a week of discharge. Pt and son understanding of plan at time of discharge. Medication sent to pharmacy.

## 2024-10-04 ENCOUNTER — OFFICE VISIT (OUTPATIENT)
Dept: OTOLARYNGOLOGY | Facility: OTHER | Age: 59
End: 2024-10-04
Attending: PHYSICIAN ASSISTANT
Payer: COMMERCIAL

## 2024-10-04 VITALS
RESPIRATION RATE: 18 BRPM | DIASTOLIC BLOOD PRESSURE: 78 MMHG | HEART RATE: 79 BPM | BODY MASS INDEX: 31.48 KG/M2 | OXYGEN SATURATION: 96 % | TEMPERATURE: 96 F | SYSTOLIC BLOOD PRESSURE: 120 MMHG | HEIGHT: 74 IN

## 2024-10-04 DIAGNOSIS — J32.4 CHRONIC PANSINUSITIS: ICD-10-CM

## 2024-10-04 DIAGNOSIS — J34.3 NASAL TURBINATE HYPERTROPHY: ICD-10-CM

## 2024-10-04 DIAGNOSIS — J32.9 CHRONIC SINUSITIS, UNSPECIFIED LOCATION: Primary | ICD-10-CM

## 2024-10-04 DIAGNOSIS — J30.2 SEASONAL ALLERGIC RHINITIS, UNSPECIFIED TRIGGER: ICD-10-CM

## 2024-10-04 DIAGNOSIS — J33.9 NASAL POLYPOSIS: ICD-10-CM

## 2024-10-04 PROCEDURE — 31231 NASAL ENDOSCOPY DX: CPT | Mod: 52 | Performed by: PHYSICIAN ASSISTANT

## 2024-10-04 PROCEDURE — 99213 OFFICE O/P EST LOW 20 MIN: CPT | Mod: 25 | Performed by: PHYSICIAN ASSISTANT

## 2024-10-04 RX ORDER — BUDESONIDE 0.5 MG/2ML
INHALANT ORAL
Qty: 120 ML | Refills: 11 | Status: SHIPPED | OUTPATIENT
Start: 2024-10-04

## 2024-10-04 ASSESSMENT — PAIN SCALES - GENERAL: PAINLEVEL: NO PAIN (0)

## 2024-10-04 NOTE — PROGRESS NOTES
Chief Complaint   Patient presents with    Follow Up     4 week follow up sinus   Patient returns to ENT for follow-up exam.  He was last seen on 9/4/2024 noted chronic pansinusitis and nasal polyps.  He was started on budesonide rinses, Nasonex, Zyrtec and prednisone.  We did discuss follow-up in 4 weeks and we reviewed options of sinus surgical revision versus Dupixent.  He has felt there is some improvement with the new medications. He has been able to sleep better with less coughing, drainage.   He has Budesonide rinses and using every other day. He does take the Zyrtec and Nasonex on daily basis.     No facial pain, pressure.       MQT 2/10/2020:  Dilution 6 (high) none  Dilution 5 (moderate) none  Dilution 2 (low) Ragweed, pigweed, thistle, grass, birch, maple     He is not interested in immunotherapy nor is immunotherapy recommended for his minimal positives.    Sometimes blood tinged.  Augmentin 12/2023 and 7/2023; short term relief.  Sinus surgery 1/2020; polypectomy.  Procedures:    1.  Bilateral frontal sinusotomy  2.  Bilateral total ethmoidectomy  3.  Bilateral sphenoidotomy  4.  Bilateral maxillary antrostomy with tissue removal  5.  Bilateral submucosal reduction inferior turbinates     Findings: diffuse inflammatory polyposis bilaterally   maxillary sinus mucosa is flat and healthy post polyposis there is moderate polypoid degeneration of the mucosa throughout the ethmoid and frontal sinuses     SPECIMEN(S):   A: Nasal polyp(s), bilateral   B: Sinus contents, bilateral        Past Medical History:   Diagnosis Date    Congenital hiatus hernia     Diabetes (H)     Gastroesophageal reflux disease     Hiatal hernia     Neck mass     Warthin tumor         Allergies   Allergen Reactions    Animal Dander      Nasal congestion    Aspirin Nausea and Vomiting    Dust Mites      Nasal congestion    Lisinopril Hives    Morphine Nausea and Vomiting     Current Outpatient Medications   Medication Sig Dispense  Refill    albuterol (PROAIR HFA/PROVENTIL HFA/VENTOLIN HFA) 108 (90 Base) MCG/ACT inhaler Inhale 2 puffs into the lungs every 6 hours as needed for shortness of breath, wheezing or cough 18 g 1    blood glucose (BELL CONTOUR NEXT) test strip Use to test blood sugar 2 times daily. 100 each 11    blood glucose monitoring (BELL MICROLET) lancets Use to test blood sugar 2 times daily. 100 each 11    budesonide (PULMICORT) 0.5 MG/2ML neb solution Squirt entire vial into previously made preet med saline bottle, mix, and irrigate each nostril until entire bottle empty.  Do this twice daily. 60 mL 0    cetirizine (ZYRTEC) 10 MG tablet Take 1 tablet (10 mg) by mouth daily. 90 tablet 4    Continuous Blood Gluc  (DEXCOM G6 ) WILMA  (Patient not taking: Reported on 12/1/2023)      Continuous Blood Gluc Sensor (DEXCOM G6 SENSOR) MISC 1 each every 10 days Change every 10 days. (Patient not taking: Reported on 12/1/2023) 9 each 1    Continuous Blood Gluc Transmit (DEXCOM G6 TRANSMITTER) MISC 1 each every 3 months Change every 3 months. (Patient not taking: Reported on 12/1/2023) 1 each 1    fenofibrate (LOFIBRA) 54 MG tablet Take 1 tablet by mouth once daily 90 tablet 1    glimepiride (AMARYL) 4 MG tablet Take 1 tablet (4 mg) by mouth 2 times daily (before meals) 180 tablet 0    guaiFENesin (MUCINEX) 600 MG 12 hr tablet Take 2 tablets (1,200 mg) by mouth 2 times daily 60 tablet 3    losartan (COZAAR) 25 MG tablet Take 1/2 (one-half) tablet by mouth once daily 45 tablet 2    metFORMIN (GLUCOPHAGE XR) 500 MG 24 hr tablet TAKE 2 TABLETS BY MOUTH TWICE DAILY WITH MEALS 360 tablet 1    mometasone (NASONEX) 50 MCG/ACT nasal spray Use 2 sprays to each nostril twice a day 17 g 11    rosuvastatin (CRESTOR) 20 MG tablet Take 1 tablet by mouth once daily 90 tablet 1     No current facility-administered medications for this visit.     ROS- SEE HPI  /78 (BP Location: Right arm, Patient Position: Sitting, Cuff Size: Adult  "Large)   Pulse 79   Temp (!) 96  F (35.6  C) (Tympanic)   Resp 18   Ht 1.88 m (6' 2\")   SpO2 96%   BMI 31.48 kg/m      General - The patient is well nourished and well developed, and appears to have good nutritional status.  Alert and oriented to person and place, answers questions and cooperates with examination appropriately.   Head and Face - Normocephalic and atraumatic, with no gross asymmetry noted.  The facial nerve is intact, with strong symmetric movements.  Voice and Breathing - The patient was breathing comfortably without the use of accessory muscles. There was no wheezing, stridor. The patients voice was clear and strong, and had appropriate pitch and quality.  Ears - External ear normal. Canals are patent. Right tympanic membrane is intact without effusion, retraction or mass. Left tympanic membrane is intact without effusion, retraction or mass.  Eyes - Extraocular movements intact, sclera were not icteric or injected, conjunctiva were pink and moist.  Mouth - Examination of the oral cavity showed pink, healthy oral mucosa. Dentition in good condition, upper and lower dentures appear to fit well. No lesions or ulcerations noted. The tongue was mobile and midline.   Throat - The walls of the oropharynx were smooth, pink, moist, symmetric, and had no lesions or ulcerations.  The tonsillar pillars and soft palate were symmetric. The uvula was midline on elevation.    Neck - Normal midline excursion of the laryngotracheal complex during swallowing.  Full range of motion on passive movement.  Palpation of the occipital, submental, submandibular, internal jugular chain, and supraclavicular nodes did not demonstrate any abnormal lymph nodes or masses.  Palpation of the thyroid was soft and smooth, with no nodules or goiter appreciated.  The trachea was mobile and midline.  Nose - External contour is symmetric, no gross deflection or scars.  Nasal mucosa is pink and moist with no abnormal mucus.  The " septum and turbinates were evaluated with nasal speculum, turbinates are lateralized.  No polyps, masses, or purulence noted on examination.        To further evaluate the nasal cavity, I performed rigid nasal endoscopy.  I first applied topical nasal lidocaine and neosynephrine.  I then began on the left side using a 2.7mm, 30 degree rigid nasal endoscope.  The septum intact.  Polyps throughout occluding viz into MM, maxillary, ethmoid and frontal. Unable to viz NP  I then turned my attention to the right side. Polyps throughout occluding viz of MM, maxillary, ethmoid or frontal recess.       ASSESSMENT/ PLAN    ICD-10-CM    1. Chronic sinusitis, unspecified location  J32.9 lidocaine 2%-oxymetazoline 0.025% nasal solution 2 spray      2. Nasal polyposis  J33.9 lidocaine 2%-oxymetazoline 0.025% nasal solution 2 spray      3. Chronic pansinusitis  J32.4 lidocaine 2%-oxymetazoline 0.025% nasal solution 2 spray     budesonide (PULMICORT) 0.5 MG/2ML neb solution      4. Nasal turbinate hypertrophy  J34.3 lidocaine 2%-oxymetazoline 0.025% nasal solution 2 spray      5. Seasonal allergic rhinitis, unspecified trigger  J30.2 lidocaine 2%-oxymetazoline 0.025% nasal solution 2 spray            Currently he is happy with his results.   He has felt the drainage, congestion improved to a level he is comfortable with.   He does not wish to pursue Dupixent and/ or surgical options.  Consider MQT, SCIT in future.     He will continue with budesonide rinses, Nasonex and Zyrtec  Follow up in 6 months or sooner SHEEBA Castrejon PA-C  ENT  [facility], [place]

## 2024-10-04 NOTE — PATIENT INSTRUCTIONS
Continue with Zyrtec, Nasonex on daily basis  Continue with Budesonide rinses. Rinse 1-2 times daily   Consider allergy testing.   If sinus symptoms worsen, contact nurse    Follow up in 6 months      Thank you for allowing [facility] and our ENT team to participate in your care.  If your medications are too expensive, please give the nurse a call.  We can possibly change this medication.  If you have a scheduling or an appointment question please contact our Health Unit Coordinator at their direct line [phone].   ALL nursing questions or concerns can be directed to your ENT nurse, Chary at: [phone].

## 2024-10-04 NOTE — LETTER
10/4/2024      Atul Lam  [address] [place] [address]      Dear Colleague,    Thank you for referring your patient, Atul Lam, to the [facility]. Please see a copy of my visit note below.    Chief Complaint   Patient presents with     Follow Up     4 week follow up sinus   Patient returns to ENT for follow-up exam.  He was last seen on 9/4/2024 noted chronic pansinusitis and nasal polyps.  He was started on budesonide rinses, Nasonex, Zyrtec and prednisone.  We did discuss follow-up in 4 weeks and we reviewed options of sinus surgical revision versus Dupixent.  He has felt there is some improvement with the new medications. He has been able to sleep better with less coughing, drainage.   He has Budesonide rinses and using every other day. He does take the Zyrtec and Nasonex on daily basis.     No facial pain, pressure.       MQT 2/10/2020:  Dilution 6 (high) none  Dilution 5 (moderate) none  Dilution 2 (low) Ragweed, pigweed, thistle, grass, birch, maple     He is not interested in immunotherapy nor is immunotherapy recommended for his minimal positives.    Sometimes blood tinged.  Augmentin 12/2023 and 7/2023; short term relief.  Sinus surgery 1/2020; polypectomy.  Procedures:    1.  Bilateral frontal sinusotomy  2.  Bilateral total ethmoidectomy  3.  Bilateral sphenoidotomy  4.  Bilateral maxillary antrostomy with tissue removal  5.  Bilateral submucosal reduction inferior turbinates     Findings: diffuse inflammatory polyposis bilaterally   maxillary sinus mucosa is flat and healthy post polyposis there is moderate polypoid degeneration of the mucosa throughout the ethmoid and frontal sinuses     SPECIMEN(S):   A: Nasal polyp(s), bilateral   B: Sinus contents, bilateral        Past Medical History:   Diagnosis Date     Congenital hiatus hernia      Diabetes (H)      Gastroesophageal reflux disease      Hiatal hernia      Neck mass      Warthin tumor         Allergies    Allergen Reactions     Animal Dander      Nasal congestion     Aspirin Nausea and Vomiting     Dust Mites      Nasal congestion     Lisinopril Hives     Morphine Nausea and Vomiting     Current Outpatient Medications   Medication Sig Dispense Refill     albuterol (PROAIR HFA/PROVENTIL HFA/VENTOLIN HFA) 108 (90 Base) MCG/ACT inhaler Inhale 2 puffs into the lungs every 6 hours as needed for shortness of breath, wheezing or cough 18 g 1     blood glucose (BELL CONTOUR NEXT) test strip Use to test blood sugar 2 times daily. 100 each 11     blood glucose monitoring (BELL MICROLET) lancets Use to test blood sugar 2 times daily. 100 each 11     budesonide (PULMICORT) 0.5 MG/2ML neb solution Squirt entire vial into previously made preet med saline bottle, mix, and irrigate each nostril until entire bottle empty.  Do this twice daily. 60 mL 0     cetirizine (ZYRTEC) 10 MG tablet Take 1 tablet (10 mg) by mouth daily. 90 tablet 4     Continuous Blood Gluc  (DEXCOM G6 ) WILMA  (Patient not taking: Reported on 12/1/2023)       Continuous Blood Gluc Sensor (DEXCOM G6 SENSOR) MISC 1 each every 10 days Change every 10 days. (Patient not taking: Reported on 12/1/2023) 9 each 1     Continuous Blood Gluc Transmit (DEXCOM G6 TRANSMITTER) MISC 1 each every 3 months Change every 3 months. (Patient not taking: Reported on 12/1/2023) 1 each 1     fenofibrate (LOFIBRA) 54 MG tablet Take 1 tablet by mouth once daily 90 tablet 1     glimepiride (AMARYL) 4 MG tablet Take 1 tablet (4 mg) by mouth 2 times daily (before meals) 180 tablet 0     guaiFENesin (MUCINEX) 600 MG 12 hr tablet Take 2 tablets (1,200 mg) by mouth 2 times daily 60 tablet 3     losartan (COZAAR) 25 MG tablet Take 1/2 (one-half) tablet by mouth once daily 45 tablet 2     metFORMIN (GLUCOPHAGE XR) 500 MG 24 hr tablet TAKE 2 TABLETS BY MOUTH TWICE DAILY WITH MEALS 360 tablet 1     mometasone (NASONEX) 50 MCG/ACT nasal spray Use 2 sprays to each nostril twice a  "day 17 g 11     rosuvastatin (CRESTOR) 20 MG tablet Take 1 tablet by mouth once daily 90 tablet 1     No current facility-administered medications for this visit.     ROS- SEE HPI  /78 (BP Location: Right arm, Patient Position: Sitting, Cuff Size: Adult Large)   Pulse 79   Temp (!) 96  F (35.6  C) (Tympanic)   Resp 18   Ht 1.88 m (6' 2\")   SpO2 96%   BMI 31.48 kg/m      General - The patient is well nourished and well developed, and appears to have good nutritional status.  Alert and oriented to person and place, answers questions and cooperates with examination appropriately.   Head and Face - Normocephalic and atraumatic, with no gross asymmetry noted.  The facial nerve is intact, with strong symmetric movements.  Voice and Breathing - The patient was breathing comfortably without the use of accessory muscles. There was no wheezing, stridor. The patients voice was clear and strong, and had appropriate pitch and quality.  Ears - External ear normal. Canals are patent. Right tympanic membrane is intact without effusion, retraction or mass. Left tympanic membrane is intact without effusion, retraction or mass.  Eyes - Extraocular movements intact, sclera were not icteric or injected, conjunctiva were pink and moist.  Mouth - Examination of the oral cavity showed pink, healthy oral mucosa. Dentition in good condition, upper and lower dentures appear to fit well. No lesions or ulcerations noted. The tongue was mobile and midline.   Throat - The walls of the oropharynx were smooth, pink, moist, symmetric, and had no lesions or ulcerations.  The tonsillar pillars and soft palate were symmetric. The uvula was midline on elevation.    Neck - Normal midline excursion of the laryngotracheal complex during swallowing.  Full range of motion on passive movement.  Palpation of the occipital, submental, submandibular, internal jugular chain, and supraclavicular nodes did not demonstrate any abnormal lymph nodes or " masses.  Palpation of the thyroid was soft and smooth, with no nodules or goiter appreciated.  The trachea was mobile and midline.  Nose - External contour is symmetric, no gross deflection or scars.  Nasal mucosa is pink and moist with no abnormal mucus.  The septum and turbinates were evaluated with nasal speculum, turbinates are lateralized.  No polyps, masses, or purulence noted on examination.        To further evaluate the nasal cavity, I performed rigid nasal endoscopy.  I first applied topical nasal lidocaine and neosynephrine.  I then began on the left side using a 2.7mm, 30 degree rigid nasal endoscope.  The septum intact.  Polyps throughout occluding viz into MM, maxillary, ethmoid and frontal. Unable to viz NP  I then turned my attention to the right side. Polyps throughout occluding viz of MM, maxillary, ethmoid or frontal recess.       ASSESSMENT/ PLAN    ICD-10-CM    1. Chronic sinusitis, unspecified location  J32.9 lidocaine 2%-oxymetazoline 0.025% nasal solution 2 spray      2. Nasal polyposis  J33.9 lidocaine 2%-oxymetazoline 0.025% nasal solution 2 spray      3. Chronic pansinusitis  J32.4 lidocaine 2%-oxymetazoline 0.025% nasal solution 2 spray     budesonide (PULMICORT) 0.5 MG/2ML neb solution      4. Nasal turbinate hypertrophy  J34.3 lidocaine 2%-oxymetazoline 0.025% nasal solution 2 spray      5. Seasonal allergic rhinitis, unspecified trigger  J30.2 lidocaine 2%-oxymetazoline 0.025% nasal solution 2 spray            Currently he is happy with his results.   He has felt the drainage, congestion improved to a level he is comfortable with.   He does not wish to pursue Dupixent and/ or surgical options.  Consider MQT, SCIT in future.     He will continue with budesonide rinses, Nasonex and Zyrtec  Follow up in 6 months or sooner PRN      Lolita Castrejon PA-C  ENT  [facility], [place]         Again, thank you for allowing me to participate in the care of your patient.        Sincerely,        Lolita  RADHA Castrejon

## 2024-11-02 ENCOUNTER — HEALTH MAINTENANCE LETTER (OUTPATIENT)
Age: 59
End: 2024-11-02

## 2024-12-03 DIAGNOSIS — E11.9 TYPE 2 DIABETES MELLITUS WITHOUT COMPLICATION, WITHOUT LONG-TERM CURRENT USE OF INSULIN (H): ICD-10-CM

## 2024-12-03 RX ORDER — GLIMEPIRIDE 4 MG/1
4 TABLET ORAL
Qty: 180 TABLET | Refills: 0 | Status: SHIPPED | OUTPATIENT
Start: 2024-12-03

## 2024-12-03 NOTE — TELEPHONE ENCOUNTER
Glimepiride 4 MG      Last Written Prescription Date:  07/31/24  Last Fill Quantity: 180,   # refills: 0  Last Office Visit: 07/26/24  Future Office visit:       Routing refill request to provider for review/approval because:  Sulfonylurea Agents Failed    Rerun Protocol (12/3/2024 9:58 AM)    Patient has documented A1c within the specified period of time.    If HgbA1C is 8 or greater, it needs to be on file within the past 3 months.  If less than 8, must be on file within the past 6 months.          Recent Labs   Lab Test 07/26/24  0946   A1C 11.1*

## 2025-01-05 DIAGNOSIS — E78.5 HYPERLIPIDEMIA, UNSPECIFIED HYPERLIPIDEMIA TYPE: ICD-10-CM

## 2025-01-05 DIAGNOSIS — E11.9 TYPE 2 DIABETES MELLITUS WITHOUT COMPLICATION, WITHOUT LONG-TERM CURRENT USE OF INSULIN (H): ICD-10-CM

## 2025-01-06 RX ORDER — METFORMIN HYDROCHLORIDE 500 MG/1
1000 TABLET, EXTENDED RELEASE ORAL 2 TIMES DAILY WITH MEALS
Qty: 360 TABLET | Refills: 0 | Status: SHIPPED | OUTPATIENT
Start: 2025-01-06

## 2025-01-06 RX ORDER — ROSUVASTATIN CALCIUM 20 MG/1
20 TABLET, COATED ORAL DAILY
Qty: 90 TABLET | Refills: 1 | Status: SHIPPED | OUTPATIENT
Start: 2025-01-06

## 2025-01-06 NOTE — TELEPHONE ENCOUNTER
Metformin 500      Last Written Prescription Date:  7/15/2024  Last Fill Quantity: 360,   # refills: 1  Last Office Visit: 7/26/2024  Future Office visit:    Next 5 appointments (look out 90 days)      Apr 04, 2025 8:15 AM  (Arrive by 8:00 AM)  Return Visit with Lolita Castrejon PA-C  [facility] ([facility] [place] ) [address]  [place] [address]  [phone]             Routing refill request to provider for review/approval because:   Patient has documented A1c within the specified period of time.

## 2025-02-10 NOTE — OR NURSING
Awaiting Anesthesia sign-out.  Okay to transfer pt to Phase 2 per Dr Lai.  Report given to Sridevi GOINS RN.    [FreeTextEntry1] : f/u spots, DSAP [de-identified] : 75 y/o F presenting for above. f/u for DSAP & white spots on the forehead now since Oct 2024. LV 11/2024. She has not started either tacolimus or lovastatin cream for face & DSAP of legs respectively since LV. She recalls getting a call about a topical medication that was not covered by insurance but does not recall which one this was. She thinks spots on forehead stable. Continues to be concerned about DSAP on legs with appearance and symptoms. As mentioned, has not been able to get lovasatin.   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 Patient is from [place]; spends time there

## 2025-02-16 DIAGNOSIS — E78.5 HYPERLIPIDEMIA, UNSPECIFIED HYPERLIPIDEMIA TYPE: ICD-10-CM

## 2025-02-16 DIAGNOSIS — Z79.4 TYPE 2 DIABETES MELLITUS WITH HYPERGLYCEMIA, WITH LONG-TERM CURRENT USE OF INSULIN (H): ICD-10-CM

## 2025-02-16 DIAGNOSIS — E11.65 TYPE 2 DIABETES MELLITUS WITH HYPERGLYCEMIA, WITH LONG-TERM CURRENT USE OF INSULIN (H): ICD-10-CM

## 2025-02-17 RX ORDER — FENOFIBRATE 54 MG/1
54 TABLET ORAL DAILY
Qty: 90 TABLET | Refills: 1 | Status: SHIPPED | OUTPATIENT
Start: 2025-02-17

## 2025-03-01 ENCOUNTER — HEALTH MAINTENANCE LETTER (OUTPATIENT)
Age: 60
End: 2025-03-01

## 2025-03-10 ENCOUNTER — TELEPHONE (OUTPATIENT)
Dept: FAMILY MEDICINE | Facility: OTHER | Age: 60
End: 2025-03-10

## 2025-03-10 NOTE — TELEPHONE ENCOUNTER
Attempt # 1  Outcome: Talked with Patient  offered a physical appointment with Dr Pham in October 2025 and patient didn't want to set up at this time.   Comment: Physical with Dr Pham

## 2025-03-11 DIAGNOSIS — E11.9 TYPE 2 DIABETES MELLITUS WITHOUT COMPLICATION, WITHOUT LONG-TERM CURRENT USE OF INSULIN (H): ICD-10-CM

## 2025-03-11 RX ORDER — GLIMEPIRIDE 4 MG/1
4 TABLET ORAL
Qty: 180 TABLET | Refills: 0 | Status: SHIPPED | OUTPATIENT
Start: 2025-03-11

## 2025-03-11 NOTE — TELEPHONE ENCOUNTER
glimepiride (AMARYL) 4 MG tablet       Last Written Prescription Date:  12/3/2024  Last Fill Quantity: 180,   # refills: 0  Last Office Visit: 10/4/2024  Future Office visit:    Next 5 appointments (look out 90 days)      Apr 04, 2025 8:15 AM  (Arrive by 8:00 AM)  Return Visit with Lolita Castrejon PA-C  [facility] [place] ([facility] [place] ) [address]  [place] [address]  [phone]     Jun 06, 2025 8:15 AM  (Arrive by 8:00 AM)  Provider Visit with Zoraida Pham MD  [facility] [place] ([facility] [place] ) [address]  [place] [address]  [phone]

## 2025-03-11 NOTE — TELEPHONE ENCOUNTER
Failed protocol    Lab Results   Component Value Date    A1C 11.1 07/26/2024    A1C 8.8 06/16/2023    A1C 7.2 03/10/2023    A1C 6.0 01/14/2022    A1C 11.3 09/30/2021    A1C 10.0 07/22/2021    A1C 7.7 01/22/2021    A1C 11.9 11/20/2020    A1C 7.7 01/06/2020    A1C 6.7 08/08/2019        glimepiride (AMARYL) 4 MG tablet       Last Written Prescription Date:  12/3/24  Last Fill Quantity: 180,   # refills: 0  Last Office Visit: 7/26/24  Future Office visit:    Next 5 appointments (look out 90 days)      Apr 04, 2025 8:15 AM  (Arrive by 8:00 AM)  Return Visit with Lolita Castrejon PA-C  [facility] ([facility] ) [address]  [place] [address]  [phone]     Jun 06, 2025 8:15 AM  (Arrive by 8:00 AM)  Provider Visit with Zoraida Pham MD  [facility] ([facility] ) [address]  [facility] [address]  [phone]             Routing refill request to provider for review/approval because:

## 2025-04-02 DIAGNOSIS — E11.9 TYPE 2 DIABETES MELLITUS WITHOUT COMPLICATION, WITHOUT LONG-TERM CURRENT USE OF INSULIN (H): ICD-10-CM

## 2025-04-02 RX ORDER — METFORMIN HYDROCHLORIDE 500 MG/1
1000 TABLET, EXTENDED RELEASE ORAL 2 TIMES DAILY WITH MEALS
Qty: 360 TABLET | Refills: 0 | Status: SHIPPED | OUTPATIENT
Start: 2025-04-02

## 2025-04-02 NOTE — TELEPHONE ENCOUNTER
metFORMIN (GLUCOPHAGE XR) 500 MG 24 hr tablet       Last Written Prescription Date:  1/6/25  Last Fill Quantity: 360,   # refills: 0  Last Office Visit: 7/26/24  Future Office visit:    Next 5 appointments (look out 90 days)      Apr 04, 2025 8:15 AM  (Arrive by 8:00 AM)  Return Visit with Lolita Castrejon PA-C  [facility] ([facility] ) [address] [facility] [address]  [phone]     Jun 06, 2025 8:15 AM  (Arrive by 8:00 AM)  Provider Visit with Zoraida Pham MD  [facility] ([facility] ) [address]  [facility] [address]  [phone]             Routing refill request to provider for review/approval because:  Biguanide Agents Failed      Patient has documented A1c within the specified period of time.    If HgbA1C is 8 or greater, it needs to be on file within the past 3 months.  If less than 8, must be on file within the past 6 months.          Recent Labs   Lab Test 07/26/24  0946   A1C 11.1*

## 2025-04-03 DIAGNOSIS — E78.5 HYPERLIPIDEMIA, UNSPECIFIED HYPERLIPIDEMIA TYPE: ICD-10-CM

## 2025-04-03 RX ORDER — ROSUVASTATIN CALCIUM 20 MG/1
20 TABLET, COATED ORAL DAILY
Qty: 90 TABLET | Refills: 1 | Status: SHIPPED | OUTPATIENT
Start: 2025-04-03

## 2025-04-03 NOTE — TELEPHONE ENCOUNTER
Rosuvastatin (Crestor) 20 MG tablet    Last Written Prescription Date:  01/06/2025  Last Fill Quantity: 90,   # refills: 0  Last Office Visit: 07/26/2024  Future Office visit:    Next 5 appointments (look out 90 days)      Apr 04, 2025 8:15 AM  (Arrive by 8:00 AM)  Return Visit with Lolita Castrejon PA-C  [facility] ([facility] [place] ) [address]  [place] [address]  [phone]     Jun 06, 2025 8:15 AM  (Arrive by 8:00 AM)  Provider Visit with Zoraida Pham MD  [facility] ([facility] [place] ) [address]  [place] [address]  [phone]             Routing refill request to provider for review/approval because:

## 2025-04-23 DIAGNOSIS — E11.65 TYPE 2 DIABETES MELLITUS WITH HYPERGLYCEMIA, WITH LONG-TERM CURRENT USE OF INSULIN (H): ICD-10-CM

## 2025-04-23 DIAGNOSIS — Z79.4 TYPE 2 DIABETES MELLITUS WITH HYPERGLYCEMIA, WITH LONG-TERM CURRENT USE OF INSULIN (H): ICD-10-CM

## 2025-04-23 RX ORDER — LOSARTAN POTASSIUM 25 MG/1
12.5 TABLET ORAL DAILY
Qty: 45 TABLET | Refills: 2 | Status: SHIPPED | OUTPATIENT
Start: 2025-04-23

## 2025-06-06 ENCOUNTER — HOSPITAL ENCOUNTER (OUTPATIENT)
Dept: EDUCATION SERVICES | Facility: HOSPITAL | Age: 60
Discharge: HOME | End: 2025-06-06
Attending: FAMILY MEDICINE
Payer: COMMERCIAL

## 2025-06-06 ENCOUNTER — LAB (OUTPATIENT)
Dept: LAB | Facility: OTHER | Age: 60
End: 2025-06-06
Attending: FAMILY MEDICINE
Payer: COMMERCIAL

## 2025-06-06 VITALS
WEIGHT: 242 LBS | OXYGEN SATURATION: 93 % | BODY MASS INDEX: 31.06 KG/M2 | HEART RATE: 91 BPM | DIASTOLIC BLOOD PRESSURE: 70 MMHG | SYSTOLIC BLOOD PRESSURE: 122 MMHG | HEIGHT: 74 IN

## 2025-06-06 DIAGNOSIS — Z79.4 TYPE 2 DIABETES MELLITUS WITH HYPERGLYCEMIA, WITH LONG-TERM CURRENT USE OF INSULIN (H): Primary | ICD-10-CM

## 2025-06-06 DIAGNOSIS — E11.65 TYPE 2 DIABETES MELLITUS WITH HYPERGLYCEMIA, WITH LONG-TERM CURRENT USE OF INSULIN (H): Primary | ICD-10-CM

## 2025-06-06 DIAGNOSIS — Z79.4 TYPE 2 DIABETES MELLITUS WITH HYPERGLYCEMIA, WITH LONG-TERM CURRENT USE OF INSULIN (H): ICD-10-CM

## 2025-06-06 DIAGNOSIS — E11.65 TYPE 2 DIABETES MELLITUS WITH HYPERGLYCEMIA, WITH LONG-TERM CURRENT USE OF INSULIN (H): ICD-10-CM

## 2025-06-06 DIAGNOSIS — E78.5 HYPERLIPIDEMIA, UNSPECIFIED HYPERLIPIDEMIA TYPE: ICD-10-CM

## 2025-06-06 DIAGNOSIS — J44.9 CHRONIC OBSTRUCTIVE PULMONARY DISEASE, UNSPECIFIED COPD TYPE (H): ICD-10-CM

## 2025-06-06 LAB
ALBUMIN SERPL BCG-MCNC: 4.2 G/DL (ref 3.5–5.2)
ALP SERPL-CCNC: 73 U/L (ref 40–150)
ALT SERPL W P-5'-P-CCNC: 32 U/L (ref 0–70)
ANION GAP SERPL CALCULATED.3IONS-SCNC: 12 MMOL/L (ref 7–15)
AST SERPL W P-5'-P-CCNC: 38 U/L (ref 0–45)
BASOPHILS # BLD AUTO: 0.1 10E3/UL (ref 0–0.2)
BASOPHILS NFR BLD AUTO: 1 %
BILIRUB SERPL-MCNC: 0.3 MG/DL
BUN SERPL-MCNC: 16.9 MG/DL (ref 6–20)
CALCIUM SERPL-MCNC: 9.4 MG/DL (ref 8.8–10.4)
CHLORIDE SERPL-SCNC: 103 MMOL/L (ref 98–107)
CHOLEST SERPL-MCNC: 112 MG/DL
CREAT SERPL-MCNC: 0.87 MG/DL (ref 0.67–1.17)
CREAT UR-MCNC: 208.8 MG/DL
EGFRCR SERPLBLD CKD-EPI 2021: >90 ML/MIN/1.73M2
EOSINOPHIL # BLD AUTO: 0.3 10E3/UL (ref 0–0.7)
EOSINOPHIL NFR BLD AUTO: 4 %
ERYTHROCYTE [DISTWIDTH] IN BLOOD BY AUTOMATED COUNT: 13.2 % (ref 10–15)
EST. AVERAGE GLUCOSE BLD GHB EST-MCNC: 275 MG/DL
FASTING STATUS PATIENT QL REPORTED: YES
FASTING STATUS PATIENT QL REPORTED: YES
GLUCOSE SERPL-MCNC: 281 MG/DL (ref 70–99)
HBA1C MFR BLD: 11.2 %
HCO3 SERPL-SCNC: 24 MMOL/L (ref 22–29)
HCT VFR BLD AUTO: 41.7 % (ref 40–53)
HDLC SERPL-MCNC: 31 MG/DL
HGB BLD-MCNC: 14.2 G/DL (ref 13.3–17.7)
IMM GRANULOCYTES # BLD: 0.1 10E3/UL
IMM GRANULOCYTES NFR BLD: 1 %
LDLC SERPL CALC-MCNC: 29 MG/DL
LYMPHOCYTES # BLD AUTO: 2 10E3/UL (ref 0.8–5.3)
LYMPHOCYTES NFR BLD AUTO: 25 %
MCH RBC QN AUTO: 28.5 PG (ref 26.5–33)
MCHC RBC AUTO-ENTMCNC: 34.1 G/DL (ref 31.5–36.5)
MCV RBC AUTO: 84 FL (ref 78–100)
MICROALBUMIN UR-MCNC: 608.1 MG/L
MICROALBUMIN/CREAT UR: 291.24 MG/G CR (ref 0–17)
MONOCYTES # BLD AUTO: 0.6 10E3/UL (ref 0–1.3)
MONOCYTES NFR BLD AUTO: 7 %
NEUTROPHILS # BLD AUTO: 5 10E3/UL (ref 1.6–8.3)
NEUTROPHILS NFR BLD AUTO: 63 %
NONHDLC SERPL-MCNC: 81 MG/DL
NRBC # BLD AUTO: 0 10E3/UL
NRBC BLD AUTO-RTO: 0 /100
PLATELET # BLD AUTO: 292 10E3/UL (ref 150–450)
POTASSIUM SERPL-SCNC: 4.6 MMOL/L (ref 3.4–5.3)
PROT SERPL-MCNC: 8.4 G/DL (ref 6.4–8.3)
RBC # BLD AUTO: 4.98 10E6/UL (ref 4.4–5.9)
SODIUM SERPL-SCNC: 139 MMOL/L (ref 135–145)
TRIGL SERPL-MCNC: 262 MG/DL
WBC # BLD AUTO: 8 10E3/UL (ref 4–11)

## 2025-06-06 PROCEDURE — 36415 COLL VENOUS BLD VENIPUNCTURE: CPT

## 2025-06-06 PROCEDURE — 82043 UR ALBUMIN QUANTITATIVE: CPT

## 2025-06-06 PROCEDURE — 80053 COMPREHEN METABOLIC PANEL: CPT

## 2025-06-06 PROCEDURE — 80061 LIPID PANEL: CPT

## 2025-06-06 PROCEDURE — 83036 HEMOGLOBIN GLYCOSYLATED A1C: CPT

## 2025-06-06 PROCEDURE — 85025 COMPLETE CBC W/AUTO DIFF WBC: CPT

## 2025-06-06 PROCEDURE — G0108 DIAB MANAGE TRN  PER INDIV: HCPCS

## 2025-06-06 PROCEDURE — 82570 ASSAY OF URINE CREATININE: CPT

## 2025-06-06 RX ORDER — INSULIN DEGLUDEC 100 U/ML
10 INJECTION, SOLUTION SUBCUTANEOUS AT BEDTIME
Qty: 15 ML | Refills: 1 | Status: SHIPPED | OUTPATIENT
Start: 2025-06-06

## 2025-06-06 RX ORDER — HYDROCHLOROTHIAZIDE 12.5 MG/1
CAPSULE ORAL
Qty: 6 EACH | Refills: 1 | Status: SHIPPED | OUTPATIENT
Start: 2025-06-06

## 2025-06-06 ASSESSMENT — PAIN SCALES - GENERAL: PAINLEVEL_OUTOF10: NO PAIN (0)

## 2025-06-06 NOTE — PROGRESS NOTES
Diabetes Self-Management Education & Support    Presents for: Individual review    Type of Service: In Person Visit    Assessment  Atul, 59 year old. Into clinic today to see PC. A1C update: 11.2. PC would like to start insulin with DRC support.     Review of dm related sx:  Not thirsty.   Hunger- depends on day.   Neuropathy. -planter fascitis. - challenging for activity.     Toast at breakfast /something to take breakfast pills  Wife packs lunch. Has lots of fruit, veg.     Grazes/day  Hard boiled eggs  Fruit, berries.  Carrots celery cucumbers       PBJ sandwich- in the morning. SF jelly.   [place] meat cheese this time of year in the afternoon.     Hot lunch/ box   Roberth cheese sauce sauce      Night- tired- pizza then off to bed.     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     Vision -due to  age?  Vs when low BG levels.      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     Wt Readings from Last 10 Encounters:   06/06/25 109.8 kg (242 lb)   06/06/25 110.1 kg (242 lb 11.2 oz)   09/04/24 111.2 kg (245 lb 2.4 oz)   07/26/24 111.2 kg (245 lb 3.2 oz)   12/01/23 114.8 kg (253 lb)   07/28/23 115.5 kg (254 lb 11.2 oz)   03/06/23 118.6 kg (261 lb 6.4 oz)   04/13/22 112.5 kg (248 lb)   03/23/22 110.7 kg (244 lb)   03/14/22 112.9 kg (249 lb)     Patient's most recent   Lab Results   Component Value Date    A1C 11.2 06/06/2025    A1C 6.0 01/14/2022     is meeting goal of <7.0    Diabetes knowledge and skills assessment:   Patient is knowledgeable in diabetes management concepts related to: Healthy Eating, Monitoring, and Taking Medication    Based on learning assessment above, most appropriate setting for further diabetes education would be: Individual setting.    Care Plan and Education Provided:  Monitoring: Blood glucose versus Continuous Glucose Monitoring, Frequency of monitoring, Individual glucose targets, Log and interpret results, Purpose, and CGM instruction: Patient was  instructed on Freestyle Nazia System: Freestyle Nazia sensor: insertion technique, sensor site location and rotation, insulin administration in relation to sensor placement, sensor wear, reasons to remove sensor (MRI, CT, diathermy), Vitamin C & Aspirin effects on sensor, Use of trends and graphs for pattern management and problem solving, and Dosing insulin based on sensor glucose results  Problem Solving: High glucose - causes, signs/symptoms, treatment and prevention, Low glucose - causes, signs/symptoms, treatment and prevention, Rule of 15 and carrying a carbohydrate source at all times in case of low glucose, and When to call a health care provider  Insulin administration technique taught today. Patient verbalized understanding and was able to perform an accurate return demonstration of administration technique.   Insulin storage. Sharps disposal. Expiration.     Patient verbalized understanding of diabetes self-management education concepts discussed, opportunities for ongoing education and support, and recommendations provided today.    Plan    Basal- Tresiba 10 units at bedtime, titration every 3-5 days until FBG <130.  Stop MCADAMS with start of insulin.   Continue Metformin.   Nazia CGM- sent to [facility].    Pt off Fridays- only day able to come into clinic for visits.   Uses BO.LThart/receives messages and will respond. Isn't able to initiate messages.   See Care Plan for co-developed, patient-state behavior change goals.    Education Materials Provided:  -- Long acting Insulin information  -- CGM information on Nazia    Subjective/Objective  Atul is an 59 year old, presenting for the following diabetes education related to: Individual review  Accompanied by: Self, Spouse (Wife is Leah)  Diabetes education in the past 24mo: Yes  Focus of Visit: Reducing Risks, Taking Medication, Insulin Start  Diabetes type: Type 2  Date of diagnosis: 2008  Disease course: Getting harder to manage  Diabetes management related  "comments/concerns: insulin start a1c >11  Transportation concerns: No  Difficulty affording diabetes medication?: Yes  Difficulty affording diabetes testing supplies?: Yes  Other concerns: None  Cultural Influences/Ethnic Background:  Not  or     Diabetes Symptoms & Complications:  Diabetes Related Symptoms: Fatigue, Neuropathy  Weight trend: Stable  Symptom course: Stable  Disease course: Getting harder to manage  Complications assessed today?: No (focus visit to start insulin.)    Patient Problem List and Family Medical History reviewed for relevant medical history, current medical status, and diabetes risk factors.    Vitals:  /70   Pulse 91   Ht 1.88 m (6' 2\")   Wt 109.8 kg (242 lb)   SpO2 93%   BMI 31.07 kg/m    Estimated body mass index is 31.07 kg/m  as calculated from the following:    Height as of this encounter: 1.88 m (6' 2\").    Weight as of this encounter: 109.8 kg (242 lb).   Last 3 BP:   BP Readings from Last 3 Encounters:   06/06/25 122/70   06/06/25 122/70   10/04/24 120/78     History   Smoking Status    Former    Types: Dip, chew, snus or snuff, Cigarettes   Smokeless Tobacco    Former    Types: Chew    Quit date: 2/14/2020       Labs:  Lab Results   Component Value Date    A1C 11.2 06/06/2025    A1C 6.0 01/14/2022     Lab Results   Component Value Date     06/06/2025     03/23/2022     03/14/2022     01/26/2021     Lab Results   Component Value Date    LDL 29 06/06/2025    LDL  01/22/2021     Cannot estimate LDL when triglyceride exceeds 400 mg/dL     01/22/2021     HDL Cholesterol   Date Value Ref Range Status   01/22/2021 39 (L) >39 mg/dL Final     Direct Measure HDL   Date Value Ref Range Status   06/06/2025 31 (L) >=40 mg/dL Final     GFR Estimate   Date Value Ref Range Status   06/06/2025 >90 >60 mL/min/1.73m2 Final     Comment:     eGFR calculated using 2021 CKD-EPI equation.   01/26/2021 >90 >60 mL/min/[1.73_m2] Final     Comment: "     Non  GFR Calc  Starting 12/18/2018, serum creatinine based estimated GFR (eGFR) will be   calculated using the Chronic Kidney Disease Epidemiology Collaboration   (CKD-EPI) equation.       GFR Estimate If Black   Date Value Ref Range Status   01/26/2021 >90 >60 mL/min/[1.73_m2] Final     Comment:      GFR Calc  Starting 12/18/2018, serum creatinine based estimated GFR (eGFR) will be   calculated using the Chronic Kidney Disease Epidemiology Collaboration   (CKD-EPI) equation.       Lab Results   Component Value Date    CR 0.87 06/06/2025    CR 0.91 01/26/2021     Lab Results   Component Value Date    MICROL 608.1 06/06/2025    UMALCR 291.24 (H) 06/06/2025    UCRR 208.8 06/06/2025 6/6/2025   Healthy Eating   Healthy Eating Assessed Today Yes   Cultural/Sabianism diet restrictions? No   Do you have any food allergies or intolerances? No   Meal planning/habits Low carb   Who cooks/prepares meals for you? Self;Spouse   Who purchases food in  your home? Self;Spouse   How many times a week on average do you eat food made away from home (restaurant/take-out)? 1   Meals include Breakfast;Lunch;Dinner   Breakfast scrambled eggs/meat or 2 toast with sugar free jelly - water and coffee with almond milk and sugar free vanilla syrup   Lunch veggie steamer with meat and 1/2 can cream soup   Dinner meat, veggie or pizza with no crust - water or almond milk   Snacks Snacks throughout the day on- 2 hardboiled eggs, mixed nuts, apple, 1/2 orange, Nutty Cristi Bar  HS-sometimes Rebel ice cream (low carb).  Pt drinks water and coffee with almond milk/sugar free vanilla syrup throughout the day   Other Pt is now working day shift - 5 am - 5 pm.   Has patient met with a dietitian in the past? Yes         6/6/2025   Being Active   Being Active Assessed Today No   Barrier to exercise None         6/6/2025   Monitoring   Monitoring Assessed Today Yes   Did patient bring glucose meter to  appointment?  Yes   Blood Glucose Meter ContourNext       would like CGM- Nazia considering   Blood glucose trend Other     Diabetes Medication(s)       Biguanides       metFORMIN (GLUCOPHAGE XR) 500 MG 24 hr tablet TAKE 2 TABLETS BY MOUTH TWICE DAILY WITH MEALS       Insulin       insulin degludec (TRESIBA FLEXTOUCH) 100 UNIT/ML pen Inject 10 Units subcutaneously at bedtime. Prime with 2 units prior to injecting your dose. Increase dose every 3 -5 days by 4 units until fasting sugars are <130       Sulfonylureas       glimepiride (AMARYL) 4 MG tablet TAKE 1 TABLET BY MOUTH TWICE DAILY BEFORE MEAL(S)              6/6/2025   Taking Medications   Taking Medication Assessed Today Yes   Current Treatments Diet;Oral Medication (taken by mouth)       Metformin. Amaryl   Problems taking diabetes medications regularly? No   Diabetes medication side effects? Yes       Pt had low's when on Glimepiride in past.  Trulicity gave him a rash on day of injection.         6/6/2025   Problem Solving   Problem Solving Assessed Today Yes   Is the patient at risk for hypoglycemia? No           6/6/2025   Reducing Risks   Reducing Risks Assessed Today No   Diabetes Risks Age over 45 years;Family History   CAD Risks Family history;Diabetes Mellitus;Male sex;Obesity   Has dilated eye exam at least once a year? Yes   Sees dentist every 6 months? No   Feet checked by healthcare provider in the last year? No         6/6/2025   Healthy Coping: Diabetes Distress Assessment   Healthy Coping Assessed Today Yes   I feel burned out by all of the attention and effort that diabetes demands of me. 4 - A Serious Problem   It bothers me that diabetes seems to control my life. 4 - A Serious Problem   I am frustrated that even when I do what I am supposed to for my diabetes, it doesn't seem to make a difference. 4 - A Serious Problem   No matter how hard I try with my diabetes, it feels like it will never be good enough. 3 - A Moderate Problem   I am so  tired of having to worry about diabetes all the time. 3 - A Moderate Problem   When it comes to my diabetes, I often feel like a failure. 3 - A Moderate Problem   It depresses me when I realize that my diabetes will likely never go away. 3 - A Moderate Problem   Living with diabetes is overwhelming for me. 3 - A Moderate Problem   T2 DDAS Total Score (0 - 1.9 Little or no DD, 2.0 - 2.9 Moderate DD,  3.0+ High DD) 3.4       Myrna Jean RN [facility]  Time Spent: 30 minutes  Encounter Type: Individual    Any diabetes medication dose changes were made via the [facility] Orders under the patient's referring provider.

## 2025-06-06 NOTE — TELEPHONE ENCOUNTER
Requesting insulin and needles for  dm management.     Myrna Jean RN [facility]   [phone]  6/6/2025 at 11:06 AM

## 2025-06-06 NOTE — PATIENT INSTRUCTIONS
"Five Goals to Manage Diabetes:  1) Control Blood Pressure: <140/90, <130/80 if able to safely reach  2) Lower LDL \"bad\" cholesterol  <70   3) Maintain blood sugar- individual targets vary  4) Be tobacco free  5) Take Aspirin as recommended- talk to your primary care provider if this is right for you.     Monitoring:  Your A1C was 11.2 today. Goal is less than <8.0%, if able- <7.0%       Target blood sugar: Fasting or before meals target is  . 2 hours after meal <180.    We only need 50% of these numbers to be within target for your A1c will be within/close to target.    CGM: Nazia 3 plus sensors- sent to [employer].   Code for Sharin  Coupon- if needed to lower cost.        Medications:   Insulin- Tresiba 10 units once daily- prefer at bedtime. We will adjust every 3-5 days by 4 units until fasting sugars are <130.  Stop Amaryl  Continue Metformin       Healthy Eating:  (avoid food/drink allergies or intolerances if you have any in the following list):     diabetesfoodhub.org  Website for diabetes friendly recipes.   [employer] Website for budget friendly recipes.   diabeteseducation.[email or web address]  healthy eating and other topics to help manage diabetes.     Men: Daily fiber intake  30 grams for those older than 50    Protein goal (weight/2.2)  X  0.8-1.2 - daily target based on todays weight:   grams of protein per day.   (The size of a deck of cards is about 21 grams of protein).   Protein for Weight Loss: If you are trying to lose weight, you may need more protein to help with satiety and preserve muscle mass.    Target Carb:   Men 45-60 grams/meal 15-30 grams optional snacks. Less if working towards weight loss.     Suggest having a protein and/or fiber with carb option (slows down how fast sugar/glucose is released into the blood stream causing the spikes).     Mediterranean Lifestyle: Fish/seafood 2 times a week, vegetables, fruit, nuts, legumes, whole grains, water, regular activity.  "   Eat a Healthy diet  Eat more vegetables/plants in your diet  Eat healthy fats  Olive oil  Avocados  Eat healthy proteins  Poultry without the skin  Fish  Limit red meat     Limit higher carbohydrate foods such as: rice, breads, cereal, pasta, sweets. (Mindful portion sizes).   Avoid sugary drinks: regular soda/pop, juice, sports drinks. (Sugar free, zero are okay).     Snacks: Try to  work on healthy, low carbohydrate food choices.   Some good snack examples:   -Greek yogurt  -Protein shake  -Few crackers/slice of cheese  -Apple/peanut butter  -Hardboiled egg/wheat thins  -Almonds or alternate nuts with berries  -Steak bites   -Small wrap sandwich  -Adult lunchable  -Serving of oatmeal with nuts or peanut butter   -Smoothie drink with portion of Greek yogurt, ice, fruit of choice  -Trail mix  -fresh veggie  -fruit/berries- pair with protein like Greek yogurt, cheese, cottage cheese, hardboiled egg.    Activity/Exercise:     Any prolonged sedentary/sitting activity should be interrupted every 30 minutes- not only diabetes benefits-it also lowers some cancer risk too!  Consider walking or other form of activity for 5-10 minutes after eating meals.      Adult goal is 150 minutes/week =  30 minutes 5 days of the week.   Aerobic activity 150 minute a week  2 days of resistance exercising     Start low and slow, work up to 30 min, 5x/week.   Increase exercise as tolerated, even multiple short times during your day helps.   A SMART Goal can help support your journey.      Healthy Coping:     Practicing health promotion can help improve diabetes (suggested by the ADA, March 2019)              Meditation                          Apps: for IPhone and Android                          -Calm                          -Headspace                          -Insight Timer              Gentle stretching              Mindful eating       Other:   Foot exam: yearly. In the meantime, check your feet daily looking for new blisters or  wounds, wearing shoes at all times when walking including around the house, and avoiding lotion application between the toes. If there are any signs of infection, contact your primary care provider.  Infections of the foot can be life threatening or lead to amputations of the foot or leg. You can use a mirror to look over your feet is you are not able to bend to get a good look.     Eye exam: yearly    Encourage regular dental check ups.    Consider setting a goal: (SMART) specific, measurable, attainable, realistic and Timely) goals (suggested by ADA 2023)      Follow up: next- to review sugar levels.      Please bring your meter/reader to your appointment.      If you have any questions or concerns, please call me at [phone] (Pao, Nurse directly) or send Examify message.    Scheduling Number: [phone]    Thank you for coming in today!  Myrna Jean RN [facility]  Diabetes Educator

## 2025-06-09 ENCOUNTER — TELEPHONE (OUTPATIENT)
Dept: FAMILY MEDICINE | Facility: OTHER | Age: 60
End: 2025-06-09

## 2025-06-10 ENCOUNTER — PATIENT OUTREACH (OUTPATIENT)
Dept: EDUCATION SERVICES | Facility: HOSPITAL | Age: 60
End: 2025-06-10

## 2025-06-10 ENCOUNTER — TELEPHONE (OUTPATIENT)
Dept: FAMILY MEDICINE | Facility: OTHER | Age: 60
End: 2025-06-10

## 2025-06-10 NOTE — TELEPHONE ENCOUNTER
Screening Questions for the Scheduling of Screening Colonoscopies     (If Colonoscopy is diagnostic, Provider should review the chart before scheduling.)    Are you younger than 50 or older than 80?  No    Do you take aspirin or fish oil?  No (if yes, tell patient to stop 1 week prior to Colonoscopy)    Do you take warfarin (Coumadin), clopidogrel (Plavix), apixaban (Eliquis), dabigatram (Pradaxa), rivaroxaban (Xarelto) or any blood thinner? No    Do you use oxygen at home?  No    Do you have kidney disease? No    Are you on dialysis? No    Have you had a stroke or heart attack in the last year? No    Have you had a stent in your heart or any blood vessel in the last year? No    Have you had a transplant of any organ?  No    Have you had a colonoscopy or upper endoscopy (EGD) before?  YES. Date-         Date of scheduled Colonoscopy: 7/31/25    Provider: Dr. YANETH Roblero     Ph[facility]

## 2025-06-10 NOTE — PROGRESS NOTES
Diabetes Self-Management Education & Support    CGM REVIEW:  Tresiba 10 units.   Stopped Glipizide- pt states he did take 1 Glipizide this morning- was worried about his sugars this morning.   Per trends, increase Tresiba to 14 units. Review on Friday.     Myrna Jean RN [facility]   [phone]  6/10/2025 at 3:18 PM

## 2025-06-16 ENCOUNTER — HOSPITAL ENCOUNTER (OUTPATIENT)
Facility: HOSPITAL | Age: 60
End: 2025-06-16
Attending: SURGERY | Admitting: SURGERY
Payer: COMMERCIAL

## 2025-06-26 DIAGNOSIS — E11.9 TYPE 2 DIABETES MELLITUS WITHOUT COMPLICATION, WITHOUT LONG-TERM CURRENT USE OF INSULIN (H): ICD-10-CM

## 2025-06-26 RX ORDER — METFORMIN HYDROCHLORIDE 500 MG/1
1000 TABLET, EXTENDED RELEASE ORAL 2 TIMES DAILY WITH MEALS
Qty: 360 TABLET | Refills: 1 | Status: SHIPPED | OUTPATIENT
Start: 2025-06-26

## 2025-07-14 DIAGNOSIS — E11.9 TYPE 2 DIABETES MELLITUS WITHOUT COMPLICATION, WITHOUT LONG-TERM CURRENT USE OF INSULIN (H): ICD-10-CM

## 2025-07-15 RX ORDER — GLIMEPIRIDE 4 MG/1
4 TABLET ORAL
Qty: 180 TABLET | Refills: 1 | Status: SHIPPED | OUTPATIENT
Start: 2025-07-15

## 2025-07-27 ENCOUNTER — ANESTHESIA EVENT (OUTPATIENT)
Dept: SURGERY | Facility: HOSPITAL | Age: 60
End: 2025-07-27

## 2025-07-27 RX ORDER — ONDANSETRON 4 MG/1
4 TABLET, ORALLY DISINTEGRATING ORAL EVERY 30 MIN PRN
Status: CANCELLED | OUTPATIENT
Start: 2025-07-27

## 2025-07-27 RX ORDER — SODIUM CHLORIDE, SODIUM LACTATE, POTASSIUM CHLORIDE, CALCIUM CHLORIDE 600; 310; 30; 20 MG/100ML; MG/100ML; MG/100ML; MG/100ML
INJECTION, SOLUTION INTRAVENOUS CONTINUOUS
Status: CANCELLED | OUTPATIENT
Start: 2025-07-27

## 2025-07-27 RX ORDER — LIDOCAINE 40 MG/G
CREAM TOPICAL
Status: CANCELLED | OUTPATIENT
Start: 2025-07-27

## 2025-07-27 RX ORDER — DEXAMETHASONE SODIUM PHOSPHATE 10 MG/ML
4 INJECTION, SOLUTION INTRAMUSCULAR; INTRAVENOUS
Status: CANCELLED | OUTPATIENT
Start: 2025-07-27

## 2025-07-27 RX ORDER — ONDANSETRON 2 MG/ML
4 INJECTION INTRAMUSCULAR; INTRAVENOUS EVERY 30 MIN PRN
Status: CANCELLED | OUTPATIENT
Start: 2025-07-27

## 2025-07-27 RX ORDER — NALOXONE HYDROCHLORIDE 0.4 MG/ML
0.1 INJECTION, SOLUTION INTRAMUSCULAR; INTRAVENOUS; SUBCUTANEOUS
Status: CANCELLED | OUTPATIENT
Start: 2025-07-27

## 2025-07-27 ASSESSMENT — LIFESTYLE VARIABLES: TOBACCO_USE: 1

## 2025-07-27 ASSESSMENT — COPD QUESTIONNAIRES
CAT_SEVERITY: SEVERE
COPD: 1

## 2025-07-28 ENCOUNTER — TELEPHONE (OUTPATIENT)
Dept: SURGERY | Facility: OTHER | Age: 60
End: 2025-07-28

## 2025-07-28 NOTE — TELEPHONE ENCOUNTER
----- Message from Shelly CHAVEZ sent at 7/28/2025  1:58 PM CDT -----  Regarding: Cancel for the 31st NO reschedule  Atul is ill and just wants to cancel his colon on July 31st with NO reschedule

## 2025-07-31 ENCOUNTER — ANESTHESIA (OUTPATIENT)
Dept: SURGERY | Facility: HOSPITAL | Age: 60
End: 2025-07-31

## 2025-08-10 DIAGNOSIS — E78.5 HYPERLIPIDEMIA, UNSPECIFIED HYPERLIPIDEMIA TYPE: ICD-10-CM

## 2025-08-10 DIAGNOSIS — E11.65 TYPE 2 DIABETES MELLITUS WITH HYPERGLYCEMIA, WITH LONG-TERM CURRENT USE OF INSULIN (H): ICD-10-CM

## 2025-08-10 DIAGNOSIS — Z79.4 TYPE 2 DIABETES MELLITUS WITH HYPERGLYCEMIA, WITH LONG-TERM CURRENT USE OF INSULIN (H): ICD-10-CM

## 2025-08-11 RX ORDER — FENOFIBRATE 54 MG/1
54 TABLET ORAL DAILY
Qty: 90 TABLET | Refills: 1 | Status: SHIPPED | OUTPATIENT
Start: 2025-08-11

## (undated) DEVICE — SNARE-ROTATABLE 20MM MINI OVAL

## (undated) DEVICE — BLADE KNIFE SURG 12 371112

## (undated) DEVICE — NIM ELEC SUBDERMAL NDL 3PAIR/BOX

## (undated) DEVICE — DRAIN JACKSON PRATT 10MM FLAT 4/4 PERF SU130-1311

## (undated) DEVICE — LINEAR CUTTER-BLUE 55MM PROXIMATE

## (undated) DEVICE — GLV-8.0 PROTEXIS PI CLASSIC LF/PF

## (undated) DEVICE — PREP SKIN SCRUB TRAY 4461A

## (undated) DEVICE — IRRIGATION-H2O 1000ML

## (undated) DEVICE — CAUTERY PAD-POLYHESIVE II ADULT

## (undated) DEVICE — DRAPE WARMER 66X44" ORS-300

## (undated) DEVICE — DRSG-MEPILEX BORDER AG 4" X 8" (10CM X 20CM)

## (undated) DEVICE — SUTURE-VICRYL 0 UR-6 J603H

## (undated) DEVICE — RELOAD-BLUE 45MM ECHELON ENDOPATH

## (undated) DEVICE — SPONGE KITTNER 30-101

## (undated) DEVICE — SU SILK 0 TIE 6X30" A306H

## (undated) DEVICE — BLANKET-BAIR UPPER BODY

## (undated) DEVICE — TUBING-IRRIG. SYSTEM CLEARVISION

## (undated) DEVICE — IRRIGATION-NACL 1000ML

## (undated) DEVICE — SUTURE-CHROMIC GUT 4-0 RB-1 U203H

## (undated) DEVICE — GOWN-SURG XL LVL 3 REINFORCED

## (undated) DEVICE — CAUTERY PENCIL-W/SUCTION 8FR HANDSWITCHING

## (undated) DEVICE — SCD SLEEVE-KNEE REG.

## (undated) DEVICE — BLANKET-BAIR LOWER EXTREMITY

## (undated) DEVICE — DRSG TELFA 3X8" 1238

## (undated) DEVICE — RETR ELASTIC STAYS LONE STAR BLUNT DUAL LEAD 3550-1G

## (undated) DEVICE — BIN-ENT BIN

## (undated) DEVICE — CANISTER-SUCTION 2000CC

## (undated) DEVICE — BETADINE 5% STERILE OPHTHALMIC SOLUTION 1 OZ.

## (undated) DEVICE — DRSG JAWBRA  95

## (undated) DEVICE — SUTURE-PROLENE 2-0 SH 8833H

## (undated) DEVICE — SU PROLENE 5-0 RB-1DA 36"  8556H

## (undated) DEVICE — TROCAR-12X100MM KII FIOS

## (undated) DEVICE — LABEL-STERILE PREPRINTED FOR OR

## (undated) DEVICE — TUBING-IRRIGATOR STRAIGHTSHOT FUSION

## (undated) DEVICE — SECURESTRAP 25 STRAP FIXATION DEVICE

## (undated) DEVICE — ESU HOLSTER PLASTIC DISP E2400

## (undated) DEVICE — Device

## (undated) DEVICE — TRAY-SKIN PREP POVIDONE/IODINE

## (undated) DEVICE — DRSG-TEGADERM LARGE 6"X8"

## (undated) DEVICE — FORCEP-COLON BIOPSY LARGE W/NEEDLE 240CM

## (undated) DEVICE — BLADE CLIPPER SGL USE 9680

## (undated) DEVICE — PACK NEURO MINOR UMMC SNE32MNMU4

## (undated) DEVICE — INFLATION DEVICE-SINUS BALLOON CATH

## (undated) DEVICE — SUTURE-VICRYL 3-0 SH J416H

## (undated) DEVICE — TUBE-SALEM SUMP 18FR STOMACH SUCTION

## (undated) DEVICE — SUCTION SLEEVE NEPTUNE 2 125MM 0703-005-125

## (undated) DEVICE — APPLICATOR-CHLORAPREP 26ML TINTED CHG 2%+ 70% IPA-SURGICAL

## (undated) DEVICE — CATH TRAY-16FR METER W/STATLOCK LATEX]

## (undated) DEVICE — COBLATION WAND-TURBINATE REDUCT. PTR

## (undated) DEVICE — LIGHT HANDLE COVER FOR SKYTRON LIGHTS

## (undated) DEVICE — DRAIN JACKSON PRATT RESERVOIR 100ML SU130-1305

## (undated) DEVICE — TROCAR SLEEVE-KII 5X100MM

## (undated) DEVICE — NDL-SCLEROTHERAPY INTERJECT

## (undated) DEVICE — ESU CORD BIPOLAR AND IRR TUBING AESCULAP US355

## (undated) DEVICE — ESU ELEC BLADE 2.75" COATED/INSULATED E1455

## (undated) DEVICE — BLADE-FUSION ROTATABLE QUADCUT 4.3MM X 13CM

## (undated) DEVICE — TUBING-SEECLEAR LAPAROSCOPIC SMOKE EVACUATION

## (undated) DEVICE — GOWN-SURG XXL LVL 4 IMPERVIOUS

## (undated) DEVICE — GLV-6.5 PROTEXIS PI CLASSIC LF/PF

## (undated) DEVICE — SPONGE LAP 18X18" X8435

## (undated) DEVICE — LABEL MEDICATION SYSTEM 3303-P

## (undated) DEVICE — PACK-BASIN SET-UP

## (undated) DEVICE — SUTURE-SILK 0 SH K834H

## (undated) DEVICE — LINEAR CUTTER-45MM ECHELON FLEX ARTICULATING

## (undated) DEVICE — SU VICRYL 4-0 RB-1 27" UND J214H

## (undated) DEVICE — WANDS-INSULSCAN

## (undated) DEVICE — DRSG-SPONGE STERILE 4 X 4

## (undated) DEVICE — GELPORT-LAPAROSCOPIC ACCESS SYSTEM

## (undated) DEVICE — TRACKER-INSTRUMENT ENT NAV

## (undated) DEVICE — BIN-COVIDIEN MESH BIN

## (undated) DEVICE — TRAP-POLYP E-TRAP

## (undated) DEVICE — POSITIONING KIT-SLT

## (undated) DEVICE — RETR BLADE LONE STAR 20MM SPIRA 3 FINGER 3335-4G

## (undated) DEVICE — DECANTER VIAL 2006S

## (undated) DEVICE — ESU GROUND PAD ADULT W/CORD E7507

## (undated) DEVICE — SU ETHILON 3-0 PS-1 18" 1663H

## (undated) DEVICE — EYE PREP BETADINE 5% SOLUTION 30ML 0065-0411-30

## (undated) DEVICE — SUTURE-VICRYL 4-0 P-3 J494G

## (undated) DEVICE — INSTRUMENT WIPE-VISIWIPE

## (undated) DEVICE — PREP POVIDONE IODINE SOLUTION 10% 120ML

## (undated) DEVICE — LIGHT HANDLE COVER

## (undated) DEVICE — NASOPORE 4CM FIRM

## (undated) DEVICE — SU SILK 3-0 TIE 12X30" A304H

## (undated) DEVICE — STAPLER-SKIN 35 WIDE STAPLES

## (undated) DEVICE — CLEARIFY VISUALIZATION SYSTEM (SCOPE WARMER)

## (undated) DEVICE — TUBING-INSUFFLATION/LAPAROFLATOR W/FILTER

## (undated) DEVICE — LIGASURE-5MM BLUNT TIP LAPAROSCOPIC

## (undated) DEVICE — CATH TRAY FOLEY SURESTEP 16FR W/TMP PRB STLK LATEX A319416AM

## (undated) DEVICE — SU SILK 4-0 TIE 12X30" A303H

## (undated) DEVICE — TROCAR-5X100MM FIOS BLADELESS

## (undated) DEVICE — SU VICRYL 3-0 SH 8X18" UND J864D

## (undated) DEVICE — PUNCTURE CLOSURE DEVICE

## (undated) DEVICE — TRACKER-PATIENT ENT NIPT

## (undated) DEVICE — CLIP HORIZON MED BLUE 002200

## (undated) DEVICE — DRSG-MEPILEX BORDER AG 3" X 3" (7.5CM X 7.5CM)

## (undated) DEVICE — SU PROLENE 5-0 P-3 18" 8698G

## (undated) DEVICE — CLIP HORIZON SM RED WIDE SLOT 001201

## (undated) DEVICE — PACK-LAPAROSCOPY-CUSTOM

## (undated) DEVICE — BLADE-SURG CLIPPER

## (undated) DEVICE — SUTURE-SILK 3-0 SH POP-OFF C013D

## (undated) DEVICE — BLADE-SCALPEL #15

## (undated) DEVICE — RX BACITRACIN OINTMENT 0.9G 1/32OZ CUR001109

## (undated) DEVICE — SUTURE-PDS 1 TP-1 Z880G

## (undated) DEVICE — GLV-8.5 PROTEXIS PI CLASSIC LF/PF

## (undated) DEVICE — DRAPE-STERI 45X60CM #1010

## (undated) DEVICE — CONNECTOR-ERBEFLO 2 PORT

## (undated) DEVICE — SUTURE-PROLENE 0 CT-1 8424H

## (undated) DEVICE — GLOVE PROTEXIS MICRO 6.0  2D73PM60

## (undated) DEVICE — DRAPE POUCH INSTRUMENT 1018

## (undated) DEVICE — ESU PENCIL SMOKE EVAC W/ROCKER SWITCH 0703-047-000

## (undated) DEVICE — PACK-ENT-CUSTOM

## (undated) DEVICE — DRSG GAUZE 4X4" TRAY 6939

## (undated) DEVICE — SUCTION MANIFOLD DORNOCH ULTRA CART UL-CL500

## (undated) DEVICE — GLOVE PROTEXIS BLUE W/NEU-THERA 6.5  2D73EB65

## (undated) DEVICE — SOL-NACL 0.9% 1000ML

## (undated) DEVICE — SUTURE-MONOCRYL 4-0 PS-2 Y496G

## (undated) DEVICE — TUBING-SUCTION 20FT

## (undated) DEVICE — SU SILK 2-0 TIE 12X30" A305H

## (undated) DEVICE — NIM PROBE PRASS INCREMENTING TIP 8225825

## (undated) DEVICE — LINEN TOWEL PACK X30 5481

## (undated) DEVICE — ADAPTER-SPECIMEN TRAP

## (undated) RX ORDER — PROPOFOL 10 MG/ML
INJECTION, EMULSION INTRAVENOUS
Status: DISPENSED
Start: 2020-01-08

## (undated) RX ORDER — PROPOFOL 10 MG/ML
INJECTION, EMULSION INTRAVENOUS
Status: DISPENSED
Start: 2020-12-31

## (undated) RX ORDER — DEXAMETHASONE SODIUM PHOSPHATE 10 MG/ML
INJECTION, SOLUTION INTRAMUSCULAR; INTRAVENOUS
Status: DISPENSED
Start: 2020-01-08

## (undated) RX ORDER — PHENYLEPHRINE HCL IN 0.9% NACL 1 MG/10 ML
SYRINGE (ML) INTRAVENOUS
Status: DISPENSED
Start: 2018-04-10

## (undated) RX ORDER — FENTANYL CITRATE 50 UG/ML
INJECTION, SOLUTION INTRAMUSCULAR; INTRAVENOUS
Status: DISPENSED
Start: 2021-01-25

## (undated) RX ORDER — FENTANYL CITRATE-0.9 % NACL/PF 10 MCG/ML
PLASTIC BAG, INJECTION (ML) INTRAVENOUS
Status: DISPENSED
Start: 2021-01-25

## (undated) RX ORDER — ONDANSETRON 2 MG/ML
INJECTION INTRAMUSCULAR; INTRAVENOUS
Status: DISPENSED
Start: 2018-04-10

## (undated) RX ORDER — FENTANYL CITRATE 50 UG/ML
INJECTION, SOLUTION INTRAMUSCULAR; INTRAVENOUS
Status: DISPENSED
Start: 2020-12-31

## (undated) RX ORDER — FENTANYL CITRATE 50 UG/ML
INJECTION, SOLUTION INTRAMUSCULAR; INTRAVENOUS
Status: DISPENSED
Start: 2018-04-10

## (undated) RX ORDER — LIDOCAINE HYDROCHLORIDE 20 MG/ML
INJECTION, SOLUTION EPIDURAL; INFILTRATION; INTRACAUDAL; PERINEURAL
Status: DISPENSED
Start: 2020-01-08

## (undated) RX ORDER — FENTANYL CITRATE 50 UG/ML
INJECTION, SOLUTION INTRAMUSCULAR; INTRAVENOUS
Status: DISPENSED
Start: 2020-01-08

## (undated) RX ORDER — AMPICILLIN AND SULBACTAM 2; 1 G/1; G/1
INJECTION, POWDER, FOR SOLUTION INTRAMUSCULAR; INTRAVENOUS
Status: DISPENSED
Start: 2018-04-10

## (undated) RX ORDER — ACETAMINOPHEN 325 MG/1
TABLET ORAL
Status: DISPENSED
Start: 2018-04-10

## (undated) RX ORDER — NEOSTIGMINE METHYLSULFATE 1 MG/ML
VIAL (ML) INJECTION
Status: DISPENSED
Start: 2021-01-25

## (undated) RX ORDER — FENTANYL CITRATE-0.9 % NACL/PF 10 MCG/ML
PLASTIC BAG, INJECTION (ML) INTRAVENOUS
Status: DISPENSED
Start: 2022-03-23

## (undated) RX ORDER — LIDOCAINE HYDROCHLORIDE 20 MG/ML
INJECTION, SOLUTION EPIDURAL; INFILTRATION; INTRACAUDAL; PERINEURAL
Status: DISPENSED
Start: 2018-04-10

## (undated) RX ORDER — DEXAMETHASONE SODIUM PHOSPHATE 10 MG/ML
INJECTION, SOLUTION INTRAMUSCULAR; INTRAVENOUS
Status: DISPENSED
Start: 2021-01-25

## (undated) RX ORDER — KETAMINE HCL IN NACL, ISO-OSM 100MG/10ML
SYRINGE (ML) INJECTION
Status: DISPENSED
Start: 2021-01-25

## (undated) RX ORDER — PROPOFOL 10 MG/ML
INJECTION, EMULSION INTRAVENOUS
Status: DISPENSED
Start: 2018-04-10

## (undated) RX ORDER — EPHEDRINE SULFATE 50 MG/ML
INJECTION, SOLUTION INTRAMUSCULAR; INTRAVENOUS; SUBCUTANEOUS
Status: DISPENSED
Start: 2018-04-10

## (undated) RX ORDER — ONDANSETRON 2 MG/ML
INJECTION INTRAMUSCULAR; INTRAVENOUS
Status: DISPENSED
Start: 2020-01-08

## (undated) RX ORDER — GLYCOPYRROLATE 0.2 MG/ML
INJECTION, SOLUTION INTRAMUSCULAR; INTRAVENOUS
Status: DISPENSED
Start: 2021-01-25

## (undated) RX ORDER — GLYCOPYRROLATE 0.2 MG/ML
INJECTION, SOLUTION INTRAMUSCULAR; INTRAVENOUS
Status: DISPENSED
Start: 2022-03-23

## (undated) RX ORDER — HYDROMORPHONE HYDROCHLORIDE 1 MG/ML
INJECTION, SOLUTION INTRAMUSCULAR; INTRAVENOUS; SUBCUTANEOUS
Status: DISPENSED
Start: 2018-04-10

## (undated) RX ORDER — LIDOCAINE HYDROCHLORIDE 20 MG/ML
INJECTION, SOLUTION EPIDURAL; INFILTRATION; INTRACAUDAL; PERINEURAL
Status: DISPENSED
Start: 2021-01-25

## (undated) RX ORDER — PROPOFOL 10 MG/ML
INJECTION, EMULSION INTRAVENOUS
Status: DISPENSED
Start: 2022-03-23

## (undated) RX ORDER — LIDOCAINE HYDROCHLORIDE 20 MG/ML
INJECTION, SOLUTION EPIDURAL; INFILTRATION; INTRACAUDAL; PERINEURAL
Status: DISPENSED
Start: 2022-03-23

## (undated) RX ORDER — KETAMINE HCL IN NACL, ISO-OSM 100MG/10ML
SYRINGE (ML) INJECTION
Status: DISPENSED
Start: 2022-03-23

## (undated) RX ORDER — FENTANYL CITRATE 50 UG/ML
INJECTION, SOLUTION INTRAMUSCULAR; INTRAVENOUS
Status: DISPENSED
Start: 2022-03-23

## (undated) RX ORDER — GLYCOPYRROLATE 0.2 MG/ML
INJECTION, SOLUTION INTRAMUSCULAR; INTRAVENOUS
Status: DISPENSED
Start: 2018-04-10

## (undated) RX ORDER — PROPOFOL 10 MG/ML
INJECTION, EMULSION INTRAVENOUS
Status: DISPENSED
Start: 2021-01-25

## (undated) RX ORDER — DEXAMETHASONE SODIUM PHOSPHATE 10 MG/ML
INJECTION, SOLUTION INTRAMUSCULAR; INTRAVENOUS
Status: DISPENSED
Start: 2022-03-23

## (undated) RX ORDER — ONDANSETRON 2 MG/ML
INJECTION INTRAMUSCULAR; INTRAVENOUS
Status: DISPENSED
Start: 2021-01-25

## (undated) RX ORDER — LIDOCAINE HYDROCHLORIDE 20 MG/ML
INJECTION, SOLUTION EPIDURAL; INFILTRATION; INTRACAUDAL; PERINEURAL
Status: DISPENSED
Start: 2020-12-31

## (undated) RX ORDER — LIDOCAINE HYDROCHLORIDE AND EPINEPHRINE 10; 10 MG/ML; UG/ML
INJECTION, SOLUTION INFILTRATION; PERINEURAL
Status: DISPENSED
Start: 2018-04-10

## (undated) RX ORDER — KETOROLAC TROMETHAMINE 30 MG/ML
INJECTION, SOLUTION INTRAMUSCULAR; INTRAVENOUS
Status: DISPENSED
Start: 2022-03-23

## (undated) RX ORDER — GABAPENTIN 300 MG/1
CAPSULE ORAL
Status: DISPENSED
Start: 2018-04-10

## (undated) RX ORDER — ONDANSETRON 2 MG/ML
INJECTION INTRAMUSCULAR; INTRAVENOUS
Status: DISPENSED
Start: 2022-03-23